# Patient Record
Sex: MALE | Race: WHITE | NOT HISPANIC OR LATINO | Employment: FULL TIME | URBAN - METROPOLITAN AREA
[De-identification: names, ages, dates, MRNs, and addresses within clinical notes are randomized per-mention and may not be internally consistent; named-entity substitution may affect disease eponyms.]

---

## 2019-07-25 ENCOUNTER — OFFICE VISIT (OUTPATIENT)
Dept: FAMILY MEDICINE CLINIC | Facility: CLINIC | Age: 49
End: 2019-07-25
Payer: COMMERCIAL

## 2019-07-25 VITALS
TEMPERATURE: 97.8 F | HEIGHT: 74 IN | OXYGEN SATURATION: 97 % | BODY MASS INDEX: 36.06 KG/M2 | SYSTOLIC BLOOD PRESSURE: 132 MMHG | DIASTOLIC BLOOD PRESSURE: 82 MMHG | WEIGHT: 281 LBS | HEART RATE: 68 BPM

## 2019-07-25 DIAGNOSIS — H61.23 BILATERAL IMPACTED CERUMEN: Primary | ICD-10-CM

## 2019-07-25 DIAGNOSIS — Z12.11 COLON CANCER SCREENING: ICD-10-CM

## 2019-07-25 DIAGNOSIS — Z00.00 WELL ADULT EXAM: ICD-10-CM

## 2019-07-25 PROCEDURE — 3008F BODY MASS INDEX DOCD: CPT | Performed by: FAMILY MEDICINE

## 2019-07-25 PROCEDURE — 99203 OFFICE O/P NEW LOW 30 MIN: CPT | Performed by: FAMILY MEDICINE

## 2019-07-25 PROCEDURE — 1036F TOBACCO NON-USER: CPT | Performed by: FAMILY MEDICINE

## 2019-07-25 PROCEDURE — 69210 REMOVE IMPACTED EAR WAX UNI: CPT | Performed by: FAMILY MEDICINE

## 2019-07-25 NOTE — PROGRESS NOTES
Ear cerumen removal  Date/Time: 7/25/2019 4:02 PM  Performed by: Kimo Low DO  Authorized by: Kimo Low DO     Patient location:  Clinic  Indications / Diagnosis:  Cerumen impaction, right ear  Other Assisting Provider: No    Consent:     Consent obtained:  Verbal    Consent given by:  Patient    Risks discussed:  Bleeding, dizziness, infection, incomplete removal, pain and TM perforation    Alternatives discussed:  Referral, observation, alternative treatment, delayed treatment and no treatment  Universal protocol:     Procedure explained and questions answered to patient or proxy's satisfaction: yes      Patient identity confirmed:  Verbally with patient  Procedure details:     Local anesthetic:  None    Location:  R ear    Procedure type: curette      Approach:  External  Post-procedure details:     Complication:  None    Hearing quality:  Improved    Patient tolerance of procedure:   Tolerated well, no immediate complications

## 2019-07-25 NOTE — PROGRESS NOTES
Ear cerumen removal  Date/Time: 7/25/2019 4:02 PM  Performed by: Tanya Osborne DO  Authorized by: Tanya Osborne DO     Patient location:  Clinic  Indications / Diagnosis:  Cerumen impaction, left ear  Other Assisting Provider: No    Consent:     Consent obtained:  Verbal    Consent given by:  Patient    Risks discussed:  Bleeding, dizziness, infection, incomplete removal, pain and TM perforation    Alternatives discussed:  No treatment, delayed treatment, alternative treatment, observation and referral  Universal protocol:     Procedure explained and questions answered to patient or proxy's satisfaction: yes      Patient identity confirmed:  Verbally with patient  Procedure details:     Local anesthetic:  None    Location:  L ear    Procedure type: curette      Approach:  External  Post-procedure details:     Complication:  None    Hearing quality:  Improved    Patient tolerance of procedure:   Tolerated well, no immediate complications

## 2019-07-25 NOTE — PROGRESS NOTES
Assessment/Plan:  Recommend follow-up with gastroenterologist for colon cancer screening  Bilateral ears disimpacted  Ears appear normal after disimpaction  Anticipatory guidance provided  Script given for blood testing including PSA  No problem-specific Assessment & Plan notes found for this encounter  Diagnoses and all orders for this visit:    Bilateral impacted cerumen  -     Ear cerumen removal  -     Ear cerumen removal    Well adult exam  -     PSA, Total Screen; Future  -     CBC and differential  -     Comprehensive metabolic panel  -     Lipid Panel with Direct LDL reflex    Colon cancer screening  -     Ambulatory referral to Gastroenterology; Future          Subjective:      Patient ID: Mercy Muir is a 46 y o  male  Patient has not been seen in several years  He has difficulty hearing out of both ears for the last 1 week  He states he has swimming and after swimming had difficulty hearing  He has history of previous bilateral cerumen impaction  Denies any ear pain  He otherwise is feeling well and tries to remain active  He is due for colonoscopy for colon cancer screening  The following portions of the patient's history were reviewed and updated as appropriate: allergies, current medications, past family history, past medical history, past social history, past surgical history and problem list     Review of Systems   Constitutional: Negative  HENT: Negative  Eyes: Negative  Respiratory: Negative  Cardiovascular: Negative  Gastrointestinal: Negative  Endocrine: Negative  Genitourinary: Negative  Musculoskeletal: Negative  Skin: Negative  Allergic/Immunologic: Negative  Neurological: Negative  Hematological: Negative  Psychiatric/Behavioral: Negative            Objective:      /82 (BP Location: Left arm, Patient Position: Sitting, Cuff Size: Standard)   Pulse 68   Temp 97 8 °F (36 6 °C) (Tympanic)   Ht 6' 2 02" (1 88 m)   Wt 127 kg (281 lb)   SpO2 97%   BMI 36 06 kg/m²          Physical Exam   Constitutional: He is oriented to person, place, and time  He appears well-developed and well-nourished  HENT:   Head: Normocephalic and atraumatic  Right Ear: External ear normal  Tympanic membrane is not erythematous and not bulging  Left Ear: External ear normal  Tympanic membrane is not erythematous and not bulging  Nose: Nose normal    Mouth/Throat: Oropharynx is clear and moist and mucous membranes are normal  No oral lesions  No oropharyngeal exudate  Bilateral ear canals initially impacted with cerumen  After disimpaction canals look clear tympanic membranes intact  Eyes: Conjunctivae and EOM are normal  Right eye exhibits no discharge  Left eye exhibits no discharge  No scleral icterus  Neck: Normal range of motion  Neck supple  No thyromegaly present  Cardiovascular: Normal rate, regular rhythm and normal heart sounds  Exam reveals no gallop and no friction rub  No murmur heard  Pulmonary/Chest: Effort normal  No respiratory distress  He has no wheezes  He has no rales  He exhibits no tenderness  Abdominal: Soft  Bowel sounds are normal  He exhibits no distension and no mass  There is no tenderness  There is no rebound and no guarding  Musculoskeletal: Normal range of motion  He exhibits no edema, tenderness or deformity  Lymphadenopathy:     He has no cervical adenopathy  Neurological: He is alert and oriented to person, place, and time  He has normal reflexes  No cranial nerve deficit  He exhibits normal muscle tone  Coordination normal    Skin: Skin is warm and dry  No rash noted  No erythema  No pallor  Psychiatric: He has a normal mood and affect  His behavior is normal    Vitals reviewed

## 2019-07-25 NOTE — PROGRESS NOTES
BMI Counseling: Body mass index is 36 06 kg/m²  Discussed the patient's BMI with him  The BMI is above average  BMI counseling and education was provided to the patient  Nutrition recommendations include reducing portion sizes

## 2019-09-26 ENCOUNTER — TELEPHONE (OUTPATIENT)
Dept: FAMILY MEDICINE CLINIC | Facility: CLINIC | Age: 49
End: 2019-09-26

## 2019-09-26 NOTE — TELEPHONE ENCOUNTER
LMOM advised patient to return call  Patient was due to  lab results  Does he still want to  results? If not please remove from done bin  Thanks!

## 2019-10-24 ENCOUNTER — ANESTHESIA EVENT (OUTPATIENT)
Dept: GASTROENTEROLOGY | Facility: HOSPITAL | Age: 49
End: 2019-10-24

## 2019-10-24 RX ORDER — SODIUM CHLORIDE, SODIUM LACTATE, POTASSIUM CHLORIDE, CALCIUM CHLORIDE 600; 310; 30; 20 MG/100ML; MG/100ML; MG/100ML; MG/100ML
125 INJECTION, SOLUTION INTRAVENOUS CONTINUOUS
Status: CANCELLED | OUTPATIENT
Start: 2019-10-24

## 2019-10-24 NOTE — ANESTHESIA PREPROCEDURE EVALUATION
Review of Systems/Medical History  Patient summary reviewed  Chart reviewed  No history of anesthetic complications     Cardiovascular  Exercise tolerance (METS): >4,     Pulmonary  No shortness of breath, No recent URI ,        GI/Hepatic    No GERD ,             Endo/Other    Obesity    GYN       Hematology   Musculoskeletal  Negative musculoskeletal ROS        Neurology  Negative neurology ROS      Psychology           Physical Exam    Airway    Mallampati score: II  TM Distance: >3 FB  Neck ROM: full     Dental   No notable dental hx     Cardiovascular  Rhythm: regular,     Pulmonary  Breath sounds clear to auscultation,     Other Findings        Anesthesia Plan  ASA Score- 1     Anesthesia Type- IV sedation with anesthesia with ASA Monitors  Additional Monitors:   Airway Plan:         Plan Factors-    Induction- intravenous  Postoperative Plan-     Informed Consent- Anesthetic plan and risks discussed with patient  I personally reviewed this patient with the CRNA  Discussed and agreed on the Anesthesia Plan with the CRNA  Yovanny Glynn

## 2019-10-25 ENCOUNTER — ANESTHESIA (OUTPATIENT)
Dept: GASTROENTEROLOGY | Facility: HOSPITAL | Age: 49
End: 2019-10-25

## 2019-10-25 ENCOUNTER — HOSPITAL ENCOUNTER (OUTPATIENT)
Dept: GASTROENTEROLOGY | Facility: HOSPITAL | Age: 49
Setting detail: OUTPATIENT SURGERY
Discharge: HOME | End: 2019-10-25
Attending: COLON & RECTAL SURGERY | Admitting: COLON & RECTAL SURGERY
Payer: COMMERCIAL

## 2019-10-25 VITALS
WEIGHT: 275 LBS | BODY MASS INDEX: 34.19 KG/M2 | SYSTOLIC BLOOD PRESSURE: 115 MMHG | RESPIRATION RATE: 18 BRPM | HEART RATE: 56 BPM | DIASTOLIC BLOOD PRESSURE: 81 MMHG | OXYGEN SATURATION: 98 % | TEMPERATURE: 97.8 F | HEIGHT: 75 IN

## 2019-10-25 DIAGNOSIS — Z12.11 SCREENING FOR COLON CANCER: ICD-10-CM

## 2019-10-25 PROCEDURE — 88305 TISSUE EXAM BY PATHOLOGIST: CPT | Performed by: PATHOLOGY

## 2019-10-25 PROCEDURE — 99243 OFF/OP CNSLTJ NEW/EST LOW 30: CPT | Performed by: COLON & RECTAL SURGERY

## 2019-10-25 PROCEDURE — 45385 COLONOSCOPY W/LESION REMOVAL: CPT | Performed by: COLON & RECTAL SURGERY

## 2019-10-25 PROCEDURE — 45382 COLONOSCOPY W/CONTROL BLEED: CPT | Performed by: COLON & RECTAL SURGERY

## 2019-10-25 PROCEDURE — 45380 COLONOSCOPY AND BIOPSY: CPT | Performed by: COLON & RECTAL SURGERY

## 2019-10-25 RX ORDER — SODIUM CHLORIDE 9 MG/ML
INJECTION, SOLUTION INTRAVENOUS CONTINUOUS PRN
Status: DISCONTINUED | OUTPATIENT
Start: 2019-10-25 | End: 2019-10-25 | Stop reason: SURG

## 2019-10-25 RX ORDER — PROPOFOL 10 MG/ML
INJECTION, EMULSION INTRAVENOUS AS NEEDED
Status: DISCONTINUED | OUTPATIENT
Start: 2019-10-25 | End: 2019-10-25 | Stop reason: SURG

## 2019-10-25 RX ADMIN — SODIUM CHLORIDE: 0.9 INJECTION, SOLUTION INTRAVENOUS at 09:57

## 2019-10-25 RX ADMIN — PROPOFOL 30 MG: 10 INJECTION, EMULSION INTRAVENOUS at 09:57

## 2019-10-25 RX ADMIN — SODIUM CHLORIDE: 0.9 INJECTION, SOLUTION INTRAVENOUS at 09:33

## 2019-10-25 RX ADMIN — PROPOFOL 40 MG: 10 INJECTION, EMULSION INTRAVENOUS at 09:43

## 2019-10-25 RX ADMIN — PROPOFOL 130 MG: 10 INJECTION, EMULSION INTRAVENOUS at 09:39

## 2019-10-25 RX ADMIN — PROPOFOL 30 MG: 10 INJECTION, EMULSION INTRAVENOUS at 09:49

## 2019-10-25 RX ADMIN — PROPOFOL 30 MG: 10 INJECTION, EMULSION INTRAVENOUS at 09:51

## 2019-10-25 RX ADMIN — PROPOFOL 30 MG: 10 INJECTION, EMULSION INTRAVENOUS at 09:54

## 2019-10-25 RX ADMIN — PROPOFOL 30 MG: 10 INJECTION, EMULSION INTRAVENOUS at 09:46

## 2019-10-25 NOTE — ANESTHESIA POSTPROCEDURE EVALUATION
Post-Op Assessment Note    CV Status:  Stable  Pain Score: 0    Pain management: adequate     Mental Status:  Alert and awake   Hydration Status:  Euvolemic   PONV Controlled:  Controlled   Airway Patency:  Patent   Post Op Vitals Reviewed: Yes      Staff: CRNA           /68 (10/25/19 1007)    Temp      Pulse 59 (10/25/19 1007)   Resp 16 (10/25/19 1007)    SpO2 96 % (10/25/19 1007)

## 2019-10-25 NOTE — H&P
History and Physical   Colon and Rectal Surgery   Bentley Valderrama 44 y o  male MRN: [id number]  Unit/Bed#:  Encounter: [id number]  10/25/19   8:41 AM      No chief complaint on file  History of Present Illness   HPI:  Bentley Valderrama is a 44 y o  male who presents for 1st screening colonoscopy, denies family history colon polyps or colon cancer  Denies nausea/vomiting/abdominal pain, change in bowel habits, rectal bleeding, or other constitutional symptoms  Historical Information   Past Medical History:   Diagnosis Date    Seasonal allergies      Past Surgical History:   Procedure Laterality Date    HEMORROIDECTOMY         Meds/Allergies       (Not in a hospital admission)    No current outpatient medications on file      No Known Allergies      Social History   Social History     Substance and Sexual Activity   Alcohol Use Yes    Comment: social     Social History     Substance and Sexual Activity   Drug Use Never     Social History     Tobacco Use   Smoking Status Never Smoker   Smokeless Tobacco Never Used         Family History:   Family History   Problem Relation Age of Onset    Cancer Mother     Cancer Father     Skin cancer Father     Breast cancer additional onset Sister     Cancer Sister     Skin cancer Brother          Objective     Current Vitals:   Blood Pressure: 133/82 (10/25/19 0823)  Pulse: (!) 54 (10/25/19 0823)  Temperature: 97 8 °F (36 6 °C) (10/25/19 0823)  Respirations: 16 (10/25/19 0823)  SpO2: 96 % (10/25/19 0823)  No intake or output data in the 24 hours ending 10/25/19 0841    Physical Exam:  General:no distress  Eyes:perrla/eomi  ENT:moist mucus membranes  Neck:supple  Pulm:no increased work of breathing, clear bilateral  CV:sinus  Abdomen:soft,nontender  Extremities:no edema        ASSESSMENT:    Average risk screening colonoscopy, none prior    Screening colonoscopy,discussed in a face-to-face, personal, informed consent process, the benefits, alternatives, risks including not limited to bleeding, missed lesion, perforation requiring emergent surgery discussed/understood        PLAN:  Colonoscopy

## 2019-10-25 NOTE — ANESTHESIA POSTPROCEDURE EVALUATION
Post-Op Assessment Note    CV Status:  Stable  Pain Score: 0    Pain management: adequate     Mental Status:  Alert and awake   Hydration Status:  Euvolemic   PONV Controlled:  Controlled   Airway Patency:  Patent   Post Op Vitals Reviewed: Yes      Staff: Anesthesiologist           /68 (10/25/19 1007)    Temp      Pulse 59 (10/25/19 1007)   Resp 16 (10/25/19 1007)    SpO2 96 % (10/25/19 1007)

## 2020-09-29 ENCOUNTER — CLINICAL SUPPORT (OUTPATIENT)
Dept: FAMILY MEDICINE CLINIC | Facility: CLINIC | Age: 50
End: 2020-09-29
Payer: COMMERCIAL

## 2020-09-29 DIAGNOSIS — Z23 NEED FOR INFLUENZA VACCINATION: Primary | ICD-10-CM

## 2020-09-29 PROCEDURE — 90682 RIV4 VACC RECOMBINANT DNA IM: CPT

## 2020-09-29 PROCEDURE — 90471 IMMUNIZATION ADMIN: CPT

## 2020-09-29 NOTE — PROGRESS NOTES
Patient in office for influenza vaccine  Patient tolerated well, no questions or concerns at this time

## 2021-02-05 ENCOUNTER — IMMUNIZATIONS (OUTPATIENT)
Dept: FAMILY MEDICINE CLINIC | Facility: HOSPITAL | Age: 51
End: 2021-02-05

## 2021-02-05 DIAGNOSIS — Z23 ENCOUNTER FOR IMMUNIZATION: Primary | ICD-10-CM

## 2021-02-05 PROCEDURE — 0011A SARS-COV-2 / COVID-19 MRNA VACCINE (MODERNA) 100 MCG: CPT

## 2021-02-05 PROCEDURE — 91301 SARS-COV-2 / COVID-19 MRNA VACCINE (MODERNA) 100 MCG: CPT

## 2021-03-04 ENCOUNTER — IMMUNIZATIONS (OUTPATIENT)
Dept: FAMILY MEDICINE CLINIC | Facility: HOSPITAL | Age: 51
End: 2021-03-04

## 2021-03-04 DIAGNOSIS — Z23 ENCOUNTER FOR IMMUNIZATION: Primary | ICD-10-CM

## 2021-03-04 PROCEDURE — 91301 SARS-COV-2 / COVID-19 MRNA VACCINE (MODERNA) 100 MCG: CPT

## 2021-03-04 PROCEDURE — 0012A SARS-COV-2 / COVID-19 MRNA VACCINE (MODERNA) 100 MCG: CPT

## 2021-04-30 ENCOUNTER — TELEPHONE (OUTPATIENT)
Dept: FAMILY MEDICINE CLINIC | Facility: CLINIC | Age: 51
End: 2021-04-30

## 2021-04-30 NOTE — TELEPHONE ENCOUNTER
We have not seen patient in 2 years  Recommend scheduling annual checkup and we can discuss further at that time

## 2021-05-07 ENCOUNTER — OFFICE VISIT (OUTPATIENT)
Dept: FAMILY MEDICINE CLINIC | Facility: CLINIC | Age: 51
End: 2021-05-07
Payer: COMMERCIAL

## 2021-05-07 VITALS
BODY MASS INDEX: 36.53 KG/M2 | HEART RATE: 70 BPM | OXYGEN SATURATION: 97 % | HEIGHT: 75 IN | SYSTOLIC BLOOD PRESSURE: 124 MMHG | DIASTOLIC BLOOD PRESSURE: 82 MMHG | WEIGHT: 293.8 LBS | TEMPERATURE: 97 F

## 2021-05-07 DIAGNOSIS — Z12.5 SCREENING FOR PROSTATE CANCER: ICD-10-CM

## 2021-05-07 DIAGNOSIS — G89.29 CHRONIC KNEE PAIN, UNSPECIFIED LATERALITY: ICD-10-CM

## 2021-05-07 DIAGNOSIS — M25.569 CHRONIC KNEE PAIN, UNSPECIFIED LATERALITY: ICD-10-CM

## 2021-05-07 DIAGNOSIS — E66.9 OBESITY (BMI 35.0-39.9 WITHOUT COMORBIDITY): ICD-10-CM

## 2021-05-07 DIAGNOSIS — Z00.00 WELL ADULT EXAM: Primary | ICD-10-CM

## 2021-05-07 PROCEDURE — 99396 PREV VISIT EST AGE 40-64: CPT | Performed by: FAMILY MEDICINE

## 2021-05-07 PROCEDURE — 3725F SCREEN DEPRESSION PERFORMED: CPT | Performed by: FAMILY MEDICINE

## 2021-05-07 PROCEDURE — 36415 COLL VENOUS BLD VENIPUNCTURE: CPT | Performed by: FAMILY MEDICINE

## 2021-05-07 NOTE — PROGRESS NOTES
BMI Counseling: Body mass index is 36 72 kg/m²  The BMI is above normal  Nutrition recommendations include reducing portion sizes

## 2021-05-07 NOTE — PROGRESS NOTES
Assessment/Plan: card given for Orthopedics  He may have just a mild tendinitis to the lateral aspect of the knee  We offered to drain sebaceous cyst but he prefers to see dermatologist for this  He will schedule  Recommend screening blood testing  He needs a sleep study done for work  He states that for his [employer] physical his BMI triggered a need to get sleep study done  He denies any daytime hypersomnolence  Order provided  1  Well adult exam  -     CBC and differential  -     Comprehensive metabolic panel  -     Lipid Panel with Direct LDL reflex    2  Chronic knee pain, unspecified laterality    3  Obesity (BMI 35 0-39 9 without comorbidity)  -     Diagnostic Sleep Study; Future; Expected date: 05/08/2021  -     CBC and differential  -     Comprehensive metabolic panel  -     Lipid Panel with Direct LDL reflex    4  Screening for prostate cancer  -     PSA, Total Screen          Subjective:      Patient ID: Za Pack is a 44 y o  male  HPI         The following portions of the patient's history were reviewed and updated as appropriate: allergies, current medications, past family history, past medical history, past social history, past surgical history, and problem list     Review of Systems   Constitutional: Negative  HENT: Negative  Eyes: Negative  Respiratory: Negative  Cardiovascular: Negative  Gastrointestinal: Negative  Endocrine: Negative  Genitourinary: Negative  Musculoskeletal: Positive for arthralgias  Skin: Negative  Allergic/Immunologic: Negative  Neurological: Negative  Hematological: Negative  Psychiatric/Behavioral: Negative  Objective:      /82 (BP Location: Left arm, Patient Position: Sitting, Cuff Size: Standard)   Pulse 70   Temp (!) 97 °F (36 1 °C) (Temporal)   Ht 6' 3" (1 905 m)   Wt 133 kg (293 lb 12 8 oz)   SpO2 97%   BMI 36 72 kg/m²          Physical Exam  Vitals signs reviewed     Constitutional:       Appearance: He is well-developed  HENT:      Head: Normocephalic and atraumatic  Right Ear: External ear normal  Tympanic membrane is not erythematous or bulging  Left Ear: External ear normal  Tympanic membrane is not erythematous or bulging  Nose: Nose normal       Mouth/Throat:      Mouth: No oral lesions  Pharynx: No oropharyngeal exudate  Eyes:      General: No scleral icterus  Right eye: No discharge  Left eye: No discharge  Conjunctiva/sclera: Conjunctivae normal    Neck:      Musculoskeletal: Normal range of motion and neck supple  Thyroid: No thyromegaly  Cardiovascular:      Rate and Rhythm: Normal rate and regular rhythm  Heart sounds: Normal heart sounds  No murmur  No friction rub  No gallop  Pulmonary:      Effort: Pulmonary effort is normal  No respiratory distress  Breath sounds: No wheezing or rales  Chest:      Chest wall: No tenderness  Abdominal:      General: Bowel sounds are normal  There is no distension  Palpations: Abdomen is soft  There is no mass  Tenderness: There is no abdominal tenderness  There is no guarding or rebound  Musculoskeletal: Normal range of motion  General: No tenderness or deformity  Comments: Full range of motion to the right knee  No patellar tracking  No edema  Negative anterior drawer sign  Lymphadenopathy:      Cervical: No cervical adenopathy  Skin:     General: Skin is warm and dry  Coloration: Skin is not pale  Findings: No erythema or rash  Comments: Sebaceous cyst present to the back  Neurological:      Mental Status: He is alert and oriented to person, place, and time  Cranial Nerves: No cranial nerve deficit  Motor: No abnormal muscle tone  Coordination: Coordination normal       Deep Tendon Reflexes: Reflexes are normal and symmetric     Psychiatric:         Behavior: Behavior normal

## 2021-05-08 LAB
ALBUMIN SERPL-MCNC: 4.3 G/DL (ref 3.6–5.1)
ALBUMIN/GLOB SERPL: 1.6 (CALC) (ref 1–2.5)
ALP SERPL-CCNC: 57 U/L (ref 35–144)
ALT SERPL-CCNC: 18 U/L (ref 9–46)
AST SERPL-CCNC: 19 U/L (ref 10–35)
BASOPHILS # BLD AUTO: 31 CELLS/UL (ref 0–200)
BASOPHILS NFR BLD AUTO: 0.6 %
BILIRUB SERPL-MCNC: 1.3 MG/DL (ref 0.2–1.2)
BUN SERPL-MCNC: 21 MG/DL (ref 7–25)
BUN/CREAT SERPL: ABNORMAL (CALC) (ref 6–22)
CALCIUM SERPL-MCNC: 9.1 MG/DL (ref 8.6–10.3)
CHLORIDE SERPL-SCNC: 106 MMOL/L (ref 98–110)
CHOLEST SERPL-MCNC: 173 MG/DL
CHOLEST/HDLC SERPL: 4.2 (CALC)
CO2 SERPL-SCNC: 26 MMOL/L (ref 20–32)
CREAT SERPL-MCNC: 0.89 MG/DL (ref 0.7–1.33)
EOSINOPHIL # BLD AUTO: 109 CELLS/UL (ref 15–500)
EOSINOPHIL NFR BLD AUTO: 2.1 %
ERYTHROCYTE [DISTWIDTH] IN BLOOD BY AUTOMATED COUNT: 12.5 % (ref 11–15)
GLOBULIN SER CALC-MCNC: 2.7 G/DL (CALC) (ref 1.9–3.7)
GLUCOSE SERPL-MCNC: 85 MG/DL (ref 65–99)
HCT VFR BLD AUTO: 47.3 % (ref 38.5–50)
HDLC SERPL-MCNC: 41 MG/DL
HGB BLD-MCNC: 16.5 G/DL (ref 13.2–17.1)
LDLC SERPL CALC-MCNC: 117 MG/DL (CALC)
LYMPHOCYTES # BLD AUTO: 1326 CELLS/UL (ref 850–3900)
LYMPHOCYTES NFR BLD AUTO: 25.5 %
MCH RBC QN AUTO: 32.8 PG (ref 27–33)
MCHC RBC AUTO-ENTMCNC: 34.9 G/DL (ref 32–36)
MCV RBC AUTO: 94 FL (ref 80–100)
MONOCYTES # BLD AUTO: 614 CELLS/UL (ref 200–950)
MONOCYTES NFR BLD AUTO: 11.8 %
NEUTROPHILS # BLD AUTO: 3120 CELLS/UL (ref 1500–7800)
NEUTROPHILS NFR BLD AUTO: 60 %
NONHDLC SERPL-MCNC: 132 MG/DL (CALC)
PLATELET # BLD AUTO: 257 THOUSAND/UL (ref 140–400)
PMV BLD REES-ECKER: 9.7 FL (ref 7.5–12.5)
POTASSIUM SERPL-SCNC: 3.9 MMOL/L (ref 3.5–5.3)
PROT SERPL-MCNC: 7 G/DL (ref 6.1–8.1)
PSA SERPL-MCNC: 0.6 NG/ML
RBC # BLD AUTO: 5.03 MILLION/UL (ref 4.2–5.8)
SL AMB EGFR AFRICAN AMERICAN: 114 ML/MIN/1.73M2
SL AMB EGFR NON AFRICAN AMERICAN: 98 ML/MIN/1.73M2
SODIUM SERPL-SCNC: 140 MMOL/L (ref 135–146)
TRIGL SERPL-MCNC: 56 MG/DL
WBC # BLD AUTO: 5.2 THOUSAND/UL (ref 3.8–10.8)

## 2021-06-08 ENCOUNTER — APPOINTMENT (OUTPATIENT)
Dept: RADIOLOGY | Facility: MEDICAL CENTER | Age: 51
End: 2021-06-08
Payer: COMMERCIAL

## 2021-06-08 ENCOUNTER — OFFICE VISIT (OUTPATIENT)
Dept: OBGYN CLINIC | Facility: MEDICAL CENTER | Age: 51
End: 2021-06-08
Payer: COMMERCIAL

## 2021-06-08 VITALS
SYSTOLIC BLOOD PRESSURE: 135 MMHG | HEIGHT: 75 IN | WEIGHT: 293 LBS | BODY MASS INDEX: 36.43 KG/M2 | HEART RATE: 65 BPM | DIASTOLIC BLOOD PRESSURE: 87 MMHG

## 2021-06-08 DIAGNOSIS — M25.561 RIGHT KNEE PAIN, UNSPECIFIED CHRONICITY: ICD-10-CM

## 2021-06-08 DIAGNOSIS — Z01.89 ENCOUNTER FOR LOWER EXTREMITY COMPARISON IMAGING STUDY: ICD-10-CM

## 2021-06-08 DIAGNOSIS — M25.561 RIGHT KNEE PAIN, UNSPECIFIED CHRONICITY: Primary | ICD-10-CM

## 2021-06-08 PROCEDURE — 99203 OFFICE O/P NEW LOW 30 MIN: CPT | Performed by: ORTHOPAEDIC SURGERY

## 2021-06-08 PROCEDURE — 73564 X-RAY EXAM KNEE 4 OR MORE: CPT

## 2021-06-08 NOTE — PROGRESS NOTES
Orthopaedic Surgery - Office Note  Anu Bernabe (54 y o  male)   : 1970   MRN: [id number]  Encounter Date: 2021    Chief Complaint   Patient presents with    Right Knee - Pain       Assessment / Plan  Right knee concern for lateral meniscus tear    · MRI of right knee   · Activity as tolerated  · Continue with Tylenol and Voltaren gel prn   Return for after MRI for results  History of Present Illness  Anu Bernabe is a 44 y o  male who presents for evaluation of right knee pain  He had ongoing knee pain for a few months followed by a twisting injury in 2021  Since then he has experience increase pain associated with locking and clicking  He gets locking with prolonged sitting and walking  He describes the pain as being lateral  This has also caused swelling around the knee  He has been using ice, voltaren gel and Tylenol for pain management  He is active and a , this pain and discomfort which requires activity modification  He denies any radiating symptoms  Review of Systems  Pertinent items are noted in HPI  All other systems were reviewed and are negative  Physical Exam  /87   Pulse 65   Ht 6' 3" (1 905 m)   Wt 133 kg (293 lb)   BMI 36 62 kg/m²   Cons: Appears well  No apparent distress  Psych: Alert  Oriented x3  Mood and affect normal   Eyes: PERRLA, EOMI  Resp: Normal effort  No audible wheezing or stridor  CV: Palpable pulse  No discernable arrhythmia  No LE edema  Lymph:  No palpable cervical, axillary, or inguinal lymphadenopathy  Skin: Warm  No palpable masses  No visible lesions  Neuro: Normal muscle tone  Normal and symmetric DTR's  Right Knee Exam  Alignment:  Normal knee alignment  Inspection:  No swelling  No erythema  No ecchymosis  Palpation:  moderate lateral joint line tenderness  moderate effusion  ROM:  Knee Extension 0  Knee Flexion 125  Strength:  Able to SLR without lag   Able to actively extend knee against gravity  Stability:  (-) Lachman  Tests:  (+) Que  (+) Bounce test   Patella:  Normal patellar mobility  Neurovascular:  Sensation intact in DP/SP/Guzman/Sa/T nerve distributions  2+ DP & PT pulses  Gait:  Normal     Studies Reviewed  I have personally reviewed pertinent films in PACS  XR of right knee - images from 6/08/2021 which demonstrate no osseous abnormalities or fractures     Procedures  No procedures today  Medical, Surgical, Family, and Social History  The patient's medical history, family history, and social history, were reviewed and updated as appropriate  Past Medical History:   Diagnosis Date    Seasonal allergies        Past Surgical History:   Procedure Laterality Date    HEMORROIDECTOMY         Family History   Problem Relation Age of Onset    Cancer Mother     Cancer Father     Skin cancer Father     Breast cancer additional onset Sister    [facility] Cancer Sister     Skin cancer Brother        Social History     Occupational History    Not on file   Tobacco Use    Smoking status: Never Smoker    Smokeless tobacco: Never Used   Substance and Sexual Activity    Alcohol use: Yes     Comment: socially     Drug use: Never    Sexual activity: Not on file       No Known Allergies    No current outpatient medications on file        Nury Urbano    Scribe Attestation    I,:  Nury Urbano am acting as a scribe while in the presence of the attending physician :       I,:  Sharlotte Castleman, MD personally performed the services described in this documentation    as scribed in my presence :

## 2021-06-12 ENCOUNTER — HOSPITAL ENCOUNTER (OUTPATIENT)
Dept: MRI IMAGING | Facility: HOSPITAL | Age: 51
Discharge: HOME | End: 2021-06-12
Attending: ORTHOPAEDIC SURGERY
Payer: COMMERCIAL

## 2021-06-12 DIAGNOSIS — M25.561 RIGHT KNEE PAIN, UNSPECIFIED CHRONICITY: ICD-10-CM

## 2021-06-12 PROCEDURE — G1004 CDSM NDSC: HCPCS

## 2021-06-12 PROCEDURE — 73721 MRI JNT OF LWR EXTRE W/O DYE: CPT

## 2021-06-18 VITALS
SYSTOLIC BLOOD PRESSURE: 154 MMHG | BODY MASS INDEX: 36.43 KG/M2 | HEIGHT: 75 IN | WEIGHT: 293 LBS | DIASTOLIC BLOOD PRESSURE: 95 MMHG | HEART RATE: 68 BPM

## 2021-06-18 DIAGNOSIS — S83.271S COMPLEX TEAR OF LATERAL MENISCUS OF RIGHT KNEE, UNSPECIFIED WHETHER OLD OR CURRENT TEAR, SEQUELA: Primary | ICD-10-CM

## 2021-06-18 DIAGNOSIS — M25.561 RIGHT KNEE PAIN, UNSPECIFIED CHRONICITY: ICD-10-CM

## 2021-06-18 PROCEDURE — 99213 OFFICE O/P EST LOW 20 MIN: CPT | Performed by: ORTHOPAEDIC SURGERY

## 2021-06-18 PROCEDURE — 1036F TOBACCO NON-USER: CPT | Performed by: ORTHOPAEDIC SURGERY

## 2021-06-18 PROCEDURE — 3008F BODY MASS INDEX DOCD: CPT | Performed by: ORTHOPAEDIC SURGERY

## 2021-06-18 RX ORDER — LORATADINE 10 MG/1
10 TABLET ORAL DAILY
COMMUNITY

## 2021-06-18 RX ORDER — CEFAZOLIN SODIUM 2 G/50ML
2000 SOLUTION INTRAVENOUS ONCE
Status: CANCELLED | OUTPATIENT
Start: 2021-07-19 | End: 2021-06-18

## 2021-06-18 RX ORDER — ACETAMINOPHEN 500 MG
500 TABLET ORAL EVERY 6 HOURS PRN
COMMUNITY

## 2021-06-18 NOTE — PROGRESS NOTES
Orthopaedic Surgery - Office Note  Benny Prakash (20 y o  male)   : 1970   MRN: [id number]  Encounter Date: 2021    Chief Complaint   Patient presents with    Right Knee - Follow-up       Assessment / Plan  Right knee lateral meniscus tear    · The diagnosis and treatment options were reviewed  · The patient wishes to proceed with right knee arthroscopy with lateral meniscus repair vs menisectomy  · The risks, benefits, and alternatives were discussed  · Written consent was obtained  · Continue with ice, anti-inflammatories and tylenol prn pain  Return for 5-7 days after surgery  History of Present Illness  Benny Prakash is a 44 y o  male who presents for follow up right knee pain and MRI results  He had ongoing knee pain for a few months followed by a twisting injury in 2021  Since then he has experience increase pain associated with locking and clicking  He gets locking with prolonged sitting and walking  He describes the pain as being lateral  This has also caused swelling around the knee  He has been using ice, voltaren gel and Tylenol for pain management  He is active and a , this pain and discomfort which requires activity modification  He denies any radiating symptoms  There were no changes since his prior visit  Review of Systems  Pertinent items are noted in HPI  All other systems were reviewed and are negative  Physical Exam  /95   Pulse 68   Ht 6' 3" (1 905 m)   Wt 133 kg (293 lb)   BMI 36 62 kg/m²   Cons: Appears well  No apparent distress  Psych: Alert  Oriented x3  Mood and affect normal   Eyes: PERRLA, EOMI  Resp: Normal effort  No audible wheezing or stridor  CV: Palpable pulse  No discernable arrhythmia  No LE edema  Lymph:  No palpable cervical, axillary, or inguinal lymphadenopathy  Skin: Warm  No palpable masses  No visible lesions  Neuro: Normal muscle tone  Normal and symmetric DTR's       Right Knee Exam  Alignment:  Normal knee alignment  Inspection:  No edema  No erythema  No ecchymosis  Palpation:  moderate lateral joint line tenderness  mild effusion  ROM:  Knee Extension 0  Knee Flexion 125  Strength:  Quadriceps 5/5  Hamstrings 5/5  Stability:  (-) Lachman  Tests:  (+) Qeu  (+) Bounce test   Patella:  Normal patellar mobility  Neurovascular:  Sensation intact in DP/SP/Guzman/Sa/T nerve distributions  2+ DP & PT pulses  Gait:  Normal     Studies Reviewed  I have personally reviewed pertinent films in PACS  MRI of right knee - images from 6/12/2021 which show longitudinal lateral meniscus tear  XR of right knee - images from 6/08/2021 which demonstrate no osseous abnormalities or fractures     Procedures  No procedures today  Medical, Surgical, Family, and Social History  The patient's medical history, family history, and social history, were reviewed and updated as appropriate      Past Medical History:   Diagnosis Date    Seasonal allergies        Past Surgical History:   Procedure Laterality Date    HEMORROIDECTOMY         Family History   Problem Relation Age of Onset    Cancer Mother     Cancer Father     Skin cancer Father     Breast cancer additional onset Sister    [facility] Courser Cancer Sister     Skin cancer Brother        Social History     Occupational History    Not on file   Tobacco Use    Smoking status: Never Smoker    Smokeless tobacco: Never Used   Vaping Use    Vaping Use: Never used   Substance and Sexual Activity    Alcohol use: Yes     Comment: socially     Drug use: Never    Sexual activity: Not on file       No Known Allergies      Current Outpatient Medications:     acetaminophen (TYLENOL) 500 mg tablet, Take 500 mg by mouth every 6 (six) hours as needed for mild pain, Disp: , Rfl:     loratadine (CLARITIN) 10 mg tablet, Take 10 mg by mouth daily, Disp: , Rfl:       [employer]    I,:  Jose Mao am acting as a scribe while in the presence of the attending physician : I,:  Jacob Teixeira MD personally performed the services described in this documentation    as scribed in my presence :

## 2021-06-21 ENCOUNTER — OFFICE VISIT (OUTPATIENT)
Dept: FAMILY MEDICINE CLINIC | Facility: CLINIC | Age: 51
End: 2021-06-21
Payer: COMMERCIAL

## 2021-06-21 VITALS
DIASTOLIC BLOOD PRESSURE: 88 MMHG | TEMPERATURE: 97.7 F | OXYGEN SATURATION: 97 % | HEART RATE: 78 BPM | SYSTOLIC BLOOD PRESSURE: 130 MMHG | HEIGHT: 75 IN | BODY MASS INDEX: 36.7 KG/M2 | WEIGHT: 295.2 LBS

## 2021-06-21 DIAGNOSIS — J01.00 ACUTE NON-RECURRENT MAXILLARY SINUSITIS: Primary | ICD-10-CM

## 2021-06-21 PROCEDURE — U0005 INFEC AGEN DETEC AMPLI PROBE: HCPCS | Performed by: FAMILY MEDICINE

## 2021-06-21 PROCEDURE — 3008F BODY MASS INDEX DOCD: CPT | Performed by: FAMILY MEDICINE

## 2021-06-21 PROCEDURE — U0003 INFECTIOUS AGENT DETECTION BY NUCLEIC ACID (DNA OR RNA); SEVERE ACUTE RESPIRATORY SYNDROME CORONAVIRUS 2 (SARS-COV-2) (CORONAVIRUS DISEASE [COVID-19]), AMPLIFIED PROBE TECHNIQUE, MAKING USE OF HIGH THROUGHPUT TECHNOLOGIES AS DESCRIBED BY CMS-2020-01-R: HCPCS | Performed by: FAMILY MEDICINE

## 2021-06-21 PROCEDURE — 1036F TOBACCO NON-USER: CPT | Performed by: FAMILY MEDICINE

## 2021-06-21 PROCEDURE — 99213 OFFICE O/P EST LOW 20 MIN: CPT | Performed by: FAMILY MEDICINE

## 2021-06-21 RX ORDER — AMOXICILLIN AND CLAVULANATE POTASSIUM 875; 125 MG/1; MG/1
1 TABLET, FILM COATED ORAL EVERY 12 HOURS SCHEDULED
Qty: 14 TABLET | Refills: 0 | Status: SHIPPED | OUTPATIENT
Start: 2021-06-21 | End: 2021-06-28

## 2021-06-21 RX ORDER — IPRATROPIUM BROMIDE 21 UG/1
2 SPRAY, METERED NASAL EVERY 12 HOURS
Qty: 30 ML | Refills: 0 | Status: SHIPPED | OUTPATIENT
Start: 2021-06-21

## 2021-06-21 NOTE — H&P (VIEW-ONLY)
Assessment/Plan:  Medication reviewed  Recommend return to office if no improvement or worsening symptoms  1  Acute non-recurrent maxillary sinusitis  -     Novel Coronavirus (Covid-19),PCR [facility] - Collected in Office  -     amoxicillin-clavulanate (AUGMENTIN) 875-125 mg per tablet; Take 1 tablet by mouth every 12 (twelve) hours for 7 days  -     ipratropium (ATROVENT) 0 03 % nasal spray; 2 sprays into each nostril every 12 (twelve) hours          Subjective:      Patient ID: Morena Harper is a 44 y o  male  Patient with cough and congestion over the last several days  He has been immunized against COVID  No fevers  The following portions of the patient's history were reviewed and updated as appropriate: allergies, current medications, past family history, past medical history, past social history, past surgical history, and problem list     Review of Systems   Constitutional: Negative  Negative for fever  HENT: Positive for congestion  Eyes: Negative  Respiratory: Negative for cough  Cardiovascular: Negative  Gastrointestinal: Negative  Endocrine: Negative  Genitourinary: Negative  Musculoskeletal: Negative  Skin: Negative  Allergic/Immunologic: Negative  Neurological: Negative  Hematological: Negative  Psychiatric/Behavioral: Negative  Objective:      /88 (BP Location: Left arm, Patient Position: Sitting, Cuff Size: Standard)   Pulse 78   Temp 97 7 °F (36 5 °C) (Temporal)   Ht 6' 3" (1 905 m)   Wt 134 kg (295 lb 3 2 oz)   SpO2 97%   BMI 36 90 kg/m²          Physical Exam  Vitals reviewed  Constitutional:       Appearance: He is well-developed  HENT:      Head: Normocephalic and atraumatic  Right Ear: External ear normal  Tympanic membrane is not erythematous or bulging  Left Ear: External ear normal  Tympanic membrane is not erythematous or bulging  Nose: Nose normal       Mouth/Throat:      Mouth: No oral lesions  Pharynx: No oropharyngeal exudate  Eyes:      General: No scleral icterus  Right eye: No discharge  Left eye: No discharge  Conjunctiva/sclera: Conjunctivae normal    Neck:      Thyroid: No thyromegaly  Cardiovascular:      Rate and Rhythm: Normal rate and regular rhythm  Heart sounds: Normal heart sounds  No murmur heard  No friction rub  No gallop  Pulmonary:      Effort: Pulmonary effort is normal  No respiratory distress  Breath sounds: No wheezing or rales  Chest:      Chest wall: No tenderness  Abdominal:      General: Bowel sounds are normal  There is no distension  Palpations: Abdomen is soft  There is no mass  Tenderness: There is no abdominal tenderness  There is no guarding or rebound  Musculoskeletal:         General: No tenderness or deformity  Normal range of motion  Cervical back: Normal range of motion and neck supple  Lymphadenopathy:      Cervical: No cervical adenopathy  Skin:     General: Skin is warm and dry  Coloration: Skin is not pale  Findings: No erythema or rash  Neurological:      Mental Status: He is alert and oriented to person, place, and time  Cranial Nerves: No cranial nerve deficit  Motor: No abnormal muscle tone  Coordination: Coordination normal       Deep Tendon Reflexes: Reflexes are normal and symmetric     Psychiatric:         Behavior: Behavior normal

## 2021-06-21 NOTE — PROGRESS NOTES
Assessment/Plan:  Medication reviewed  Recommend return to office if no improvement or worsening symptoms  1  Acute non-recurrent maxillary sinusitis  -     Novel Coronavirus (Covid-19),PCR [facility] - Collected in Office  -     amoxicillin-clavulanate (AUGMENTIN) 875-125 mg per tablet; Take 1 tablet by mouth every 12 (twelve) hours for 7 days  -     ipratropium (ATROVENT) 0 03 % nasal spray; 2 sprays into each nostril every 12 (twelve) hours          Subjective:      Patient ID: Anu Bernabe is a 44 y o  male  Patient with cough and congestion over the last several days  He has been immunized against COVID  No fevers  The following portions of the patient's history were reviewed and updated as appropriate: allergies, current medications, past family history, past medical history, past social history, past surgical history, and problem list     Review of Systems   Constitutional: Negative  Negative for fever  HENT: Positive for congestion  Eyes: Negative  Respiratory: Negative for cough  Cardiovascular: Negative  Gastrointestinal: Negative  Endocrine: Negative  Genitourinary: Negative  Musculoskeletal: Negative  Skin: Negative  Allergic/Immunologic: Negative  Neurological: Negative  Hematological: Negative  Psychiatric/Behavioral: Negative  Objective:      /88 (BP Location: Left arm, Patient Position: Sitting, Cuff Size: Standard)   Pulse 78   Temp 97 7 °F (36 5 °C) (Temporal)   Ht 6' 3" (1 905 m)   Wt 134 kg (295 lb 3 2 oz)   SpO2 97%   BMI 36 90 kg/m²          Physical Exam  Vitals reviewed  Constitutional:       Appearance: He is well-developed  HENT:      Head: Normocephalic and atraumatic  Right Ear: External ear normal  Tympanic membrane is not erythematous or bulging  Left Ear: External ear normal  Tympanic membrane is not erythematous or bulging  Nose: Nose normal       Mouth/Throat:      Mouth: No oral lesions  Pharynx: No oropharyngeal exudate  Eyes:      General: No scleral icterus  Right eye: No discharge  Left eye: No discharge  Conjunctiva/sclera: Conjunctivae normal    Neck:      Thyroid: No thyromegaly  Cardiovascular:      Rate and Rhythm: Normal rate and regular rhythm  Heart sounds: Normal heart sounds  No murmur heard  No friction rub  No gallop  Pulmonary:      Effort: Pulmonary effort is normal  No respiratory distress  Breath sounds: No wheezing or rales  Chest:      Chest wall: No tenderness  Abdominal:      General: Bowel sounds are normal  There is no distension  Palpations: Abdomen is soft  There is no mass  Tenderness: There is no abdominal tenderness  There is no guarding or rebound  Musculoskeletal:         General: No tenderness or deformity  Normal range of motion  Cervical back: Normal range of motion and neck supple  Lymphadenopathy:      Cervical: No cervical adenopathy  Skin:     General: Skin is warm and dry  Coloration: Skin is not pale  Findings: No erythema or rash  Neurological:      Mental Status: He is alert and oriented to person, place, and time  Cranial Nerves: No cranial nerve deficit  Motor: No abnormal muscle tone  Coordination: Coordination normal       Deep Tendon Reflexes: Reflexes are normal and symmetric     Psychiatric:         Behavior: Behavior normal

## 2021-06-22 ENCOUNTER — TELEPHONE (OUTPATIENT)
Dept: OBGYN CLINIC | Facility: MEDICAL CENTER | Age: 51
End: 2021-06-22

## 2021-06-22 LAB — SARS-COV-2 RNA RESP QL NAA+PROBE: NEGATIVE

## 2021-06-22 NOTE — TELEPHONE ENCOUNTER
Called patient to discuss his visit yesterday with family doctor for sinusitis  He was placed on an antibiotic and had Covid test  No other PAT's or medical clearance will be required from ortho  He will followup with fam doctor if he does not recover completely from his sinus issue

## 2021-07-08 NOTE — PRE-PROCEDURE INSTRUCTIONS
Pre-Surgery Instructions:   Medication Instructions    acetaminophen (TYLENOL) 500 mg tablet Instructed patient per Anesthesia Guidelines   ipratropium (ATROVENT) 0 03 % nasal spray Instructed patient per Anesthesia Guidelines   loratadine (CLARITIN) 10 mg tablet Instructed patient per Anesthesia Guidelines  Instructed may use nasal spray prn the morning of surgery  No aspirin, NSAIDs, vitamins, or supplements 1 week before surgery

## 2021-07-13 DIAGNOSIS — J01.00 ACUTE NON-RECURRENT MAXILLARY SINUSITIS: ICD-10-CM

## 2021-07-13 RX ORDER — IPRATROPIUM BROMIDE 21 UG/1
2 SPRAY, METERED NASAL EVERY 12 HOURS
OUTPATIENT
Start: 2021-07-13

## 2021-07-16 ENCOUNTER — ANESTHESIA EVENT (OUTPATIENT)
Dept: PERIOP | Facility: HOSPITAL | Age: 51
End: 2021-07-16
Payer: COMMERCIAL

## 2021-07-19 ENCOUNTER — HOSPITAL ENCOUNTER (OUTPATIENT)
Facility: HOSPITAL | Age: 51
Setting detail: OUTPATIENT SURGERY
Discharge: HOME | End: 2021-07-19
Attending: ORTHOPAEDIC SURGERY | Admitting: ORTHOPAEDIC SURGERY
Payer: COMMERCIAL

## 2021-07-19 ENCOUNTER — ANESTHESIA (OUTPATIENT)
Dept: PERIOP | Facility: HOSPITAL | Age: 51
End: 2021-07-19
Payer: COMMERCIAL

## 2021-07-19 VITALS
TEMPERATURE: 97.8 F | SYSTOLIC BLOOD PRESSURE: 127 MMHG | DIASTOLIC BLOOD PRESSURE: 72 MMHG | OXYGEN SATURATION: 96 % | HEIGHT: 75 IN | BODY MASS INDEX: 36.68 KG/M2 | HEART RATE: 59 BPM | WEIGHT: 295 LBS | RESPIRATION RATE: 14 BRPM

## 2021-07-19 DIAGNOSIS — S83.241A ACUTE MEDIAL MENISCUS TEAR OF RIGHT KNEE, INITIAL ENCOUNTER: Primary | ICD-10-CM

## 2021-07-19 PROCEDURE — 29881 ARTHRS KNE SRG MNISECTMY M/L: CPT | Performed by: PHYSICIAN ASSISTANT

## 2021-07-19 PROCEDURE — 29881 ARTHRS KNE SRG MNISECTMY M/L: CPT | Performed by: ORTHOPAEDIC SURGERY

## 2021-07-19 RX ORDER — FENTANYL CITRATE 50 UG/ML
INJECTION, SOLUTION INTRAMUSCULAR; INTRAVENOUS AS NEEDED
Status: DISCONTINUED | OUTPATIENT
Start: 2021-07-19 | End: 2021-07-19

## 2021-07-19 RX ORDER — OXYCODONE HYDROCHLORIDE 5 MG/1
5 TABLET ORAL EVERY 4 HOURS PRN
Status: DISCONTINUED | OUTPATIENT
Start: 2021-07-19 | End: 2021-07-19 | Stop reason: HOSPADM

## 2021-07-19 RX ORDER — FENTANYL CITRATE/PF 50 MCG/ML
25 SYRINGE (ML) INJECTION
Status: DISCONTINUED | OUTPATIENT
Start: 2021-07-19 | End: 2021-07-19 | Stop reason: HOSPADM

## 2021-07-19 RX ORDER — KETOROLAC TROMETHAMINE 30 MG/ML
INJECTION, SOLUTION INTRAMUSCULAR; INTRAVENOUS AS NEEDED
Status: DISCONTINUED | OUTPATIENT
Start: 2021-07-19 | End: 2021-07-19

## 2021-07-19 RX ORDER — SODIUM CHLORIDE 9 MG/ML
125 INJECTION, SOLUTION INTRAVENOUS CONTINUOUS
Status: DISCONTINUED | OUTPATIENT
Start: 2021-07-19 | End: 2021-07-19 | Stop reason: HOSPADM

## 2021-07-19 RX ORDER — ROPIVACAINE HYDROCHLORIDE 5 MG/ML
INJECTION, SOLUTION EPIDURAL; INFILTRATION; PERINEURAL
Status: COMPLETED | OUTPATIENT
Start: 2021-07-19 | End: 2021-07-19

## 2021-07-19 RX ORDER — ASPIRIN 325 MG
325 TABLET, DELAYED RELEASE (ENTERIC COATED) ORAL 2 TIMES DAILY
Qty: 28 TABLET | Refills: 0 | Status: SHIPPED | OUTPATIENT
Start: 2021-07-19 | End: 2021-08-02

## 2021-07-19 RX ORDER — OXYCODONE HYDROCHLORIDE 5 MG/1
5 TABLET ORAL EVERY 4 HOURS PRN
Qty: 30 TABLET | Refills: 0 | Status: SHIPPED | OUTPATIENT
Start: 2021-07-19 | End: 2021-07-29

## 2021-07-19 RX ORDER — MIDAZOLAM HYDROCHLORIDE 2 MG/2ML
INJECTION, SOLUTION INTRAMUSCULAR; INTRAVENOUS AS NEEDED
Status: DISCONTINUED | OUTPATIENT
Start: 2021-07-19 | End: 2021-07-19

## 2021-07-19 RX ORDER — PROPOFOL 10 MG/ML
INJECTION, EMULSION INTRAVENOUS AS NEEDED
Status: DISCONTINUED | OUTPATIENT
Start: 2021-07-19 | End: 2021-07-19

## 2021-07-19 RX ORDER — HYDROMORPHONE HCL/PF 1 MG/ML
SYRINGE (ML) INJECTION AS NEEDED
Status: DISCONTINUED | OUTPATIENT
Start: 2021-07-19 | End: 2021-07-19

## 2021-07-19 RX ORDER — ONDANSETRON 2 MG/ML
4 INJECTION INTRAMUSCULAR; INTRAVENOUS ONCE AS NEEDED
Status: DISCONTINUED | OUTPATIENT
Start: 2021-07-19 | End: 2021-07-19 | Stop reason: HOSPADM

## 2021-07-19 RX ORDER — LIDOCAINE HYDROCHLORIDE 10 MG/ML
INJECTION, SOLUTION EPIDURAL; INFILTRATION; INTRACAUDAL; PERINEURAL AS NEEDED
Status: DISCONTINUED | OUTPATIENT
Start: 2021-07-19 | End: 2021-07-19

## 2021-07-19 RX ORDER — ONDANSETRON 4 MG/1
4 TABLET, ORALLY DISINTEGRATING ORAL EVERY 8 HOURS PRN
Qty: 15 TABLET | Refills: 0 | Status: SHIPPED | OUTPATIENT
Start: 2021-07-19

## 2021-07-19 RX ORDER — CEFAZOLIN SODIUM 2 G/50ML
2000 SOLUTION INTRAVENOUS ONCE
Status: DISCONTINUED | OUTPATIENT
Start: 2021-07-19 | End: 2021-07-19

## 2021-07-19 RX ORDER — OXYCODONE HYDROCHLORIDE 5 MG/1
10 TABLET ORAL EVERY 4 HOURS PRN
Status: DISCONTINUED | OUTPATIENT
Start: 2021-07-19 | End: 2021-07-19 | Stop reason: HOSPADM

## 2021-07-19 RX ORDER — ONDANSETRON 2 MG/ML
INJECTION INTRAMUSCULAR; INTRAVENOUS AS NEEDED
Status: DISCONTINUED | OUTPATIENT
Start: 2021-07-19 | End: 2021-07-19

## 2021-07-19 RX ADMIN — MIDAZOLAM 4 MG: 1 INJECTION INTRAMUSCULAR; INTRAVENOUS at 11:49

## 2021-07-19 RX ADMIN — SODIUM CHLORIDE: 0.9 INJECTION, SOLUTION INTRAVENOUS at 12:35

## 2021-07-19 RX ADMIN — HYDROMORPHONE HYDROCHLORIDE 0.5 MG: 1 INJECTION, SOLUTION INTRAMUSCULAR; INTRAVENOUS; SUBCUTANEOUS at 12:31

## 2021-07-19 RX ADMIN — ONDANSETRON 4 MG: 2 INJECTION INTRAMUSCULAR; INTRAVENOUS at 12:39

## 2021-07-19 RX ADMIN — PROPOFOL 200 MG: 10 INJECTION, EMULSION INTRAVENOUS at 12:06

## 2021-07-19 RX ADMIN — ROPIVACAINE HYDROCHLORIDE 20 ML: 5 INJECTION, SOLUTION EPIDURAL; INFILTRATION; PERINEURAL at 11:59

## 2021-07-19 RX ADMIN — Medication 3000 MG: at 11:44

## 2021-07-19 RX ADMIN — FENTANYL CITRATE 100 MCG: 50 INJECTION INTRAMUSCULAR; INTRAVENOUS at 11:49

## 2021-07-19 RX ADMIN — HYDROMORPHONE HYDROCHLORIDE 0.5 MG: 1 INJECTION, SOLUTION INTRAMUSCULAR; INTRAVENOUS; SUBCUTANEOUS at 12:26

## 2021-07-19 RX ADMIN — SODIUM CHLORIDE 125 ML/HR: 0.9 INJECTION, SOLUTION INTRAVENOUS at 09:20

## 2021-07-19 RX ADMIN — Medication 3000 MG: at 11:53

## 2021-07-19 RX ADMIN — LIDOCAINE HYDROCHLORIDE 100 MG: 10 INJECTION, SOLUTION EPIDURAL; INFILTRATION; INTRACAUDAL; PERINEURAL at 12:06

## 2021-07-19 RX ADMIN — KETOROLAC TROMETHAMINE 30 MG: 30 INJECTION, SOLUTION INTRAMUSCULAR at 12:39

## 2021-07-19 RX ADMIN — PROPOFOL 200 MG: 10 INJECTION, EMULSION INTRAVENOUS at 12:13

## 2021-07-19 NOTE — OP NOTE
OPERATIVE REPORT    PATIENT NAME: Rickey Wood   :  1970  MRN: [id number]  Pt Location: [facility] OR ROOM 02    SURGERY DATE: 2021    SURGEON(S) and ROLE:  Primary: Orlando Child MD  Assisting: Ananth Romero PA-C    NOTE:  The presence of a physician assistant was necessary to help with patient positioning, surgical exposure, wound retraction, wound closure, and other key portions of the procedure  No qualified resident was available for this case  PREOPERATIVE DIAGNOSES:  Right Knee  Lateral Meniscus Tear    POSTOPERATIVE DIAGNOSES:  Right Knee  Lateral Meniscus Tear    PROCEDURES:  Right Knee Arthroscopy with:  Partial Lateral Meniscectomy      ANESTHESIA TYPE:  General LMA and Intra-articular block    ANESTHESIA STAFF:   Anesthesiologist: Mary Jo Martinez DO  CRNA: Ernestine Whitt CRNA    ESTIMATED BLOOD LOSS:  5 mL    TOURNIQUET TIME:  Not used    PERIOPERATIVE ANTIBIOTICS:  cefazolin, 2 grams    IMPLANTS:  none    * No implants in log *    SPECIMENS:  * No specimens in log *    DRAINS:  None      OPERATIVE INDICATIONS:  The patient is a 44 y o  male with right knee pain and a lateral meniscus tear  Surgical treatment was indicated due to persistent symptoms despite non-surgical treatment  After a thorough discussion of the potential risks, benefits, and alternative treatments, the patient agreed to proceed with surgery  The patient understands that the risks of surgery include, but are not limited to: infection, neurovascular injury, wound healing complications, venous thromboembolism, persistent pain, stiffness, instability, recurrence of symptoms, potential need for additional surgeries, and loss of limb or life  Oral and written consent for surgery was obtained from the patient preoperatively        EXAM UNDER ANESTHESIA:  Neutral alignment  ROM:  0-135 degrees  Ligaments stable to varus stress / valgus stress / Lachman / posterior drawer; negative pivot-shift  Patella tracking normal without crepitus  PROCEDURE AND TECHNIQUE:  On the day of surgery, the patient was identified in the preoperative holding area  The operative site was marked by the surgeon  The patient was taken into the operating room  A time-out was conducted to confirm the patient's identity, the operative site, and the proposed procedure  The patient was anesthetized, and perioperative antibiotics were administered  The patient was positioned supine on the OR table  All bony prominences were padded  A tourniquet was not used  The operative site was prepped and draped using standard sterile technique  An anterolateral portal was established, and the arthroscope was inserted into the knee joint  An anteromedial portal was established under direct visualization, and diagnostic arthroscopy was performed  The joint demonstrated mild synovitis  In the patellofemoral compartment, the trochlea demonstrated diffuse grade 2 chondral wear which was treated with chondroplasty to remove all loose flaps of tissue   The patella demonstrated diffuse grade 2 chondral wear which was treated with chondroplasty to remove all loose flaps of tissue   The patella tracking was normal        In the medial compartment, the medial femoral condyle demonstrated diffuse grade 2 chondral wear which was treated with chondroplasty to remove all loose flaps of tissue   The medial tibial plateau demonstrated diffuse grade 1 chondral wear which required no treatment   The medial meniscus was intact       In the lateral compartment, the lateral femoral condyle demonstrated diffuse grade 1 chondral wear which required no treatment   The lateral tibial plateau demonstrated diffuse grade 1 chondral wear which required no treatment   The lateral meniscus had a complex tear involving the posterior horn, body and anterior horn  The torn portion of the meniscus was removed and debrided to a stable base with a basket and motorized shaver   50% of the meniscus width remained intact  In the intercondylar notch, the PCL was intact  The ACL was intact  At the conclusion of the procedure, the instruments were withdrawn  The portals and incisions were closed with absorbable sutures and steri-strips  A sterile dressing was applied  The surgical drapes were removed  A soft bandage was applied to the operative knee  The patient was awakened from anesthesia and transported to the PACU in stable condition        COMPLICATIONS:  None    PATIENT DISPOSITION:  PACU       SIGNATURE:  Orlando Child MD  DATE:  July 19, 2021  TIME:  12:45 PM

## 2021-07-19 NOTE — ANESTHESIA POSTPROCEDURE EVALUATION
Post-Op Assessment Note    CV Status:  Stable    Pain management: adequate     Mental Status:  Alert and awake   Hydration Status:  Euvolemic   PONV Controlled:  Controlled   Airway Patency:  Patent      Post Op Vitals Reviewed: Yes      Staff: Anesthesiologist         No complications documented      /70 (07/19/21 1336)    Temp 97 8 °F (36 6 °C) (07/19/21 1336)    Pulse 67 (07/19/21 1336)   Resp 14 (07/19/21 1336)    SpO2 94 % (07/19/21 1336)

## 2021-07-19 NOTE — ANESTHESIA PREPROCEDURE EVALUATION
Procedure:  ARTHROSCOPY KNEE, partial lateral meniscectomy (Right Knee)    Relevant Problems   No relevant active problems        Physical Exam    Airway    Mallampati score: II  TM Distance: >3 FB  Neck ROM: full     Dental   No notable dental hx     Cardiovascular  Rhythm: regular, Rate: normal, Cardiovascular exam normal    Pulmonary  Pulmonary exam normal Breath sounds clear to auscultation,     Other Findings        Anesthesia Plan  ASA Score- 2     Anesthesia Type- general and regional with ASA Monitors  Additional Monitors:   Airway Plan:     Comment: IAB discussed          Plan Factors-    Chart reviewed  Patient summary reviewed  Patient is not a current smoker  Patient instructed to abstain from smoking on day of procedure  Patient did not smoke on day of surgery  Induction- intravenous  Postoperative Plan-     Informed Consent- Anesthetic plan and risks discussed with patient

## 2021-07-19 NOTE — DISCHARGE INSTRUCTIONS
POSTOPERATIVE INSTRUCTIONS following KNEE SURGERY    MEDICATIONS:  · Resume all home medications unless otherwise instructed by your surgeon  · Pain Medication:  Oxycodone 5 mg, 1-3 tablets every 3 hours as needed  · If you were given a regional anesthetic (nerve block), please begin taking the pain medication as soon as you get home, even if you have minimal or no pain  DO NOT WAIT FOR THE NERVE BLOCK TO WEAR OFF  · Possible side effects include nausea, constipation, and urinary retention  If you experience these side effects, please call our office for assistance  · Pain med refills are authorized only during office hours (8am-4pm, Mon-Fri)  · Anti-Inflammatory:  Resume your home anti-inflammatory medication  · Take with food  Stop if you experience nausea, reflux, or stomach pain  · Nausea Medication:  Zofran ODT 4 mg, 1 tablet every 6 hours as needed  · Fill prescription ONLY if you expericnce severe nausea  · Blood Clot Prevention:  Aspirin 81 mg, 1 tablet twice daily for 2 weeks  · Pump your foot up and down 20 times per hour while you are less mobile  WOUND CARE:  · Keep the dressing clean and dry  Light drainage may occur the first 2 days postop  · You may remove the dressings and get the incision wet in the shower 72 hours after surgery  DO NOT remove steri-strips or sutures  DO NOT immerse the incision under water  Carefully pat the incision dry  If there is wound drainage, re-apply a fresh dry gauze dressing  · Please call our office ([phone]) if you experience either of the following:  · Sudden increase in swelling, redness, or warmth at the surgical site  · Excessive incisional drainage that persists beyond the 3rd day after surgery  · Oral temperature greater than 101 degrees, not relieved with Tylenol  · Shortness of breath, chest pain, nausea, or any other concerning symptoms    SWELLING CONTROL:  · Cold Therapy:   The cold therapy device may be used either continuously or only as needed, according to your preference  Do not let the pad directly touch your skin  Alternatively, apply ice (20 min on, 20 min off) as often as you feel is necessary  · Elevation:  Elevate the entire leg above heart level  Place pillows under your ankle to keep your knee straight  · Compression:  Apply ACE wraps or a thigh-length compression stocking as needed  RANGE OF MOTION:  · You are allowed FULL RANGE OF MOTION as tolerated  IMMOBILIZATION:  · None  You are allowed full range of motion as tolerated  ACTIVITY:   · BEAR FULL WEIGHT AS TOLERATED on the operative leg  Use crutches to assist only as needed  · Using Crutches on Stairs:  Going up, lead with your "good" (nonoperative) leg  Going down, lead with your "bad" (operative) leg  Use a hand rail when available  · Knee Extension:  Place a rolled towel or pillow under your ankle for 20-30 minutes 3-5 times per day  This will help to maintain full knee extension  · Quad Sets:  Sit or lie with your knee straight  Tighten your quadriceps (front thigh) muscle  Hold for 3 seconds, then relax  Repeat 20 times per hour while awake  PHYSICAL THERAPY:  · You will be given a physical therapy prescription when you are seen in the office for your postoperative appointment  FOLLOW-UP APPOINTMENT:  · 4-5 days after surgery with:    Dr Gerson Carreon, [address] [facility]  [address] [place] [address], [address] [place] [address], sara, [address] [facility]  [phone] ([facility])  [phone] (After-Hours)

## 2021-07-19 NOTE — INTERVAL H&P NOTE
H&P reviewed  After examining the patient I find no changes in the patients condition since the H&P had been written      Vitals:    07/19/21 0858   BP: 136/82   Pulse: 64   Resp: 16   Temp: 98 8 °F (37 1 °C)   SpO2: 96%

## 2021-07-19 NOTE — ANESTHESIA PROCEDURE NOTES
Peripheral Block    Patient location during procedure: holding area  Start time: 7/19/2021 11:50 AM  Reason for block: at surgeon's request and post-op pain management  Staffing  Anesthesiologist: Alexy Alexander DO  Preanesthetic Checklist  Completed: patient identified, IV checked, site marked, risks and benefits discussed, surgical consent, monitors and equipment checked, pre-op evaluation and timeout performed  Peripheral Block  Patient position: supine  Prep: ChloraPrep  Patient monitoring: heart rate, continuous pulse ox and frequent blood pressure checks  Block type: Intra-articular  Laterality: right  Injection technique: single-shotropivacaine (NAROPIN) 0 5 % perineural infiltration, 20 mL (20 ml 2% lidocaine with 1:200K of epinephrine)  Needle  Needle type: short-bevel   Needle gauge: 18 G    Needle localization: anatomical landmarks  Assessment  Injection assessment: incremental injection and negative aspiration for heme  Paresthesia pain: none  Post-procedure:  site cleaned  patient tolerated the procedure well with no immediate complications

## 2021-07-27 ENCOUNTER — OFFICE VISIT (OUTPATIENT)
Dept: OBGYN CLINIC | Facility: MEDICAL CENTER | Age: 51
End: 2021-07-27

## 2021-07-27 VITALS — WEIGHT: 295 LBS | BODY MASS INDEX: 36.68 KG/M2 | HEIGHT: 75 IN

## 2021-07-27 DIAGNOSIS — S83.271S COMPLEX TEAR OF LATERAL MENISCUS OF RIGHT KNEE, UNSPECIFIED WHETHER OLD OR CURRENT TEAR, SEQUELA: Primary | ICD-10-CM

## 2021-07-27 PROCEDURE — 99024 POSTOP FOLLOW-UP VISIT: CPT | Performed by: ORTHOPAEDIC SURGERY

## 2021-07-27 PROCEDURE — 3008F BODY MASS INDEX DOCD: CPT | Performed by: FAMILY MEDICINE

## 2021-07-27 NOTE — PROGRESS NOTES
Orthopaedic Surgery - Office Note  Felisha Ruelas (52 y o  male)   : 1970   MRN: [id number]  Encounter Date: 2021    Chief Complaint   Patient presents with    Right Knee - Pain       Assessment / Plan  S/p right knee partial lateral meniscectomy on 2021     · Avoid heavy lifting or activity for 6 weeks post op  · No ROM restrictions   · Knee sleeve given today in office  · Continue with ice and anti-inflammatories prn  Return in about 5 weeks (around 2021)  History of Present Illness  Felisha Ruelas is a 44 y o  male who presents for follow up S/p right knee partial lateral meniscectomy on 2021  He states his pain has been manageable with OTC's  He has been ambulating unassisted for 3 days without any issues  He has been using ice, ace wrap and elevation for swelling management  He denies any fever, chills, numbness or tingling  He offers no complaints at this time  Review of Systems  Pertinent items are noted in HPI  All other systems were reviewed and are negative  Physical Exam  Ht 6' 3" (1 905 m)   Wt 134 kg (295 lb)   BMI 36 87 kg/m²   Cons: Appears well  No apparent distress  Psych: Alert  Oriented x3  Mood and affect normal   Eyes: PERRLA, EOMI  Resp: Normal effort  No audible wheezing or stridor  CV: Palpable pulse  No discernable arrhythmia  No LE edema  Lymph:  No palpable cervical, axillary, or inguinal lymphadenopathy  Skin: Warm  No palpable masses  No visible lesions  Neuro: Normal muscle tone  Normal and symmetric DTR's  Right Knee Exam  Alignment:  Normal knee alignment  Inspection:  mild swelling  No erythema  No ecchymosis  Incision clean and dry  Palpation:  mild diffuse tenderness  No effusion  ROM:  Not tested  Strength:  Able to actively extend knee against gravity  Stability:  Not tested  Tests:  Not tested  Patella:  Not tested  Neurovascular:  Sensation intact in DP/SP/Guzman/Sa/T nerve distributions  2+ DP & PT pulses    Gait: Smooth  Studies Reviewed  No studies to review    Procedures  No procedures today  Medical, Surgical, Family, and Social History  The patient's medical history, family history, and social history, were reviewed and updated as appropriate  Past Medical History:   Diagnosis Date    Seasonal allergies     Torn meniscus     right knee- surgical repair today 7/19/2021    Wears glasses        Past Surgical History:   Procedure Laterality Date    HEMORROIDECTOMY      [place] KNEE SCOPE,MED/LAT MENISECTOMY Right 7/19/2021    Procedure: ARTHROSCOPY KNEE, partial lateral meniscectomy;  Surgeon: Ema Roe MD;  [facility];  Service: Orthopedics       Family History   Problem Relation Age of Onset    Cancer Mother     Cancer Father     Skin cancer Father     Breast cancer additional onset Sister    Thomas Cancer Sister     Skin cancer Brother        Social History     Occupational History    Not on file   Tobacco Use    Smoking status: Never Smoker    Smokeless tobacco: Never Used   Vaping Use    Vaping Use: Never used   Substance and Sexual Activity    Alcohol use:  Yes     Alcohol/week: 3 0 standard drinks     Types: 1 Glasses of wine, 1 Cans of beer, 1 Shots of liquor per week     Comment: 1-2 weekly    Drug use: Never    Sexual activity: Not on file       Allergies   Allergen Reactions    Oyster Shell - Food Allergy Vomiting         Current Outpatient Medications:     acetaminophen (TYLENOL) 500 mg tablet, Take 500 mg by mouth every 6 (six) hours as needed for mild pain, Disp: , Rfl:     aspirin (ECOTRIN) 325 mg EC tablet, Take 1 tablet (325 mg total) by mouth 2 (two) times a day for 14 days, Disp: 28 tablet, Rfl: 0    ipratropium (ATROVENT) 0 03 % nasal spray, 2 sprays into each nostril every 12 (twelve) hours (Patient taking differently: 2 sprays into each nostril 2 (two) times a day as needed ), Disp: 30 mL, Rfl: 0    loratadine (CLARITIN) 10 mg tablet, Take 10 mg by mouth daily, Disp: , Rfl:     ondansetron (ZOFRAN-ODT) 4 mg disintegrating tablet, Take 1 tablet (4 mg total) by mouth every 8 (eight) hours as needed for nausea or vomiting, Disp: 15 tablet, Rfl: 0    oxyCODONE (ROXICODONE) 5 mg immediate release tablet, Take 1 tablet (5 mg total) by mouth every 4 (four) hours as needed for moderate pain for up to 10 daysMax Daily Amount: 30 mg, Disp: 30 tablet, Rfl: 0      [employer]    I,:  Yolanda Thomas am acting as a scribe while in the presence of the attending physician :       I,:  Fatimah Campos MD personally performed the services described in this documentation    as scribed in my presence :

## 2021-08-31 ENCOUNTER — OFFICE VISIT (OUTPATIENT)
Dept: OBGYN CLINIC | Facility: MEDICAL CENTER | Age: 51
End: 2021-08-31

## 2021-08-31 VITALS
HEART RATE: 56 BPM | HEIGHT: 75 IN | DIASTOLIC BLOOD PRESSURE: 87 MMHG | BODY MASS INDEX: 37.25 KG/M2 | WEIGHT: 299.6 LBS | SYSTOLIC BLOOD PRESSURE: 132 MMHG

## 2021-08-31 DIAGNOSIS — S83.271S COMPLEX TEAR OF LATERAL MENISCUS OF RIGHT KNEE, UNSPECIFIED WHETHER OLD OR CURRENT TEAR, SEQUELA: Primary | ICD-10-CM

## 2021-08-31 PROCEDURE — 99024 POSTOP FOLLOW-UP VISIT: CPT | Performed by: ORTHOPAEDIC SURGERY

## 2021-08-31 NOTE — PROGRESS NOTES
Orthopaedic Surgery - Office Note  Berlin Mcmahan (87 y o  male)   : 1970   MRN: [id number]  Encounter Date: 2021    No chief complaint on file  Assessment / Plan  S/p right knee partial lateral meniscectomy, with appropriate progression    · Avoid strenuous activity  · Neoprene knee sleeve  · Home exercise program reviewed  · Anti-inflammatories or Tylenol prn pain  · compression, ice and elevation for swelling control  · at next appointment if swelling persists we will consider aspiration  Return in about 6 weeks (around 10/12/2021)  History of Present Illness  Berlin Mcmahan is a 44 y o  male who presents for follow up evaluation, he is S/p right knee partial lateral meniscectomy on 21  He has been compliant with a home exercise program,  Focusing on stretching  He is experiencing right knee soreness however he does not need to take anything for pain  The soreness he is experiencing is different than the pain he had prior to surgery  He states did have some lateral incisional  Drainage up until 1 week ago, however he has not been having drainage for the last week  He denies any fevers or chills  Denies any injury or trauma to his right knee  He denies any distal paresthesias  Review of Systems  Pertinent items are noted in HPI  All other systems were reviewed and are negative  Physical Exam  /87   Pulse 56   Ht 6' 3" (1 905 m)   Wt 136 kg (299 lb 9 6 oz)   BMI 37 45 kg/m²   Cons: Appears well  No apparent distress  Psych: Alert  Oriented x3  Mood and affect normal   Eyes: PERRLA, EOMI  Resp: Normal effort  No audible wheezing or stridor  CV: Palpable pulse  No discernable arrhythmia  No LE edema  Lymph:  No palpable cervical, axillary, or inguinal lymphadenopathy  Skin: Warm  No palpable masses  No visible lesions  Neuro: Normal muscle tone  Normal and symmetric DTR's  Right Knee Exam  Alignment:  Normal knee alignment  Inspection:  No edema   No erythema  No ecchymosis  Incisions clean and dry  Palpation:  no tenderness  moderate effusion  No warmth  No crepitus  ROM:  Knee Extension 0  Knee Flexion 115  Strength:  Able to actively extend knee against gravity  Stability:  No objective knee instability  Stable Varus / Valgus stress, Lachman, and Posterior drawer  Tests:  (-) Que  Patella:  Normal patellar mobility  Neurovascular:  Sensation intact in DP/SP/Guzman/Sa/T nerve distributions  2+ DP & PT pulses  Gait:  Normal     Studies Reviewed  No studies to review    Procedures  No procedures today  Medical, Surgical, Family, and Social History  The patient's medical history, family history, and social history, were reviewed and updated as appropriate  Past Medical History:   Diagnosis Date    Seasonal allergies     Torn meniscus     right knee- surgical repair today 7/19/2021    Wears glasses        Past Surgical History:   Procedure Laterality Date    HEMORROIDECTOMY      NH KNEE SCOPE,MED/LAT MENISECTOMY Right 7/19/2021    Procedure: ARTHROSCOPY KNEE, partial lateral meniscectomy;  Surgeon: Kenia Gibbons MD;  Location: [facility];  Service: Orthopedics       Family History   Problem Relation Age of Onset    Cancer Mother     Cancer Father     Skin cancer Father     Breast cancer additional onset Sister    Tarik Albrecht Cancer Sister     Skin cancer Brother        Social History     Occupational History    Not on file   Tobacco Use    Smoking status: Never Smoker    Smokeless tobacco: Never Used   Vaping Use    Vaping Use: Never used   Substance and Sexual Activity    Alcohol use:  Yes     Alcohol/week: 3 0 standard drinks     Types: 1 Glasses of wine, 1 Cans of beer, 1 Shots of liquor per week     Comment: 1-2 weekly    Drug use: Never    Sexual activity: Not on file       Allergies   Allergen Reactions    Oyster Shell - Food Allergy Vomiting         Current Outpatient Medications:     ipratropium (ATROVENT) 0 03 % nasal spray, 2 sprays into each nostril every 12 (twelve) hours (Patient taking differently: 2 sprays into each nostril 2 (two) times a day as needed ), Disp: 30 mL, Rfl: 0    loratadine (CLARITIN) 10 mg tablet, Take 10 mg by mouth daily, Disp: , Rfl:     acetaminophen (TYLENOL) 500 mg tablet, Take 500 mg by mouth every 6 (six) hours as needed for mild pain (Patient not taking: Reported on 8/31/2021), Disp: , Rfl:     aspirin (ECOTRIN) 325 mg EC tablet, Take 1 tablet (325 mg total) by mouth 2 (two) times a day for 14 days, Disp: 28 tablet, Rfl: 0    ondansetron (ZOFRAN-ODT) 4 mg disintegrating tablet, Take 1 tablet (4 mg total) by mouth every 8 (eight) hours as needed for nausea or vomiting (Patient not taking: Reported on 8/31/2021), Disp: 15 tablet, Rfl: 0      Amy Lomeli    Scribe Attestation    I,:  Manish Plant am acting as a scribe while in the presence of the attending physician :       I,:  Khadar Weiss MD personally performed the services described in this documentation    as scribed in my presence :

## 2021-10-07 ENCOUNTER — IMMUNIZATIONS (OUTPATIENT)
Dept: FAMILY MEDICINE CLINIC | Facility: CLINIC | Age: 51
End: 2021-10-07
Payer: COMMERCIAL

## 2021-10-07 DIAGNOSIS — Z23 NEED FOR INFLUENZA VACCINATION: Primary | ICD-10-CM

## 2021-10-07 PROCEDURE — 90682 RIV4 VACC RECOMBINANT DNA IM: CPT

## 2021-10-07 PROCEDURE — 90471 IMMUNIZATION ADMIN: CPT

## 2021-10-12 ENCOUNTER — OFFICE VISIT (OUTPATIENT)
Dept: OBGYN CLINIC | Facility: MEDICAL CENTER | Age: 51
End: 2021-10-12

## 2021-10-12 VITALS
WEIGHT: 300.8 LBS | HEIGHT: 75 IN | DIASTOLIC BLOOD PRESSURE: 88 MMHG | HEART RATE: 57 BPM | BODY MASS INDEX: 37.4 KG/M2 | SYSTOLIC BLOOD PRESSURE: 142 MMHG

## 2021-10-12 DIAGNOSIS — S83.271S COMPLEX TEAR OF LATERAL MENISCUS OF RIGHT KNEE, UNSPECIFIED WHETHER OLD OR CURRENT TEAR, SEQUELA: Primary | ICD-10-CM

## 2021-10-12 PROCEDURE — 99024 POSTOP FOLLOW-UP VISIT: CPT | Performed by: ORTHOPAEDIC SURGERY

## 2022-05-22 ENCOUNTER — DOCUMENTATION (OUTPATIENT)
Dept: FAMILY MEDICINE CLINIC | Facility: CLINIC | Age: 52
End: 2022-05-22

## 2022-05-22 DIAGNOSIS — U07.1 COVID-19: Primary | ICD-10-CM

## 2022-05-23 ENCOUNTER — TELEMEDICINE (OUTPATIENT)
Dept: FAMILY MEDICINE CLINIC | Facility: CLINIC | Age: 52
End: 2022-05-23
Payer: COMMERCIAL

## 2022-05-23 DIAGNOSIS — U07.1 COVID-19: Primary | ICD-10-CM

## 2022-05-23 PROCEDURE — 99213 OFFICE O/P EST LOW 20 MIN: CPT | Performed by: FAMILY MEDICINE

## 2022-05-23 NOTE — PROGRESS NOTES
Virtual Regular Visit    Verification of patient location:    Patient is located in the following state in which I hold an active license [place]      Assessment/Plan:  Patient will continue on Paxil of it  He will call with any new persisting or worsening symptoms  He continues to isolated home    Problem List Items Addressed This Visit    None     Visit Diagnoses     COVID-19    -  Primary               Reason for visit is   Chief Complaint   Patient presents with    Virtual Regular Visit        Encounter provider Wilber Wiseman DO    Provider located at [address] [place] [address] [id number] [phone]      Recent Visits  No visits were found meeting these conditions  Showing recent visits within past 7 days and meeting all other requirements  Today's Visits  Date Type Provider Dept   05/23/22 Telemedicine Wilber Wiseman DO [facility]   Showing today's visits and meeting all other requirements  Future Appointments  No visits were found meeting these conditions  Showing future appointments within next 150 days and meeting all other requirements       The patient was identified by name and date of birth  Kelsie Mtz was informed that this is a telemedicine visit and that the visit is being conducted through [address] [place] [address] Now and patient was informed that this is a secure, HIPAA-compliant platform  He agrees to proceed     My office door was closed  No one else was in the room  He acknowledged consent and understanding of privacy and security of the video platform  The patient has agreed to participate and understands they can discontinue the visit at any time  Patient is aware this is a billable service  Subjective  Kelsie Mtz is a 46 y o  male for COVID-19   Patient seen for Covid  He has mild-to-moderate symptoms  He was started on Paxil would yesterday  He is tolerating medication well so far  No severe shortness of breath    No loss of taste or smell  Past Medical History:   Diagnosis Date    Seasonal allergies     Torn meniscus     right knee- surgical repair today 7/19/2021    Wears glasses        Past Surgical History:   Procedure Laterality Date    HEMORROIDECTOMY      OR KNEE SCOPE,MED/LAT MENISECTOMY Right 7/19/2021    Procedure: ARTHROSCOPY KNEE, partial lateral meniscectomy;  Surgeon: Vikas Hammond MD;  Location: [facility] [place];  Service: Orthopedics       Current Outpatient Medications   Medication Sig Dispense Refill    acetaminophen (TYLENOL) 500 mg tablet Take 500 mg by mouth every 6 (six) hours as needed for mild pain (Patient not taking: Reported on 8/31/2021)      aspirin (ECOTRIN) 325 mg EC tablet Take 1 tablet (325 mg total) by mouth 2 (two) times a day for 14 days 28 tablet 0    ipratropium (ATROVENT) 0 03 % nasal spray 2 sprays into each nostril every 12 (twelve) hours (Patient taking differently: 2 sprays into each nostril 2 (two) times a day as needed ) 30 mL 0    loratadine (CLARITIN) 10 mg tablet Take 10 mg by mouth daily      nirmatrelvir & ritonavir (Paxlovid) tablet therapy pack Take 3 tablets by mouth in the morning and 3 tablets in the evening  Do all this for 5 days  Take 2 nirmatrelvir tablets + 1 ritonavir tablet together per dose  30 tablet 0    ondansetron (ZOFRAN-ODT) 4 mg disintegrating tablet Take 1 tablet (4 mg total) by mouth every 8 (eight) hours as needed for nausea or vomiting (Patient not taking: Reported on 8/31/2021) 15 tablet 0     No current facility-administered medications for this visit  Allergies   Allergen Reactions    Oyster Shell - Food Allergy Vomiting       Review of Systems   Constitutional: Negative  Negative for fever  HENT: Positive for congestion  Eyes: Negative  Respiratory: Positive for cough  Cardiovascular: Negative  Gastrointestinal: Negative  Endocrine: Negative  Genitourinary: Negative  Musculoskeletal: Negative  Skin: Negative  Allergic/Immunologic: Negative  Neurological: Negative  Hematological: Negative  Psychiatric/Behavioral: Negative  Video Exam    There were no vitals filed for this visit  Physical Exam  Constitutional:       General: He is not in acute distress  Appearance: He is well-developed  He is not diaphoretic  Neurological:      Mental Status: He is alert and oriented to person, place, and time  Psychiatric:         Behavior: Behavior normal          Thought Content: Thought content normal          Judgment: Judgment normal           I spent 15 minutes directly with the patient during this visit    VIRTUAL VISIT DISCLAIMER      Juan Eduardo verbally agrees to participate in [employer]  Pt is aware that [employer] could be limited without vital signs or the ability to perform a full hands-on physical exam  Mario Mckeon understands he or the provider may request at any time to terminate the video visit and request the patient to seek care or treatment in person

## 2022-12-28 ENCOUNTER — OFFICE VISIT (OUTPATIENT)
Dept: FAMILY MEDICINE CLINIC | Facility: CLINIC | Age: 52
End: 2022-12-28

## 2022-12-28 ENCOUNTER — APPOINTMENT (OUTPATIENT)
Dept: LAB | Facility: MEDICAL CENTER | Age: 52
End: 2022-12-28

## 2022-12-28 VITALS
OXYGEN SATURATION: 98 % | HEART RATE: 78 BPM | BODY MASS INDEX: 38.22 KG/M2 | DIASTOLIC BLOOD PRESSURE: 92 MMHG | TEMPERATURE: 97.1 F | SYSTOLIC BLOOD PRESSURE: 144 MMHG | HEIGHT: 75 IN | WEIGHT: 307.4 LBS

## 2022-12-28 DIAGNOSIS — Z12.5 SCREENING FOR PROSTATE CANCER: ICD-10-CM

## 2022-12-28 DIAGNOSIS — R59.1 LYMPHADENOPATHY: Primary | ICD-10-CM

## 2022-12-28 DIAGNOSIS — R59.1 LYMPHADENOPATHY: ICD-10-CM

## 2022-12-28 DIAGNOSIS — Z12.12 SCREENING FOR COLORECTAL CANCER: ICD-10-CM

## 2022-12-28 DIAGNOSIS — Z12.11 SCREENING FOR COLORECTAL CANCER: ICD-10-CM

## 2022-12-28 LAB
ALBUMIN SERPL BCP-MCNC: 3.7 G/DL (ref 3.5–5)
ALP SERPL-CCNC: 114 U/L (ref 46–116)
ALT SERPL W P-5'-P-CCNC: 35 U/L (ref 12–78)
ANION GAP SERPL CALCULATED.3IONS-SCNC: 3 MMOL/L (ref 4–13)
AST SERPL W P-5'-P-CCNC: 29 U/L (ref 5–45)
BASOPHILS # BLD AUTO: 0.05 THOUSANDS/ÂΜL (ref 0–0.1)
BASOPHILS NFR BLD AUTO: 1 % (ref 0–1)
BILIRUB SERPL-MCNC: 0.63 MG/DL (ref 0.2–1)
BUN SERPL-MCNC: 25 MG/DL (ref 5–25)
CALCIUM SERPL-MCNC: 9.2 MG/DL (ref 8.3–10.1)
CHLORIDE SERPL-SCNC: 108 MMOL/L (ref 96–108)
CHOLEST SERPL-MCNC: 209 MG/DL
CO2 SERPL-SCNC: 27 MMOL/L (ref 21–32)
CREAT SERPL-MCNC: 0.96 MG/DL (ref 0.6–1.3)
EOSINOPHIL # BLD AUTO: 0.3 THOUSAND/ÂΜL (ref 0–0.61)
EOSINOPHIL NFR BLD AUTO: 5 % (ref 0–6)
ERYTHROCYTE [DISTWIDTH] IN BLOOD BY AUTOMATED COUNT: 12.8 % (ref 11.6–15.1)
GFR SERPL CREATININE-BSD FRML MDRD: 89 ML/MIN/1.73SQ M
GLUCOSE SERPL-MCNC: 98 MG/DL (ref 65–140)
HCT VFR BLD AUTO: 46.8 % (ref 36.5–49.3)
HDLC SERPL-MCNC: 50 MG/DL
HGB BLD-MCNC: 15.9 G/DL (ref 12–17)
IMM GRANULOCYTES # BLD AUTO: 0.06 THOUSAND/UL (ref 0–0.2)
IMM GRANULOCYTES NFR BLD AUTO: 1 % (ref 0–2)
LDLC SERPL CALC-MCNC: 143 MG/DL (ref 0–100)
LYMPHOCYTES # BLD AUTO: 1.01 THOUSANDS/ÂΜL (ref 0.6–4.47)
LYMPHOCYTES NFR BLD AUTO: 16 % (ref 14–44)
MCH RBC QN AUTO: 31.8 PG (ref 26.8–34.3)
MCHC RBC AUTO-ENTMCNC: 34 G/DL (ref 31.4–37.4)
MCV RBC AUTO: 94 FL (ref 82–98)
MONOCYTES # BLD AUTO: 0.92 THOUSAND/ÂΜL (ref 0.17–1.22)
MONOCYTES NFR BLD AUTO: 14 % (ref 4–12)
NEUTROPHILS # BLD AUTO: 4.15 THOUSANDS/ÂΜL (ref 1.85–7.62)
NEUTS SEG NFR BLD AUTO: 63 % (ref 43–75)
NRBC BLD AUTO-RTO: 0 /100 WBCS
PLATELET # BLD AUTO: 244 THOUSANDS/UL (ref 149–390)
PMV BLD AUTO: 9.6 FL (ref 8.9–12.7)
POTASSIUM SERPL-SCNC: 4.2 MMOL/L (ref 3.5–5.3)
PROT SERPL-MCNC: 8.4 G/DL (ref 6.4–8.4)
PSA SERPL-MCNC: 0.6 NG/ML (ref 0–4)
RBC # BLD AUTO: 5 MILLION/UL (ref 3.88–5.62)
SODIUM SERPL-SCNC: 138 MMOL/L (ref 135–147)
TRIGL SERPL-MCNC: 81 MG/DL
WBC # BLD AUTO: 6.49 THOUSAND/UL (ref 4.31–10.16)

## 2022-12-28 RX ORDER — TRIAMCINOLONE ACETONIDE 1 MG/G
CREAM TOPICAL
COMMUNITY
Start: 2022-12-11

## 2022-12-28 NOTE — PROGRESS NOTES
BMI Counseling: Body mass index is 38 42 kg/m²  The BMI is above normal  Nutrition recommendations include reducing portion sizes

## 2022-12-28 NOTE — PROGRESS NOTES
Assessment/Plan: Recommend follow-up with ENT specialist if no improvement and resolution within the next week  They will call with any new persisting or worsening symptoms  Recommend follow-up with colonoscopy for colon cancer screening  Time spent counseling and reviewing treatment plan and coordinating care as well as documentation was 30 minutes  1  Lymphadenopathy  -     CBC and differential  -     Comprehensive metabolic panel  -     Lipid Panel with Direct LDL reflex  -     Lyme Antibody Profile with reflex to WB; Future  -     Ambulatory Referral to Otolaryngology; Future  -     CT neck and chest w contrast; Future; Expected date: 12/28/2022    2  Screening for colorectal cancer  -     Ambulatory referral for colonoscopy; Future    3  Screening for prostate cancer  -     PSA, Total Screen; Future          Subjective:      Patient ID: Dorina Hernandez is a 45 y o  male  Patient discovered a lump to the left side of the neck about 2 days ago  He believes it is gotten somewhat smaller over the last 1 to 2 days  Denies any night sweats fevers or chills  No recent upper respiratory symptoms  Denies any new rashes or recent travel  The following portions of the patient's history were reviewed and updated as appropriate: allergies, current medications, past family history, past medical history, past social history, past surgical history, and problem list     Review of Systems   Constitutional: Negative  HENT: Negative  Eyes: Negative  Respiratory: Negative  Cardiovascular: Negative  Gastrointestinal: Negative  Endocrine: Negative  Genitourinary: Negative  Musculoskeletal: Negative  Skin: Negative  Allergic/Immunologic: Negative  Neurological: Negative  Hematological: Positive for adenopathy  Psychiatric/Behavioral: Negative            Objective:      /92 (BP Location: Left arm, Patient Position: Sitting, Cuff Size: Standard)   Pulse 78   Temp (!) 97 1 °F (36 2 °C) (Tympanic)   Ht 6' 3" (1 905 m)   Wt (!) 139 kg (307 lb 6 4 oz)   SpO2 98%   BMI 38 42 kg/m²          Physical Exam  Vitals reviewed  Constitutional:       Appearance: He is well-developed  HENT:      Head: Normocephalic and atraumatic  Right Ear: External ear normal  Tympanic membrane is not erythematous or bulging  Left Ear: External ear normal  Tympanic membrane is not erythematous or bulging  Nose: Nose normal       Mouth/Throat:      Mouth: No oral lesions  Pharynx: No oropharyngeal exudate  Eyes:      General: No scleral icterus  Right eye: No discharge  Left eye: No discharge  Conjunctiva/sclera: Conjunctivae normal    Neck:      Thyroid: No thyromegaly  Cardiovascular:      Rate and Rhythm: Normal rate and regular rhythm  Heart sounds: Normal heart sounds  No murmur heard  No friction rub  No gallop  Pulmonary:      Effort: Pulmonary effort is normal  No respiratory distress  Breath sounds: No wheezing or rales  Chest:      Chest wall: No tenderness  Abdominal:      General: Bowel sounds are normal  There is no distension  Palpations: Abdomen is soft  There is no mass  Tenderness: There is no abdominal tenderness  There is no guarding or rebound  Musculoskeletal:         General: No tenderness or deformity  Normal range of motion  Cervical back: Normal range of motion and neck supple  Lymphadenopathy:      Cervical: Cervical adenopathy (Left-sided nontender cervical lymphadenopathy palpable  No other lymph nodes palpable) present  Skin:     General: Skin is warm and dry  Coloration: Skin is not pale  Findings: No erythema or rash  Neurological:      Mental Status: He is alert and oriented to person, place, and time  Cranial Nerves: No cranial nerve deficit  Motor: No abnormal muscle tone        Coordination: Coordination normal       Deep Tendon Reflexes: Reflexes are normal and symmetric     Psychiatric:         Behavior: Behavior normal

## 2022-12-29 LAB — B BURGDOR IGG+IGM SER-ACNC: 0.2 AI

## 2023-01-03 ENCOUNTER — TELEPHONE (OUTPATIENT)
Dept: HEMATOLOGY ONCOLOGY | Facility: CLINIC | Age: 53
End: 2023-01-03

## 2023-01-03 ENCOUNTER — TELEPHONE (OUTPATIENT)
Dept: FAMILY MEDICINE CLINIC | Facility: CLINIC | Age: 53
End: 2023-01-03

## 2023-01-03 ENCOUNTER — HOSPITAL ENCOUNTER (OUTPATIENT)
Dept: CT IMAGING | Facility: HOSPITAL | Age: 53
Discharge: HOME | End: 2023-01-03

## 2023-01-03 DIAGNOSIS — R59.1 LYMPHADENOPATHY: ICD-10-CM

## 2023-01-03 DIAGNOSIS — R59.1 LYMPHADENOPATHY: Primary | ICD-10-CM

## 2023-01-03 RX ADMIN — IOHEXOL 85 ML: 350 INJECTION, SOLUTION INTRAVENOUS at 12:37

## 2023-01-03 NOTE — TELEPHONE ENCOUNTER
S/w Dr Aye Mendez - since this is urgent CT Scan order to be changed to STAT  Ok to Gayle to see if appt is available today or tomorrow

## 2023-01-03 NOTE — TELEPHONE ENCOUNTER
Call placed to Central Scheduling  Appt for today at 12:30pm is available at [facility]  Pt scheduled

## 2023-01-03 NOTE — TELEPHONE ENCOUNTER
Rcvd call from pt's wife, Ilia, re: [facility]  He is scheduled to have a CT Scan done on 01/10/22  However, since he saw Dr Kat Shay on 12/28/22 the lump has grown significantly  Explained to her that Dr Kat Shay is out of the office   I will check with Dr Khris Man and get back to her

## 2023-01-03 NOTE — TELEPHONE ENCOUNTER
Please advise  Patient's wife called and is concerned about recent CT results, please call patient's wife once resulted   [phone]

## 2023-01-03 NOTE — TELEPHONE ENCOUNTER
Patient's wife, Parag Parisi, is calling in requesting a sooner appointment, I tried assisting her but she was unsure of which appointment they would like  She will talk to her  and call me back  When she calls please transfer her over to me

## 2023-01-04 ENCOUNTER — HOSPITAL ENCOUNTER (OUTPATIENT)
Dept: CT IMAGING | Facility: HOSPITAL | Age: 53
Discharge: HOME | End: 2023-01-04

## 2023-01-04 ENCOUNTER — TELEPHONE (OUTPATIENT)
Dept: FAMILY MEDICINE CLINIC | Facility: CLINIC | Age: 53
End: 2023-01-04

## 2023-01-04 DIAGNOSIS — R93.89 ABNORMAL CT OF THE CHEST: ICD-10-CM

## 2023-01-04 DIAGNOSIS — R59.1 LYMPHADENOPATHY: ICD-10-CM

## 2023-01-04 DIAGNOSIS — R59.1 LYMPHADENOPATHY: Primary | ICD-10-CM

## 2023-01-04 RX ADMIN — IOHEXOL 100 ML: 350 INJECTION, SOLUTION INTRAVENOUS at 15:04

## 2023-01-04 NOTE — TELEPHONE ENCOUNTER
Wife nadia called back and stated that oncology team called her and they do not need the bloodwork done before hand just the ct scan  Informed her that   Did put the order in for the ct scan of abd and pelvis with contrast  She said great thank you and she will schedule it

## 2023-01-04 NOTE — TELEPHONE ENCOUNTER
We could try getting him in with Dr Angela King ENT specialist if he would like to be seen sooner  They may be able to see him this week or next

## 2023-01-04 NOTE — TELEPHONE ENCOUNTER
Pt wife nadia called needs script for STAT CT of the abd and pelvis with contrast also needs lukemia and slow cytometry bloodwork ordered so mark can have everything done before he goes to the oncology surgeon so they don't have to  Go back and forth and the surgeon can just do the biopsy right away    Please advise thank you

## 2023-01-04 NOTE — TELEPHONE ENCOUNTER
Stat CT of the abdomen pelvis with contrast ordered  Please schedule    I am not familiar with labs requested therefore needs to get order from oncology team

## 2023-01-05 PROBLEM — R59.1 LYMPHADENOPATHY: Status: ACTIVE | Noted: 2023-01-05

## 2023-01-06 ENCOUNTER — APPOINTMENT (OUTPATIENT)
Dept: LAB | Facility: CLINIC | Age: 53
End: 2023-01-06

## 2023-01-06 ENCOUNTER — CONSULT (OUTPATIENT)
Dept: SURGICAL ONCOLOGY | Facility: CLINIC | Age: 53
End: 2023-01-06

## 2023-01-06 VITALS
DIASTOLIC BLOOD PRESSURE: 90 MMHG | BODY MASS INDEX: 38.12 KG/M2 | HEIGHT: 75 IN | TEMPERATURE: 97.7 F | RESPIRATION RATE: 17 BRPM | OXYGEN SATURATION: 96 % | SYSTOLIC BLOOD PRESSURE: 138 MMHG | WEIGHT: 306.6 LBS | HEART RATE: 83 BPM

## 2023-01-06 DIAGNOSIS — R59.1 LYMPHADENOPATHY: Primary | ICD-10-CM

## 2023-01-06 DIAGNOSIS — R59.1 LYMPHADENOPATHY: ICD-10-CM

## 2023-01-06 LAB
ATRIAL RATE: 69 BPM
P AXIS: 4 DEGREES
PR INTERVAL: 168 MS
QRS AXIS: 50 DEGREES
QRSD INTERVAL: 100 MS
QT INTERVAL: 388 MS
QTC INTERVAL: 415 MS
T WAVE AXIS: 65 DEGREES
VENTRICULAR RATE: 69 BPM

## 2023-01-06 RX ORDER — HYDROCODONE BITARTRATE AND ACETAMINOPHEN 5; 325 MG/1; MG/1
1 TABLET ORAL EVERY 6 HOURS PRN
Qty: 10 TABLET | Refills: 0 | Status: SHIPPED | OUTPATIENT
Start: 2023-01-06

## 2023-01-06 NOTE — PROGRESS NOTES
Surgical Oncology Consult       [address] [facility] [address] [id number] [phone]    Emerson Hubbard  1970  [id number]  [address] [facility] [address] [id number] [phone]    Diagnoses and all orders for this visit:    Lymphadenopathy  -     Ambulatory Referral to Surgical Oncology  -     Case request operating room: EXCISION OF LEFT NECK LYMPH NODE WITH LYMPHOMA PROTOCOL; Standing  -     EKG 12 lead; Future  -     HYDROcodone-acetaminophen (Norco) 5-325 mg per tablet; Take 1 tablet by mouth every 6 (six) hours as needed for pain Max Daily Amount: 4 tablets  -     Case request operating room: EXCISION OF LEFT NECK LYMPH NODE WITH LYMPHOMA PROTOCOL    Other orders  -     Incentive spirometry; Standing  -     Insert and maintain IV line; Standing  -     Void On-Call to O R ; Standing  -     Place sequential compression device; Standing  -     ceFAZolin (ANCEF) 3,000 mg in dextrose 5 % 100 mL IVPB        Chief Complaint   Patient presents with   • New Patient Visit       No follow-ups on file  Oncology History    No history exists  History of Present Illness: 42-year-old male who recently palpated a mass on his left neck on December 24  He brought this to the attention of his primary care physician  CT of the neck from January 3, 2023 revealed moderate left-sided neck adenopathy along the jugular chain  There was also adenopathy seen in the mediastinum  Follow-up chest CT from the same date revealed left supraclavicular, mediastinal and posterior mediastinal adenopathy  There was suspicion of adenopathy in the abdomen  CT the next day revealed multiple para-aortic masses consistent with bulky lymphadenopathy  I personally reviewed the films  He denies any abdominal pain, nausea or vomiting    No change in appetite or unintentional weight loss  No fever, chills or night sweats  No fatigue  He does have some bloating which is more frequent as well as a cough that his wife describes as more frequent than just clearing of his throat  Review of Systems  Complete ROS Surg Onc:   Constitutional: The patient denies new or recent history of general fatigue, no recent weight loss, no change in appetite  Eyes: No complaints of visual problems, no scleral icterus  ENT: no complaints of ear pain, no hoarseness, no difficulty swallowing,  no tinnitus and no new masses in head, oral cavity, or neck  Cardiovascular: No complaints of chest pain, no palpitations, no ankle edema  Respiratory: No complaints of shortness of breath, no cough  Gastrointestinal: No complaints of jaundice, no bloody stools, no pale stools  Genitourinary: No complaints of dysuria, no hematuria, no nocturia, no frequent urination, no urethral discharge  Musculoskeletal: No complaints of weakness, paralysis, joint stiffness or arthralgias  Integumentary: No complaints of rash, no new lesions  Neurological: No complaints of convulsions, no seizures, no dizziness  Hematologic/Lymphatic: No complaints of easy bruising  Endocrine:  No hot or cold intolerance  No polydipsia, polyphagia, or polyuria  Allergy/immunology:  No environmental allergies  No food allergies  Not immunocompromised  Skin:  No pallor or rash  No wound            Patient Active Problem List   Diagnosis   • Impacted cerumen   • Lymphadenopathy     Past Medical History:   Diagnosis Date   • Allergic 1/1/2000    seasonal allergies   • Seasonal allergies    • Torn meniscus     right knee- surgical repair today 7/19/2021   • Wears glasses      Past Surgical History:   Procedure Laterality Date   • HEMORROIDECTOMY     • KNEE SURGERY  7/19/2021   • [place] ARTHRS KNE SURG W/MENISCECTOMY MED/LAT W/SHVG Right 07/19/2021    Procedure: ARTHROSCOPY KNEE, partial lateral meniscectomy; Surgeon: Krystal Jones MD;  Location: [facility] OR;  Service: Orthopedics     Family History   Problem Relation Age of Onset   • Cancer Mother         Breast Cancer   • Breast cancer Mother    • Breast cancer additional onset Mother    • Cancer Father         Skin carcenoma   • Skin cancer Father    • Breast cancer additional onset Sister    • Cancer Sister         Breast Cancer and Skin carcenoma   • Skin cancer Brother      Social History     Socioeconomic History   • Marital status: /Civil Union     Spouse name: Not on file   • Number of children: Not on file   • Years of education: Not on file   • Highest education level: Not on file   Occupational History   • Not on file   Tobacco Use   • Smoking status: Never   • Smokeless tobacco: Never   Vaping Use   • Vaping Use: Never used   Substance and Sexual Activity   • Alcohol use:  Yes     Alcohol/week: 3 0 standard drinks     Types: 1 Glasses of wine, 1 Cans of beer, 1 Shots of liquor per week     Comment: 1-2 weekly   • Drug use: Never   • Sexual activity: Not on file   Other Topics Concern   • Not on file   Social History Narrative   • Not on file     Social Determinants of Health     Financial Resource Strain: Not on file   Food Insecurity: Not on file   Transportation Needs: Not on file   Physical Activity: Not on file   Stress: Not on file   Social Connections: Not on file   Intimate Partner Violence: Not on file   Housing Stability: Not on file       Current Outpatient Medications:   •  HYDROcodone-acetaminophen (Norco) 5-325 mg per tablet, Take 1 tablet by mouth every 6 (six) hours as needed for pain Max Daily Amount: 4 tablets, Disp: 10 tablet, Rfl: 0  •  loratadine (CLARITIN) 10 mg tablet, Take 10 mg by mouth daily, Disp: , Rfl:   •  triamcinolone (KENALOG) 0 1 % cream, APPLY TO AREA ON LOWER BACK TWICE A DAY FOR UP TO 2 WEEKS IF NEEDED, Disp: , Rfl:   •  ondansetron (ZOFRAN-ODT) 4 mg disintegrating tablet, Take 1 tablet (4 mg total) by mouth every 8 (eight) hours as needed for nausea or vomiting (Patient not taking: Reported on 8/31/2021), Disp: 15 tablet, Rfl: 0  No current facility-administered medications for this visit  Facility-Administered Medications Ordered in Other Visits:   •  iohexol (OMNIPAQUE) 240 MG/ML solution 50 mL, 50 mL, Oral, Once in imaging, [facility], DO  Allergies   Allergen Reactions   • Meliton Baldwin - Food Allergy Vomiting     Vitals:    01/06/23 1252   BP: 138/90   Pulse: 83   Resp: 17   Temp: 97 7 °F (36 5 °C)   SpO2: 96%       Physical Exam   Constitutional: General appearance: The Patient is well-developed and well-nourished who appears the stated age in no acute distress  Patient is pleasant and talkative  HEENT:  Normocephalic  Sclerae are anicteric  Mucous membranes are moist  Neck is supple with left crevical adenopathy  No JVD  Chest: The lungs are clear to auscultation  Cardiac: Heart is regular rate  Abdomen: Abdomen is soft, non-tender, non-distended and without masses  Extremities: There is no clubbing or cyanosis  There is no edema  Symmetric  Neuro: Grossly nonfocal  Gait is normal      Lymphatic: Left cervical adenopathy  No evidence of axillary adenopathy bilaterally  No evidence of inguinal adenopathy bilaterally  Skin: Warm, anicteric  Psych:  Patient is pleasant and talkative  Breasts:      Pathology:  [unfilled]    Labs:      Imaging  CT soft tissue neck w contrast    Result Date: 1/3/2023  Narrative: CT NECK WITH CONTRAST INDICATION:   R59 1: Generalized enlarged lymph nodes  COMPARISON:  None  TECHNIQUE:  Axial, sagittal, and coronal 2D reformatted images were created from the axial source data and submitted for interpretation  Radiation dose length product (DLP) for this visit:  859 mGy-cm     This examination, like all CT scans performed in the [facility], was performed utilizing techniques to minimize radiation dose exposure, including the use of iterative reconstruction and automated exposure control  IV Contrast:  85 mL of iohexol (OMNIPAQUE) IMAGE QUALITY:  Diagnostic  FINDINGS: VISUALIZED BRAIN PARENCHYMA:  No acute intracranial pathology of the visualized brain parenchyma  VISUALIZED ORBITS AND PARANASAL SINUSES:  Normal visualized orbits  Small retention cysts are seen within the inferior maxillary sinuses  NASAL CAVITY AND NASOPHARYNX:  Normal  SUPRAHYOID NECK:  Normal oral cavity, tongue base, tonsillar fossa and epiglottis  Punctate calcifications are seen within the palatine tonsils bilaterally, presumably sequela of remote infection  No signs of acute tonsillopharyngitis  Normal parapharyngeal space and prevertebral space  INFRAHYOID NECK:  Aryepiglottic folds and piriform sinuses are normal   Normal glottis and subglottic airway  THYROID GLAND:  Unremarkable  PAROTID AND SUBMANDIBULAR GLANDS:  Normal  LYMPH NODES:  Moderate cervical adenopathy identified within the left neck primarily posterior jugular chain nodes, level 3 through 5 extending inferiorly into the supraclavicular region  These nodes measure up to 2 1 cm in short axis and are homogeneous with relatively sharply defined borders  Multiple additional smaller nodes are scattered within the neck  No enlarged adenopathy on the right  There is a prominent upper mediastinal node projecting between the left common carotid artery and subclavian artery measuring 1 5 cm in short axis  VASCULAR STRUCTURES:  Normal enhancement of the cervical vasculature  THORACIC INLET:  Lung apices and upper mediastinum are unremarkable  BONY STRUCTURES: No acute fracture or destructive osseous lesion  Impression: Moderate left-sided adenopathy primarily within the left neck, posterior jugular chain levels 3 through 5 with a prominent node present in the upper mediastinum  Findings are nonspecific    As there are no signs of infection, findings are suspicious for neoplastic adenopathy, possibly lymphoma/leukemia  This examination was marked "immediate notification" in Epic in order to begin the standard process by which the radiology reading room liaison alerts the referring practitioner  Workstation performed: [id number]     CT chest w contrast    Result Date: 1/3/2023  Narrative: CT CHEST WITH IV CONTRAST INDICATION:   R59 1: Generalized enlarged lymph nodes  COMPARISON:  None  TECHNIQUE: CT examination of the chest was performed  Axial, sagittal, and coronal 2D reformatted images were created from the source data and submitted for interpretation  Radiation dose length product (DLP) for this visit:  989 mGy-cm   This examination, like all CT scans performed in the [facility], was performed utilizing techniques to minimize radiation dose exposure, including the use of iterative reconstruction and automated exposure control  IV Contrast:  85 mL of iohexol (OMNIPAQUE) FINDINGS: LUNGS:  Lungs are clear  There is no tracheal or endobronchial lesion  PLEURA:  Unremarkable  HEART/GREAT VESSELS: Heart is unremarkable for patient's age  No thoracic aortic aneurysm  MEDIASTINUM AND JESSICA:  Posterior mediastinal/retrocrural adenopathy surrounding the descending thoracic aorta; dominant posterior mediastinal/retrocrural node measures 2 2 cm in short axis #4/40  Enlarged left superior mediastinal node measuring 15 mm in short axis #4/11  CHEST WALL AND LOWER NECK:  Partially imaged left supraclavicular adenopathy; partially imaged dominant 17 mm left supraclavicular node #4/1  See the separate CT soft tissue neck report  Bilateral subcentimeter axillary lymph nodes  VISUALIZED STRUCTURES IN THE UPPER ABDOMEN:  Partially imaged abdominal structure could represent adenopathy  Follow-up with dedicated CT abdomen pelvis imaging  OSSEOUS STRUCTURES:  No acute fracture or destructive osseous lesion       Impression: Left supraclavicular, superior mediastinal, posterior mediastinal/retrocrural and suspected partially imaged left abdominal adenopathy  Follow-up with CT abdomen pelvis  See the separate CT soft tissue neck report  The study was marked in [facility] for immediate notification  Workstation performed: [id number]     CT abdomen pelvis w contrast    Result Date: 1/4/2023  Narrative: CT ABDOMEN AND PELVIS WITH IV CONTRAST INDICATION:   R59 1: Generalized enlarged lymph nodes R93 89: Abnormal findings on diagnostic imaging of other specified body structures  COMPARISON:  9/30/2010, 1/3/2023 TECHNIQUE:  CT examination of the abdomen and pelvis was performed  Axial, sagittal, and coronal 2D reformatted images were created from the source data and submitted for interpretation  Radiation dose length product (DLP) for this visit:  5978 mGy-cm   This examination, like all CT scans performed in the [facility], was performed utilizing techniques to minimize radiation dose exposure, including the use of iterative reconstruction and automated exposure control  IV Contrast:  100 mL of iohexol (OMNIPAQUE) Enteric Contrast:  Enteric contrast was administered  FINDINGS: ABDOMEN LOWER CHEST:  No clinically significant abnormality identified in the visualized lower chest  LIVER/BILIARY TREE:  Unremarkable  GALLBLADDER:  No calcified gallstones  No pericholecystic inflammatory change  SPLEEN:  Unremarkable  PANCREAS:  Unremarkable  ADRENAL GLANDS:  Unremarkable  KIDNEYS/URETERS:  There is a punctate nonobstructing right renal calculus  No hydronephrosis  STOMACH AND BOWEL:  Unremarkable  APPENDIX:  No findings to suggest appendicitis  ABDOMINOPELVIC CAVITY:  No ascites  No pneumoperitoneum  There is extensive retroperitoneal lymphadenopathy  A bulky mass in the left para-aortic region extending into the left upper quadrant measures 8 2 x 10 9 cm  Aortocaval lymphadenopathy measures 3 7 x 5 2 cm  More inferior left para-aortic lymphadenopathy at the bifurcation measures 5 0 x 5 9 cm   VESSELS: Unremarkable for patient's age  PELVIS REPRODUCTIVE ORGANS:  Unremarkable for patient's age  URINARY BLADDER:  Unremarkable  ABDOMINAL WALL/INGUINAL REGIONS:  There are bilateral fat-containing inguinal hernias  OSSEOUS STRUCTURES:  No acute fracture or destructive osseous lesion  Impression: Bulky retroperitoneal lymphadenopathy consistent with lymphoma  Workstation performed: [id number]     I reviewed the above laboratory and imaging data  Discussion/Summary: 49-year-old male with lymphadenopathy  This involves the neck, chest as well as abdomen  I suspect this is contributing to his increased cough as well as his abdominal bloating  His left neck lymph node would be accessible for biopsy  I discussed that I suspect this is lymphoma  I have recommended that he undergo left neck lymph node biopsy with lymphoma protocol  The risks were explained including bleeding, infection, recurrence, need for further surgery, wound complications and neurovascular injury  Informed consent was obtained  We discussed treatment options would be based on the type of lymphoma  His wife sees Dr Mikaela Rosales for breast cancer, so we will make sure that he is set up with him once we have a definitive diagnosis  I will see him again at the time of surgery  He is agreeable to this  All his questions were answered

## 2023-01-06 NOTE — H&P (VIEW-ONLY)
Surgical Oncology Consult       [id number] [facility] SURGICAL ONCOLOGY [address] [id number] [phone]    Clementina Shown  1970  [id number]  [id number] [facility] SURGICAL ONCOLOGY [address] [address] [phone]    Diagnoses and all orders for this visit:    Lymphadenopathy  -     Ambulatory Referral to Surgical Oncology  -     Case request operating room: EXCISION OF LEFT NECK LYMPH NODE WITH LYMPHOMA PROTOCOL; Standing  -     EKG 12 lead; Future  -     HYDROcodone-acetaminophen (Norco) 5-325 mg per tablet; Take 1 tablet by mouth every 6 (six) hours as needed for pain Max Daily Amount: 4 tablets  -     Case request operating room: EXCISION OF LEFT NECK LYMPH NODE WITH LYMPHOMA PROTOCOL    Other orders  -     Incentive spirometry; Standing  -     Insert and maintain IV line; Standing  -     Void On-Call to O R ; Standing  -     Place sequential compression device; Standing  -     ceFAZolin (ANCEF) 3,000 mg in dextrose 5 % 100 mL IVPB        Chief Complaint   Patient presents with   • New Patient Visit       No follow-ups on file  Oncology History    No history exists  History of Present Illness: 75-year-old male who recently palpated a mass on his left neck on December 24  He brought this to the attention of his primary care physician  CT of the neck from January 3, 2023 revealed moderate left-sided neck adenopathy along the jugular chain  There was also adenopathy seen in the mediastinum  Follow-up chest CT from the same date revealed left supraclavicular, mediastinal and posterior mediastinal adenopathy  There was suspicion of adenopathy in the abdomen  CT the next day revealed multiple para-aortic masses consistent with bulky lymphadenopathy  I personally reviewed the films  He denies any abdominal pain, nausea or vomiting    No change in appetite or unintentional weight loss  No fever, chills or night sweats  No fatigue  He does have some bloating which is more frequent as well as a cough that his wife describes as more frequent than just clearing of his throat  Review of Systems  Complete ROS Surg Onc:   Constitutional: The patient denies new or recent history of general fatigue, no recent weight loss, no change in appetite  Eyes: No complaints of visual problems, no scleral icterus  ENT: no complaints of ear pain, no hoarseness, no difficulty swallowing,  no tinnitus and no new masses in head, oral cavity, or neck  Cardiovascular: No complaints of chest pain, no palpitations, no ankle edema  Respiratory: No complaints of shortness of breath, no cough  Gastrointestinal: No complaints of jaundice, no bloody stools, no pale stools  Genitourinary: No complaints of dysuria, no hematuria, no nocturia, no frequent urination, no urethral discharge  Musculoskeletal: No complaints of weakness, paralysis, joint stiffness or arthralgias  Integumentary: No complaints of rash, no new lesions  Neurological: No complaints of convulsions, no seizures, no dizziness  Hematologic/Lymphatic: No complaints of easy bruising  Endocrine:  No hot or cold intolerance  No polydipsia, polyphagia, or polyuria  Allergy/immunology:  No environmental allergies  No food allergies  Not immunocompromised  Skin:  No pallor or rash  No wound            Patient Active Problem List   Diagnosis   • Impacted cerumen   • Lymphadenopathy     Past Medical History:   Diagnosis Date   • Allergic 1/1/2000    seasonal allergies   • Seasonal allergies    • Torn meniscus     right knee- surgical repair today 7/19/2021   • Wears glasses      Past Surgical History:   Procedure Laterality Date   • HEMORROIDECTOMY     • KNEE SURGERY  7/19/2021   • PA ARTHRS KNE SURG W/MENISCECTOMY MED/LAT W/SHVG Right 07/19/2021    Procedure: ARTHROSCOPY KNEE, partial lateral meniscectomy; Surgeon: Ulysses Pickering MD;  Location: [facility] OR;  Service: Orthopedics     Family History   Problem Relation Age of Onset   • Cancer Mother         Breast Cancer   • Breast cancer Mother    • Breast cancer additional onset Mother    • Cancer Father         Skin carcenoma   • Skin cancer Father    • Breast cancer additional onset Sister    • Cancer Sister         Breast Cancer and Skin carcenoma   • Skin cancer Brother      Social History     Socioeconomic History   • Marital status: /Civil Union     Spouse name: Not on file   • Number of children: Not on file   • Years of education: Not on file   • Highest education level: Not on file   Occupational History   • Not on file   Tobacco Use   • Smoking status: Never   • Smokeless tobacco: Never   Vaping Use   • Vaping Use: Never used   Substance and Sexual Activity   • Alcohol use:  Yes     Alcohol/week: 3 0 standard drinks     Types: 1 Glasses of wine, 1 Cans of beer, 1 Shots of liquor per week     Comment: 1-2 weekly   • Drug use: Never   • Sexual activity: Not on file   Other Topics Concern   • Not on file   Social History Narrative   • Not on file     Social Determinants of Health     Financial Resource Strain: Not on file   Food Insecurity: Not on file   Transportation Needs: Not on file   Physical Activity: Not on file   Stress: Not on file   Social Connections: Not on file   Intimate Partner Violence: Not on file   Housing Stability: Not on file       Current Outpatient Medications:   •  HYDROcodone-acetaminophen (Norco) 5-325 mg per tablet, Take 1 tablet by mouth every 6 (six) hours as needed for pain Max Daily Amount: 4 tablets, Disp: 10 tablet, Rfl: 0  •  loratadine (CLARITIN) 10 mg tablet, Take 10 mg by mouth daily, Disp: , Rfl:   •  triamcinolone (KENALOG) 0 1 % cream, APPLY TO AREA ON LOWER BACK TWICE A DAY FOR UP TO 2 WEEKS IF NEEDED, Disp: , Rfl:   •  ondansetron (ZOFRAN-ODT) 4 mg disintegrating tablet, Take 1 tablet (4 mg total) by mouth every 8 (eight) hours as needed for nausea or vomiting (Patient not taking: Reported on 8/31/2021), Disp: 15 tablet, Rfl: 0  No current facility-administered medications for this visit  Facility-Administered Medications Ordered in Other Visits:   •  iohexol (OMNIPAQUE) 240 MG/ML solution 50 mL, 50 mL, Oral, Once in imaging, Mauri Bazzi, DO  Allergies   Allergen Reactions   • Pamalee Cool - Food Allergy Vomiting     Vitals:    01/06/23 1252   BP: 138/90   Pulse: 83   Resp: 17   Temp: 97 7 °F (36 5 °C)   SpO2: 96%       Physical Exam   Constitutional: General appearance: The Patient is well-developed and well-nourished who appears the stated age in no acute distress  Patient is pleasant and talkative  HEENT:  Normocephalic  Sclerae are anicteric  Mucous membranes are moist  Neck is supple with left crevical adenopathy  No JVD  Chest: The lungs are clear to auscultation  Cardiac: Heart is regular rate  Abdomen: Abdomen is soft, non-tender, non-distended and without masses  Extremities: There is no clubbing or cyanosis  There is no edema  Symmetric  Neuro: Grossly nonfocal  Gait is normal      Lymphatic: Left cervical adenopathy  No evidence of axillary adenopathy bilaterally  No evidence of inguinal adenopathy bilaterally  Skin: Warm, anicteric  Psych:  Patient is pleasant and talkative  Breasts:      Pathology:  [unfilled]    Labs:      Imaging  CT soft tissue neck w contrast    Result Date: 1/3/2023  Narrative: CT NECK WITH CONTRAST INDICATION:   R59 1: Generalized enlarged lymph nodes  COMPARISON:  None  TECHNIQUE:  Axial, sagittal, and coronal 2D reformatted images were created from the axial source data and submitted for interpretation  Radiation dose length product (DLP) for this visit:  859 mGy-cm     This examination, like all CT scans performed in the [facility], was performed utilizing techniques to minimize radiation dose exposure, including the use of iterative reconstruction and automated exposure control  IV Contrast:  85 mL of iohexol (OMNIPAQUE) IMAGE QUALITY:  Diagnostic  FINDINGS: VISUALIZED BRAIN PARENCHYMA:  No acute intracranial pathology of the visualized brain parenchyma  VISUALIZED ORBITS AND PARANASAL SINUSES:  Normal visualized orbits  Small retention cysts are seen within the inferior maxillary sinuses  NASAL CAVITY AND NASOPHARYNX:  Normal  SUPRAHYOID NECK:  Normal oral cavity, tongue base, tonsillar fossa and epiglottis  Punctate calcifications are seen within the palatine tonsils bilaterally, presumably sequela of remote infection  No signs of acute tonsillopharyngitis  Normal parapharyngeal space and prevertebral space  INFRAHYOID NECK:  Aryepiglottic folds and piriform sinuses are normal   Normal glottis and subglottic airway  THYROID GLAND:  Unremarkable  PAROTID AND SUBMANDIBULAR GLANDS:  Normal  LYMPH NODES:  Moderate cervical adenopathy identified within the left neck primarily posterior jugular chain nodes, level 3 through 5 extending inferiorly into the supraclavicular region  These nodes measure up to 2 1 cm in short axis and are homogeneous with relatively sharply defined borders  Multiple additional smaller nodes are scattered within the neck  No enlarged adenopathy on the right  There is a prominent upper mediastinal node projecting between the left common carotid artery and subclavian artery measuring 1 5 cm in short axis  VASCULAR STRUCTURES:  Normal enhancement of the cervical vasculature  THORACIC INLET:  Lung apices and upper mediastinum are unremarkable  BONY STRUCTURES: No acute fracture or destructive osseous lesion  Impression: Moderate left-sided adenopathy primarily within the left neck, posterior jugular chain levels 3 through 5 with a prominent node present in the upper mediastinum  Findings are nonspecific    As there are no signs of infection, findings are suspicious for neoplastic adenopathy, possibly lymphoma/leukemia  This examination was marked "immediate notification" in Epic in order to begin the standard process by which the radiology reading room liaison alerts the referring practitioner  Workstation performed: [id number]     CT chest w contrast    Result Date: 1/3/2023  Narrative: CT CHEST WITH IV CONTRAST INDICATION:   R59 1: Generalized enlarged lymph nodes  COMPARISON:  None  TECHNIQUE: CT examination of the chest was performed  Axial, sagittal, and coronal 2D reformatted images were created from the source data and submitted for interpretation  Radiation dose length product (DLP) for this visit:  989 mGy-cm   This examination, like all CT scans performed in the [facility], was performed utilizing techniques to minimize radiation dose exposure, including the use of iterative reconstruction and automated exposure control  IV Contrast:  85 mL of iohexol (OMNIPAQUE) FINDINGS: LUNGS:  Lungs are clear  There is no tracheal or endobronchial lesion  PLEURA:  Unremarkable  HEART/GREAT VESSELS: Heart is unremarkable for patient's age  No thoracic aortic aneurysm  MEDIASTINUM AND JESSICA:  Posterior mediastinal/retrocrural adenopathy surrounding the descending thoracic aorta; dominant posterior mediastinal/retrocrural node measures 2 2 cm in short axis #4/40  Enlarged left superior mediastinal node measuring 15 mm in short axis #4/11  CHEST WALL AND LOWER NECK:  Partially imaged left supraclavicular adenopathy; partially imaged dominant 17 mm left supraclavicular node #4/1  See the separate CT soft tissue neck report  Bilateral subcentimeter axillary lymph nodes  VISUALIZED STRUCTURES IN THE UPPER ABDOMEN:  Partially imaged abdominal structure could represent adenopathy  Follow-up with dedicated CT abdomen pelvis imaging  OSSEOUS STRUCTURES:  No acute fracture or destructive osseous lesion       Impression: Left supraclavicular, superior mediastinal, posterior mediastinal/retrocrural and suspected partially imaged left abdominal adenopathy  Follow-up with CT abdomen pelvis  See the separate CT soft tissue neck report  The study was marked in [facility] for immediate notification  Workstation performed: [id number]     CT abdomen pelvis w contrast    Result Date: 1/4/2023  Narrative: CT ABDOMEN AND PELVIS WITH IV CONTRAST INDICATION:   R59 1: Generalized enlarged lymph nodes R93 89: Abnormal findings on diagnostic imaging of other specified body structures  COMPARISON:  9/30/2010, 1/3/2023 TECHNIQUE:  CT examination of the abdomen and pelvis was performed  Axial, sagittal, and coronal 2D reformatted images were created from the source data and submitted for interpretation  Radiation dose length product (DLP) for this visit:  1199 mGy-cm   This examination, like all CT scans performed in the [facility], was performed utilizing techniques to minimize radiation dose exposure, including the use of iterative reconstruction and automated exposure control  IV Contrast:  100 mL of iohexol (OMNIPAQUE) Enteric Contrast:  Enteric contrast was administered  FINDINGS: ABDOMEN LOWER CHEST:  No clinically significant abnormality identified in the visualized lower chest  LIVER/BILIARY TREE:  Unremarkable  GALLBLADDER:  No calcified gallstones  No pericholecystic inflammatory change  SPLEEN:  Unremarkable  PANCREAS:  Unremarkable  ADRENAL GLANDS:  Unremarkable  KIDNEYS/URETERS:  There is a punctate nonobstructing right renal calculus  No hydronephrosis  STOMACH AND BOWEL:  Unremarkable  APPENDIX:  No findings to suggest appendicitis  ABDOMINOPELVIC CAVITY:  No ascites  No pneumoperitoneum  There is extensive retroperitoneal lymphadenopathy  A bulky mass in the left para-aortic region extending into the left upper quadrant measures 8 2 x 10 9 cm  Aortocaval lymphadenopathy measures 3 7 x 5 2 cm  More inferior left para-aortic lymphadenopathy at the bifurcation measures 5 0 x 5 9 cm   VESSELS: Unremarkable for patient's age  PELVIS REPRODUCTIVE ORGANS:  Unremarkable for patient's age  URINARY BLADDER:  Unremarkable  ABDOMINAL WALL/INGUINAL REGIONS:  There are bilateral fat-containing inguinal hernias  OSSEOUS STRUCTURES:  No acute fracture or destructive osseous lesion  Impression: Bulky retroperitoneal lymphadenopathy consistent with lymphoma  Workstation performed: [id number]     I reviewed the above laboratory and imaging data  Discussion/Summary: 18-year-old male with lymphadenopathy  This involves the neck, chest as well as abdomen  I suspect this is contributing to his increased cough as well as his abdominal bloating  His left neck lymph node would be accessible for biopsy  I discussed that I suspect this is lymphoma  I have recommended that he undergo left neck lymph node biopsy with lymphoma protocol  The risks were explained including bleeding, infection, recurrence, need for further surgery, wound complications and neurovascular injury  Informed consent was obtained  We discussed treatment options would be based on the type of lymphoma  His wife sees Dr Mike Marie for breast cancer, so we will make sure that he is set up with him once we have a definitive diagnosis  I will see him again at the time of surgery  He is agreeable to this  All his questions were answered

## 2023-01-06 NOTE — LETTER
January 6, 2023     Rah Mishra, [address] [facility],[address]  [address] [place] [address]    Patient: Dayna Darden   YOB: 1970   Date of Visit: 1/6/2023       Dear Dr Fabio Rockwell: Thank you for referring Dayna Darden to me for evaluation  Below are my notes for this consultation  If you have questions, please do not hesitate to call me  I look forward to following your patient along with you  Sincerely,        Sue Terry MD        CC: MD Sue Guevara MD  1/6/2023  1:32 PM  Sign when Signing Visit               Surgical Oncology Consult       [address] [facility] [id number] [phone]    Dayna Darden  1970  [id number]  [address] [facility] [id number] [phone]    Diagnoses and all orders for this visit:    Lymphadenopathy  -     Ambulatory Referral to Surgical Oncology  -     Case request operating room: EXCISION OF LEFT NECK LYMPH NODE WITH LYMPHOMA PROTOCOL; Standing  -     EKG 12 lead; Future  -     HYDROcodone-acetaminophen (Norco) 5-325 mg per tablet; Take 1 tablet by mouth every 6 (six) hours as needed for pain Max Daily Amount: 4 tablets  -     Case request operating room: EXCISION OF LEFT NECK LYMPH NODE WITH LYMPHOMA PROTOCOL    Other orders  -     Incentive spirometry; Standing  -     Insert and maintain IV line; Standing  -     Void On-Call to O R ; Standing  -     Place sequential compression device; Standing  -     ceFAZolin (ANCEF) 3,000 mg in dextrose 5 % 100 mL IVPB        Chief Complaint   Patient presents with   • New Patient Visit       No follow-ups on file  Oncology History    No history exists  History of Present Illness: 43-year-old male who recently palpated a mass on his left neck on December 24  He brought this to the attention of his primary care physician  CT of the neck from January 3, 2023 revealed moderate left-sided neck adenopathy along the jugular chain  There was also adenopathy seen in the mediastinum  Follow-up chest CT from the same date revealed left supraclavicular, mediastinal and posterior mediastinal adenopathy  There was suspicion of adenopathy in the abdomen  CT the next day revealed multiple para-aortic masses consistent with bulky lymphadenopathy  I personally reviewed the films  He denies any abdominal pain, nausea or vomiting  No change in appetite or unintentional weight loss  No fever, chills or night sweats  No fatigue  He does have some bloating which is more frequent as well as a cough that his wife describes as more frequent than just clearing of his throat  Review of Systems  Complete ROS Surg Onc:   Constitutional: The patient denies new or recent history of general fatigue, no recent weight loss, no change in appetite  Eyes: No complaints of visual problems, no scleral icterus  ENT: no complaints of ear pain, no hoarseness, no difficulty swallowing,  no tinnitus and no new masses in head, oral cavity, or neck  Cardiovascular: No complaints of chest pain, no palpitations, no ankle edema  Respiratory: No complaints of shortness of breath, no cough  Gastrointestinal: No complaints of jaundice, no bloody stools, no pale stools  Genitourinary: No complaints of dysuria, no hematuria, no nocturia, no frequent urination, no urethral discharge  Musculoskeletal: No complaints of weakness, paralysis, joint stiffness or arthralgias  Integumentary: No complaints of rash, no new lesions  Neurological: No complaints of convulsions, no seizures, no dizziness  Hematologic/Lymphatic: No complaints of easy bruising  Endocrine:  No hot or cold intolerance  No polydipsia, polyphagia, or polyuria  Allergy/immunology:  No environmental allergies  No food allergies  Not immunocompromised  Skin:  No pallor or rash    No wound  Patient Active Problem List   Diagnosis   • Impacted cerumen   • Lymphadenopathy     Past Medical History:   Diagnosis Date   • Allergic 1/1/2000    seasonal allergies   • Seasonal allergies    • Torn meniscus     right knee- surgical repair today 7/19/2021   • Wears glasses      Past Surgical History:   Procedure Laterality Date   • HEMORROIDECTOMY     • KNEE SURGERY  7/19/2021   • NC ARTHRS KNE SURG W/MENISCECTOMY MED/LAT W/SHVG Right 07/19/2021    Procedure: ARTHROSCOPY KNEE, partial lateral meniscectomy;  Surgeon: Krystal Jones MD;  Location: [facility];  Service: Orthopedics     Family History   Problem Relation Age of Onset   • Cancer Mother         Breast Cancer   • Breast cancer Mother    • Breast cancer additional onset Mother    • Cancer Father         Skin carcenoma   • Skin cancer Father    • Breast cancer additional onset Sister    • Cancer Sister         Breast Cancer and Skin carcenoma   • Skin cancer Brother      Social History     Socioeconomic History   • Marital status: /Civil Union     Spouse name: Not on file   • Number of children: Not on file   • Years of education: Not on file   • Highest education level: Not on file   Occupational History   • Not on file   Tobacco Use   • Smoking status: Never   • Smokeless tobacco: Never   Vaping Use   • Vaping Use: Never used   Substance and Sexual Activity   • Alcohol use:  Yes     Alcohol/week: 3 0 standard drinks     Types: 1 Glasses of wine, 1 Cans of beer, 1 Shots of liquor per week     Comment: 1-2 weekly   • Drug use: Never   • Sexual activity: Not on file   Other Topics Concern   • Not on file   Social History Narrative   • Not on file     Social Determinants of Health     Financial Resource Strain: Not on file   Food Insecurity: Not on file   Transportation Needs: Not on file   Physical Activity: Not on file   Stress: Not on file   Social Connections: Not on file   Intimate Partner Violence: Not on file   Housing Stability: Not on file       Current Outpatient Medications:   •  HYDROcodone-acetaminophen (Norco) 5-325 mg per tablet, Take 1 tablet by mouth every 6 (six) hours as needed for pain Max Daily Amount: 4 tablets, Disp: 10 tablet, Rfl: 0  •  loratadine (CLARITIN) 10 mg tablet, Take 10 mg by mouth daily, Disp: , Rfl:   •  triamcinolone (KENALOG) 0 1 % cream, APPLY TO AREA ON LOWER BACK TWICE A DAY FOR UP TO 2 WEEKS IF NEEDED, Disp: , Rfl:   •  ondansetron (ZOFRAN-ODT) 4 mg disintegrating tablet, Take 1 tablet (4 mg total) by mouth every 8 (eight) hours as needed for nausea or vomiting (Patient not taking: Reported on 8/31/2021), Disp: 15 tablet, Rfl: 0  No current facility-administered medications for this visit  Facility-Administered Medications Ordered in Other Visits:   •  iohexol (OMNIPAQUE) 240 MG/ML solution 50 mL, 50 mL, Oral, Once in imaging, Vara Stage, DO  Allergies   Allergen Reactions   • Cierra Boone - Food Allergy Vomiting     Vitals:    01/06/23 1252   BP: 138/90   Pulse: 83   Resp: 17   Temp: 97 7 °F (36 5 °C)   SpO2: 96%       Physical Exam   Constitutional: General appearance: The Patient is well-developed and well-nourished who appears the stated age in no acute distress  Patient is pleasant and talkative  HEENT:  Normocephalic  Sclerae are anicteric  Mucous membranes are moist  Neck is supple with left crevical adenopathy  No JVD  Chest: The lungs are clear to auscultation  Cardiac: Heart is regular rate  Abdomen: Abdomen is soft, non-tender, non-distended and without masses  Extremities: There is no clubbing or cyanosis  There is no edema  Symmetric  Neuro: Grossly nonfocal  Gait is normal      Lymphatic: Left cervical adenopathy  No evidence of axillary adenopathy bilaterally  No evidence of inguinal adenopathy bilaterally  Skin: Warm, anicteric  Psych:  Patient is pleasant and talkative    Breasts:      Pathology:  [unfilled]    Labs:      Imaging  CT soft tissue neck w contrast    Result Date: 1/3/2023  Narrative: CT NECK WITH CONTRAST INDICATION:   R59 1: Generalized enlarged lymph nodes  COMPARISON:  None  TECHNIQUE:  Axial, sagittal, and coronal 2D reformatted images were created from the axial source data and submitted for interpretation  Radiation dose length product (DLP) for this visit:  859 mGy-cm   This examination, like all CT scans performed in the [facility], was performed utilizing techniques to minimize radiation dose exposure, including the use of iterative reconstruction and automated exposure control  IV Contrast:  85 mL of iohexol (OMNIPAQUE) IMAGE QUALITY:  Diagnostic  FINDINGS: VISUALIZED BRAIN PARENCHYMA:  No acute intracranial pathology of the visualized brain parenchyma  VISUALIZED ORBITS AND PARANASAL SINUSES:  Normal visualized orbits  Small retention cysts are seen within the inferior maxillary sinuses  NASAL CAVITY AND NASOPHARYNX:  Normal  SUPRAHYOID NECK:  Normal oral cavity, tongue base, tonsillar fossa and epiglottis  Punctate calcifications are seen within the palatine tonsils bilaterally, presumably sequela of remote infection  No signs of acute tonsillopharyngitis  Normal parapharyngeal space and prevertebral space  INFRAHYOID NECK:  Aryepiglottic folds and piriform sinuses are normal   Normal glottis and subglottic airway  THYROID GLAND:  Unremarkable  PAROTID AND SUBMANDIBULAR GLANDS:  Normal  LYMPH NODES:  Moderate cervical adenopathy identified within the left neck primarily posterior jugular chain nodes, level 3 through 5 extending inferiorly into the supraclavicular region  These nodes measure up to 2 1 cm in short axis and are homogeneous with relatively sharply defined borders  Multiple additional smaller nodes are scattered within the neck  No enlarged adenopathy on the right    There is a prominent upper mediastinal node projecting between the left common carotid artery and subclavian artery measuring 1 5 cm in short axis  VASCULAR STRUCTURES:  Normal enhancement of the cervical vasculature  THORACIC INLET:  Lung apices and upper mediastinum are unremarkable  BONY STRUCTURES: No acute fracture or destructive osseous lesion  Impression: Moderate left-sided adenopathy primarily within the left neck, posterior jugular chain levels 3 through 5 with a prominent node present in the upper mediastinum  Findings are nonspecific  As there are no signs of infection, findings are suspicious for neoplastic adenopathy, possibly lymphoma/leukemia  This examination was marked "immediate notification" in Epic in order to begin the standard process by which the radiology reading room liaison alerts the referring practitioner  Workstation performed: [id number]     CT chest w contrast    Result Date: 1/3/2023  Narrative: CT CHEST WITH IV CONTRAST INDICATION:   R59 1: Generalized enlarged lymph nodes  COMPARISON:  None  TECHNIQUE: CT examination of the chest was performed  Axial, sagittal, and coronal 2D reformatted images were created from the source data and submitted for interpretation  Radiation dose length product (DLP) for this visit:  989 mGy-cm   This examination, like all CT scans performed in the [facility], was performed utilizing techniques to minimize radiation dose exposure, including the use of iterative reconstruction and automated exposure control  IV Contrast:  85 mL of iohexol (OMNIPAQUE) FINDINGS: LUNGS:  Lungs are clear  There is no tracheal or endobronchial lesion  PLEURA:  Unremarkable  HEART/GREAT VESSELS: Heart is unremarkable for patient's age  No thoracic aortic aneurysm  MEDIASTINUM AND JESSICA:  Posterior mediastinal/retrocrural adenopathy surrounding the descending thoracic aorta; dominant posterior mediastinal/retrocrural node measures 2 2 cm in short axis #4/40  Enlarged left superior mediastinal node measuring 15 mm in short axis #4/11   CHEST WALL AND LOWER NECK:  Partially imaged left supraclavicular adenopathy; partially imaged dominant 17 mm left supraclavicular node #4/1  See the separate CT soft tissue neck report  Bilateral subcentimeter axillary lymph nodes  VISUALIZED STRUCTURES IN THE UPPER ABDOMEN:  Partially imaged abdominal structure could represent adenopathy  Follow-up with dedicated CT abdomen pelvis imaging  OSSEOUS STRUCTURES:  No acute fracture or destructive osseous lesion  Impression: Left supraclavicular, superior mediastinal, posterior mediastinal/retrocrural and suspected partially imaged left abdominal adenopathy  Follow-up with CT abdomen pelvis  See the separate CT soft tissue neck report  The study was marked in [facility] for immediate notification  Workstation performed: [id number]     CT abdomen pelvis w contrast    Result Date: 1/4/2023  Narrative: CT ABDOMEN AND PELVIS WITH IV CONTRAST INDICATION:   R59 1: Generalized enlarged lymph nodes R93 89: Abnormal findings on diagnostic imaging of other specified body structures  COMPARISON:  9/30/2010, 1/3/2023 TECHNIQUE:  CT examination of the abdomen and pelvis was performed  Axial, sagittal, and coronal 2D reformatted images were created from the source data and submitted for interpretation  Radiation dose length product (DLP) for this visit:  4440 mGy-cm   This examination, like all CT scans performed in the [facility], was performed utilizing techniques to minimize radiation dose exposure, including the use of iterative reconstruction and automated exposure control  IV Contrast:  100 mL of iohexol (OMNIPAQUE) Enteric Contrast:  Enteric contrast was administered  FINDINGS: ABDOMEN LOWER CHEST:  No clinically significant abnormality identified in the visualized lower chest  LIVER/BILIARY TREE:  Unremarkable  GALLBLADDER:  No calcified gallstones  No pericholecystic inflammatory change  SPLEEN:  Unremarkable  PANCREAS:  Unremarkable  ADRENAL GLANDS:  Unremarkable   KIDNEYS/URETERS:  There is a punctate nonobstructing right renal calculus  No hydronephrosis  STOMACH AND BOWEL:  Unremarkable  APPENDIX:  No findings to suggest appendicitis  ABDOMINOPELVIC CAVITY:  No ascites  No pneumoperitoneum  There is extensive retroperitoneal lymphadenopathy  A bulky mass in the left para-aortic region extending into the left upper quadrant measures 8 2 x 10 9 cm  Aortocaval lymphadenopathy measures 3 7 x 5 2 cm  More inferior left para-aortic lymphadenopathy at the bifurcation measures 5 0 x 5 9 cm  VESSELS:  Unremarkable for patient's age  PELVIS REPRODUCTIVE ORGANS:  Unremarkable for patient's age  URINARY BLADDER:  Unremarkable  ABDOMINAL WALL/INGUINAL REGIONS:  There are bilateral fat-containing inguinal hernias  OSSEOUS STRUCTURES:  No acute fracture or destructive osseous lesion  Impression: Bulky retroperitoneal lymphadenopathy consistent with lymphoma  Workstation performed: [id number]     I reviewed the above laboratory and imaging data  Discussion/Summary: 38-year-old male with lymphadenopathy  This involves the neck, chest as well as abdomen  I suspect this is contributing to his increased cough as well as his abdominal bloating  His left neck lymph node would be accessible for biopsy  I discussed that I suspect this is lymphoma  I have recommended that he undergo left neck lymph node biopsy with lymphoma protocol  The risks were explained including bleeding, infection, recurrence, need for further surgery, wound complications and neurovascular injury  Informed consent was obtained  We discussed treatment options would be based on the type of lymphoma  His wife sees Dr Cassandria Lombard for breast cancer, so we will make sure that he is set up with him once we have a definitive diagnosis  I will see him again at the time of surgery  He is agreeable to this  All his questions were answered

## 2023-01-09 NOTE — PRE-PROCEDURE INSTRUCTIONS
Pre-Surgery Instructions:   Medication Instructions   • loratadine (CLARITIN) 10 mg tablet Instructed to take as needed including DOS with sips water    Pre op,medications and showering instructions reviewed-Patient has hibiclens  Instructed to avoid all ASA/NSAIDs and OTC Vit/Supp from now until after surgery per anesthesia guidelines  Tylenol ok prn  Pt  Verbalized an understanding of all instructions reviewed and offers no concerns at this time

## 2023-01-11 ENCOUNTER — HOSPITAL ENCOUNTER (OUTPATIENT)
Facility: HOSPITAL | Age: 53
Setting detail: OUTPATIENT SURGERY
Discharge: HOME | End: 2023-01-11
Attending: SURGERY | Admitting: SURGERY

## 2023-01-11 ENCOUNTER — ANESTHESIA (OUTPATIENT)
Dept: PERIOP | Facility: HOSPITAL | Age: 53
End: 2023-01-11

## 2023-01-11 ENCOUNTER — ANESTHESIA EVENT (OUTPATIENT)
Dept: PERIOP | Facility: HOSPITAL | Age: 53
End: 2023-01-11

## 2023-01-11 VITALS
DIASTOLIC BLOOD PRESSURE: 82 MMHG | RESPIRATION RATE: 18 BRPM | OXYGEN SATURATION: 94 % | HEART RATE: 71 BPM | TEMPERATURE: 97.2 F | WEIGHT: 306 LBS | HEIGHT: 75 IN | SYSTOLIC BLOOD PRESSURE: 136 MMHG | BODY MASS INDEX: 38.05 KG/M2

## 2023-01-11 DIAGNOSIS — R59.1 LYMPHADENOPATHY: ICD-10-CM

## 2023-01-11 PROBLEM — E66.812 OBESITY, CLASS II, BMI 35-39.9: Status: ACTIVE | Noted: 2023-01-11

## 2023-01-11 PROBLEM — E66.9 OBESITY, CLASS II, BMI 35-39.9: Status: ACTIVE | Noted: 2023-01-11

## 2023-01-11 RX ORDER — GLYCOPYRROLATE 0.2 MG/ML
INJECTION INTRAMUSCULAR; INTRAVENOUS AS NEEDED
Status: DISCONTINUED | OUTPATIENT
Start: 2023-01-11 | End: 2023-01-11

## 2023-01-11 RX ORDER — HYDROMORPHONE HCL/PF 1 MG/ML
0.5 SYRINGE (ML) INJECTION
Status: DISCONTINUED | OUTPATIENT
Start: 2023-01-11 | End: 2023-01-11 | Stop reason: HOSPADM

## 2023-01-11 RX ORDER — DEXAMETHASONE SODIUM PHOSPHATE 10 MG/ML
INJECTION, SOLUTION INTRAMUSCULAR; INTRAVENOUS AS NEEDED
Status: DISCONTINUED | OUTPATIENT
Start: 2023-01-11 | End: 2023-01-11

## 2023-01-11 RX ORDER — MIDAZOLAM HYDROCHLORIDE 2 MG/2ML
INJECTION, SOLUTION INTRAMUSCULAR; INTRAVENOUS AS NEEDED
Status: DISCONTINUED | OUTPATIENT
Start: 2023-01-11 | End: 2023-01-11

## 2023-01-11 RX ORDER — NEOSTIGMINE METHYLSULFATE 1 MG/ML
INJECTION INTRAVENOUS AS NEEDED
Status: DISCONTINUED | OUTPATIENT
Start: 2023-01-11 | End: 2023-01-11

## 2023-01-11 RX ORDER — CEFAZOLIN SODIUM 1 G/50ML
1000 SOLUTION INTRAVENOUS ONCE
Status: COMPLETED | OUTPATIENT
Start: 2023-01-11 | End: 2023-01-11

## 2023-01-11 RX ORDER — SODIUM CHLORIDE, SODIUM LACTATE, POTASSIUM CHLORIDE, CALCIUM CHLORIDE 600; 310; 30; 20 MG/100ML; MG/100ML; MG/100ML; MG/100ML
INJECTION, SOLUTION INTRAVENOUS CONTINUOUS PRN
Status: DISCONTINUED | OUTPATIENT
Start: 2023-01-11 | End: 2023-01-11

## 2023-01-11 RX ORDER — FENTANYL CITRATE/PF 50 MCG/ML
25 SYRINGE (ML) INJECTION
Status: DISCONTINUED | OUTPATIENT
Start: 2023-01-11 | End: 2023-01-11 | Stop reason: HOSPADM

## 2023-01-11 RX ORDER — DIPHENHYDRAMINE HYDROCHLORIDE 50 MG/ML
12.5 INJECTION INTRAMUSCULAR; INTRAVENOUS ONCE AS NEEDED
Status: DISCONTINUED | OUTPATIENT
Start: 2023-01-11 | End: 2023-01-11 | Stop reason: HOSPADM

## 2023-01-11 RX ORDER — OXYCODONE HYDROCHLORIDE 5 MG/1
5 TABLET ORAL EVERY 4 HOURS PRN
Status: DISCONTINUED | OUTPATIENT
Start: 2023-01-11 | End: 2023-01-11 | Stop reason: HOSPADM

## 2023-01-11 RX ORDER — LIDOCAINE HYDROCHLORIDE 20 MG/ML
INJECTION, SOLUTION EPIDURAL; INFILTRATION; INTRACAUDAL; PERINEURAL AS NEEDED
Status: DISCONTINUED | OUTPATIENT
Start: 2023-01-11 | End: 2023-01-11

## 2023-01-11 RX ORDER — ONDANSETRON 2 MG/ML
INJECTION INTRAMUSCULAR; INTRAVENOUS AS NEEDED
Status: DISCONTINUED | OUTPATIENT
Start: 2023-01-11 | End: 2023-01-11

## 2023-01-11 RX ORDER — BUPIVACAINE HYDROCHLORIDE 2.5 MG/ML
INJECTION, SOLUTION EPIDURAL; INFILTRATION; INTRACAUDAL AS NEEDED
Status: DISCONTINUED | OUTPATIENT
Start: 2023-01-11 | End: 2023-01-11 | Stop reason: HOSPADM

## 2023-01-11 RX ORDER — CEFAZOLIN SODIUM 2 G/50ML
2000 SOLUTION INTRAVENOUS ONCE
Status: COMPLETED | OUTPATIENT
Start: 2023-01-11 | End: 2023-01-11

## 2023-01-11 RX ORDER — ONDANSETRON 2 MG/ML
4 INJECTION INTRAMUSCULAR; INTRAVENOUS ONCE AS NEEDED
Status: DISCONTINUED | OUTPATIENT
Start: 2023-01-11 | End: 2023-01-11 | Stop reason: HOSPADM

## 2023-01-11 RX ORDER — FENTANYL CITRATE 50 UG/ML
INJECTION, SOLUTION INTRAMUSCULAR; INTRAVENOUS AS NEEDED
Status: DISCONTINUED | OUTPATIENT
Start: 2023-01-11 | End: 2023-01-11

## 2023-01-11 RX ORDER — ROCURONIUM BROMIDE 10 MG/ML
INJECTION, SOLUTION INTRAVENOUS AS NEEDED
Status: DISCONTINUED | OUTPATIENT
Start: 2023-01-11 | End: 2023-01-11

## 2023-01-11 RX ORDER — PROPOFOL 10 MG/ML
INJECTION, EMULSION INTRAVENOUS AS NEEDED
Status: DISCONTINUED | OUTPATIENT
Start: 2023-01-11 | End: 2023-01-11

## 2023-01-11 RX ORDER — MAGNESIUM HYDROXIDE 1200 MG/15ML
LIQUID ORAL AS NEEDED
Status: DISCONTINUED | OUTPATIENT
Start: 2023-01-11 | End: 2023-01-11 | Stop reason: HOSPADM

## 2023-01-11 RX ORDER — SODIUM CHLORIDE, SODIUM LACTATE, POTASSIUM CHLORIDE, CALCIUM CHLORIDE 600; 310; 30; 20 MG/100ML; MG/100ML; MG/100ML; MG/100ML
50 INJECTION, SOLUTION INTRAVENOUS CONTINUOUS
Status: DISCONTINUED | OUTPATIENT
Start: 2023-01-11 | End: 2023-01-11 | Stop reason: HOSPADM

## 2023-01-11 RX ADMIN — NEOSTIGMINE METHYLSULFATE 3 MG: 1 INJECTION INTRAVENOUS at 08:08

## 2023-01-11 RX ADMIN — PROPOFOL 200 MG: 10 INJECTION, EMULSION INTRAVENOUS at 07:31

## 2023-01-11 RX ADMIN — CEFAZOLIN SODIUM 2000 MG: 2 SOLUTION INTRAVENOUS at 07:35

## 2023-01-11 RX ADMIN — ONDANSETRON 4 MG: 2 INJECTION INTRAMUSCULAR; INTRAVENOUS at 07:51

## 2023-01-11 RX ADMIN — MIDAZOLAM 2 MG: 1 INJECTION INTRAMUSCULAR; INTRAVENOUS at 07:26

## 2023-01-11 RX ADMIN — CEFAZOLIN SODIUM 1000 MG: 1 SOLUTION INTRAVENOUS at 07:35

## 2023-01-11 RX ADMIN — OXYCODONE HYDROCHLORIDE 5 MG: 5 TABLET ORAL at 09:28

## 2023-01-11 RX ADMIN — PROPOFOL 150 MG: 10 INJECTION, EMULSION INTRAVENOUS at 07:33

## 2023-01-11 RX ADMIN — GLYCOPYRROLATE 0.6 MCG: 0.2 INJECTION INTRAMUSCULAR; INTRAVENOUS at 08:08

## 2023-01-11 RX ADMIN — ROCURONIUM BROMIDE 50 MG: 10 INJECTION, SOLUTION INTRAVENOUS at 07:32

## 2023-01-11 RX ADMIN — SODIUM CHLORIDE, SODIUM LACTATE, POTASSIUM CHLORIDE, AND CALCIUM CHLORIDE: .6; .31; .03; .02 INJECTION, SOLUTION INTRAVENOUS at 07:26

## 2023-01-11 RX ADMIN — FENTANYL CITRATE 100 MCG: 50 INJECTION, SOLUTION INTRAMUSCULAR; INTRAVENOUS at 07:31

## 2023-01-11 RX ADMIN — DEXAMETHASONE SODIUM PHOSPHATE 10 MG: 10 INJECTION, SOLUTION INTRAMUSCULAR; INTRAVENOUS at 07:38

## 2023-01-11 RX ADMIN — LIDOCAINE HYDROCHLORIDE 100 MG: 20 INJECTION, SOLUTION EPIDURAL; INFILTRATION; INTRACAUDAL; PERINEURAL at 07:31

## 2023-01-11 NOTE — OP NOTE
OPERATIVE REPORT  PATIENT NAME: David Martinez    :  1970  MRN: [id number]  Pt Location: AN OR ROOM 04    SURGERY DATE: 2023    Surgeon(s) and Role:     * Milton Thibodeaux MD - Primary     * Nury Cote PA-C - Assisting    Preop Diagnosis:  Lymphadenopathy [R59 1]    Post-Op Diagnosis Codes:     * Lymphadenopathy [R59 1]    Procedure(s) (LRB):  EXCISION OF LEFT NECK LYMPH NODE WITH LYMPHOMA PROTOCOL (Left)    Specimen(s):  ID Type Source Tests Collected by Time Destination   1 : LEFT NECK LYMPH NODE Tissue Lymph Node - Lymphoma Prtocol TISSUE EXAM, LEUKEMIA/LYMPHOMA FLOW CYTOMETRY Milton Thibodeaux MD 2023  7:57 AM        Estimated Blood Loss:   Minimal    Drains:  * No LDAs found *    Anesthesia Type:   General    Operative Indications:  Lymphadenopathy [R611]  18-year-old male with significant lymphadenopathy  It was recommended that he undergo excision of a left neck lymph node  Risks and benefits were explained  Informed consent was obtained  Patient was brought to the operating room  Operative Findings:  Enlarged left cervical lymph nodes    Complications:   None    Procedure and Technique:  After identifying the patient, general anesthesia was achieved  Patient was prepped and draped in the usual sterile fashion  A timeout was performed  Local anesthesia was infiltrated over the palpable lymph node in the left neck  Skin incision was made over the node  This was taken through the skin, subcutaneous tissue and the platysma  We then dissected along the lateral border of the sternocleidomastoid  An enlarged lymph node was identified and then sharply dissected  The lymph node was then encircled with 2-0 Vicryl suture  The spinal accessory nerve was identified and dissected away from the lymph node and preserved along its course  The lymphatics were now clamped, divided and ligated with 2-0 Vicryl suture  Specimen was sent fresh to pathology to rule out lymphoma    Wound was now copiously irrigated  There was excellent hemostasis  Local anesthesia was infiltrated into the wound and there continued to be excellent hemostasis  Platysma was approximated with a running 3-0 Vicryl suture  Skin was approximated with a running 4-0 Monocryl suture in a subcuticular fashion  Skin glue was used on the skin  Patient was extubated and taken to the recovery room in stable condition having tolerated the procedure well  I was present and participated in all aspects of this procedure  No qualified resident was available to assist   The PA assisted with exposure, retraction, and to minimize blood loss         I was present for the entire procedure, A qualified resident physician was not available and A physician assistant was required during the procedure for retraction tissue handling,dissection and suturing    Patient Disposition:  PACU     This procedure was not performed to treat primary cutaneous melanoma through wide local excision      SIGNATURE: Roni Garcia MD  DATE: January 11, 2023  TIME: 8:07 AM

## 2023-01-11 NOTE — DISCHARGE INSTR - AVS FIRST PAGE
POST-OPERATIVE WOUND CARE INSTRUCTIONS    Your wound is closed with:   Dissolvable sutures/glue       Wound care: You may remove your dressing after 24 hours   You may shower using soap and water to clean your wound  Gently pat it dry  You may redress your wound for comfort as needed  Activity:   Did not perform any heavy lifting or strenuous physical activity for 7 days  Your activity restrictions will be reevaluated at your postoperative visit  Medications: You may resume all your preoperative medications and diet  Pain medication as directed on the prescription given in the office  Other:   May use ice on the wound for 24-48 hours as needed for comfort  May place warm compresses to the neck after 48 hours for comfort  Call the office at [phone] if you have any of the followin  Redness, swelling, heat, unusual drainage or heavy bleeding from the wound     2  Fever greater than 101°F    3  Pain not relieved by the prescribed pain medication

## 2023-01-11 NOTE — ANESTHESIA PREPROCEDURE EVALUATION
Procedure:  EXCISION OF LEFT NECK LYMPH NODE WITH LYMPHOMA PROTOCOL (Left: Neck)    Relevant Problems   Immune and Lymphatic   (+) Lymphadenopathy      Other   (+) Obesity, Class II, BMI 35-39 9        Physical Exam    Airway    Mallampati score: II  TM Distance: >3 FB  Neck ROM: full     Dental   No notable dental hx     Cardiovascular      Pulmonary      Other Findings        Anesthesia Plan  ASA Score- 3     Anesthesia Type- general with ASA Monitors  Additional Monitors:   Airway Plan: ETT  Plan Factors-Exercise tolerance (METS): >4 METS  Chart reviewed  Existing labs reviewed  Patient summary reviewed  Patient is not a current smoker  Obstructive sleep apnea risk education given perioperatively  Induction- intravenous  Postoperative Plan- Plan for postoperative opioid use  Planned trial extubation    Informed Consent- Anesthetic plan and risks discussed with patient  I personally reviewed this patient with the CRNA  Discussed and agreed on the Anesthesia Plan with the CRNA  Sukhi Pablo

## 2023-01-11 NOTE — INTERVAL H&P NOTE
H&P reviewed  After examining the patient I find no changes in the patients condition since the H&P had been written      Vitals:    01/11/23 0646   BP: 145/87   Pulse: 74   Resp: 18   Temp: 97 5 °F (36 4 °C)   SpO2: 96%

## 2023-01-11 NOTE — ANESTHESIA POSTPROCEDURE EVALUATION
Post-Op Assessment Note    CV Status:  Stable  Pain Score: 0    Pain management: adequate     Mental Status:  Alert and awake   Hydration Status:  Euvolemic   PONV Controlled:  Controlled   Airway Patency:  Patent      Post Op Vitals Reviewed: Yes            No notable events documented      BP   131/74   Temp   97 4   Pulse  88   Resp   12   SpO2   92%

## 2023-01-15 LAB — SCAN RESULT: NORMAL

## 2023-01-17 ENCOUNTER — TELEPHONE (OUTPATIENT)
Dept: SURGICAL ONCOLOGY | Facility: CLINIC | Age: 53
End: 2023-01-17

## 2023-01-17 NOTE — TELEPHONE ENCOUNTER
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

## 2023-01-18 ENCOUNTER — HOSPITAL ENCOUNTER (OUTPATIENT)
Dept: ULTRASOUND IMAGING | Facility: HOSPITAL | Age: 53
Discharge: HOME | End: 2023-01-18
Attending: UROLOGY

## 2023-01-18 ENCOUNTER — OFFICE VISIT (OUTPATIENT)
Dept: UROLOGY | Facility: AMBULATORY SURGERY CENTER | Age: 53
End: 2023-01-18

## 2023-01-18 ENCOUNTER — PATIENT OUTREACH (OUTPATIENT)
Dept: HEMATOLOGY ONCOLOGY | Facility: CLINIC | Age: 53
End: 2023-01-18

## 2023-01-18 ENCOUNTER — APPOINTMENT (OUTPATIENT)
Dept: LAB | Facility: MEDICAL CENTER | Age: 53
End: 2023-01-18

## 2023-01-18 VITALS
OXYGEN SATURATION: 95 % | BODY MASS INDEX: 37.12 KG/M2 | SYSTOLIC BLOOD PRESSURE: 110 MMHG | WEIGHT: 297 LBS | DIASTOLIC BLOOD PRESSURE: 82 MMHG | HEART RATE: 70 BPM

## 2023-01-18 DIAGNOSIS — C62.90 SEMINOMA (HCC): Primary | ICD-10-CM

## 2023-01-18 DIAGNOSIS — C62.90 SEMINOMA (HCC): ICD-10-CM

## 2023-01-18 DIAGNOSIS — R59.0 RETROPERITONEAL LYMPHADENOPATHY: ICD-10-CM

## 2023-01-18 LAB
AFP-TM SERPL-MCNC: 5.2 NG/ML (ref 0.5–8)
HCG-TM SERPL-SCNC: 13 MLU/ML
LDH SERPL-CCNC: 699 U/L (ref 81–234)

## 2023-01-18 RX ORDER — ZINC GLUCONATE 50 MG
50 TABLET ORAL DAILY
COMMUNITY

## 2023-01-18 RX ORDER — RIBOFLAVIN (VITAMIN B2) 100 MG
100 TABLET ORAL DAILY
COMMUNITY

## 2023-01-18 NOTE — PROGRESS NOTES
1/18/2023    [facility]  1970  [id number]        Assessment  Extensive retroperitoneal adenopathy and supraclavicular adenopathy status postbiopsy consistent with seminoma, no evidence of testicular mass on examination      Discussion  Discussion with patient and his wife in the office today regarding the recent neck biopsy showing seminoma  The CT scan also shows retroperitoneal adenopathy which appears most consistent with lymphoma, however, with a diagnosis of seminoma this could very well be testicular cancer as well  The distribution shows more tumor towards the patient's left side, but testicular examination is normal   I recommend obtaining tumor markers including LDH, AFP, and hCG  If this is truly pure seminoma with extensive retroperitoneal adenopathy I would expect the hCG level to be elevated  If the AFP level is elevated this would be indicative of a mixed germ cell tumor  I have also ordered a scrotal ultrasound to see if there is a small lesion or scar within the testicles possibly from a burned-out testicular tumor  I am working with our nurse navigator to arrange for a medical oncology appointment ASAP  I will hold on orchiectomy at this time as there is no palpable testicular mass on examination  History of Present Illness  45 y o  male with a history of enlarging left supraclavicular and neck adenopathy  This prompted a CT scan which showed enlarged retroperitoneal adenopathy as well as some mediastinal adenopathy  The patient was seen by Dr Saud Phillips and recently underwent a biopsy of a left neck lymph node  Dr Saud Phillips informing that the pathology is most consistent with seminoma  He denies ever feeling a testicular mass  He has no history of undescended testes  CT scan reviewed in the office today shows a pattern of retroperitoneal adenopathy consistent with either lymphoma or possibly a testicular cancer spreading along the iliac, caval, and aortic lymph node pathways    He denies any weight loss or constitutional signs  He denies ever feeling a testicular mass or lesion  AUA Symptom Score  AUA SYMPTOM SCORE    Flowsheet Row Most Recent Value   AUA SYMPTOM SCORE    How often have you had a sensation of not emptying your bladder completely after you finished urinating? 1 (P)     How often have you had to urinate again less than two hours after you finished urinating? 1 (P)     How often have you found you stopped and started again several times when you urinate? 0 (P)     How often have you found it difficult to postpone urination? 0 (P)     How often have you had a weak urinary stream? 1 (P)     How often have you had to push or strain to begin urination? 0 (P)     How many times did you most typically get up to urinate from the time you went to bed at night until the time you got up in the morning? 1 (P)     Quality of Life: If you were to spend the rest of your life with your urinary condition just the way it is now, how would you feel about that? 2 (P)     AUA SYMPTOM SCORE 4 (P)           Review of Systems  Review of Systems   Constitutional: Negative  HENT: Negative  Eyes: Negative  Respiratory: Negative  Cardiovascular: Negative  Gastrointestinal: Negative  Endocrine: Negative  Genitourinary:        HPI   Musculoskeletal: Negative  Skin: Negative  Allergic/Immunologic: Negative  Neurological: Negative  Hematological: Negative  Psychiatric/Behavioral: Negative            Past Medical History  Past Medical History:   Diagnosis Date   • Allergic 1/1/2000    seasonal allergies   • Seasonal allergies    • Torn meniscus     right knee- surgical repair today 7/19/2021   • Wears glasses        Past Social History  Past Surgical History:   Procedure Laterality Date   • FACIAL/NECK BIOPSY Left 1/11/2023    Procedure: EXCISION OF LEFT NECK LYMPH NODE WITH LYMPHOMA PROTOCOL;  Surgeon: Viki Morris MD;  [facility];  Service: Surgical Oncology   • HEMORROIDECTOMY     • KNEE SURGERY  7/19/2021   • MN ARTHRS KNE SURG W/MENISCECTOMY MED/LAT W/SHVG Right 07/19/2021    Procedure: ARTHROSCOPY KNEE, partial lateral meniscectomy;  Surgeon: Magalis Munoz MD;  Location: [facility] OR;  Service: Orthopedics       Past Family History  Family History   Problem Relation Age of Onset   • Cancer Mother         Breast Cancer   • Breast cancer Mother    • Breast cancer additional onset Mother    • Cancer Father         Skin carcenoma   • Skin cancer Father    • Breast cancer additional onset Sister    • Cancer Sister         Breast Cancer and Skin carcenoma   • Skin cancer Brother        Past Social history  Social History     Socioeconomic History   • Marital status: /Civil Union     Spouse name: Not on file   • Number of children: Not on file   • Years of education: Not on file   • Highest education level: Not on file   Occupational History   • Not on file   Tobacco Use   • Smoking status: Never   • Smokeless tobacco: Never   Vaping Use   • Vaping Use: Never used   Substance and Sexual Activity   • Alcohol use:  Yes     Alcohol/week: 3 0 standard drinks     Types: 1 Glasses of wine, 1 Cans of beer, 1 Shots of liquor per week     Comment: 1-2 weekly   • Drug use: Never   • Sexual activity: Yes     Partners: Female   Other Topics Concern   • Not on file   Social History Narrative   • Not on file     Social Determinants of Health     Financial Resource Strain: Not on file   Food Insecurity: Not on file   Transportation Needs: Not on file   Physical Activity: Not on file   Stress: Not on file   Social Connections: Not on file   Intimate Partner Violence: Not on file   Housing Stability: Not on file       Current Medications  Current Outpatient Medications   Medication Sig Dispense Refill   • Ascorbic Acid (vitamin C) 100 MG tablet Take 100 mg by mouth daily     • Elderberry 500 MG CAPS Take by mouth in the morning     • zinc gluconate 50 mg tablet Take 50 mg by mouth daily     • HYDROcodone-acetaminophen (Norco) 5-325 mg per tablet Take 1 tablet by mouth every 6 (six) hours as needed for pain Max Daily Amount: 4 tablets (Patient not taking: Reported on 1/18/2023) 10 tablet 0   • loratadine (CLARITIN) 10 mg tablet Take 10 mg by mouth daily (Patient not taking: Reported on 1/18/2023)     • ondansetron (ZOFRAN-ODT) 4 mg disintegrating tablet Take 1 tablet (4 mg total) by mouth every 8 (eight) hours as needed for nausea or vomiting (Patient not taking: Reported on 8/31/2021) 15 tablet 0   • triamcinolone (KENALOG) 0 1 % cream APPLY TO AREA ON LOWER BACK TWICE A DAY FOR UP TO 2 WEEKS IF NEEDED (Patient not taking: Reported on 1/18/2023)       No current facility-administered medications for this visit  Allergies  Allergies   Allergen Reactions   • [employer] [place] - Food Allergy Vomiting       Past Medical History, Social History, Family History, medications and allergies were reviewed  Vitals  Vitals:    01/18/23 0912   BP: 110/82   BP Location: Left arm   Patient Position: Sitting   Cuff Size: Large   Pulse: 70   SpO2: 95%   Weight: 135 kg (297 lb)       Physical Exam  Physical Exam  On examination he is in no acute distress  His abdomen is soft obese nontender nondistended  There is no palpable inguinal adenopathy  A left neck incision is healing well at this time  Phallus is normal   Both testes are normal and properly descended in the scrotum without mass or lesion  Skin is warm  Extremities without edema    Neurologic is grossly intact and nonfocal   Gait normal   Affect normal      Results  Lab Results   Component Value Date    PSA 0 6 12/28/2022    PSA 0 6 05/07/2021     Lab Results   Component Value Date    CALCIUM 9 2 12/28/2022    K 4 2 12/28/2022    CO2 27 12/28/2022     12/28/2022    BUN 25 12/28/2022    CREATININE 0 96 12/28/2022     Lab Results   Component Value Date    WBC 6 49 12/28/2022    HGB 15 9 12/28/2022    HCT 46 8 12/28/2022    MCV 94 12/28/2022     12/28/2022         Office Urine Dip  No results found for this or any previous visit (from the past 1 hour(s)) ]

## 2023-01-18 NOTE — PROGRESS NOTES
Hematology/Oncology Outpatient Office Note    Date of Service: 2023    [facility] [facility]  [phone]    Reason for Consultation:   Chief Complaint   Patient presents with   • Consult       Cancer Stage at diagnosis: IIIC    Referral Physician: Scarlet Hua MD    Primary Care Physician:  Fadi Allen DO     Nickname: Dalila Cazares    Spouse: Fidel Morrow    Original ECO    Today's ECO     Goals and Barriers:  Current Goal: Minimize effects of disease burden, extend life  Barriers to accomplishing this: None    Patient's Capacity to Self Care:  Patient is able to self care    Code Status: not addressed today    Advanced directives: not addressed today    ASSESSMENT & PLAN      Diagnosis ICD-10-CM Associated Orders   1  Seminoma ([facility] [address] )  C62 90       2  Chemotherapy-induced nausea  R11 0     T45  1X5A             This is a 45 y o  c PMHx notable for seasonal allergies, 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     5 year Survival rate 73% (Testicular Cancer Survival Rates  Testicular Cancer Prognosis) per the ACS  Discussion of decision making  • Oncology history updated, accordingly, during this visit  • Goals of care/patient communication  o I discussed with the patient the clinical course leading up to their cancer diagnosis  I reviewed relevant office notes, imaging reports and pathology result as well   o I told the patient that this is a case of potentially curable disease and what this means  We discussed that the goal of anti-cancer therapy is to provide best quality of life, extend overall survival, and progression free survival as shown in clinical trials   We also discussed that there might be a point when the cancer will no longer respond to this anti-neoplastic therapy    o I explained the risks/benefits of the proposed cancer therapy: Bleomycin 30 units IV, Etoposide 100mg/m2, cisplatin 20mg/m2, and after discussion including understanding risks of possible life-threatening complications and therapy-related malignancy development, informed consent for blood products and treatment has been signed and obtained  • TNM/Staging At Diagnosis  o [id number] S2  Cancer Staging   IIIC, poor risk (non pulmonary visceral mets)  • Disease Features/Tumor Markers/Genetics  o Tumor Marker: 1/18/2023:  (3x ULN), AFP (5 2, WNL), HCG (13, slightly high)  o Notable Path Features:   1/11/2023: Lymph node, left neck, excision: Germ cell tumor, compatible with metastatic seminoma  Background nonnecrotizing granulomatous inflammation  • Treatment: BEP  • Other Supportive care:  • Treatment Team Members  o Surgeon: Dr Krysten Valentine  o Rad Onc  o Palliative  • Labs: 12/28/2022: creat 0 96, T bili 0 63, WBC 6 49k, Hgb 15 9, plt 244k  • Diagnostics   1/3/2023 CT soft tissue neck: Moderate left-sided adenopathy primarily within the left neck, posterior jugular chain levels 3 through 5 with a prominent node present in the upper mediastinum  1/3/2023 CT Chest w/c: Left supraclavicular, superior mediastinal, posterior mediastinal (15mm)/retrocrural (2 2 cm)and suspected partially imaged left abdominal adenopathy  1/4/2023: CT Abd/pelv w/c: Bulky retroperitoneal lymphadenopathy consistent with lymphoma (left para-aortic region extending into the left upper quadrant measures 8 2 x 10 9 cm  Aortocaval lymphadenopathy measures 3 7 x 5 2 cm  More inferior left para-aortic lymphadenopathy at the bifurcation measures 5 0 x 5 9 cm )  1/18/2023: Testicular U/S shows a left testicular lesion (1 5 x 1 7 x 0 9 cm) and the RP adenopathy is more to the Left of the aorta    Discussion of decision making    I personally reviewed the following lab results, the image studies, pathology, other specialty/physicians consult notes and recommendations, and outside medical records from [facility]   I had a lengthy discussion with the patient and shared the work-up findings  We discussed the diagnosis and management plan as below  I spent 62 minutes reviewing the records (labs, clinician notes, outside records, medical history, ordering medicine/tests/procedures, interpreting the imaging/labs previously done) and coordination of care as well as direct time with the patient today, of which greater than 50% of the time was spent in counseling and coordination of care with the patient/family  · Plan/Labs  · Zofran 8mg q8 prn nausea/vomiting to be sent home  · F/u Urology for goal of orchiectomy  Port planned for Monday   · PFTs baseline  · Infusion chairs to be ordered for BEP q21 days x4 cycles with Neulasta support  · CT CAP w/c ordered for 4/5/2023 after BEP for post chemo restaging along with tumor markers at that time        Follow Up: q3 weeks for 4 cycles while on systemic therapy    All questions were answered to the patient's satisfaction during this encounter  The patient knows the contact information for our office and knows to reach out for any relevant concerns related to this encounter  They are to call for any temperature 100 4 or higher, new symptoms including but not restricted to shaking chills, decreased appetite, nausea, vomiting, diarrhea, increased fatigue, shortness of breath or chest pain, confusion, and not feeling the strength to come to the clinic  For all other listed problems and medical diagnosis in their chart - they are managed by PCP and/or other specialists, which the patient acknowledges  Thank you very much for your consultation and making us a part of this patient's care  We are continuing to follow closely with you  Please do not hesitate to reach out to me with any additional questions or concerns  Nancy Xavier MD  Hematology & Medical Oncology Staff Physician             Disclaimer: This document was prepared using [employer] Flu[employer] Direct technology   If a word or phrase is confusing, or does not make sense, this is likely due to recognition error which was not discovered during this clinician's review  If you believe an error has occurred, please contact me through [address] [place] line for rashida? cation  ONCOLOGY HISTORY OF PRESENT ILLNESS        Oncology History   Seminoma ([facility] [address] )   1/11/2023 -  Cancer Staged    Staging form: Testis, AJCC 8th Edition  - Clinical stage from 1/11/2023: cT4, cN3, pM1a, S2 - Signed by Agata Whitley MD on 1/20/2023  Stage prefix: Initial diagnosis  Lactate dehydrogenase (LDH) (U/L): 699  Human chorionic gonadotropin (hCG) (mIU/mL): 13  Alpha fetoprotein (AFP) (ng/mL): 5 2  Laterality: Left  Sites of metastasis: Distant lymph nodes, NOS, Retroperitoneal lymph node, NOS  Source of metastatic specimen: Supraclavicular Lymph Nodes  Staged by: Managing physician       1/19/2023 Initial Diagnosis    Seminoma (HCC)           SUBJECTIVE  (INTERVAL HISTORY)      Clotting History None   Bleeding History None   Cancer History Seminoma   Family Cancer History Mom/sister (breast), same sister (melanoma), father (basal cell skin cancer)   H/O Blood/Plt Transfusion None   Tobacco/etoh/drug abuse No nicotine use, etoh abuse, rec drug use       Cancer Screening history C-scope (2019)   Occupation  at [employer]     Slight pain in the L groin and L neck since 12/19/2022  These discomfort has been stable  I have reviewed the relevant past medical, surgical, social and family history  I have also reviewed allergies and medications for this patient  Review of Systems    Baseline weight: 300 lbs    Has had intermittent lower back, thigh rash since summer of 2022  Denies F/C, N/V, SOB, CP, LH, HA, itching, gen weakness, melena, hematuria, hematochezia, falls, diarrhea, or constipation       A 10-point review of system was performed, pertinent positive and negative were detailed as above  Otherwise, the 10-point review of system was negative        Past Medical History: Diagnosis Date   • Allergic 1/1/2000    seasonal allergies   • Seasonal allergies    • Torn meniscus     right knee- surgical repair today 7/19/2021   • Wears glasses        Past Surgical History:   Procedure Laterality Date   • FACIAL/NECK BIOPSY Left 1/11/2023    Procedure: EXCISION OF LEFT NECK LYMPH NODE WITH LYMPHOMA PROTOCOL;  Surgeon: Vy Castro MD;  [facility];  Service: Surgical Oncology   • HEMORROIDECTOMY     • KNEE SURGERY  7/19/2021   • RI ARTHRS KNE SURG W/MENISCECTOMY MED/LAT W/SHVG Right 07/19/2021    Procedure: ARTHROSCOPY KNEE, partial lateral meniscectomy;  Surgeon: Tiff Mcgee MD;  [facility];  Service: Orthopedics       Family History   Problem Relation Age of Onset   • Cancer Mother         Breast Cancer   • Breast cancer Mother    • Breast cancer additional onset Mother    • Cancer Father         Skin carcenoma   • Skin cancer Father    • Breast cancer additional onset Sister    • Cancer Sister         Breast Cancer and Skin carcenoma   • Skin cancer Brother        Social History     Socioeconomic History   • Marital status: /Civil Union     Spouse name: Not on file   • Number of children: Not on file   • Years of education: Not on file   • Highest education level: Not on file   Occupational History   • Not on file   Tobacco Use   • Smoking status: Never   • Smokeless tobacco: Never   Vaping Use   • Vaping Use: Never used   Substance and Sexual Activity   • Alcohol use:  Yes     Alcohol/week: 3 0 standard drinks     Types: 1 Glasses of wine, 1 Cans of beer, 1 Shots of liquor per week     Comment: 1-2 weekly   • Drug use: Never   • Sexual activity: Yes     Partners: Female   Other Topics Concern   • Not on file   Social History Narrative   • Not on file     Social Determinants of Health     Financial Resource Strain: Not on file   Food Insecurity: Not on file   Transportation Needs: Not on file   Physical Activity: Not on file   Stress: Not on file Social Connections: Not on file   Intimate Partner Violence: Not on file   Housing Stability: Not on file       Allergies   Allergen Reactions   • [place] - Food Allergy Vomiting       Current Outpatient Medications   Medication Sig Dispense Refill   • Ascorbic Acid (vitamin C) 100 MG tablet Take 100 mg by mouth daily     • Elderberry 500 MG CAPS Take by mouth in the morning     • triamcinolone (KENALOG) 0 1 % cream      • zinc gluconate 50 mg tablet Take 50 mg by mouth daily     • HYDROcodone-acetaminophen (Norco) 5-325 mg per tablet Take 1 tablet by mouth every 6 (six) hours as needed for pain Max Daily Amount: 4 tablets (Patient not taking: Reported on 1/18/2023) 10 tablet 0   • loratadine (CLARITIN) 10 mg tablet Take 10 mg by mouth daily (Patient not taking: Reported on 1/18/2023)       No current facility-administered medications for this visit  (Not in a hospital admission)      Objective:     24 Hour Vitals Assessment:     Vitals:    01/20/23 1114   BP: 130/74   Pulse: 70   Resp: 16   Temp: 98 3 °F (36 8 °C)   SpO2: 96%       PHYSICIAN EXAM    General: Appearance: alert, cooperative, no distress  HEENT: Normocephalic, atraumatic  No scleral icterus  conjunctivae clear  EOMI  Chest: No tenderness to palpation  No open wound noted  Lungs: Clear to auscultation bilaterally, Respirations unlabored  Cardiac: Regular rate and rhythm, +S1and S2  Abdomen: Soft, non-tender, non-distended  Bowel sounds are normal    Extremities:  No edema, cyanosis, clubbing  Skin: Skin color, turgor are normal  No rashes  Lymphatics: no palpable axillary, or inguinal adenopathy  L supra-clavicular LN palpated with incision c/d/i    Neurologic: Awake, Alert, and oriented, no gross focal deficits noted b/l  DATA REVIEW:    Pathology Result:    Final Diagnosis   Date Value Ref Range Status   01/11/2023   Final    A    Lymph node, left neck, excision:  -Germ cell tumor, compatible with metastatic seminoma (see note)   -Background nonnecrotizing granulomatous inflammation (see note)  Comment: This is an appended report  These results have been appended to a previously preliminary verified report  10/25/2019   Final    A  Cecum, cold biopsy:  - Portion of benign colonic mucosa with prominent reactive lymphoid aggregate  B  Ascending colon, cold snare:  - Tubular adenoma  - Negative for high grade dysplasia/ carcinoma  C  Colon, splenic flexure, cold snare:  - Tubular adenoma  - Negative for high grade dysplasia/ carcinoma  Image Results:   Image result are reviewed and documented in Hematology/Oncology history    US scrotum and testicles  Narrative: SCROTAL ULTRASOUND    INDICATION:    C62 90: Malignant neoplasm of unspecified testis, unspecified whether descended or undescended  "Discussion with patient and his wife in the office today regarding the recent neck biopsy showing seminoma  The CT scan also shows   retroperitoneal adenopathy which appears most consistent with lymphoma, however, with a diagnosis of seminoma this could very well be testicular cancer as well  The distribution shows more tumor towards the patient's left side, but testicular   examination is normal   I recommend obtaining tumor markers including LDH, AFP, and hCG  If this is truly pure seminoma with extensive retroperitoneal adenopathy I would expect the hCG level to be elevated  If the AFP level is elevated this would be   indicative of a mixed germ cell tumor  I have also ordered a scrotal ultrasound to see if there is a small lesion or scar within the testicles possibly from a burned-out testicular tumor "     COMPARISON: CT abdomen pelvis 1/4/2020  TECHNIQUE:   Ultrasound the scrotal contents was performed with a high frequency linear transducer utilizing volumetric sweep imaging as well as standard still image techniques  Imaging performed in longitudinal and transverse orientation   Color and   spectral Doppler evaluation also performed bilaterally  FINDINGS:    TESTES:   Testes are symmetric and normal in size  RIGHT testis = 4 4 x 1 9 x 2 9 cm  Volume 12 7 mL  Normal contour with homogeneous smooth echotexture  Testicular microlithiasis  LEFT testis = 3 9 x 1 8 x 2 8 cm  Volume 10 2 mL  Normal contour with homogeneous smooth echotexture  Testicular microlithiasis  Hypoechoic area measuring 1 5 x 1 7 x 0 9 cm  Smaller hypoechoic area measuring 0 9 x 0 9 x 0 4 cm  Doppler flow within both testes is present and symmetric  EPIDIDYMIDES:   Normal Size  Doppler ultrasound demonstrates normal blood flow  No epididymal lesions  HYDROCELE:  No significant fluid present  VARICOCELE:  None present  SCROTUM:  Scrotal thickness and appearance within normal limits  No evidence for extratesticular mass or hernia demonstrated  Impression:    Hypoechoic areas in the left testicle suspicious for testicular malignancy in this patient with left-sided predominant retroperitoneal adenopathy  Bilateral testicular microlithiasis  The study was marked in [facility] for immediate notification  Workstation performed: [id number]      LABS:  Lab data are reviewed and documented in HemOnc history  Lab Results   Component Value Date    HGB 15 9 12/28/2022    HCT 46 8 12/28/2022    MCV 94 12/28/2022     12/28/2022    WBC 6 49 12/28/2022    NRBC 0 12/28/2022     Lab Results   Component Value Date    K 4 2 12/28/2022     12/28/2022    CO2 27 12/28/2022    BUN 25 12/28/2022    CREATININE 0 96 12/28/2022    CALCIUM 9 2 12/28/2022    AST 29 12/28/2022    ALT 35 12/28/2022    ALKPHOS 114 12/28/2022    EGFR 89 12/28/2022       No results found for: IRON, TIBC, FERRITIN    No results found for: [id number]    No results for input(s): WBC, CREAT in the last 72 hours      Invalid input(s):  PLT    By:  Mariya Atkins MD, 1/20/2023, 11:26 AM

## 2023-01-18 NOTE — LETTER
January 18, 2023     Pily Sprague, [facility],[address]  [id number] [place] [address]    Patient: Eugenia Garcia   YOB: 1970   Date of Visit: 1/18/2023       Dear Dr Aj Poe: Thank you for referring Eugenia Garcia to me for evaluation  Below are my notes for this consultation  If you have questions, please do not hesitate to call me  I look forward to following your patient along with you  Sincerely,        Que Bill MD        CC: MD Ronny Odonnell MD Frosty Brightly, MD Leverette Red, MD Harlow Bean, KALEY Bill MD  1/18/2023 10:15 AM  Sign when Signing Visit  1/18/2023    Eugenia Garcia  1970  [id number]        Assessment  Extensive retroperitoneal adenopathy and supraclavicular adenopathy status postbiopsy consistent with seminoma, no evidence of testicular mass on examination      Discussion  Discussion with patient and his wife in the office today regarding the recent neck biopsy showing seminoma  The CT scan also shows retroperitoneal adenopathy which appears most consistent with lymphoma, however, with a diagnosis of seminoma this could very well be testicular cancer as well  The distribution shows more tumor towards the patient's left side, but testicular examination is normal   I recommend obtaining tumor markers including LDH, AFP, and hCG  If this is truly pure seminoma with extensive retroperitoneal adenopathy I would expect the hCG level to be elevated  If the AFP level is elevated this would be indicative of a mixed germ cell tumor  I have also ordered a scrotal ultrasound to see if there is a small lesion or scar within the testicles possibly from a burned-out testicular tumor  I am working with our nurse navigator to arrange for a medical oncology appointment ASAP  I will hold on orchiectomy at this time as there is no palpable testicular mass on examination          History of Present Illness  45 y o  male with a history of enlarging left supraclavicular and neck adenopathy  This prompted a CT scan which showed enlarged retroperitoneal adenopathy as well as some mediastinal adenopathy  The patient was seen by Dr Margoth Cazares and recently underwent a biopsy of a left neck lymph node  Dr Margoth Cazares informing that the pathology is most consistent with seminoma  He denies ever feeling a testicular mass  He has no history of undescended testes  CT scan reviewed in the office today shows a pattern of retroperitoneal adenopathy consistent with either lymphoma or possibly a testicular cancer spreading along the iliac, caval, and aortic lymph node pathways  He denies any weight loss or constitutional signs  He denies ever feeling a testicular mass or lesion  AUA Symptom Score  AUA SYMPTOM SCORE    Flowsheet Row Most Recent Value   AUA SYMPTOM SCORE    How often have you had a sensation of not emptying your bladder completely after you finished urinating? 1 (P)     How often have you had to urinate again less than two hours after you finished urinating? 1 (P)     How often have you found you stopped and started again several times when you urinate? 0 (P)     How often have you found it difficult to postpone urination? 0 (P)     How often have you had a weak urinary stream? 1 (P)     How often have you had to push or strain to begin urination? 0 (P)     How many times did you most typically get up to urinate from the time you went to bed at night until the time you got up in the morning? 1 (P)     Quality of Life: If you were to spend the rest of your life with your urinary condition just the way it is now, how would you feel about that? 2 (P)     AUA SYMPTOM SCORE 4 (P)           Review of Systems  Review of Systems   Constitutional: Negative  HENT: Negative  Eyes: Negative  Respiratory: Negative  Cardiovascular: Negative  Gastrointestinal: Negative  Endocrine: Negative      Genitourinary:        HPI   Musculoskeletal: Negative  Skin: Negative  Allergic/Immunologic: Negative  Neurological: Negative  Hematological: Negative  Psychiatric/Behavioral: Negative  Past Medical History  Past Medical History:   Diagnosis Date   • Allergic 1/1/2000    seasonal allergies   • Seasonal allergies    • Torn meniscus     right knee- surgical repair today 7/19/2021   • Wears glasses        Past Social History  Past Surgical History:   Procedure Laterality Date   • FACIAL/NECK BIOPSY Left 1/11/2023    Procedure: EXCISION OF LEFT NECK LYMPH NODE WITH LYMPHOMA PROTOCOL;  Surgeon: Juma Zavala MD;  Location: [facility];  Service: Surgical Oncology   • HEMORROIDECTOMY     • KNEE SURGERY  7/19/2021   • FL ARTHRS KNE SURG W/MENISCECTOMY MED/LAT W/SHVG Right 07/19/2021    Procedure: ARTHROSCOPY KNEE, partial lateral meniscectomy;  Surgeon: Immanuel Akers MD;  Location: [facility];  Service: Orthopedics       Past Family History  Family History   Problem Relation Age of Onset   • Cancer Mother         Breast Cancer   • Breast cancer Mother    • Breast cancer additional onset Mother    • Cancer Father         Skin carcenoma   • Skin cancer Father    • Breast cancer additional onset Sister    • Cancer Sister         Breast Cancer and Skin carcenoma   • Skin cancer Brother        Past Social history  Social History     Socioeconomic History   • Marital status: /Civil Union     Spouse name: Not on file   • Number of children: Not on file   • Years of education: Not on file   • Highest education level: Not on file   Occupational History   • Not on file   Tobacco Use   • Smoking status: Never   • Smokeless tobacco: Never   Vaping Use   • Vaping Use: Never used   Substance and Sexual Activity   • Alcohol use:  Yes     Alcohol/week: 3 0 standard drinks     Types: 1 Glasses of wine, 1 Cans of beer, 1 Shots of liquor per week     Comment: 1-2 weekly   • Drug use: Never   • Sexual activity: Yes     Partners: Female   Other Topics Concern   • Not on file   Social History Narrative   • Not on file     Social Determinants of Health     Financial Resource Strain: Not on file   Food Insecurity: Not on file   Transportation Needs: Not on file   Physical Activity: Not on file   Stress: Not on file   Social Connections: Not on file   Intimate Partner Violence: Not on file   Housing Stability: Not on file       Current Medications  Current Outpatient Medications   Medication Sig Dispense Refill   • Ascorbic Acid (vitamin C) 100 MG tablet Take 100 mg by mouth daily     • Elderberry 500 MG CAPS Take by mouth in the morning     • zinc gluconate 50 mg tablet Take 50 mg by mouth daily     • HYDROcodone-acetaminophen (Norco) 5-325 mg per tablet Take 1 tablet by mouth every 6 (six) hours as needed for pain Max Daily Amount: 4 tablets (Patient not taking: Reported on 1/18/2023) 10 tablet 0   • loratadine (CLARITIN) 10 mg tablet Take 10 mg by mouth daily (Patient not taking: Reported on 1/18/2023)     • ondansetron (ZOFRAN-ODT) 4 mg disintegrating tablet Take 1 tablet (4 mg total) by mouth every 8 (eight) hours as needed for nausea or vomiting (Patient not taking: Reported on 8/31/2021) 15 tablet 0   • triamcinolone (KENALOG) 0 1 % cream APPLY TO AREA ON LOWER BACK TWICE A DAY FOR UP TO 2 WEEKS IF NEEDED (Patient not taking: Reported on 1/18/2023)       No current facility-administered medications for this visit  Allergies  Allergies   Allergen Reactions   • Sofi Poon - Food Allergy Vomiting       Past Medical History, Social History, Family History, medications and allergies were reviewed  Vitals  Vitals:    01/18/23 0912   BP: 110/82   BP Location: Left arm   Patient Position: Sitting   Cuff Size: Large   Pulse: 70   SpO2: 95%   Weight: 135 kg (297 lb)       Physical Exam  Physical Exam  On examination he is in no acute distress  His abdomen is soft obese nontender nondistended  There is no palpable inguinal adenopathy    A left neck incision is healing well at this time  Phallus is normal   Both testes are normal and properly descended in the scrotum without mass or lesion  Skin is warm  Extremities without edema    Neurologic is grossly intact and nonfocal   Gait normal   Affect normal      Results  Lab Results   Component Value Date    PSA 0 6 12/28/2022    PSA 0 6 05/07/2021     Lab Results   Component Value Date    CALCIUM 9 2 12/28/2022    K 4 2 12/28/2022    CO2 27 12/28/2022     12/28/2022    BUN 25 12/28/2022    CREATININE 0 96 12/28/2022     Lab Results   Component Value Date    WBC 6 49 12/28/2022    HGB 15 9 12/28/2022    HCT 46 8 12/28/2022    MCV 94 12/28/2022     12/28/2022         Office Urine Dip  No results found for this or any previous visit (from the past 1 hour(s)) ]

## 2023-01-19 ENCOUNTER — PATIENT OUTREACH (OUTPATIENT)
Dept: CASE MANAGEMENT | Facility: OTHER | Age: 53
End: 2023-01-19

## 2023-01-19 PROBLEM — C62.90 SEMINOMA (HCC): Status: ACTIVE | Noted: 2023-01-19

## 2023-01-19 NOTE — PROGRESS NOTES
Call placed to pt to introduce myself as Nurse Navigator  Spoke with his wife Ilia  Explained my role in providing support, coordinating care and connecting to resources     Reviewed upcoming appts and offered appt for this Friday with Dr Julia Hernadez  1/20/23 at 11:20am  General assessment completed  Pt completed stat labs and scheduled for Ultrasound today  Provided support and encouraged to call with any questions or concerns      Future Appointments   Date Time Provider Mario Townsend   1/20/2023 11:20 AM Demetri Quinonez MD HEM [facility]   1/24/2023  8:30 AM Amira Whittaker MD SURG [facility]   2/1/2023  2:15 PM Ian Sanchez MD [facility]

## 2023-01-20 ENCOUNTER — TELEPHONE (OUTPATIENT)
Dept: HEMATOLOGY ONCOLOGY | Facility: CLINIC | Age: 53
End: 2023-01-20

## 2023-01-20 ENCOUNTER — PATIENT OUTREACH (OUTPATIENT)
Dept: HEMATOLOGY ONCOLOGY | Facility: CLINIC | Age: 53
End: 2023-01-20

## 2023-01-20 ENCOUNTER — CONSULT (OUTPATIENT)
Dept: HEMATOLOGY ONCOLOGY | Facility: CLINIC | Age: 53
End: 2023-01-20

## 2023-01-20 ENCOUNTER — OFFICE VISIT (OUTPATIENT)
Dept: SURGICAL ONCOLOGY | Facility: CLINIC | Age: 53
End: 2023-01-20

## 2023-01-20 VITALS
WEIGHT: 300 LBS | DIASTOLIC BLOOD PRESSURE: 74 MMHG | HEIGHT: 75 IN | BODY MASS INDEX: 37.3 KG/M2 | OXYGEN SATURATION: 96 % | RESPIRATION RATE: 16 BRPM | HEART RATE: 70 BPM | TEMPERATURE: 98.3 F | SYSTOLIC BLOOD PRESSURE: 130 MMHG

## 2023-01-20 VITALS
RESPIRATION RATE: 14 BRPM | HEIGHT: 75 IN | WEIGHT: 295 LBS | TEMPERATURE: 96.7 F | BODY MASS INDEX: 36.68 KG/M2 | HEART RATE: 78 BPM | OXYGEN SATURATION: 99 % | DIASTOLIC BLOOD PRESSURE: 84 MMHG | SYSTOLIC BLOOD PRESSURE: 128 MMHG

## 2023-01-20 DIAGNOSIS — R11.0 CHEMOTHERAPY-INDUCED NAUSEA: ICD-10-CM

## 2023-01-20 DIAGNOSIS — C62.90 SEMINOMA (HCC): Primary | ICD-10-CM

## 2023-01-20 DIAGNOSIS — R59.1 LYMPHADENOPATHY: ICD-10-CM

## 2023-01-20 DIAGNOSIS — T45.1X5A CHEMOTHERAPY-INDUCED NAUSEA: ICD-10-CM

## 2023-01-20 RX ORDER — ONDANSETRON 8 MG/1
8 TABLET, ORALLY DISINTEGRATING ORAL EVERY 8 HOURS PRN
Qty: 20 TABLET | Refills: 0 | Status: SHIPPED | OUTPATIENT
Start: 2023-01-20

## 2023-01-20 NOTE — TELEPHONE ENCOUNTER
While we try to accommodate patient requests, our priority is to schedule treatment according to Doctor's orders and site availability  Does the Provider use the intake sheet or checkout note? What would be a preferred day of the week that would work best for your infusion appointment? Monday-Fri   Do you prefer mornings or afternoons for your appointments? Mornings   Are there any days or dates that do not work for your schedule, including any upcoming vacations? N/A  We are going to try our best to schedule you at the infusion center closest to your home  In the event that we are unable to what would be your next preferred infusion site or sites? 1  [facility]      Do you have transportation to take you to all of your appointments?  Yes  Would you like the infusion center to draw labs from your port? (disregard if patient doesn't have a port or need labs for infusion appointment) outpatient

## 2023-01-20 NOTE — LETTER
January 20, 2023     Debera Prim, [address] [facility],2[address]  [address] [place] [address]    Patient: Marty Hong   YOB: 1970   Date of Visit: 1/20/2023       Dear Dr Kiley Cintron: Thank you for referring Marty Hong to me for evaluation  Below are my notes for this consultation  If you have questions, please do not hesitate to call me  I look forward to following your patient along with you  Sincerely,        Meagan Reynolds MD        CC: Seth Lundborg, MD Alroy Maxwell, MD Huston Joiner, MD  1/20/2023  1:13 PM  Incomplete               Surgical Oncology Follow Up       [facility] [address] [address]  [phone]    Marty Hong  1970  [id number]  [facility] [address] [address] [phone]    Diagnoses and all orders for this visit:    Seminoma [facility])  -     Case request operating room: INSERTION VENOUS PORT (PORT-A-CATH); Standing  -     Case request operating room: INSERTION VENOUS PORT (PORT-A-CATH)    Lymphadenopathy    Other orders  -     Incentive spirometry; Standing  -     Insert and maintain IV line; Standing  -     Void On-Call to O R ; Standing  -     Place sequential compression device; Standing  -     ceFAZolin (ANCEF) 3,000 mg in dextrose 5 % 100 mL IVPB        Chief Complaint   Patient presents with   • Post-op       No follow-ups on file        Oncology History   Seminoma ([facility] [address] )   1/11/2023 -  Cancer Staged    Staging form: Testis, AJCC 8th Edition  - Clinical stage from 1/11/2023: cT4, cN3, pM1a, S2 - Signed by Seth Lundborg, MD on 1/20/2023  Stage prefix: Initial diagnosis  Lactate dehydrogenase (LDH) (U/L): 699  Human chorionic gonadotropin (hCG) (mIU/mL): 13  Alpha fetoprotein (AFP) (ng/mL): 5 2  Laterality: Left  Sites of metastasis: Distant lymph nodes, NOS, Retroperitoneal lymph node, NOS  Source of metastatic specimen: Supraclavicular Lymph Nodes  Staged by: Managing physician       1/19/2023 Initial Diagnosis    Seminoma ([address] )     1/30/2023 -  Chemotherapy    pegfilgrastim (Durwin Cotton), 6 mg, Subcutaneous, Once, 0 of 4 cycles  bleomycin (BLENOXANE) IVPB, 30 Units, Intravenous, Once, 0 of 4 cycles  CISplatin (PLATINOL) infusion, 20 mg/m2, Intravenous, Once, 0 of 4 cycles  fosaprepitant (EMEND) IVPB, 150 mg, Intravenous, Once, 0 of 4 cycles             History of Present Illness: Patient returns in follow-up of his left neck lymph node biopsy  This did reveal seminoma  We had evaluated by urology and a testicular ultrasound was performed  He has already seen medical oncology and chemotherapy is being initiated  He comes in now to discuss port placement  Review of Systems  Complete ROS Surg Onc:   Complete ROS Surg Onc:   Constitutional: The patient denies new or recent history of general fatigue, no recent weight loss, no change in appetite  Eyes: No complaints of visual problems, no scleral icterus  ENT: no complaints of ear pain, no hoarseness, no difficulty swallowing,  no tinnitus and no new masses in head, oral cavity, or neck  Cardiovascular: No complaints of chest pain, no palpitations, no ankle edema  Respiratory: No complaints of shortness of breath, no cough  Gastrointestinal: No complaints of jaundice, no bloody stools, no pale stools  Genitourinary: No complaints of dysuria, no hematuria, no nocturia, no frequent urination, no urethral discharge  Musculoskeletal: No complaints of weakness, paralysis, joint stiffness or arthralgias  Integumentary: No complaints of rash, no new lesions  Neurological: No complaints of convulsions, no seizures, no dizziness  Hematologic/Lymphatic: No complaints of easy bruising  Endocrine:  No hot or cold intolerance  No polydipsia, polyphagia, or polyuria  Allergy/immunology:  No environmental allergies    No food allergies  Not immunocompromised  Skin:  No pallor or rash  No wound  Patient Active Problem List   Diagnosis   • Impacted cerumen   • Lymphadenopathy   • Obesity, Class II, BMI 35-39 9   • Seminoma (HCC)     Past Medical History:   Diagnosis Date   • Allergic 1/1/2000    seasonal allergies   • Seasonal allergies    • Torn meniscus     right knee- surgical repair today 7/19/2021   • Wears glasses      Past Surgical History:   Procedure Laterality Date   • FACIAL/NECK BIOPSY Left 1/11/2023    Procedure: EXCISION OF LEFT NECK LYMPH NODE WITH LYMPHOMA PROTOCOL;  Surgeon: Anne-Marie Zavaleta MD;  Location: [facility];  Service: Surgical Oncology   • HEMORROIDECTOMY     • KNEE SURGERY  7/19/2021   • RI ARTHRS KNE SURG W/MENISCECTOMY MED/LAT W/SHVG Right 07/19/2021    Procedure: ARTHROSCOPY KNEE, partial lateral meniscectomy;  Surgeon: Colletta Pitt, MD;  Location: [facility];  Service: Orthopedics     Family History   Problem Relation Age of Onset   • Cancer Mother         Breast Cancer   • Breast cancer Mother    • Breast cancer additional onset Mother    • Cancer Father         Skin carcenoma   • Skin cancer Father    • Breast cancer additional onset Sister    • Cancer Sister         Breast Cancer and Skin carcenoma   • Skin cancer Brother      Social History     Socioeconomic History   • Marital status: /Civil Union     Spouse name: Not on file   • Number of children: Not on file   • Years of education: Not on file   • Highest education level: Not on file   Occupational History   • Not on file   Tobacco Use   • Smoking status: Never   • Smokeless tobacco: Never   Vaping Use   • Vaping Use: Never used   Substance and Sexual Activity   • Alcohol use:  Yes     Alcohol/week: 3 0 standard drinks     Types: 1 Glasses of wine, 1 Cans of beer, 1 Shots of liquor per week     Comment: 1-2 weekly   • Drug use: Never   • Sexual activity: Yes     Partners: Female   Other Topics Concern   • Not on file   Social History Narrative   • Not on file     Social Determinants of Health     Financial Resource Strain: Not on file   Food Insecurity: Not on file   Transportation Needs: Not on file   Physical Activity: Not on file   Stress: Not on file   Social Connections: Not on file   Intimate Partner Violence: Not on file   Housing Stability: Not on file       Current Outpatient Medications:   •  Ascorbic Acid (vitamin C) 100 MG tablet, Take 100 mg by mouth daily, Disp: , Rfl:   •  Elderberry 500 MG CAPS, Take by mouth in the morning, Disp: , Rfl:   •  loratadine (CLARITIN) 10 mg tablet, Take 10 mg by mouth daily, Disp: , Rfl:   •  ondansetron (ZOFRAN-ODT) 8 mg disintegrating tablet, Take 1 tablet (8 mg total) by mouth every 8 (eight) hours as needed for nausea or vomiting, Disp: 20 tablet, Rfl: 0  •  triamcinolone (KENALOG) 0 1 % cream, , Disp: , Rfl:   •  zinc gluconate 50 mg tablet, Take 50 mg by mouth daily, Disp: , Rfl:   •  HYDROcodone-acetaminophen (Norco) 5-325 mg per tablet, Take 1 tablet by mouth every 6 (six) hours as needed for pain Max Daily Amount: 4 tablets (Patient not taking: Reported on 1/20/2023), Disp: 10 tablet, Rfl: 0  Allergies   Allergen Reactions   • Oyster Shell - Food Allergy Vomiting     Vitals:    01/20/23 1255   BP: 128/84   Pulse: 78   Resp: 14   Temp: (!) 96 7 °F (35 9 °C)   SpO2: 99%       Physical Exam  Constitutional: General appearance: The Patient is well-developed and well-nourished who appears the stated age in no acute distress  Patient is pleasant and talkative  HEENT:  Normocephalic  Sclerae are anicteric  Mucous membranes are moist  Neck is supple without adenopathy  No JVD  Chest: The lungs are clear to auscultation  Cardiac: Heart is regular rate  Abdomen: Abdomen is soft, non-tender, non-distended and without masses  Extremities: There is no clubbing or cyanosis  There is no edema  Symmetric  Neuro: Grossly nonfocal  Gait is normal      Skin: Warm, anicteric  Psych:  Patient is pleasant and talkative  Breasts:        Pathology:  Addendum   Additional IHC for ALK1 is negative, compatible with the diagnosis  Addendum electronically signed by Roman Daniel MD on 1/17/2023 at 2:16 PM Final Diagnosis   A  Lymph node, left neck, excision:   -Germ cell tumor, compatible with metastatic seminoma (see note)  -Background nonnecrotizing granulomatous inflammation (see note)  Electronically signed by Roman Daniel MD on 1/17/2023 at 9:48 AM      Labs:      Imaging  CT soft tissue neck w contrast    Result Date: 1/3/2023  Narrative: CT NECK WITH CONTRAST INDICATION:   R59 1: Generalized enlarged lymph nodes  COMPARISON:  None  TECHNIQUE:  Axial, sagittal, and coronal 2D reformatted images were created from the axial source data and submitted for interpretation  Radiation dose length product (DLP) for this visit:  859 mGy-cm   This examination, like all CT scans performed in the [facility], was performed utilizing techniques to minimize radiation dose exposure, including the use of iterative reconstruction and automated exposure control  IV Contrast:  85 mL of iohexol (OMNIPAQUE) IMAGE QUALITY:  Diagnostic  FINDINGS: VISUALIZED BRAIN PARENCHYMA:  No acute intracranial pathology of the visualized brain parenchyma  VISUALIZED ORBITS AND PARANASAL SINUSES:  Normal visualized orbits  Small retention cysts are seen within the inferior maxillary sinuses  NASAL CAVITY AND NASOPHARYNX:  Normal  SUPRAHYOID NECK:  Normal oral cavity, tongue base, tonsillar fossa and epiglottis  Punctate calcifications are seen within the palatine tonsils bilaterally, presumably sequela of remote infection  No signs of acute tonsillopharyngitis  Normal parapharyngeal space and prevertebral space  INFRAHYOID NECK:  Aryepiglottic folds and piriform sinuses are normal   Normal glottis and subglottic airway  THYROID GLAND:  Unremarkable   PAROTID AND SUBMANDIBULAR GLANDS:  Normal  LYMPH NODES:  Moderate cervical adenopathy identified within the left neck primarily posterior jugular chain nodes, level 3 through 5 extending inferiorly into the supraclavicular region  These nodes measure up to 2 1 cm in short axis and are homogeneous with relatively sharply defined borders  Multiple additional smaller nodes are scattered within the neck  No enlarged adenopathy on the right  There is a prominent upper mediastinal node projecting between the left common carotid artery and subclavian artery measuring 1 5 cm in short axis  VASCULAR STRUCTURES:  Normal enhancement of the cervical vasculature  THORACIC INLET:  Lung apices and upper mediastinum are unremarkable  BONY STRUCTURES: No acute fracture or destructive osseous lesion  Impression: Moderate left-sided adenopathy primarily within the left neck, posterior jugular chain levels 3 through 5 with a prominent node present in the upper mediastinum  Findings are nonspecific  As there are no signs of infection, findings are suspicious for neoplastic adenopathy, possibly lymphoma/leukemia  This examination was marked "immediate notification" in Epic in order to begin the standard process by which the radiology reading room liaison alerts the referring practitioner  Workstation performed: [id number]     CT chest w contrast    Result Date: 1/3/2023  Narrative: CT CHEST WITH IV CONTRAST INDICATION:   R59 1: Generalized enlarged lymph nodes  COMPARISON:  None  TECHNIQUE: CT examination of the chest was performed  Axial, sagittal, and coronal 2D reformatted images were created from the source data and submitted for interpretation  Radiation dose length product (DLP) for this visit:  989 mGy-cm   This examination, like all CT scans performed in the [facility], was performed utilizing techniques to minimize radiation dose exposure, including the use of iterative reconstruction and automated exposure control   IV Contrast:  85 mL of iohexol (OMNIPAQUE) FINDINGS: LUNGS:  Lungs are clear  There is no tracheal or endobronchial lesion  PLEURA:  Unremarkable  HEART/GREAT VESSELS: Heart is unremarkable for patient's age  No thoracic aortic aneurysm  MEDIASTINUM AND JESSICA:  Posterior mediastinal/retrocrural adenopathy surrounding the descending thoracic aorta; dominant posterior mediastinal/retrocrural node measures 2 2 cm in short axis #4/40  Enlarged left superior mediastinal node measuring 15 mm in short axis #4/11  CHEST WALL AND LOWER NECK:  Partially imaged left supraclavicular adenopathy; partially imaged dominant 17 mm left supraclavicular node #4/1  See the separate CT soft tissue neck report  Bilateral subcentimeter axillary lymph nodes  VISUALIZED STRUCTURES IN THE UPPER ABDOMEN:  Partially imaged abdominal structure could represent adenopathy  Follow-up with dedicated CT abdomen pelvis imaging  OSSEOUS STRUCTURES:  No acute fracture or destructive osseous lesion  Impression: Left supraclavicular, superior mediastinal, posterior mediastinal/retrocrural and suspected partially imaged left abdominal adenopathy  Follow-up with CT abdomen pelvis  See the separate CT soft tissue neck report  The study was marked in [facility] for immediate notification  Workstation performed: [id number]     US scrotum and testicles    Result Date: 1/18/2023  Narrative: SCROTAL ULTRASOUND INDICATION:    C62 90: Malignant neoplasm of unspecified testis, unspecified whether descended or undescended  "Discussion with patient and his wife in the office today regarding the recent neck biopsy showing seminoma  The CT scan also shows retroperitoneal adenopathy which appears most consistent with lymphoma, however, with a diagnosis of seminoma this could very well be testicular cancer as well  The distribution shows more tumor towards the patient's left side, but testicular examination is normal   I recommend obtaining tumor markers including LDH, AFP, and hCG    If this is truly pure seminoma with extensive retroperitoneal adenopathy I would expect the hCG level to be elevated  If the AFP level is elevated this would be indicative of a mixed germ cell tumor  I have also ordered a scrotal ultrasound to see if there is a small lesion or scar within the testicles possibly from a burned-out testicular tumor " COMPARISON: CT abdomen pelvis 1/4/2020  TECHNIQUE:   Ultrasound the scrotal contents was performed with a high frequency linear transducer utilizing volumetric sweep imaging as well as standard still image techniques  Imaging performed in longitudinal and transverse orientation  Color and spectral Doppler evaluation also performed bilaterally  FINDINGS: TESTES: Testes are symmetric and normal in size  RIGHT testis = 4 4 x 1 9 x 2 9 cm  Volume 12 7 mL Normal contour with homogeneous smooth echotexture  Testicular microlithiasis  LEFT testis = 3 9 x 1 8 x 2 8 cm  Volume 10 2 mL Normal contour with homogeneous smooth echotexture  Testicular microlithiasis  Hypoechoic area measuring 1 5 x 1 7 x 0 9 cm  Smaller hypoechoic area measuring 0 9 x 0 9 x 0 4 cm  Doppler flow within both testes is present and symmetric  EPIDIDYMIDES: Normal Size  Doppler ultrasound demonstrates normal blood flow  No epididymal lesions  HYDROCELE:  No significant fluid present  VARICOCELE:  None present  SCROTUM:  Scrotal thickness and appearance within normal limits  No evidence for extratesticular mass or hernia demonstrated  Impression:  Hypoechoic areas in the left testicle suspicious for testicular malignancy in this patient with left-sided predominant retroperitoneal adenopathy  Bilateral testicular microlithiasis  The study was marked in [facility] for immediate notification   Workstation performed: [id number]     CT abdomen pelvis w contrast    Result Date: 1/4/2023  Narrative: CT ABDOMEN AND PELVIS WITH IV CONTRAST INDICATION:   R59 1: Generalized enlarged lymph nodes R93 89: Abnormal findings on diagnostic imaging of other specified body structures  COMPARISON:  9/30/2010, 1/3/2023 TECHNIQUE:  CT examination of the abdomen and pelvis was performed  Axial, sagittal, and coronal 2D reformatted images were created from the source data and submitted for interpretation  Radiation dose length product (DLP) for this visit:  8683 mGy-cm   This examination, like all CT scans performed in the [facility], was performed utilizing techniques to minimize radiation dose exposure, including the use of iterative reconstruction and automated exposure control  IV Contrast:  100 mL of iohexol (OMNIPAQUE) Enteric Contrast:  Enteric contrast was administered  FINDINGS: ABDOMEN LOWER CHEST:  No clinically significant abnormality identified in the visualized lower chest  LIVER/BILIARY TREE:  Unremarkable  GALLBLADDER:  No calcified gallstones  No pericholecystic inflammatory change  SPLEEN:  Unremarkable  PANCREAS:  Unremarkable  ADRENAL GLANDS:  Unremarkable  KIDNEYS/URETERS:  There is a punctate nonobstructing right renal calculus  No hydronephrosis  STOMACH AND BOWEL:  Unremarkable  APPENDIX:  No findings to suggest appendicitis  ABDOMINOPELVIC CAVITY:  No ascites  No pneumoperitoneum  There is extensive retroperitoneal lymphadenopathy  A bulky mass in the left para-aortic region extending into the left upper quadrant measures 8 2 x 10 9 cm  Aortocaval lymphadenopathy measures 3 7 x 5 2 cm  More inferior left para-aortic lymphadenopathy at the bifurcation measures 5 0 x 5 9 cm  VESSELS:  Unremarkable for patient's age  PELVIS REPRODUCTIVE ORGANS:  Unremarkable for patient's age  URINARY BLADDER:  Unremarkable  ABDOMINAL WALL/INGUINAL REGIONS:  There are bilateral fat-containing inguinal hernias  OSSEOUS STRUCTURES:  No acute fracture or destructive osseous lesion  Impression: Bulky retroperitoneal lymphadenopathy consistent with lymphoma   Workstation performed: [id number]     I reviewed the above laboratory and imaging data  Discussion/Summary: 49-year-old male status post left neck lymph node biopsy that revealed seminoma  He is going to be starting chemotherapy  He will need a port for chemotherapy  Risks were explained including bleeding, infection, need for further surgery, wound complications, port malfunction, DVT and pneumothorax  Informed consent was obtained  This is being scheduled for January 23  I did discuss that he will likely require orchiectomy as there was a small abnormality seen on the ultrasound  I have recommended that he have this performed after he completes chemotherapy  He will continue to follow-up with Dr Katya Keller as well  All of his questions were answered

## 2023-01-20 NOTE — PROGRESS NOTES
Attended pt visit with Dr Kyle Parsons  He was accompanied by his wife  Reinforced chemotherapy teaching, plan of care, follow-up and who to call with side effects, after hours and for any questions/concners  Provided my business card and encouraged call with any questions/concerns

## 2023-01-20 NOTE — H&P (VIEW-ONLY)
Surgical Oncology Follow Up       [address] [facility]  [address]  [place] [facility] [id number] [phone]    Dorina Hernandez  1970  [id number]  [address] [facility]  [address]  [place] [id number] [phone]    Diagnoses and all orders for this visit:    Seminoma [facility])  -     Case request operating room: INSERTION VENOUS PORT (PORT-A-CATH); Standing  -     Case request operating room: INSERTION VENOUS PORT (PORT-A-CATH)    Lymphadenopathy    Other orders  -     Incentive spirometry; Standing  -     Insert and maintain IV line; Standing  -     Void On-Call to O R ; Standing  -     Place sequential compression device; Standing  -     ceFAZolin (ANCEF) 3,000 mg in dextrose 5 % 100 mL IVPB        Chief Complaint   Patient presents with   • Post-op       No follow-ups on file        Oncology History   Seminoma ([address] )   1/11/2023 -  Cancer Staged    Staging form: Testis, AJCC 8th Edition  - Clinical stage from 1/11/2023: cT4, cN3, pM1a, S2 - Signed by Kristyn Soto MD on 1/20/2023  Stage prefix: Initial diagnosis  Lactate dehydrogenase (LDH) (U/L): 699  Human chorionic gonadotropin (hCG) (mIU/mL): 13  Alpha fetoprotein (AFP) (ng/mL): 5 2  Laterality: Left  Sites of metastasis: Distant lymph nodes, NOS, Retroperitoneal lymph node, NOS  Source of metastatic specimen: Supraclavicular Lymph Nodes  Staged by: Managing physician       1/19/2023 Initial Diagnosis    Seminoma ([address] )     1/30/2023 -  Chemotherapy    pegfilgrastim (Lilli Osvaldo), 6 mg, Subcutaneous, Once, 0 of 4 cycles  bleomycin (BLENOXANE) IVPB, 30 Units, Intravenous, Once, 0 of 4 cycles  CISplatin (PLATINOL) infusion, 20 mg/m2, Intravenous, Once, 0 of 4 cycles  fosaprepitant (EMEND) IVPB, 150 mg, Intravenous, Once, 0 of 4 cycles             History of Present Illness: Patient returns in follow-up of his left neck lymph node biopsy  This did reveal seminoma  We had evaluated by urology and a testicular ultrasound was performed  He has already seen medical oncology and chemotherapy is being initiated  He comes in now to discuss port placement  Review of Systems  Complete ROS Surg Onc:   Complete ROS Surg Onc:   Constitutional: The patient denies new or recent history of general fatigue, no recent weight loss, no change in appetite  Eyes: No complaints of visual problems, no scleral icterus  ENT: no complaints of ear pain, no hoarseness, no difficulty swallowing,  no tinnitus and no new masses in head, oral cavity, or neck  Cardiovascular: No complaints of chest pain, no palpitations, no ankle edema  Respiratory: No complaints of shortness of breath, no cough  Gastrointestinal: No complaints of jaundice, no bloody stools, no pale stools  Genitourinary: No complaints of dysuria, no hematuria, no nocturia, no frequent urination, no urethral discharge  Musculoskeletal: No complaints of weakness, paralysis, joint stiffness or arthralgias  Integumentary: No complaints of rash, no new lesions  Neurological: No complaints of convulsions, no seizures, no dizziness  Hematologic/Lymphatic: No complaints of easy bruising  Endocrine:  No hot or cold intolerance  No polydipsia, polyphagia, or polyuria  Allergy/immunology:  No environmental allergies  No food allergies  Not immunocompromised  Skin:  No pallor or rash  No wound          Patient Active Problem List   Diagnosis   • Impacted cerumen   • Lymphadenopathy   • Obesity, Class II, BMI 35-39 9   • Seminoma (HCC)     Past Medical History:   Diagnosis Date   • Allergic 1/1/2000    seasonal allergies   • Seasonal allergies    • Torn meniscus     right knee- surgical repair today 7/19/2021   • Wears glasses      Past Surgical History:   Procedure Laterality Date   • FACIAL/NECK BIOPSY Left 1/11/2023    Procedure: EXCISION OF LEFT NECK LYMPH NODE WITH LYMPHOMA PROTOCOL;  Surgeon: Ragini Snell MD;  Location: [facility];  Service: Surgical Oncology   • HEMORROIDECTOMY     • KNEE SURGERY  7/19/2021   • MT ARTHRS KNE SURG W/MENISCECTOMY MED/LAT W/SHVG Right 07/19/2021    Procedure: ARTHROSCOPY KNEE, partial lateral meniscectomy;  Surgeon: Alexa Huerta MD;  Location: [facility];  Service: Orthopedics     Family History   Problem Relation Age of Onset   • Cancer Mother         Breast Cancer   • Breast cancer Mother    • Breast cancer additional onset Mother    • Cancer Father         Skin carcenoma   • Skin cancer Father    • Breast cancer additional onset Sister    • Cancer Sister         Breast Cancer and Skin carcenoma   • Skin cancer Brother      Social History     Socioeconomic History   • Marital status: /Civil Union     Spouse name: Not on file   • Number of children: Not on file   • Years of education: Not on file   • Highest education level: Not on file   Occupational History   • Not on file   Tobacco Use   • Smoking status: Never   • Smokeless tobacco: Never   Vaping Use   • Vaping Use: Never used   Substance and Sexual Activity   • Alcohol use:  Yes     Alcohol/week: 3 0 standard drinks     Types: 1 Glasses of wine, 1 Cans of beer, 1 Shots of liquor per week     Comment: 1-2 weekly   • Drug use: Never   • Sexual activity: Yes     Partners: Female   Other Topics Concern   • Not on file   Social History Narrative   • Not on file     Social Determinants of Health     Financial Resource Strain: Not on file   Food Insecurity: Not on file   Transportation Needs: Not on file   Physical Activity: Not on file   Stress: Not on file   Social Connections: Not on file   Intimate Partner Violence: Not on file   Housing Stability: Not on file       Current Outpatient Medications:   •  Ascorbic Acid (vitamin C) 100 MG tablet, Take 100 mg by mouth daily, Disp: , Rfl:   •  Elderberry 500 MG CAPS, Take by mouth in the morning, Disp: , Rfl:   •  loratadine (CLARITIN) 10 mg tablet, Take 10 mg by mouth daily, Disp: , Rfl:   •  ondansetron (ZOFRAN-ODT) 8 mg disintegrating tablet, Take 1 tablet (8 mg total) by mouth every 8 (eight) hours as needed for nausea or vomiting, Disp: 20 tablet, Rfl: 0  •  triamcinolone (KENALOG) 0 1 % cream, , Disp: , Rfl:   •  zinc gluconate 50 mg tablet, Take 50 mg by mouth daily, Disp: , Rfl:   •  HYDROcodone-acetaminophen (Norco) 5-325 mg per tablet, Take 1 tablet by mouth every 6 (six) hours as needed for pain Max Daily Amount: 4 tablets (Patient not taking: Reported on 1/20/2023), Disp: 10 tablet, Rfl: 0  Allergies   Allergen Reactions   • Oyster Shell - Food Allergy Vomiting     Vitals:    01/20/23 1255   BP: 128/84   Pulse: 78   Resp: 14   Temp: (!) 96 7 °F (35 9 °C)   SpO2: 99%       Physical Exam  Constitutional: General appearance: The Patient is well-developed and well-nourished who appears the stated age in no acute distress  Patient is pleasant and talkative  HEENT:  Normocephalic  Sclerae are anicteric  Mucous membranes are moist  Neck is supple without adenopathy  No JVD  Chest: The lungs are clear to auscultation  Cardiac: Heart is regular rate  Abdomen: Abdomen is soft, non-tender, non-distended and without masses  Extremities: There is no clubbing or cyanosis  There is no edema  Symmetric  Neuro: Grossly nonfocal  Gait is normal      Skin: Warm, anicteric  Psych:  Patient is pleasant and talkative  Breasts:        Pathology:  Addendum   Additional IHC for ALK1 is negative, compatible with the diagnosis  Addendum electronically signed by Roman Daniel MD on 1/17/2023 at 2:16 PM Final Diagnosis   A  Lymph node, left neck, excision:   -Germ cell tumor, compatible with metastatic seminoma (see note)  -Background nonnecrotizing granulomatous inflammation (see note)   Electronically signed by Roman Daniel MD on 1/17/2023 at 9:48 AM      Labs:      Imaging  CT soft tissue neck w contrast    Result Date: 1/3/2023  Narrative: CT NECK WITH CONTRAST INDICATION:   R59 1: Generalized enlarged lymph nodes  COMPARISON:  None  TECHNIQUE:  Axial, sagittal, and coronal 2D reformatted images were created from the axial source data and submitted for interpretation  Radiation dose length product (DLP) for this visit:  859 mGy-cm   This examination, like all CT scans performed in the [facility], was performed utilizing techniques to minimize radiation dose exposure, including the use of iterative reconstruction and automated exposure control  IV Contrast:  85 mL of iohexol (OMNIPAQUE) IMAGE QUALITY:  Diagnostic  FINDINGS: VISUALIZED BRAIN PARENCHYMA:  No acute intracranial pathology of the visualized brain parenchyma  VISUALIZED ORBITS AND PARANASAL SINUSES:  Normal visualized orbits  Small retention cysts are seen within the inferior maxillary sinuses  NASAL CAVITY AND NASOPHARYNX:  Normal  SUPRAHYOID NECK:  Normal oral cavity, tongue base, tonsillar fossa and epiglottis  Punctate calcifications are seen within the palatine tonsils bilaterally, presumably sequela of remote infection  No signs of acute tonsillopharyngitis  Normal parapharyngeal space and prevertebral space  INFRAHYOID NECK:  Aryepiglottic folds and piriform sinuses are normal   Normal glottis and subglottic airway  THYROID GLAND:  Unremarkable  PAROTID AND SUBMANDIBULAR GLANDS:  Normal  LYMPH NODES:  Moderate cervical adenopathy identified within the left neck primarily posterior jugular chain nodes, level 3 through 5 extending inferiorly into the supraclavicular region  These nodes measure up to 2 1 cm in short axis and are homogeneous with relatively sharply defined borders  Multiple additional smaller nodes are scattered within the neck  No enlarged adenopathy on the right  There is a prominent upper mediastinal node projecting between the left common carotid artery and subclavian artery measuring 1 5 cm in short axis   VASCULAR STRUCTURES:  Normal enhancement of the cervical vasculature  THORACIC INLET:  Lung apices and upper mediastinum are unremarkable  BONY STRUCTURES: No acute fracture or destructive osseous lesion  Impression: Moderate left-sided adenopathy primarily within the left neck, posterior jugular chain levels 3 through 5 with a prominent node present in the upper mediastinum  Findings are nonspecific  As there are no signs of infection, findings are suspicious for neoplastic adenopathy, possibly lymphoma/leukemia  This examination was marked "immediate notification" in Epic in order to begin the standard process by which the radiology reading room liaison alerts the referring practitioner  Workstation performed: [id number]     CT chest w contrast    Result Date: 1/3/2023  Narrative: CT CHEST WITH IV CONTRAST INDICATION:   R59 1: Generalized enlarged lymph nodes  COMPARISON:  None  TECHNIQUE: CT examination of the chest was performed  Axial, sagittal, and coronal 2D reformatted images were created from the source data and submitted for interpretation  Radiation dose length product (DLP) for this visit:  989 mGy-cm   This examination, like all CT scans performed in the [facility], was performed utilizing techniques to minimize radiation dose exposure, including the use of iterative reconstruction and automated exposure control  IV Contrast:  85 mL of iohexol (OMNIPAQUE) FINDINGS: LUNGS:  Lungs are clear  There is no tracheal or endobronchial lesion  PLEURA:  Unremarkable  HEART/GREAT VESSELS: Heart is unremarkable for patient's age  No thoracic aortic aneurysm  MEDIASTINUM AND JESSICA:  Posterior mediastinal/retrocrural adenopathy surrounding the descending thoracic aorta; dominant posterior mediastinal/retrocrural node measures 2 2 cm in short axis #4/40  Enlarged left superior mediastinal node measuring 15 mm in short axis #4/11   CHEST WALL AND LOWER NECK:  Partially imaged left supraclavicular adenopathy; partially imaged dominant 17 mm left supraclavicular node #4/1  See the separate CT soft tissue neck report  Bilateral subcentimeter axillary lymph nodes  VISUALIZED STRUCTURES IN THE UPPER ABDOMEN:  Partially imaged abdominal structure could represent adenopathy  Follow-up with dedicated CT abdomen pelvis imaging  OSSEOUS STRUCTURES:  No acute fracture or destructive osseous lesion  Impression: Left supraclavicular, superior mediastinal, posterior mediastinal/retrocrural and suspected partially imaged left abdominal adenopathy  Follow-up with CT abdomen pelvis  See the separate CT soft tissue neck report  The study was marked in [facility] for immediate notification  Workstation performed: [id number]     US scrotum and testicles    Result Date: 1/18/2023  Narrative: SCROTAL ULTRASOUND INDICATION:    C62 90: Malignant neoplasm of unspecified testis, unspecified whether descended or undescended  "Discussion with patient and his wife in the office today regarding the recent neck biopsy showing seminoma  The CT scan also shows retroperitoneal adenopathy which appears most consistent with lymphoma, however, with a diagnosis of seminoma this could very well be testicular cancer as well  The distribution shows more tumor towards the patient's left side, but testicular examination is normal   I recommend obtaining tumor markers including LDH, AFP, and hCG  If this is truly pure seminoma with extensive retroperitoneal adenopathy I would expect the hCG level to be elevated  If the AFP level is elevated this would be indicative of a mixed germ cell tumor  I have also ordered a scrotal ultrasound to see if there is a small lesion or scar within the testicles possibly from a burned-out testicular tumor " COMPARISON: CT abdomen pelvis 1/4/2020   TECHNIQUE:   Ultrasound the scrotal contents was performed with a high frequency linear transducer utilizing volumetric sweep imaging as well as standard still image techniques  Imaging performed in longitudinal and transverse orientation  Color and spectral Doppler evaluation also performed bilaterally  FINDINGS: TESTES: Testes are symmetric and normal in size  RIGHT testis = 4 4 x 1 9 x 2 9 cm  Volume 12 7 mL Normal contour with homogeneous smooth echotexture  Testicular microlithiasis  LEFT testis = 3 9 x 1 8 x 2 8 cm  Volume 10 2 mL Normal contour with homogeneous smooth echotexture  Testicular microlithiasis  Hypoechoic area measuring 1 5 x 1 7 x 0 9 cm  Smaller hypoechoic area measuring 0 9 x 0 9 x 0 4 cm  Doppler flow within both testes is present and symmetric  EPIDIDYMIDES: Normal Size  Doppler ultrasound demonstrates normal blood flow  No epididymal lesions  HYDROCELE:  No significant fluid present  VARICOCELE:  None present  SCROTUM:  Scrotal thickness and appearance within normal limits  No evidence for extratesticular mass or hernia demonstrated  Impression:  Hypoechoic areas in the left testicle suspicious for testicular malignancy in this patient with left-sided predominant retroperitoneal adenopathy  Bilateral testicular microlithiasis  The study was marked in [facility] for immediate notification  Workstation performed: [id number]     CT abdomen pelvis w contrast    Result Date: 1/4/2023  Narrative: CT ABDOMEN AND PELVIS WITH IV CONTRAST INDICATION:   R59 1: Generalized enlarged lymph nodes R93 89: Abnormal findings on diagnostic imaging of other specified body structures  COMPARISON:  9/30/2010, 1/3/2023 TECHNIQUE:  CT examination of the abdomen and pelvis was performed  Axial, sagittal, and coronal 2D reformatted images were created from the source data and submitted for interpretation  Radiation dose length product (DLP) for this visit:  1334 mGy-cm     This examination, like all CT scans performed in the [facility], was performed utilizing techniques to minimize radiation dose exposure, including the use of iterative reconstruction and automated exposure control  IV Contrast:  100 mL of iohexol (OMNIPAQUE) Enteric Contrast:  Enteric contrast was administered  FINDINGS: ABDOMEN LOWER CHEST:  No clinically significant abnormality identified in the visualized lower chest  LIVER/BILIARY TREE:  Unremarkable  GALLBLADDER:  No calcified gallstones  No pericholecystic inflammatory change  SPLEEN:  Unremarkable  PANCREAS:  Unremarkable  ADRENAL GLANDS:  Unremarkable  KIDNEYS/URETERS:  There is a punctate nonobstructing right renal calculus  No hydronephrosis  STOMACH AND BOWEL:  Unremarkable  APPENDIX:  No findings to suggest appendicitis  ABDOMINOPELVIC CAVITY:  No ascites  No pneumoperitoneum  There is extensive retroperitoneal lymphadenopathy  A bulky mass in the left para-aortic region extending into the left upper quadrant measures 8 2 x 10 9 cm  Aortocaval lymphadenopathy measures 3 7 x 5 2 cm  More inferior left para-aortic lymphadenopathy at the bifurcation measures 5 0 x 5 9 cm  VESSELS:  Unremarkable for patient's age  PELVIS REPRODUCTIVE ORGANS:  Unremarkable for patient's age  URINARY BLADDER:  Unremarkable  ABDOMINAL WALL/INGUINAL REGIONS:  There are bilateral fat-containing inguinal hernias  OSSEOUS STRUCTURES:  No acute fracture or destructive osseous lesion  Impression: Bulky retroperitoneal lymphadenopathy consistent with lymphoma  Workstation performed: [id number]     I reviewed the above laboratory and imaging data  Discussion/Summary: 58-year-old male status post left neck lymph node biopsy that revealed seminoma  He is going to be starting chemotherapy  He will need a port for chemotherapy  Risks were explained including bleeding, infection, need for further surgery, wound complications, port malfunction, DVT and pneumothorax  Informed consent was obtained  This is being scheduled for January 23  I did discuss that he will likely require orchiectomy as there was a small abnormality seen on the ultrasound    I have recommended that he have this performed after he completes chemotherapy  He will continue to follow-up with Dr Jimi Alas as well  All of his questions were answered

## 2023-01-20 NOTE — PROGRESS NOTES
oncolink printouts given and reviewed with patient and patient spouse by Dr Jessie Turner  Nurse Navigator Mendoza Perez was present during visit on 1/20/23  Reviewed office handouts with Evonne Bell and RN teams number reviewed to call for further questions and concerns  Consent obtained on 1/20/23  Copy given to patient and uploaded into patient chart on 1/20/23

## 2023-01-20 NOTE — PROGRESS NOTES
Surgical Oncology Follow Up       [address] [facility] [place]  [address] [facility] [place] [address] [phone]    Dirk Duran  1970  [id number]  [address] [facility] [place]  [id number]   [place] [address] [phone]    Diagnoses and all orders for this visit:    Seminoma [facility])  -     Case request operating room: INSERTION VENOUS PORT (PORT-A-CATH); Standing  -     Case request operating room: INSERTION VENOUS PORT (PORT-A-CATH)    Lymphadenopathy    Other orders  -     Incentive spirometry; Standing  -     Insert and maintain IV line; Standing  -     Void On-Call to O R ; Standing  -     Place sequential compression device; Standing  -     ceFAZolin (ANCEF) 3,000 mg in dextrose 5 % 100 mL IVPB        Chief Complaint   Patient presents with   • Post-op       No follow-ups on file        Oncology History   Seminoma ([facility] [address] )   1/11/2023 -  Cancer Staged    Staging form: Testis, AJCC 8th Edition  - Clinical stage from 1/11/2023: cT4, cN3, pM1a, S2 - Signed by Leona Gonsales MD on 1/20/2023  Stage prefix: Initial diagnosis  Lactate dehydrogenase (LDH) (U/L): 699  Human chorionic gonadotropin (hCG) (mIU/mL): 13  Alpha fetoprotein (AFP) (ng/mL): 5 2  Laterality: Left  Sites of metastasis: Distant lymph nodes, NOS, Retroperitoneal lymph node, NOS  Source of metastatic specimen: Supraclavicular Lymph Nodes  Staged by: Managing physician       1/19/2023 Initial Diagnosis    Seminoma ([facility] [address] )     1/30/2023 -  Chemotherapy    pegfilgrastim (Jock Adair), 6 mg, Subcutaneous, Once, 0 of 4 cycles  bleomycin (BLENOXANE) IVPB, 30 Units, Intravenous, Once, 0 of 4 cycles  CISplatin (PLATINOL) infusion, 20 mg/m2, Intravenous, Once, 0 of 4 cycles  fosaprepitant (EMEND) IVPB, 150 mg, Intravenous, Once, 0 of 4 cycles             History of Present Illness: Patient returns in follow-up of his left neck lymph node biopsy  This did reveal seminoma  We had evaluated by urology and a testicular ultrasound was performed  He has already seen medical oncology and chemotherapy is being initiated  He comes in now to discuss port placement  Review of Systems  Complete ROS Surg Onc:   Complete ROS Surg Onc:   Constitutional: The patient denies new or recent history of general fatigue, no recent weight loss, no change in appetite  Eyes: No complaints of visual problems, no scleral icterus  ENT: no complaints of ear pain, no hoarseness, no difficulty swallowing,  no tinnitus and no new masses in head, oral cavity, or neck  Cardiovascular: No complaints of chest pain, no palpitations, no ankle edema  Respiratory: No complaints of shortness of breath, no cough  Gastrointestinal: No complaints of jaundice, no bloody stools, no pale stools  Genitourinary: No complaints of dysuria, no hematuria, no nocturia, no frequent urination, no urethral discharge  Musculoskeletal: No complaints of weakness, paralysis, joint stiffness or arthralgias  Integumentary: No complaints of rash, no new lesions  Neurological: No complaints of convulsions, no seizures, no dizziness  Hematologic/Lymphatic: No complaints of easy bruising  Endocrine:  No hot or cold intolerance  No polydipsia, polyphagia, or polyuria  Allergy/immunology:  No environmental allergies  No food allergies  Not immunocompromised  Skin:  No pallor or rash  No wound          Patient Active Problem List   Diagnosis   • Impacted cerumen   • Lymphadenopathy   • Obesity, Class II, BMI 35-39 9   • Seminoma (HCC)     Past Medical History:   Diagnosis Date   • Allergic 1/1/2000    seasonal allergies   • Seasonal allergies    • Torn meniscus     right knee- surgical repair today 7/19/2021   • Wears glasses      Past Surgical History:   Procedure Laterality Date   • FACIAL/NECK BIOPSY Left 1/11/2023    Procedure: EXCISION OF LEFT NECK LYMPH NODE WITH LYMPHOMA PROTOCOL;  Surgeon: Andrez Bond MD;  Location: [facility];  Service: Surgical Oncology   • HEMORROIDECTOMY     • KNEE SURGERY  7/19/2021   • NJ ARTHRS KNE SURG W/MENISCECTOMY MED/LAT W/SHVG Right 07/19/2021    Procedure: ARTHROSCOPY KNEE, partial lateral meniscectomy;  Surgeon: Vasiliy Garcia MD;  Location: [facility];  Service: Orthopedics     Family History   Problem Relation Age of Onset   • Cancer Mother         Breast Cancer   • Breast cancer Mother    • Breast cancer additional onset Mother    • Cancer Father         Skin carcenoma   • Skin cancer Father    • Breast cancer additional onset Sister    • Cancer Sister         Breast Cancer and Skin carcenoma   • Skin cancer Brother      Social History     Socioeconomic History   • Marital status: /Civil Union     Spouse name: Not on file   • Number of children: Not on file   • Years of education: Not on file   • Highest education level: Not on file   Occupational History   • Not on file   Tobacco Use   • Smoking status: Never   • Smokeless tobacco: Never   Vaping Use   • Vaping Use: Never used   Substance and Sexual Activity   • Alcohol use:  Yes     Alcohol/week: 3 0 standard drinks     Types: 1 Glasses of wine, 1 Cans of beer, 1 Shots of liquor per week     Comment: 1-2 weekly   • Drug use: Never   • Sexual activity: Yes     Partners: Female   Other Topics Concern   • Not on file   Social History Narrative   • Not on file     Social Determinants of Health     Financial Resource Strain: Not on file   Food Insecurity: Not on file   Transportation Needs: Not on file   Physical Activity: Not on file   Stress: Not on file   Social Connections: Not on file   Intimate Partner Violence: Not on file   Housing Stability: Not on file       Current Outpatient Medications:   •  Ascorbic Acid (vitamin C) 100 MG tablet, Take 100 mg by mouth daily, Disp: , Rfl:   •  Elderberry 500 MG CAPS, Take by mouth in the morning, Disp: , Rfl:   •  loratadine (CLARITIN) 10 mg tablet, Take 10 mg by mouth daily, Disp: , Rfl:   •  ondansetron (ZOFRAN-ODT) 8 mg disintegrating tablet, Take 1 tablet (8 mg total) by mouth every 8 (eight) hours as needed for nausea or vomiting, Disp: 20 tablet, Rfl: 0  •  triamcinolone (KENALOG) 0 1 % cream, , Disp: , Rfl:   •  zinc gluconate 50 mg tablet, Take 50 mg by mouth daily, Disp: , Rfl:   •  HYDROcodone-acetaminophen (Norco) 5-325 mg per tablet, Take 1 tablet by mouth every 6 (six) hours as needed for pain Max Daily Amount: 4 tablets (Patient not taking: Reported on 1/20/2023), Disp: 10 tablet, Rfl: 0  Allergies   Allergen Reactions   • Oyster Shell - Food Allergy Vomiting     Vitals:    01/20/23 1255   BP: 128/84   Pulse: 78   Resp: 14   Temp: (!) 96 7 °F (35 9 °C)   SpO2: 99%       Physical Exam  Constitutional: General appearance: The Patient is well-developed and well-nourished who appears the stated age in no acute distress  Patient is pleasant and talkative  HEENT:  Normocephalic  Sclerae are anicteric  Mucous membranes are moist  Neck is supple without adenopathy  No JVD  Chest: The lungs are clear to auscultation  Cardiac: Heart is regular rate  Abdomen: Abdomen is soft, non-tender, non-distended and without masses  Extremities: There is no clubbing or cyanosis  There is no edema  Symmetric  Neuro: Grossly nonfocal  Gait is normal      Skin: Warm, anicteric  Psych:  Patient is pleasant and talkative  Breasts:        Pathology:  Addendum   Additional IHC for ALK1 is negative, compatible with the diagnosis  Addendum electronically signed by Allie Ward MD on 1/17/2023 at 2:16 PM Final Diagnosis   A  Lymph node, left neck, excision:   -Germ cell tumor, compatible with metastatic seminoma (see note)  -Background nonnecrotizing granulomatous inflammation (see note)   Electronically signed by Allie Ward MD on 1/17/2023 at 9:48 AM      Labs:      Imaging  CT soft tissue neck w contrast    Result Date: 1/3/2023  Narrative: CT NECK WITH CONTRAST INDICATION:   R59 1: Generalized enlarged lymph nodes  COMPARISON:  None  TECHNIQUE:  Axial, sagittal, and coronal 2D reformatted images were created from the axial source data and submitted for interpretation  Radiation dose length product (DLP) for this visit:  859 mGy-cm   This examination, like all CT scans performed in the [facility], was performed utilizing techniques to minimize radiation dose exposure, including the use of iterative reconstruction and automated exposure control  IV Contrast:  85 mL of iohexol (OMNIPAQUE) IMAGE QUALITY:  Diagnostic  FINDINGS: VISUALIZED BRAIN PARENCHYMA:  No acute intracranial pathology of the visualized brain parenchyma  VISUALIZED ORBITS AND PARANASAL SINUSES:  Normal visualized orbits  Small retention cysts are seen within the inferior maxillary sinuses  NASAL CAVITY AND NASOPHARYNX:  Normal  SUPRAHYOID NECK:  Normal oral cavity, tongue base, tonsillar fossa and epiglottis  Punctate calcifications are seen within the palatine tonsils bilaterally, presumably sequela of remote infection  No signs of acute tonsillopharyngitis  Normal parapharyngeal space and prevertebral space  INFRAHYOID NECK:  Aryepiglottic folds and piriform sinuses are normal   Normal glottis and subglottic airway  THYROID GLAND:  Unremarkable  PAROTID AND SUBMANDIBULAR GLANDS:  Normal  LYMPH NODES:  Moderate cervical adenopathy identified within the left neck primarily posterior jugular chain nodes, level 3 through 5 extending inferiorly into the supraclavicular region  These nodes measure up to 2 1 cm in short axis and are homogeneous with relatively sharply defined borders  Multiple additional smaller nodes are scattered within the neck  No enlarged adenopathy on the right  There is a prominent upper mediastinal node projecting between the left common carotid artery and subclavian artery measuring 1 5 cm in short axis   VASCULAR STRUCTURES:  Normal enhancement of the cervical vasculature  THORACIC INLET:  Lung apices and upper mediastinum are unremarkable  BONY STRUCTURES: No acute fracture or destructive osseous lesion  Impression: Moderate left-sided adenopathy primarily within the left neck, posterior jugular chain levels 3 through 5 with a prominent node present in the upper mediastinum  Findings are nonspecific  As there are no signs of infection, findings are suspicious for neoplastic adenopathy, possibly lymphoma/leukemia  This examination was marked "immediate notification" in Epic in order to begin the standard process by which the radiology reading room liaison alerts the referring practitioner  Workstation performed: [id number]     CT chest w contrast    Result Date: 1/3/2023  Narrative: CT CHEST WITH IV CONTRAST INDICATION:   R59 1: Generalized enlarged lymph nodes  COMPARISON:  None  TECHNIQUE: CT examination of the chest was performed  Axial, sagittal, and coronal 2D reformatted images were created from the source data and submitted for interpretation  Radiation dose length product (DLP) for this visit:  989 mGy-cm   This examination, like all CT scans performed in the [facility], was performed utilizing techniques to minimize radiation dose exposure, including the use of iterative reconstruction and automated exposure control  IV Contrast:  85 mL of iohexol (OMNIPAQUE) FINDINGS: LUNGS:  Lungs are clear  There is no tracheal or endobronchial lesion  PLEURA:  Unremarkable  HEART/GREAT VESSELS: Heart is unremarkable for patient's age  No thoracic aortic aneurysm  MEDIASTINUM AND JESSICA:  Posterior mediastinal/retrocrural adenopathy surrounding the descending thoracic aorta; dominant posterior mediastinal/retrocrural node measures 2 2 cm in short axis #4/40  Enlarged left superior mediastinal node measuring 15 mm in short axis #4/11   CHEST WALL AND LOWER NECK:  Partially imaged left supraclavicular adenopathy; partially imaged dominant 17 mm left supraclavicular node #4/1  See the separate CT soft tissue neck report  Bilateral subcentimeter axillary lymph nodes  VISUALIZED STRUCTURES IN THE UPPER ABDOMEN:  Partially imaged abdominal structure could represent adenopathy  Follow-up with dedicated CT abdomen pelvis imaging  OSSEOUS STRUCTURES:  No acute fracture or destructive osseous lesion  Impression: Left supraclavicular, superior mediastinal, posterior mediastinal/retrocrural and suspected partially imaged left abdominal adenopathy  Follow-up with CT abdomen pelvis  See the separate CT soft tissue neck report  The study was marked in [facility] for immediate notification  Workstation performed: [id number]     US scrotum and testicles    Result Date: 1/18/2023  Narrative: SCROTAL ULTRASOUND INDICATION:    C62 90: Malignant neoplasm of unspecified testis, unspecified whether descended or undescended  "Discussion with patient and his wife in the office today regarding the recent neck biopsy showing seminoma  The CT scan also shows retroperitoneal adenopathy which appears most consistent with lymphoma, however, with a diagnosis of seminoma this could very well be testicular cancer as well  The distribution shows more tumor towards the patient's left side, but testicular examination is normal   I recommend obtaining tumor markers including LDH, AFP, and hCG  If this is truly pure seminoma with extensive retroperitoneal adenopathy I would expect the hCG level to be elevated  If the AFP level is elevated this would be indicative of a mixed germ cell tumor  I have also ordered a scrotal ultrasound to see if there is a small lesion or scar within the testicles possibly from a burned-out testicular tumor " COMPARISON: CT abdomen pelvis 1/4/2020   TECHNIQUE:   Ultrasound the scrotal contents was performed with a high frequency linear transducer utilizing volumetric sweep imaging as well as standard still image techniques  Imaging performed in longitudinal and transverse orientation  Color and spectral Doppler evaluation also performed bilaterally  FINDINGS: TESTES: Testes are symmetric and normal in size  RIGHT testis = 4 4 x 1 9 x 2 9 cm  Volume 12 7 mL Normal contour with homogeneous smooth echotexture  Testicular microlithiasis  LEFT testis = 3 9 x 1 8 x 2 8 cm  Volume 10 2 mL Normal contour with homogeneous smooth echotexture  Testicular microlithiasis  Hypoechoic area measuring 1 5 x 1 7 x 0 9 cm  Smaller hypoechoic area measuring 0 9 x 0 9 x 0 4 cm  Doppler flow within both testes is present and symmetric  EPIDIDYMIDES: Normal Size  Doppler ultrasound demonstrates normal blood flow  No epididymal lesions  HYDROCELE:  No significant fluid present  VARICOCELE:  None present  SCROTUM:  Scrotal thickness and appearance within normal limits  No evidence for extratesticular mass or hernia demonstrated  Impression:  Hypoechoic areas in the left testicle suspicious for testicular malignancy in this patient with left-sided predominant retroperitoneal adenopathy  Bilateral testicular microlithiasis  The study was marked in [facility] for immediate notification  Workstation performed: [id number]     CT abdomen pelvis w contrast    Result Date: 1/4/2023  Narrative: CT ABDOMEN AND PELVIS WITH IV CONTRAST INDICATION:   R59 1: Generalized enlarged lymph nodes R93 89: Abnormal findings on diagnostic imaging of other specified body structures  COMPARISON:  9/30/2010, 1/3/2023 TECHNIQUE:  CT examination of the abdomen and pelvis was performed  Axial, sagittal, and coronal 2D reformatted images were created from the source data and submitted for interpretation  Radiation dose length product (DLP) for this visit:  1679 mGy-cm     This examination, like all CT scans performed in the [facility], was performed utilizing techniques to minimize radiation dose exposure, including the use of iterative reconstruction and automated exposure control  IV Contrast:  100 mL of iohexol (OMNIPAQUE) Enteric Contrast:  Enteric contrast was administered  FINDINGS: ABDOMEN LOWER CHEST:  No clinically significant abnormality identified in the visualized lower chest  LIVER/BILIARY TREE:  Unremarkable  GALLBLADDER:  No calcified gallstones  No pericholecystic inflammatory change  SPLEEN:  Unremarkable  PANCREAS:  Unremarkable  ADRENAL GLANDS:  Unremarkable  KIDNEYS/URETERS:  There is a punctate nonobstructing right renal calculus  No hydronephrosis  STOMACH AND BOWEL:  Unremarkable  APPENDIX:  No findings to suggest appendicitis  ABDOMINOPELVIC CAVITY:  No ascites  No pneumoperitoneum  There is extensive retroperitoneal lymphadenopathy  A bulky mass in the left para-aortic region extending into the left upper quadrant measures 8 2 x 10 9 cm  Aortocaval lymphadenopathy measures 3 7 x 5 2 cm  More inferior left para-aortic lymphadenopathy at the bifurcation measures 5 0 x 5 9 cm  VESSELS:  Unremarkable for patient's age  PELVIS REPRODUCTIVE ORGANS:  Unremarkable for patient's age  URINARY BLADDER:  Unremarkable  ABDOMINAL WALL/INGUINAL REGIONS:  There are bilateral fat-containing inguinal hernias  OSSEOUS STRUCTURES:  No acute fracture or destructive osseous lesion  Impression: Bulky retroperitoneal lymphadenopathy consistent with lymphoma  Workstation performed: [id number]     I reviewed the above laboratory and imaging data  Discussion/Summary: 78-year-old male status post left neck lymph node biopsy that revealed seminoma  He is going to be starting chemotherapy  He will need a port for chemotherapy  Risks were explained including bleeding, infection, need for further surgery, wound complications, port malfunction, DVT and pneumothorax  Informed consent was obtained  This is being scheduled for January 23  I did discuss that he will likely require orchiectomy as there was a small abnormality seen on the ultrasound    I have recommended that he have this performed after he completes chemotherapy  He will continue to follow-up with Dr Zeina Glass as well  All of his questions were answered

## 2023-01-21 ENCOUNTER — APPOINTMENT (OUTPATIENT)
Dept: LAB | Facility: MEDICAL CENTER | Age: 53
End: 2023-01-21

## 2023-01-21 DIAGNOSIS — C62.90 SEMINOMA (HCC): ICD-10-CM

## 2023-01-21 LAB
ALBUMIN SERPL BCP-MCNC: 3.6 G/DL (ref 3.5–5)
ALP SERPL-CCNC: 138 U/L (ref 46–116)
ALT SERPL W P-5'-P-CCNC: 37 U/L (ref 12–78)
ANION GAP SERPL CALCULATED.3IONS-SCNC: 5 MMOL/L (ref 4–13)
AST SERPL W P-5'-P-CCNC: 30 U/L (ref 5–45)
BASOPHILS # BLD AUTO: 0.05 THOUSANDS/ÂΜL (ref 0–0.1)
BASOPHILS NFR BLD AUTO: 1 % (ref 0–1)
BILIRUB SERPL-MCNC: 0.47 MG/DL (ref 0.2–1)
BUN SERPL-MCNC: 27 MG/DL (ref 5–25)
CALCIUM SERPL-MCNC: 8.8 MG/DL (ref 8.3–10.1)
CHLORIDE SERPL-SCNC: 110 MMOL/L (ref 96–108)
CO2 SERPL-SCNC: 25 MMOL/L (ref 21–32)
CREAT SERPL-MCNC: 1.1 MG/DL (ref 0.6–1.3)
EOSINOPHIL # BLD AUTO: 0.16 THOUSAND/ÂΜL (ref 0–0.61)
EOSINOPHIL NFR BLD AUTO: 3 % (ref 0–6)
ERYTHROCYTE [DISTWIDTH] IN BLOOD BY AUTOMATED COUNT: 12.2 % (ref 11.6–15.1)
GFR SERPL CREATININE-BSD FRML MDRD: 75 ML/MIN/1.73SQ M
GLUCOSE P FAST SERPL-MCNC: 96 MG/DL (ref 65–99)
HCT VFR BLD AUTO: 44.5 % (ref 36.5–49.3)
HGB BLD-MCNC: 14.8 G/DL (ref 12–17)
IMM GRANULOCYTES # BLD AUTO: 0.04 THOUSAND/UL (ref 0–0.2)
IMM GRANULOCYTES NFR BLD AUTO: 1 % (ref 0–2)
LYMPHOCYTES # BLD AUTO: 1.19 THOUSANDS/ÂΜL (ref 0.6–4.47)
LYMPHOCYTES NFR BLD AUTO: 20 % (ref 14–44)
MCH RBC QN AUTO: 31.3 PG (ref 26.8–34.3)
MCHC RBC AUTO-ENTMCNC: 33.3 G/DL (ref 31.4–37.4)
MCV RBC AUTO: 94 FL (ref 82–98)
MONOCYTES # BLD AUTO: 0.93 THOUSAND/ÂΜL (ref 0.17–1.22)
MONOCYTES NFR BLD AUTO: 16 % (ref 4–12)
NEUTROPHILS # BLD AUTO: 3.48 THOUSANDS/ÂΜL (ref 1.85–7.62)
NEUTS SEG NFR BLD AUTO: 59 % (ref 43–75)
NRBC BLD AUTO-RTO: 0 /100 WBCS
PLATELET # BLD AUTO: 238 THOUSANDS/UL (ref 149–390)
PMV BLD AUTO: 9.6 FL (ref 8.9–12.7)
POTASSIUM SERPL-SCNC: 3.8 MMOL/L (ref 3.5–5.3)
PROT SERPL-MCNC: 7.6 G/DL (ref 6.4–8.4)
RBC # BLD AUTO: 4.73 MILLION/UL (ref 3.88–5.62)
SODIUM SERPL-SCNC: 140 MMOL/L (ref 135–147)
WBC # BLD AUTO: 5.85 THOUSAND/UL (ref 4.31–10.16)

## 2023-01-22 ENCOUNTER — ANESTHESIA EVENT (OUTPATIENT)
Dept: PERIOP | Facility: HOSPITAL | Age: 53
End: 2023-01-22

## 2023-01-23 ENCOUNTER — TELEPHONE (OUTPATIENT)
Dept: HEMATOLOGY ONCOLOGY | Facility: CLINIC | Age: 53
End: 2023-01-23

## 2023-01-23 ENCOUNTER — HOSPITAL ENCOUNTER (OUTPATIENT)
Facility: HOSPITAL | Age: 53
Setting detail: OUTPATIENT SURGERY
Discharge: HOME | End: 2023-01-23
Attending: SURGERY | Admitting: SURGERY

## 2023-01-23 ENCOUNTER — ANESTHESIA (OUTPATIENT)
Dept: PERIOP | Facility: HOSPITAL | Age: 53
End: 2023-01-23

## 2023-01-23 ENCOUNTER — TELEPHONE (OUTPATIENT)
Dept: GENETICS | Facility: CLINIC | Age: 53
End: 2023-01-23

## 2023-01-23 ENCOUNTER — APPOINTMENT (OUTPATIENT)
Dept: RADIOLOGY | Facility: HOSPITAL | Age: 53
End: 2023-01-23

## 2023-01-23 ENCOUNTER — DOCUMENTATION (OUTPATIENT)
Dept: HEMATOLOGY ONCOLOGY | Facility: CLINIC | Age: 53
End: 2023-01-23

## 2023-01-23 ENCOUNTER — PATIENT OUTREACH (OUTPATIENT)
Dept: HEMATOLOGY ONCOLOGY | Facility: CLINIC | Age: 53
End: 2023-01-23

## 2023-01-23 VITALS
SYSTOLIC BLOOD PRESSURE: 150 MMHG | BODY MASS INDEX: 36.68 KG/M2 | RESPIRATION RATE: 16 BRPM | DIASTOLIC BLOOD PRESSURE: 92 MMHG | HEIGHT: 75 IN | HEART RATE: 65 BPM | OXYGEN SATURATION: 95 % | TEMPERATURE: 97.1 F | WEIGHT: 295 LBS

## 2023-01-23 DEVICE — 8F PLASTIC PRO-FUSE® CT PORT W/ATTACHABLE CHRONOFLEX® POLYURETHANE CATHETER
Type: IMPLANTABLE DEVICE | Site: CHEST | Status: FUNCTIONAL
Brand: PRO-FUSE® LOW PROFILE CT PORT

## 2023-01-23 RX ORDER — PROPOFOL 10 MG/ML
INJECTION, EMULSION INTRAVENOUS AS NEEDED
Status: DISCONTINUED | OUTPATIENT
Start: 2023-01-23 | End: 2023-01-23

## 2023-01-23 RX ORDER — SODIUM CHLORIDE 9 MG/ML
20 INJECTION, SOLUTION INTRAVENOUS ONCE
Status: CANCELLED | OUTPATIENT
Start: 2023-02-13

## 2023-01-23 RX ORDER — SODIUM CHLORIDE 9 MG/ML
20 INJECTION, SOLUTION INTRAVENOUS ONCE
Status: CANCELLED | OUTPATIENT
Start: 2023-02-01

## 2023-01-23 RX ORDER — ONDANSETRON 2 MG/ML
4 INJECTION INTRAMUSCULAR; INTRAVENOUS ONCE AS NEEDED
Status: DISCONTINUED | OUTPATIENT
Start: 2023-01-23 | End: 2023-01-23 | Stop reason: HOSPADM

## 2023-01-23 RX ORDER — ACETAMINOPHEN 325 MG/1
650 TABLET ORAL ONCE
Status: CANCELLED | OUTPATIENT
Start: 2023-01-30

## 2023-01-23 RX ORDER — SODIUM CHLORIDE 9 MG/ML
20 INJECTION, SOLUTION INTRAVENOUS ONCE
Status: CANCELLED | OUTPATIENT
Start: 2023-01-30

## 2023-01-23 RX ORDER — ACETAMINOPHEN 325 MG/1
650 TABLET ORAL ONCE
Status: CANCELLED | OUTPATIENT
Start: 2023-02-13

## 2023-01-23 RX ORDER — SODIUM CHLORIDE, SODIUM LACTATE, POTASSIUM CHLORIDE, CALCIUM CHLORIDE 600; 310; 30; 20 MG/100ML; MG/100ML; MG/100ML; MG/100ML
INJECTION, SOLUTION INTRAVENOUS CONTINUOUS PRN
Status: DISCONTINUED | OUTPATIENT
Start: 2023-01-23 | End: 2023-01-23

## 2023-01-23 RX ORDER — SODIUM CHLORIDE 9 MG/ML
20 INJECTION, SOLUTION INTRAVENOUS ONCE
Status: CANCELLED | OUTPATIENT
Start: 2023-01-31

## 2023-01-23 RX ORDER — SODIUM CHLORIDE 9 MG/ML
20 INJECTION, SOLUTION INTRAVENOUS ONCE
Status: CANCELLED | OUTPATIENT
Start: 2023-02-06

## 2023-01-23 RX ORDER — FENTANYL CITRATE 50 UG/ML
INJECTION, SOLUTION INTRAMUSCULAR; INTRAVENOUS AS NEEDED
Status: DISCONTINUED | OUTPATIENT
Start: 2023-01-23 | End: 2023-01-23

## 2023-01-23 RX ORDER — ACETAMINOPHEN 325 MG/1
650 TABLET ORAL ONCE
Status: CANCELLED | OUTPATIENT
Start: 2023-02-06

## 2023-01-23 RX ORDER — BUPIVACAINE HYDROCHLORIDE 2.5 MG/ML
INJECTION, SOLUTION EPIDURAL; INFILTRATION; INTRACAUDAL AS NEEDED
Status: DISCONTINUED | OUTPATIENT
Start: 2023-01-23 | End: 2023-01-23 | Stop reason: HOSPADM

## 2023-01-23 RX ORDER — OXYCODONE HYDROCHLORIDE 5 MG/1
10 TABLET ORAL EVERY 4 HOURS PRN
Status: DISCONTINUED | OUTPATIENT
Start: 2023-01-23 | End: 2023-01-23 | Stop reason: HOSPADM

## 2023-01-23 RX ORDER — ONDANSETRON 2 MG/ML
INJECTION INTRAMUSCULAR; INTRAVENOUS AS NEEDED
Status: DISCONTINUED | OUTPATIENT
Start: 2023-01-23 | End: 2023-01-23

## 2023-01-23 RX ORDER — DEXAMETHASONE SODIUM PHOSPHATE 10 MG/ML
INJECTION, SOLUTION INTRAMUSCULAR; INTRAVENOUS AS NEEDED
Status: DISCONTINUED | OUTPATIENT
Start: 2023-01-23 | End: 2023-01-23

## 2023-01-23 RX ORDER — OXYCODONE HYDROCHLORIDE 5 MG/1
5 TABLET ORAL EVERY 4 HOURS PRN
Status: DISCONTINUED | OUTPATIENT
Start: 2023-01-23 | End: 2023-01-23 | Stop reason: HOSPADM

## 2023-01-23 RX ORDER — SODIUM CHLORIDE 9 MG/ML
INJECTION INTRAVENOUS AS NEEDED
Status: DISCONTINUED | OUTPATIENT
Start: 2023-01-23 | End: 2023-01-23 | Stop reason: HOSPADM

## 2023-01-23 RX ORDER — LIDOCAINE HYDROCHLORIDE 10 MG/ML
INJECTION, SOLUTION EPIDURAL; INFILTRATION; INTRACAUDAL; PERINEURAL
Status: DISCONTINUED
Start: 2023-01-23 | End: 2023-01-23 | Stop reason: HOSPADM

## 2023-01-23 RX ORDER — SODIUM CHLORIDE 9 MG/ML
20 INJECTION, SOLUTION INTRAVENOUS ONCE
Status: CANCELLED | OUTPATIENT
Start: 2023-02-03

## 2023-01-23 RX ORDER — ACETAMINOPHEN 325 MG/1
650 TABLET ORAL EVERY 6 HOURS PRN
Status: DISCONTINUED | OUTPATIENT
Start: 2023-01-23 | End: 2023-01-23 | Stop reason: HOSPADM

## 2023-01-23 RX ORDER — SODIUM CHLORIDE 9 MG/ML
20 INJECTION, SOLUTION INTRAVENOUS ONCE
Status: CANCELLED | OUTPATIENT
Start: 2023-02-02

## 2023-01-23 RX ADMIN — PROPOFOL 50 MG: 10 INJECTION, EMULSION INTRAVENOUS at 08:03

## 2023-01-23 RX ADMIN — DEXAMETHASONE SODIUM PHOSPHATE 10 MG: 10 INJECTION, SOLUTION INTRAMUSCULAR; INTRAVENOUS at 08:50

## 2023-01-23 RX ADMIN — Medication 3000 MG: at 08:16

## 2023-01-23 RX ADMIN — PROPOFOL 50 MG: 10 INJECTION, EMULSION INTRAVENOUS at 08:58

## 2023-01-23 RX ADMIN — OXYCODONE HYDROCHLORIDE 5 MG: 5 TABLET ORAL at 11:03

## 2023-01-23 RX ADMIN — PROPOFOL 200 MG: 10 INJECTION, EMULSION INTRAVENOUS at 07:59

## 2023-01-23 RX ADMIN — ONDANSETRON 4 MG: 2 INJECTION INTRAMUSCULAR; INTRAVENOUS at 08:50

## 2023-01-23 RX ADMIN — SODIUM CHLORIDE, SODIUM LACTATE, POTASSIUM CHLORIDE, AND CALCIUM CHLORIDE: .6; .31; .03; .02 INJECTION, SOLUTION INTRAVENOUS at 07:50

## 2023-01-23 RX ADMIN — PROPOFOL 50 MG: 10 INJECTION, EMULSION INTRAVENOUS at 08:20

## 2023-01-23 RX ADMIN — FENTANYL CITRATE 50 MCG: 50 INJECTION INTRAMUSCULAR; INTRAVENOUS at 08:23

## 2023-01-23 NOTE — PROGRESS NOTES
Pt scheduled for chemo on 2/1/2023 and also scheduled to see Dr Trena Lyon  Can this appt be rescheduled for closer to the end of chemo or do you want to see him before that? Thank you!

## 2023-01-23 NOTE — ANESTHESIA POSTPROCEDURE EVALUATION
Post-Op Assessment Note    CV Status:  Stable    Pain management: adequate     Mental Status:  Sleepy   Hydration Status:  Euvolemic   PONV Controlled:  Controlled   Airway Patency:  Patent      Post Op Vitals Reviewed: Yes      Staff: Anesthesiologist         No notable events documented      /91 (01/23/23 0930)    Temp     Pulse 72 (01/23/23 0930)   Resp 21 (01/23/23 0930)    SpO2 95 % (01/23/23 0930)

## 2023-01-23 NOTE — ANESTHESIA PREPROCEDURE EVALUATION
Procedure:  INSERTION VENOUS PORT (PORT-A-CATH) (Chest)    Relevant Problems   Other   (+) Obesity, Class II, BMI 35-39 9   (+) Seminoma (HCC)      Lab Results   Component Value Date    SODIUM 140 01/21/2023    K 3 8 01/21/2023     (H) 01/21/2023    CO2 25 01/21/2023    AGAP 5 01/21/2023    BUN 27 (H) 01/21/2023    CREATININE 1 10 01/21/2023    GLUC 98 12/28/2022    GLUF 96 01/21/2023    CALCIUM 8 8 01/21/2023    AST 30 01/21/2023    ALT 37 01/21/2023    ALKPHOS 138 (H) 01/21/2023    TP 7 6 01/21/2023    TBILI 0 47 01/21/2023    EGFR 75 01/21/2023         Lab Results   Component Value Date    WBC 5 85 01/21/2023    HGB 14 8 01/21/2023    HCT 44 5 01/21/2023    MCV 94 01/21/2023     01/21/2023       Physical Exam    Airway    Mallampati score: II  TM Distance: >3 FB  Neck ROM: full     Dental       Cardiovascular      Pulmonary      Other Findings        Anesthesia Plan  ASA Score- 2     Anesthesia Type- general with ASA Monitors  Additional Monitors:   Airway Plan: LMA  Plan Factors-Exercise tolerance (METS): >4 METS  Chart reviewed  EKG reviewed  Imaging results reviewed  Existing labs reviewed  Patient summary reviewed  Induction- intravenous  Postoperative Plan- Plan for postoperative opioid use  Planned trial extubation    Informed Consent- Anesthetic plan and risks discussed with patient and spouse  I personally reviewed this patient with the CRNA  Discussed and agreed on the Anesthesia Plan with the CRNA  Zeynep Iraheta

## 2023-01-23 NOTE — INTERVAL H&P NOTE
H&P reviewed  After examining the patient I find no changes in the patients condition since the H&P had been written      Vitals:    01/23/23 0653   BP: 146/96   Pulse: 69   Resp: 18   Temp: 97 8 °F (36 6 °C)   SpO2: 96%

## 2023-01-23 NOTE — DISCHARGE INSTR - AVS FIRST PAGE
POST-OPERATIVE WOUND CARE INSTRUCTIONS    Your wound is closed with:   Sterile strips-white pieces of tape that hold your incision together      Wound care: You may remove your dressing after 24 hours   You may shower using soap and water to clean your wound  Gently pat it dry  You may redress your wound for comfort as needed  Activity:   Did not perform any heavy lifting or strenuous physical activity for 7 days  Your activity restrictions will be reevaluated at your postoperative visit  Medications: You may resume all your preoperative medications and diet  Pain medication as directed on the prescription given in the office  Other:   May use ice on the wound for 24-48 hours as needed for comfort  Call the office at [phone] if you have any of the followin  Redness, swelling, heat, unusual drainage or heavy bleeding from the wound     2  Fever greater than 101°F    3  Pain not relieved by the prescribed pain medication

## 2023-01-23 NOTE — PROGRESS NOTES
Received email from pt's wife inquiring about the process of genetics referral and if it needed to be completed prior to starting chemotherapy  Pt scheduled for chemotherapy on 2/1/23 and also scheduled to see Dr Piper Torres  Inquiring about rescheduling  Advised I would send message to Dr Piper Torres and inquire about when pt needed to follow-up  Pt had port placement today by Dr Gaby Marquez

## 2023-01-23 NOTE — OP NOTE
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]    Post-Op Diagnosis Codes:     * Seminoma ([address] ) [C62 90]    Procedure(s) (LRB):  INSERTION VENOUS PORT (PORT-A-CATH) (N/A)    Specimen(s):  * No specimens in log *    Estimated Blood Loss:   Minimal    Drains:  * No LDAs found *    Anesthesia Type:   General    Operative Indications:  Seminoma ([address] ) []  49-year-old male who needs a port for long-term intravenous access  Risks and benefits were explained  Informed consent was obtained  Patient was brought to the operating room  Operative Findings:  Catheter tip at the cavoatrial junction  No ectopy  Complications:   None    Procedure and Technique:  After identifying the patient, general anesthesia was induced  Patient was prepped and draped in the usual sterile fashion  A time-out was performed  An incision was made in the left deltopectoral groove  This was taken down to the cephalic vein  The cephalic vein was encircled and tied off distally with 2-0 silk suture  A 2-0 silk was placed proximally around the vein to prevent any backbleeding  The catheter was introduced through the vein and confirmed to be in the superior vena cava using fluoroscopy  A subcutaneous port pocket was created using sharp dissection  Excess fat was removed using sharp dissection  There was excellent hemostasis  Three 2-0 Prolene sutures were placed in the port pocket and attached to the port  Using fluoroscopy, the catheter was manipulated into the cavoatrial junction  Catheter was cut and attached to the port and the port was secured using the previously placed Prolene sutures  The port withdrew blood easily and flushed very easily    The proximal tie was placed around the catheter and vein to prevent any backbleeding  Fluoroscopy confirmed the catheter tip to be just beyond the cavoatrial junction, because otherwise more proximally the catheter would not withdraw blood  There was no ectopy  Wound was now copiously irrigated there was excellent hemostasis  The incision was approximated with interrupted 3-0 Vicryl suture subcutaneously and a running 4 Monocryl suture in a subcuticular fashion  Steri-Strips were applied  Port was once again accessed and withdrew blood easily and flushed very easily and was ultimately flushed with the final Hep-Lock solution  Sterile dressings were applied  The patient was extubated  Patient tolerated the procedure well and was taken to the recovery room in stable condition  I was present and participated in all aspects of this procedure  A chest x-ray was pending at the time of this dictation         I was present for the entire procedure    Patient Disposition:  PACU         SIGNATURE: Juma Zavala MD  DATE: January 23, 2023  TIME: 8:52 AM

## 2023-01-23 NOTE — TELEPHONE ENCOUNTER
I called Duncan Turner to schedule a new patient appointment with the Cancer Risk and Genetics Program       Outcome:   I left a voice message encouraging the patient to call the genetics team at [phone] to schedule this appointment  Follow-up: We will make one more attempt to reach the patient

## 2023-01-24 ENCOUNTER — TELEPHONE (OUTPATIENT)
Dept: GENETICS | Facility: CLINIC | Age: 53
End: 2023-01-24

## 2023-01-24 ENCOUNTER — HOSPITAL ENCOUNTER (OUTPATIENT)
Dept: PULMONOLOGY | Facility: HOSPITAL | Age: 53
Discharge: HOME | End: 2023-01-24
Attending: INTERNAL MEDICINE

## 2023-01-24 DIAGNOSIS — C62.90 SEMINOMA (HCC): ICD-10-CM

## 2023-01-24 RX ORDER — ALBUTEROL SULFATE 2.5 MG/3ML
2.5 SOLUTION RESPIRATORY (INHALATION) ONCE
Status: COMPLETED | OUTPATIENT
Start: 2023-01-24 | End: 2023-01-24

## 2023-01-24 RX ADMIN — ALBUTEROL SULFATE 2.5 MG: 2.5 SOLUTION RESPIRATORY (INHALATION) at 16:59

## 2023-01-24 NOTE — TELEPHONE ENCOUNTER
Santiago Jimenez returned call to schedule a new patient appointment with the Cancer Risk and Genetics Program       Outcome:  Genetics appointment scheduled for Friday January 27 at 1:00 pm      Personal/Family History Related to Appointment:  Patient recently dx with Seminoma  fhx of breast cancer (MGM, mom, sister), skin cancer (sister)  Patient is non-[employer]  History of Genetic Testing:  Patient reports family history of genetic testing  sister possibly had genetic testing done - results are unknown      Genetics Family History Questionnaire:  I confirmed the patient's e-mail on file as the best e-mail to send an invite link for our genetics family history intake

## 2023-01-25 ENCOUNTER — DOCUMENTATION (OUTPATIENT)
Dept: HEMATOLOGY ONCOLOGY | Facility: CLINIC | Age: 53
End: 2023-01-25

## 2023-01-25 ENCOUNTER — PATIENT OUTREACH (OUTPATIENT)
Dept: HEMATOLOGY ONCOLOGY | Facility: CLINIC | Age: 53
End: 2023-01-25

## 2023-01-25 NOTE — PROGRESS NOTES
Received email from pt's wife inquiring about directions for Claritin and when he would get Neulasta  Reviewed treatment plan and noted pt will have Neulasta on 2/7/23 after Day 8  Responded via email with that information and advised he should start taking Claritin the day before on 2/6/23 and continue for 4-5 days  Also provided name of Dr Max Potts nurse as requested, Yisel Dumont RN

## 2023-01-25 NOTE — PROGRESS NOTES
Oncology Finance Advocacy Intake and Intervention  Oncology Finance Counselor/Advocate placed call to patient  This writer informed patient that this writer is here to assist patient with billing questions, financial assistance, payment/payment plans, quotes, copayment assistance, insurance optimization, and insurance navigation  This writer conducted a thorough benefit review of copayment, deductible, and out of pocket cost  This information is documented below and has been reviewed with patient  Copayment:specialist $35 ER $200  Deductible:$400 met -0- $400 remaining  Out of Pocket Cost: $1400 met $248 72  $1151 28 remaining  Insurance optimization (Limited benefit vs self-pay):  Patient assistance status:n/a  Interventions:  Called pt & went over the benefits & he has no concerns at this time          Information above was review thoroughly with patient and patient was advise of possible assistance programs/interventions  If any question arise patient can contact this writer at below information  This information was given to patient at time of contact  Damon Reed  Phone:[phone]  Email: [email or web address]

## 2023-01-26 ENCOUNTER — PATIENT OUTREACH (OUTPATIENT)
Dept: CASE MANAGEMENT | Facility: OTHER | Age: 53
End: 2023-01-26

## 2023-01-26 NOTE — PROGRESS NOTES
Biopsychosocial and Barriers Assessment    Cancer Diagnosis: Seminoma  Home/Cell Phone: [phone]  Emergency Contact: Ronni Lim- [phone]  Marital Status:   Interpretation concerns, speaks another language, preferred language: English  Cultural concerns: none  Ability to read or write: yes    Caregiver/Support: Strong support from family/friends  Children: 2 sons and 1 daughter  Child/Elder care: n/a    Housin story  Home Setup: 2 steps to enter  Lives With: spouse 1 son and 1 daughter  Daily Living Activities: independent  Durable Medical Equipment: none  Ambulation: independent    Preferred Pharmacy: [facility]  [place] co-pays with insurance: [employer] co-pays with medication coverage: none  No medication coverage: n/a    Primary Care Provider: Dr Mi Manuel  Hx of  [facility]: none  Hx of Short term rehab: none  Mental Health Hx: none  Substance Abuse Hx: none  Employment: [employer] Status/Location: none  Ability to pay bills: yes  POA/LW/AD: not addressed  Transportation Plan/Concerns: self      What do you know about your Cancer Diagnosis    What has your doctor told you about your cancer diagnosis: I have seminoma cancer  What has your doctor told you about your cancer treatment: I will be starting chemotherapy on Monday  What specific concerns do you have about your diagnosis and treatment: None at this time-he feels confident with his doctors and the plan  Have you been made aware of any hair loss associated with treatment: n/a    Additional Comments:    OSW placed outreach TC to pt this afternoon  OSW introduced self and role  Pt is a very pleasant man with a dx of seminoma cancer  Pt completed a Dt, where he scored a 3/10  He does not have any concerns at this time  He states that he is well supported by his spouse, who is a nurse for [employer], as well as family members  He states that he feels confident in his team and treatment recommendations   He works for [employer] and states that they are aware and supportive  Pt states that he is familiar with cancer, as his wife, mother and sister all had breast cancer  OSW educated him on the [facility] and cancer hope network  He was agreeable to OSW sending out in the mail  They were placed in the mail today  Pt does not identify any SW needs at this time, therefore the referral will be closed  OSW encouraged him to call with any questions/concerns  He was appreciative of the outreach

## 2023-01-27 ENCOUNTER — CLINICAL SUPPORT (OUTPATIENT)
Dept: GENETICS | Facility: CLINIC | Age: 53
End: 2023-01-27

## 2023-01-27 DIAGNOSIS — R59.0 RETROPERITONEAL LYMPHADENOPATHY: ICD-10-CM

## 2023-01-27 DIAGNOSIS — Z80.0 FAMILY HISTORY OF PANCREATIC CANCER: ICD-10-CM

## 2023-01-27 DIAGNOSIS — Z80.8 FAMILY HISTORY OF THYROID CANCER: ICD-10-CM

## 2023-01-27 DIAGNOSIS — C62.90 SEMINOMA (HCC): Primary | ICD-10-CM

## 2023-01-27 DIAGNOSIS — C62.90 SEMINOMA (HCC): ICD-10-CM

## 2023-01-27 DIAGNOSIS — Z80.3 FAMILY HISTORY OF BREAST CANCER: Primary | ICD-10-CM

## 2023-01-27 NOTE — PROGRESS NOTES
Pre-Test Genetic Counseling Consult Note    Patient Name: Linda Lynne   /Age: 1970/52 y o  Referring Provider: Jony Rincon MD     Date of Service: 2023  Genetic Counselor: Katrina Cotto MS, [facility]  Interpretation Services: None  [facility]      Julia Amezcua was referred to the [address] [place] [address] and Genetic Assessment Program due to his family history of breast, pancreatic, and thyroid cancer  he presents today to discuss the possibility of a hereditary cancer syndrome, options for genetic testing, and implications for him and his family  His wife, Pedro Aguirre, accompanied him to the appointment  Cancer History and Treatment:   Personal History: recently diagnosed with seminoma  Seminoma ([facility] [address] )   2023 -  Cancer Staged     Staging form: Testis, AJCC 8th Edition  - Clinical stage from 2023: cT4, cN3, pM1a, S2 - Signed by Elena Sharma MD on 2023  Stage prefix: Initial diagnosis  Lactate dehydrogenase (LDH) (U/L): 699  Human chorionic gonadotropin (hCG) (mIU/mL): 13  Alpha fetoprotein (AFP) (ng/mL): 5 2  Laterality: Left  Sites of metastasis: Distant lymph nodes, NOS, Retroperitoneal lymph node, NOS  Source of metastatic specimen: Supraclavicular Lymph Nodes  Staged by: Managing physician   2023 Initial Diagnosis     Seminoma ([address] [facility] [address] )   2023 -  Chemotherapy     pegfilgrastim (Almeta Koyanagi), 6 mg, Subcutaneous, Once, 0 of 4 cycles  bleomycin (BLENOXANE) IVPB, 30 Units, Intravenous, Once, 0 of 4 cycles  CISplatin (PLATINOL) infusion, 20 mg/m2, Intravenous, Once, 0 of 4 cycles  fosaprepitant (EMEND) IVPB, 150 mg, Intravenous, Once, 0 of 4 cycles        Screening Hx:   Colon:  Colonoscopy: 10/25/19  Final Diagnosis   A  Cecum, cold biopsy:  - Portion of benign colonic mucosa with prominent reactive lymphoid aggregate       B  Ascending colon, cold snare:  - Tubular adenoma    - Negative for high grade dysplasia/ carcinoma      C  Colon, splenic flexure, cold snare:  - Tubular adenoma  - Negative for high grade dysplasia/ carcinoma  Skin:  No current screening    Prostate:  Prostate screening: annually normal    Other screening: none    Medical and Surgical History  Pertinent surgical history:   Past Surgical History:   Procedure Laterality Date   • FACIAL/NECK BIOPSY Left 1/11/2023    Procedure: EXCISION OF LEFT NECK LYMPH NODE WITH LYMPHOMA PROTOCOL;  Surgeon: Delmar Monroe MD;  Location: [facility] OR;  Service: Surgical Oncology   • FL GUIDED CENTRAL VENOUS ACCESS DEVICE INSERTION  1/23/2023   • HEMORROIDECTOMY     • KNEE SURGERY  7/19/2021   • IA ARTHRS KNE SURG W/MENISCECTOMY MED/LAT W/SHVG Right 07/19/2021    Procedure: ARTHROSCOPY KNEE, partial lateral meniscectomy;  Surgeon: Verito Sheth MD;  Location: [facility];  Service: Orthopedics   • TUNNELED VENOUS PORT PLACEMENT N/A 1/23/2023    Procedure: INSERTION VENOUS PORT (PORT-A-CATH); Surgeon: Delmar Monroe MD;  Location: [facility] OR;  Service: Surgical Oncology      Pertinent medical history:  Past Medical History:   Diagnosis Date   • Allergic 1/1/2000    seasonal allergies   • Seasonal allergies    • Torn meniscus     right knee- surgical repair today 7/19/2021   • Wears glasses          Other History:  Height:   Ht Readings from Last 1 Encounters:   01/23/23 6' 3" (1 905 m)     Weight:   Wt Readings from Last 1 Encounters:   01/23/23 134 kg (295 lb)       Relevant Family History   Patient reports no Ashkenazi Muslim ancestry  Sister (61) history of unilateral breast cancer (dx 45) and contralateral breast cancer (dx 52)  Niece through unaffected aunt (22) history of thyroid cancer (dx 25)    Maternal Family History:   Mother (d 67) history of bilateral breast cancer (dx 61)  Uncle (d 78) history of lymphoma  Aunt (d 69) history of pancreatic cancer   Daughter (d 63) history of pancreatic cancer  Aunt (d 83) history of lymphoma   Son (61) history of cancer NOS (dx 10)  Grandmother (d 38) history of breast cancer    Paternal Family History:   Father (80) history of BCC (dx 64)   No other reported history of cancer    Please refer to the scanned pedigree in the Media Tab for a complete family history     *All history is reported as provided by the patient; records are not available for review, except where indicated  Assessment:  We discussed sporadic, familial and hereditary cancer  We also discussed the many factors that influence our risk for cancer such as age, environmental exposures, lifestyle choices and family history  We reviewed the indications suggestive of a hereditary predisposition to cancer  Genetic testing is indicated for Hakeem Lloyd based on the following criteria: Meets NCCN V2 2023 Testing Criteria for High-Penetrance Breast Cancer Susceptibility Genes: family history of a first-degree relative with a history of breast cancer diagnosed under 48    The risks, benefits, and limitations of genetic testing were reviewed with the patient, as well as genetic discrimination laws, and possible test results (positive, negative, variants of uncertain significance) and their clinical implications  If positive for a mutation, options for managing cancer risk including increased surveillance, chemoprevention, and in some cases prophylactic surgery were discussed  Hakeem Lloyd was informed that if a hereditary cancer syndrome was identified in him, first degree relatives (parents, siblings, and children) have a chance of also inheriting the condition  Genetic testing would allow for predictive genetic testing in other relatives, who may also be at risk depending on their degree of relation  Billing:  Most individuals pay <$100 for hereditary cancer genetic testing  If insurance covers the cost of the testing, individuals may still pay out of pocket secondary to co-pays, co-insurance, or deductibles   If the cost of the testing exceeds $100, the lab will reach out to the patient via phone or e-mail  The patient will then have the option to proceed with the testing, cancel the testing, or elect the self-pay option of $250  Donnette Primrose verbalized understanding  Plan: Patient decided to proceed with testing and provided consent  Summary:     Sample Collection:  The patient was given a blood kit and instructed to [facility] to a [facility]    Genetic Testing Preformed: [facility] + RNA(47 genes): APC, MARIO, AXIN2, BARD1, BMPR1A, BRCA1, BRCA2, BRIP1, CDH1, CDK4, CDKN2A, CHEK2, CTNNA1, DICER1, EPCAM, GREM1, HOXB13, KIT, MEN1, MLH1, MSH2, MSH3, MSH6, MUTYH, NBN, NF1, NTHL1, PALB2, PDGFRA, PMS2, POLD1, POLE, PTEN, RAD50, RAD51C, RAD51D, SDHA, SDHB, SDHC, SDHD, SMAD4, SMARCA4, STK11, TP53, TSC1, TSC2, VHL    Results take approximately 2-3 weeks to complete once test is started  We will contact Jimmy Rothman once results are available  Additional recommendations for surveillance/medical management will be made pending genetic test results

## 2023-01-27 NOTE — PROGRESS NOTES
Reviewed with Dr Dain Grissom patient did not have labs done less than 1 week pre treatment  Per Dr Dain Grissom patient okay to have treatment with labs from 1/21/23  Magnesium level to be obtained prior to treatment on 1/30/23 and proceed without results

## 2023-01-27 NOTE — LETTER
2023     Shan Hernandez [address]  [address] [address]    Patient: Dang Carias  YOB: 1970  Date of Visit: 2023      Dear Dr Mcghee Span:    Thank you for referring Dang Carias to me for evaluation  Below are my notes for this consultation  If you have questions, please do not hesitate to call me  I look forward to following your patient along with you  Sincerely,        Sonia Peralta        CC: Cruzito Reed MD  [facility], DO        Pre-Test Genetic Counseling Consult Note    Patient Name: Dang Carias   /Age: 1970/52 y o  Referring Provider: Lore Gross MD     Date of Service: 2023  Genetic Counselor: Sonia Peralta MS, [facility]  Interpretation Services: None  [facility]      Alfredo Welsh was referred to the [address] [place] [address] and Genetic Assessment Program due to his family history of breast, pancreatic, and thyroid cancer  he presents today to discuss the possibility of a hereditary cancer syndrome, options for genetic testing, and implications for him and his family  His wife, Caroline Pringle, accompanied him to the appointment        Cancer History and Treatment:   Personal History: recently diagnosed with seminoma  Seminoma ([facility] [address] )   2023 -  Cancer Staged     Staging form: Testis, AJCC 8th Edition  - Clinical stage from 2023: cT4, cN3, pM1a, S2 - Signed by Cruzito Reed MD on 2023  Stage prefix: Initial diagnosis  Lactate dehydrogenase (LDH) (U/L): 699  Human chorionic gonadotropin (hCG) (mIU/mL): 13  Alpha fetoprotein (AFP) (ng/mL): 5 2  Laterality: Left  Sites of metastasis: Distant lymph nodes, NOS, Retroperitoneal lymph node, NOS  Source of metastatic specimen: Supraclavicular Lymph Nodes  Staged by: Managing physician   2023 Initial Diagnosis     Seminoma ([facility] [address] )   2023 -  Chemotherapy     pegfilgrastim (NEULASTA ONPRO), 6 mg, Subcutaneous, Once, 0 of 4 cycles  bleomycin (BLENOXANE) IVPB, 30 Units, Intravenous, Once, 0 of 4 cycles  CISplatin (PLATINOL) infusion, 20 mg/m2, Intravenous, Once, 0 of 4 cycles  fosaprepitant (EMEND) IVPB, 150 mg, Intravenous, Once, 0 of 4 cycles        Screening Hx:   Colon:  Colonoscopy: 10/25/19  Final Diagnosis   A  Cecum, cold biopsy:  - Portion of benign colonic mucosa with prominent reactive lymphoid aggregate       B  Ascending colon, cold snare:  - Tubular adenoma  - Negative for high grade dysplasia/ carcinoma      C  Colon, splenic flexure, cold snare:  - Tubular adenoma  - Negative for high grade dysplasia/ carcinoma  Skin:  No current screening    Prostate:  Prostate screening: annually normal    Other screening: none    Medical and Surgical History  Pertinent surgical history:   Past Surgical History:   Procedure Laterality Date   • FACIAL/NECK BIOPSY Left 1/11/2023    Procedure: EXCISION OF LEFT NECK LYMPH NODE WITH LYMPHOMA PROTOCOL;  Surgeon: Brian Brand MD;  Location[facility];  Service: Surgical Oncology   • FL GUIDED CENTRAL VENOUS ACCESS DEVICE INSERTION  1/23/2023   • HEMORROIDECTOMY     • KNEE SURGERY  7/19/2021   • ID ARTHRS KNE SURG W/MENISCECTOMY MED/LAT W/SHVG Right 07/19/2021    Procedure: ARTHROSCOPY KNEE, partial lateral meniscectomy;  Surgeon: Danika Aguilera MD;  [facility];  Service: Orthopedics   • TUNNELED VENOUS PORT PLACEMENT N/A 1/23/2023    Procedure: INSERTION VENOUS PORT (PORT-A-CATH);   Surgeon: Brian Brand MD;  Location[facility];  Service: Surgical Oncology      Pertinent medical history:  Past Medical History:   Diagnosis Date   • Allergic 1/1/2000    seasonal allergies   • Seasonal allergies    • Torn meniscus     right knee- surgical repair today 7/19/2021   • Wears glasses          Other History:  Height:   Ht Readings from Last 1 Encounters:   01/23/23 6' 3" (1 905 m)     Weight:   Wt Readings from Last 1 Encounters:   01/23/23 134 kg (295 lb)       Relevant Family History   Patient reports no Ashkenazi Jehovah's witness ancestry  Sister (61) history of unilateral breast cancer (dx 45) and contralateral breast cancer (dx 52)  Niece through unaffected aunt (22) history of thyroid cancer (dx 25)    Maternal Family History: Mother (d 67) history of bilateral breast cancer (dx 61)  Uncle (d 78) history of lymphoma  Aunt (d 69) history of pancreatic cancer   Daughter (d 63) history of pancreatic cancer  Aunt (d 83) history of lymphoma   Son (61) history of cancer NOS (dx 10)  Grandmother (d 38) history of breast cancer    Paternal Family History:   Father (80) history of BCC (dx 64)   No other reported history of cancer    Please refer to the scanned pedigree in the Media Tab for a complete family history     *All history is reported as provided by the patient; records are not available for review, except where indicated  Assessment:  We discussed sporadic, familial and hereditary cancer  We also discussed the many factors that influence our risk for cancer such as age, environmental exposures, lifestyle choices and family history  We reviewed the indications suggestive of a hereditary predisposition to cancer  Genetic testing is indicated for Kevin Freire based on the following criteria: Meets NCCN V2 2023 Testing Criteria for High-Penetrance Breast Cancer Susceptibility Genes: family history of a first-degree relative with a history of breast cancer diagnosed under 48    The risks, benefits, and limitations of genetic testing were reviewed with the patient, as well as genetic discrimination laws, and possible test results (positive, negative, variants of uncertain significance) and their clinical implications  If positive for a mutation, options for managing cancer risk including increased surveillance, chemoprevention, and in some cases prophylactic surgery were discussed   Kevin Freire was informed that if a hereditary cancer syndrome was identified in him, first degree relatives (parents, siblings, and children) have a chance of also inheriting the condition  Genetic testing would allow for predictive genetic testing in other relatives, who may also be at risk depending on their degree of relation  Billing:  Most individuals pay <$100 for hereditary cancer genetic testing  If insurance covers the cost of the testing, individuals may still pay out of pocket secondary to co-pays, co-insurance, or deductibles  If the cost of the testing exceeds $100, the lab will reach out to the patient via phone or e-mail  The patient will then have the option to proceed with the testing, cancel the testing, or elect the self-pay option of $250  True Luciano verbalized understanding  Plan: Patient decided to proceed with testing and provided consent  Summary:     Sample Collection:  The patient was given a blood kit and instructed to go to a [facility] lab    Genetic Testing Preformed: [facility] Cancer Panel + RNA(47 genes): APC, MARIO, AXIN2, BARD1, BMPR1A, BRCA1, BRCA2, BRIP1, CDH1, CDK4, CDKN2A, CHEK2, CTNNA1, DICER1, EPCAM, GREM1, HOXB13, KIT, MEN1, MLH1, MSH2, MSH3, MSH6, MUTYH, NBN, NF1, NTHL1, PALB2, PDGFRA, PMS2, POLD1, POLE, PTEN, RAD50, RAD51C, RAD51D, SDHA, SDHB, SDHC, SDHD, SMAD4, SMARCA4, STK11, TP53, TSC1, TSC2, VHL    Results take approximately 2-3 weeks to complete once test is started  We will contact Janell Norman once results are available  Additional recommendations for surveillance/medical management will be made pending genetic test results

## 2023-01-28 ENCOUNTER — APPOINTMENT (OUTPATIENT)
Dept: LAB | Facility: MEDICAL CENTER | Age: 53
End: 2023-01-28

## 2023-01-28 DIAGNOSIS — Z80.0 FAMILY HISTORY OF PANCREATIC CANCER: ICD-10-CM

## 2023-01-28 DIAGNOSIS — C62.90 SEMINOMA (HCC): ICD-10-CM

## 2023-01-30 ENCOUNTER — HOSPITAL ENCOUNTER (OUTPATIENT)
Dept: INFUSION CENTER | Facility: CLINIC | Age: 53
Discharge: HOME | End: 2023-01-30

## 2023-01-30 VITALS
HEART RATE: 79 BPM | HEIGHT: 74 IN | RESPIRATION RATE: 18 BRPM | SYSTOLIC BLOOD PRESSURE: 151 MMHG | BODY MASS INDEX: 38.45 KG/M2 | DIASTOLIC BLOOD PRESSURE: 90 MMHG | TEMPERATURE: 96.6 F | WEIGHT: 299.61 LBS

## 2023-01-30 DIAGNOSIS — C62.90 SEMINOMA (HCC): Primary | ICD-10-CM

## 2023-01-30 LAB — MAGNESIUM SERPL-MCNC: 2 MG/DL (ref 1.9–2.7)

## 2023-01-30 RX ORDER — SODIUM CHLORIDE 9 MG/ML
20 INJECTION, SOLUTION INTRAVENOUS ONCE
Status: COMPLETED | OUTPATIENT
Start: 2023-01-30 | End: 2023-01-30

## 2023-01-30 RX ORDER — ACETAMINOPHEN 325 MG/1
650 TABLET ORAL ONCE
Status: COMPLETED | OUTPATIENT
Start: 2023-01-30 | End: 2023-01-30

## 2023-01-30 RX ADMIN — BLEOMYCIN 30 UNITS: 15 POWDER, FOR SOLUTION INTRAMUSCULAR; INTRAPLEURAL; INTRAVENOUS; SUBCUTANEOUS at 13:59

## 2023-01-30 RX ADMIN — SODIUM CHLORIDE 500 ML: 0.9 INJECTION, SOLUTION INTRAVENOUS at 08:47

## 2023-01-30 RX ADMIN — CISPLATIN 52.2 MG: 1 INJECTION, SOLUTION INTRAVENOUS at 10:09

## 2023-01-30 RX ADMIN — SODIUM CHLORIDE 500 ML: 0.9 INJECTION, SOLUTION INTRAVENOUS at 12:37

## 2023-01-30 RX ADMIN — DIPHENHYDRAMINE HYDROCHLORIDE 25 MG: 50 INJECTION, SOLUTION INTRAMUSCULAR; INTRAVENOUS at 12:37

## 2023-01-30 RX ADMIN — DEXAMETHASONE SODIUM PHOSPHATE: 10 INJECTION, SOLUTION INTRAMUSCULAR; INTRAVENOUS at 08:50

## 2023-01-30 RX ADMIN — ACETAMINOPHEN 650 MG: 325 TABLET ORAL at 12:37

## 2023-01-30 RX ADMIN — ETOPOSIDE 261 MG: 20 INJECTION INTRAVENOUS at 11:18

## 2023-01-30 RX ADMIN — FOSAPREPITANT 150 MG: 150 INJECTION, POWDER, LYOPHILIZED, FOR SOLUTION INTRAVENOUS at 09:25

## 2023-01-30 RX ADMIN — SODIUM CHLORIDE 20 ML/HR: 0.9 INJECTION, SOLUTION INTRAVENOUS at 08:46

## 2023-01-30 NOTE — PROGRESS NOTES
Patient arrived to unit without complaint  Patient tolerated chemotherapy without incident  AVS provided and patient aware of appointment tomorrow  Patient left in stable condition

## 2023-01-31 ENCOUNTER — HOSPITAL ENCOUNTER (OUTPATIENT)
Dept: INFUSION CENTER | Facility: CLINIC | Age: 53
Discharge: HOME | End: 2023-01-31

## 2023-01-31 VITALS
BODY MASS INDEX: 38.76 KG/M2 | DIASTOLIC BLOOD PRESSURE: 77 MMHG | SYSTOLIC BLOOD PRESSURE: 123 MMHG | RESPIRATION RATE: 18 BRPM | HEART RATE: 67 BPM | HEIGHT: 74 IN | TEMPERATURE: 97.9 F | WEIGHT: 302.03 LBS

## 2023-01-31 DIAGNOSIS — C62.90 SEMINOMA (HCC): Primary | ICD-10-CM

## 2023-01-31 RX ORDER — SODIUM CHLORIDE 9 MG/ML
20 INJECTION, SOLUTION INTRAVENOUS ONCE
Status: COMPLETED | OUTPATIENT
Start: 2023-01-31 | End: 2023-01-31

## 2023-01-31 RX ADMIN — CISPLATIN 52.2 MG: 1 INJECTION, SOLUTION INTRAVENOUS at 09:23

## 2023-01-31 RX ADMIN — DEXAMETHASONE SODIUM PHOSPHATE: 10 INJECTION, SOLUTION INTRAMUSCULAR; INTRAVENOUS at 08:24

## 2023-01-31 RX ADMIN — SODIUM CHLORIDE 500 ML: 0.9 INJECTION, SOLUTION INTRAVENOUS at 11:49

## 2023-01-31 RX ADMIN — SODIUM CHLORIDE 500 ML: 0.9 INJECTION, SOLUTION INTRAVENOUS at 08:24

## 2023-01-31 RX ADMIN — SODIUM CHLORIDE 20 ML/HR: 0.9 INJECTION, SOLUTION INTRAVENOUS at 08:24

## 2023-01-31 RX ADMIN — ETOPOSIDE 261 MG: 20 INJECTION INTRAVENOUS at 10:29

## 2023-02-01 ENCOUNTER — HOSPITAL ENCOUNTER (OUTPATIENT)
Dept: INFUSION CENTER | Facility: CLINIC | Age: 53
Discharge: HOME | End: 2023-02-01

## 2023-02-01 VITALS
HEIGHT: 74 IN | BODY MASS INDEX: 39.04 KG/M2 | HEART RATE: 62 BPM | DIASTOLIC BLOOD PRESSURE: 76 MMHG | SYSTOLIC BLOOD PRESSURE: 126 MMHG | WEIGHT: 304.24 LBS | TEMPERATURE: 97.5 F | RESPIRATION RATE: 18 BRPM

## 2023-02-01 DIAGNOSIS — C62.90 SEMINOMA (HCC): Primary | ICD-10-CM

## 2023-02-01 RX ORDER — SODIUM CHLORIDE 9 MG/ML
20 INJECTION, SOLUTION INTRAVENOUS ONCE
Status: COMPLETED | OUTPATIENT
Start: 2023-02-01 | End: 2023-02-01

## 2023-02-01 RX ADMIN — ETOPOSIDE 261 MG: 20 INJECTION INTRAVENOUS at 10:24

## 2023-02-01 RX ADMIN — DEXAMETHASONE SODIUM PHOSPHATE: 10 INJECTION, SOLUTION INTRAMUSCULAR; INTRAVENOUS at 08:22

## 2023-02-01 RX ADMIN — SODIUM CHLORIDE 20 ML/HR: 0.9 INJECTION, SOLUTION INTRAVENOUS at 08:21

## 2023-02-01 RX ADMIN — SODIUM CHLORIDE 500 ML: 0.9 INJECTION, SOLUTION INTRAVENOUS at 11:42

## 2023-02-01 RX ADMIN — CISPLATIN 52.2 MG: 1 INJECTION, SOLUTION INTRAVENOUS at 09:22

## 2023-02-01 RX ADMIN — SODIUM CHLORIDE 500 ML: 0.9 INJECTION, SOLUTION INTRAVENOUS at 08:21

## 2023-02-01 NOTE — PROGRESS NOTES
Patient arrived to unit without complain other than mild nausea  PO Zofran has been effective for nausea control  Patient tolerating chemotherapy without incident

## 2023-02-02 ENCOUNTER — HOSPITAL ENCOUNTER (OUTPATIENT)
Dept: INFUSION CENTER | Facility: CLINIC | Age: 53
Discharge: HOME | End: 2023-02-02

## 2023-02-02 VITALS
WEIGHT: 308.64 LBS | RESPIRATION RATE: 18 BRPM | SYSTOLIC BLOOD PRESSURE: 134 MMHG | TEMPERATURE: 97.1 F | HEIGHT: 74 IN | HEART RATE: 51 BPM | DIASTOLIC BLOOD PRESSURE: 81 MMHG | BODY MASS INDEX: 39.61 KG/M2

## 2023-02-02 DIAGNOSIS — C62.90 SEMINOMA (HCC): Primary | ICD-10-CM

## 2023-02-02 RX ORDER — SODIUM CHLORIDE 9 MG/ML
20 INJECTION, SOLUTION INTRAVENOUS ONCE
Status: COMPLETED | OUTPATIENT
Start: 2023-02-02 | End: 2023-02-02

## 2023-02-02 RX ADMIN — CISPLATIN 52.2 MG: 1 INJECTION, SOLUTION INTRAVENOUS at 10:05

## 2023-02-02 RX ADMIN — ETOPOSIDE 261 MG: 20 INJECTION INTRAVENOUS at 11:08

## 2023-02-02 RX ADMIN — SODIUM CHLORIDE 500 ML: 0.9 INJECTION, SOLUTION INTRAVENOUS at 08:57

## 2023-02-02 RX ADMIN — DEXAMETHASONE SODIUM PHOSPHATE: 10 INJECTION, SOLUTION INTRAMUSCULAR; INTRAVENOUS at 08:58

## 2023-02-02 RX ADMIN — SODIUM CHLORIDE 20 ML/HR: 0.9 INJECTION, SOLUTION INTRAVENOUS at 09:00

## 2023-02-02 RX ADMIN — SODIUM CHLORIDE 500 ML: 0.9 INJECTION, SOLUTION INTRAVENOUS at 12:24

## 2023-02-02 NOTE — PROGRESS NOTES
Pt tolerated treatment without incident, left unit in stable condition, aware of appt tomorrow for day d5 treatment

## 2023-02-02 NOTE — PROGRESS NOTES
Pt arrived to unit in stable condition  Pt admits to some mild nausea, he took his oral Zofran at 0620 today with some relief obtained, but pt still mildly nauseous  Also it is noted that pt has had an approx 9lb weight gain since day 1 on treatment on 1/30/23  Pt admits to mild abdominal bloating, denies SOB, chest pain, or any peripheral swelling  All symptoms/assessment reported to Dr Hilary Mustafa via TARAS Campbell RN  OK given to proceed with treatment today, no change in treatment plan ordered  We are to continue to monitor pt weight, and pt is to notify us/Dr Westbrook if he should become symptomatic with fluid retention  Pt verbalized understanding

## 2023-02-02 NOTE — PROGRESS NOTES
Received notification from infusion RN that patient presented to infusion center with 9lb weight gain since day 1 of cycle 1  Patient is getting cycle 1 day 4  Patient did also take PO zofran 8mg at 620 am, reviewed Up to date with Maximino Trejo PA-C patient is okay to receive premedication zofran as prescribed  Infusion staff notified

## 2023-02-02 NOTE — PLAN OF CARE
Problem: Potential for Falls  Goal: Patient will remain free of falls  Description: INTERVENTIONS:  - Educate patient/family on patient safety including physical limitations  - Instruct patient to call for assistance with activity   - Consult OT/PT to assist with strengthening/mobility   - Keep Call bell within reach  - Keep bed low and locked with side rails adjusted as appropriate  - Keep care items and personal belongings within reach  - Initiate and maintain comfort rounds  - Make Fall Risk Sign visible to staff  - Offe- Apply yellow socks and bracelet for high fall risk patients  - Consider moving patient to room near nurses station  Outcome: Progressing

## 2023-02-03 ENCOUNTER — HOSPITAL ENCOUNTER (OUTPATIENT)
Dept: INFUSION CENTER | Facility: CLINIC | Age: 53
End: 2023-02-03

## 2023-02-03 VITALS
RESPIRATION RATE: 18 BRPM | HEART RATE: 55 BPM | WEIGHT: 306.88 LBS | DIASTOLIC BLOOD PRESSURE: 88 MMHG | HEIGHT: 74 IN | BODY MASS INDEX: 39.38 KG/M2 | TEMPERATURE: 97.8 F | SYSTOLIC BLOOD PRESSURE: 146 MMHG

## 2023-02-03 DIAGNOSIS — C62.90 SEMINOMA (HCC): Primary | ICD-10-CM

## 2023-02-03 RX ORDER — SODIUM CHLORIDE 9 MG/ML
20 INJECTION, SOLUTION INTRAVENOUS ONCE
Status: COMPLETED | OUTPATIENT
Start: 2023-02-03 | End: 2023-02-03

## 2023-02-03 RX ADMIN — SODIUM CHLORIDE 20 ML/HR: 0.9 INJECTION, SOLUTION INTRAVENOUS at 09:56

## 2023-02-03 RX ADMIN — ETOPOSIDE 261 MG: 20 INJECTION INTRAVENOUS at 11:05

## 2023-02-03 RX ADMIN — SODIUM CHLORIDE 500 ML: 0.9 INJECTION, SOLUTION INTRAVENOUS at 12:16

## 2023-02-03 RX ADMIN — SODIUM CHLORIDE 52.2 MG: 0.9 INJECTION, SOLUTION INTRAVENOUS at 09:58

## 2023-02-03 RX ADMIN — SODIUM CHLORIDE 500 ML: 0.9 INJECTION, SOLUTION INTRAVENOUS at 08:40

## 2023-02-03 RX ADMIN — DEXAMETHASONE SODIUM PHOSPHATE: 10 INJECTION, SOLUTION INTRAMUSCULAR; INTRAVENOUS at 09:25

## 2023-02-03 NOTE — PROGRESS NOTES
Patient arrived on unit for Day #5 of cycle  Denies any present issues/concerns  Tolerated infusion without incident  Patient stated he will be getting his labs drawn tomorrow for 2/6/23 treatment  Aware of next appointment   Declined AVS

## 2023-02-04 ENCOUNTER — APPOINTMENT (OUTPATIENT)
Dept: LAB | Facility: MEDICAL CENTER | Age: 53
End: 2023-02-04

## 2023-02-04 DIAGNOSIS — C62.90 SEMINOMA (HCC): ICD-10-CM

## 2023-02-04 LAB
BASOPHILS # BLD AUTO: 0 THOUSANDS/ÂΜL (ref 0–0.1)
BASOPHILS NFR BLD AUTO: 0 % (ref 0–1)
EOSINOPHIL # BLD AUTO: 0.07 THOUSAND/ÂΜL (ref 0–0.61)
EOSINOPHIL NFR BLD AUTO: 1 % (ref 0–6)
ERYTHROCYTE [DISTWIDTH] IN BLOOD BY AUTOMATED COUNT: 11.8 % (ref 11.6–15.1)
HCT VFR BLD AUTO: 41.1 % (ref 36.5–49.3)
HGB BLD-MCNC: 14.5 G/DL (ref 12–17)
IMM GRANULOCYTES # BLD AUTO: 0.03 THOUSAND/UL (ref 0–0.2)
IMM GRANULOCYTES NFR BLD AUTO: 0 % (ref 0–2)
LYMPHOCYTES # BLD AUTO: 1.27 THOUSANDS/ÂΜL (ref 0.6–4.47)
LYMPHOCYTES NFR BLD AUTO: 19 % (ref 14–44)
MCH RBC QN AUTO: 32.2 PG (ref 26.8–34.3)
MCHC RBC AUTO-ENTMCNC: 35.3 G/DL (ref 31.4–37.4)
MCV RBC AUTO: 91 FL (ref 82–98)
MONOCYTES # BLD AUTO: 0.05 THOUSAND/ÂΜL (ref 0.17–1.22)
MONOCYTES NFR BLD AUTO: 1 % (ref 4–12)
NEUTROPHILS # BLD AUTO: 5.39 THOUSANDS/ÂΜL (ref 1.85–7.62)
NEUTS SEG NFR BLD AUTO: 79 % (ref 43–75)
NRBC BLD AUTO-RTO: 0 /100 WBCS
PLATELET # BLD AUTO: 244 THOUSANDS/UL (ref 149–390)
PMV BLD AUTO: 9.7 FL (ref 8.9–12.7)
RBC # BLD AUTO: 4.5 MILLION/UL (ref 3.88–5.62)
WBC # BLD AUTO: 6.81 THOUSAND/UL (ref 4.31–10.16)

## 2023-02-06 ENCOUNTER — TELEPHONE (OUTPATIENT)
Dept: HEMATOLOGY ONCOLOGY | Facility: CLINIC | Age: 53
End: 2023-02-06

## 2023-02-06 ENCOUNTER — HOSPITAL ENCOUNTER (OUTPATIENT)
Dept: INFUSION CENTER | Facility: CLINIC | Age: 53
Discharge: HOME | End: 2023-02-06

## 2023-02-06 ENCOUNTER — HOSPITAL ENCOUNTER (EMERGENCY)
Facility: HOSPITAL | Age: 53
Discharge: HOME | End: 2023-02-07
Attending: EMERGENCY MEDICINE

## 2023-02-06 ENCOUNTER — APPOINTMENT (EMERGENCY)
Dept: RADIOLOGY | Facility: HOSPITAL | Age: 53
End: 2023-02-06

## 2023-02-06 ENCOUNTER — DOCUMENTATION (OUTPATIENT)
Dept: HEMATOLOGY ONCOLOGY | Facility: CLINIC | Age: 53
End: 2023-02-06

## 2023-02-06 ENCOUNTER — PATIENT OUTREACH (OUTPATIENT)
Dept: HEMATOLOGY ONCOLOGY | Facility: CLINIC | Age: 53
End: 2023-02-06

## 2023-02-06 VITALS — HEIGHT: 74 IN | WEIGHT: 295.42 LBS | BODY MASS INDEX: 37.91 KG/M2

## 2023-02-06 DIAGNOSIS — C62.90 SEMINOMA (HCC): Primary | ICD-10-CM

## 2023-02-06 DIAGNOSIS — D84.9 IMMUNOCOMPROMISED (HCC): ICD-10-CM

## 2023-02-06 DIAGNOSIS — E83.42 HYPOMAGNESEMIA: ICD-10-CM

## 2023-02-06 DIAGNOSIS — R50.9 FEVER: Primary | ICD-10-CM

## 2023-02-06 LAB
ALBUMIN SERPL BCP-MCNC: 4.1 G/DL (ref 3.5–5)
ALP SERPL-CCNC: 59 U/L (ref 34–104)
ALT SERPL W P-5'-P-CCNC: 36 U/L (ref 7–52)
ANION GAP SERPL CALCULATED.3IONS-SCNC: 9 MMOL/L (ref 4–13)
APTT PPP: 27 SECONDS (ref 23–37)
AST SERPL W P-5'-P-CCNC: 21 U/L (ref 13–39)
BACTERIA UR QL AUTO: ABNORMAL /HPF
BASOPHILS # BLD AUTO: 0.06 THOUSANDS/ÂΜL (ref 0–0.1)
BASOPHILS NFR BLD AUTO: 1 % (ref 0–1)
BILIRUB SERPL-MCNC: 1.77 MG/DL (ref 0.2–1)
BILIRUB UR QL STRIP: NEGATIVE
BUN SERPL-MCNC: 26 MG/DL (ref 5–25)
CALCIUM SERPL-MCNC: 9.1 MG/DL (ref 8.4–10.2)
CHLORIDE SERPL-SCNC: 97 MMOL/L (ref 96–108)
CLARITY UR: CLEAR
CO2 SERPL-SCNC: 26 MMOL/L (ref 21–32)
COLOR UR: ABNORMAL
CREAT SERPL-MCNC: 1.08 MG/DL (ref 0.6–1.3)
EOSINOPHIL # BLD AUTO: 0.05 THOUSAND/ÂΜL (ref 0–0.61)
EOSINOPHIL NFR BLD AUTO: 1 % (ref 0–6)
ERYTHROCYTE [DISTWIDTH] IN BLOOD BY AUTOMATED COUNT: 11.6 % (ref 11.6–15.1)
FINE GRAN CASTS URNS QL MICRO: ABNORMAL /LPF
FLUAV RNA RESP QL NAA+PROBE: NEGATIVE
FLUBV RNA RESP QL NAA+PROBE: NEGATIVE
GFR SERPL CREATININE-BSD FRML MDRD: 77 ML/MIN/1.73SQ M
GLUCOSE SERPL-MCNC: 105 MG/DL (ref 65–140)
GLUCOSE UR STRIP-MCNC: NEGATIVE MG/DL
HCT VFR BLD AUTO: 42.5 % (ref 36.5–49.3)
HGB BLD-MCNC: 15.2 G/DL (ref 12–17)
HGB UR QL STRIP.AUTO: NEGATIVE
HYALINE CASTS #/AREA URNS LPF: ABNORMAL /LPF
IMM GRANULOCYTES # BLD AUTO: 0.15 THOUSAND/UL (ref 0–0.2)
IMM GRANULOCYTES NFR BLD AUTO: 2 % (ref 0–2)
INR PPP: 1.01 (ref 0.84–1.19)
KETONES UR STRIP-MCNC: NEGATIVE MG/DL
LACTATE SERPL-SCNC: 1 MMOL/L (ref 0.5–2)
LEUKOCYTE ESTERASE UR QL STRIP: NEGATIVE
LYMPHOCYTES # BLD AUTO: 0.36 THOUSANDS/ÂΜL (ref 0.6–4.47)
LYMPHOCYTES NFR BLD AUTO: 5 % (ref 14–44)
MAGNESIUM SERPL-MCNC: 1.7 MG/DL (ref 1.9–2.7)
MCH RBC QN AUTO: 31.8 PG (ref 26.8–34.3)
MCHC RBC AUTO-ENTMCNC: 35.8 G/DL (ref 31.4–37.4)
MCV RBC AUTO: 89 FL (ref 82–98)
MONOCYTES # BLD AUTO: 0.02 THOUSAND/ÂΜL (ref 0.17–1.22)
MONOCYTES NFR BLD AUTO: 0 % (ref 4–12)
NEUTROPHILS # BLD AUTO: 7.42 THOUSANDS/ÂΜL (ref 1.85–7.62)
NEUTS SEG NFR BLD AUTO: 91 % (ref 43–75)
NITRITE UR QL STRIP: NEGATIVE
NON-SQ EPI CELLS URNS QL MICRO: ABNORMAL /HPF
NRBC BLD AUTO-RTO: 0 /100 WBCS
PH UR STRIP.AUTO: 6 [PH]
PLATELET # BLD AUTO: 181 THOUSANDS/UL (ref 149–390)
PMV BLD AUTO: 9.2 FL (ref 8.9–12.7)
POTASSIUM SERPL-SCNC: 3.9 MMOL/L (ref 3.5–5.3)
PROCALCITONIN SERPL-MCNC: 0.96 NG/ML
PROT SERPL-MCNC: 7.5 G/DL (ref 6.4–8.4)
PROT UR STRIP-MCNC: ABNORMAL MG/DL
PROTHROMBIN TIME: 13.3 SECONDS (ref 11.6–14.5)
RBC # BLD AUTO: 4.78 MILLION/UL (ref 3.88–5.62)
RBC #/AREA URNS AUTO: ABNORMAL /HPF
RSV RNA RESP QL NAA+PROBE: NEGATIVE
SARS-COV-2 RNA RESP QL NAA+PROBE: NEGATIVE
SODIUM SERPL-SCNC: 132 MMOL/L (ref 135–147)
SP GR UR STRIP.AUTO: 1.02 (ref 1–1.03)
UROBILINOGEN UR QL STRIP.AUTO: 1 E.U./DL
WBC # BLD AUTO: 8.06 THOUSAND/UL (ref 4.31–10.16)
WBC #/AREA URNS AUTO: ABNORMAL /HPF

## 2023-02-06 RX ORDER — ACETAMINOPHEN 325 MG/1
650 TABLET ORAL ONCE
Status: COMPLETED | OUTPATIENT
Start: 2023-02-06 | End: 2023-02-06

## 2023-02-06 RX ORDER — SODIUM CHLORIDE 9 MG/ML
20 INJECTION, SOLUTION INTRAVENOUS ONCE
Status: COMPLETED | OUTPATIENT
Start: 2023-02-06 | End: 2023-02-06

## 2023-02-06 RX ORDER — ONDANSETRON 2 MG/ML
4 INJECTION INTRAMUSCULAR; INTRAVENOUS ONCE
Status: COMPLETED | OUTPATIENT
Start: 2023-02-06 | End: 2023-02-06

## 2023-02-06 RX ORDER — UREA 10 %
500 LOTION (ML) TOPICAL ONCE
Status: COMPLETED | OUTPATIENT
Start: 2023-02-06 | End: 2023-02-06

## 2023-02-06 RX ADMIN — PEGFILGRASTIM 6 MG: KIT SUBCUTANEOUS at 10:13

## 2023-02-06 RX ADMIN — ONDANSETRON HYDROCHLORIDE 4 MG: 2 SOLUTION INTRAMUSCULAR; INTRAVENOUS at 19:15

## 2023-02-06 RX ADMIN — SODIUM CHLORIDE 20 ML/HR: 0.9 INJECTION, SOLUTION INTRAVENOUS at 09:06

## 2023-02-06 RX ADMIN — SODIUM CHLORIDE 1000 ML: 0.9 INJECTION, SOLUTION INTRAVENOUS at 19:14

## 2023-02-06 RX ADMIN — ACETAMINOPHEN 650 MG: 325 TABLET ORAL at 08:57

## 2023-02-06 RX ADMIN — DIPHENHYDRAMINE HYDROCHLORIDE 25 MG: 50 INJECTION, SOLUTION INTRAMUSCULAR; INTRAVENOUS at 09:06

## 2023-02-06 RX ADMIN — BLEOMYCIN SULFATE 30 UNITS: 30 POWDER, FOR SOLUTION INTRAMUSCULAR; INTRAPLEURAL; INTRAVENOUS; SUBCUTANEOUS at 10:04

## 2023-02-06 RX ADMIN — SODIUM CHLORIDE 1000 ML: 0.9 INJECTION, SOLUTION INTRAVENOUS at 21:30

## 2023-02-06 RX ADMIN — Medication 500 MG: at 20:29

## 2023-02-06 NOTE — PROGRESS NOTES
Incoming call from pt's wife  States pt had a Great week last week with some mild nausea in the mornings relieved with zofran, but otherwise was able to work and do normal activities  Pt felt fine this morning and Had treatment at 9am with Day 8 Bleomycin only  Pre-meds of tylenol and Benadryl given  Now with chills and can't get warm  Temp 98 9  No difficulty breathing, SOB or chest tightness  Denies flushing or itching  Mild nausea relieved with zofran and c/o headache  Neftaly Brown also states pt has been only able to get limited quantities of the zofran at a time  High Priority task sent to Nikole Andrade RN for Dr Hillis Fleischer, with above information    Nikole Andrade RN contacted Neftaly Brown with recommendations

## 2023-02-06 NOTE — PROGRESS NOTES
Call out to patient spouse and reviewed recommendations  Compazine prescription to be sent to preferred pharmacy  Rest and hydration are encouraged  Patient stated has been have nausea in the mornings  Reviewed recommendation on monitoring chills/temperature as well  Patient stated will try to increase hydration, rest and to take tylenol to assist with HA  Reported will call on 2/7/23 to follow up with s/s  [facility] number provided incase s/s continue or worsen  Patient and patient spouse appreciative of call

## 2023-02-06 NOTE — TELEPHONE ENCOUNTER
Pt wife called in stating that pt has a fever of 101 9 and has not been feeling well all day  Paged the on call provider Dr Saba Douglas via TC

## 2023-02-06 NOTE — PROGRESS NOTES
Pt arrived to unit in good spirits  Pt admits to mild nausea, so far this is controlled with prescribed Zofran  Pt also admits to some insomnia  He does not want anything prescribed for this just yet  He will notify MD if this problem persists, he is hopeful this will improve now that he is not receiving steroid premed  Pt tolerated treatment today without incident  Neulasta onpro placed on pt's left arm  Pt left unit with device functioning properly  Pt and wife educated re: monitoring and removal of device  Written materials provided as well, both verbalized understanding  Pt left unit without question or concern  AVS provided

## 2023-02-06 NOTE — ED PROVIDER NOTES
History  Chief Complaint   Patient presents with   • Fever Immunocompromised     Patient reports chemo treatment (2nd dose) today and fever post treatment  Patient took tylenol around 1430 and then temp  Was 101 9 at 1630  Patient is unable to take motrin  99 4 temp in triage  Patient is a 77-year-old male here with spouse  Patient currently receiving chemotherapy for seminoma  In chart review patient was initially seen by his PCP and had CTs and consulted with surgery oncology after he had noted adenopathy throughout his neck extending down into the mediastinum, left supraclavicular and posterior mediastinal adenopathy  There was a lymph node biopsy obtained  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  He then had an ultrasound completed which did show a left testicular anechoic area concerning for malignancy and he was seen by urology on January 18  They were holding orchectomy due to no palpable testicular mass on exam   Patient has been following closely and does have start of chemotherapy  According to wife he receives chemotherapy for 1 week, then a chemotherapy day on the Monday after  They have 4 cycles of this  Completed his first week of chemotherapy last week and his chemo today which is bleomycin  After coming home he was feeling well but with some nausea with Zofran  He then was complaining of not feeling well  He was complaining that he felt cold  They took his temperature and it was 99 4  Patient then progressed with continued feeling cold and chills  He had no vomiting, diarrhea, abdominal pain, chest pain, cough, palpitations, syncope  He reports that then they took his temperature was greater than 101  He did have a headache at that time as well but relieved after Tylenol  Called to come in for further work-up  Patient has his COVID and flu vaccines  No sick contacts  He has no melena, bright red blood per rectum         History provided by:  Patient and spouse  Language  used: Yes    Fever Immunocompromised  Max temp prior to arrival:  101 4  Severity:  Moderate  Onset quality:  Gradual  Timing:  Intermittent  Progression:  Resolved  Chronicity:  New  Relieved by:  Nothing  Worsened by:  Nothing  Ineffective treatments:  Acetaminophen  Associated symptoms: chills and nausea    Associated symptoms: no chest pain, no confusion, no congestion, no cough, no diarrhea, no dysuria, no ear pain, no headaches, no myalgias, no rash, no rhinorrhea, no somnolence, no sore throat and no vomiting    Risk factors: hx of cancer and immunosuppression    Risk factors: no contaminated food, no contaminated water, no occupational exposure, no recent sickness and no recent travel          Prior to Admission Medications   Prescriptions Last Dose Informant Patient Reported? Taking?    Ascorbic Acid (vitamin C) 100 MG tablet   Yes No   Sig: Take 100 mg by mouth daily   Elderberry 500 MG CAPS   Yes No   Sig: Take by mouth in the morning   HYDROcodone-acetaminophen (Norco) 5-325 mg per tablet   No No   Sig: Take 1 tablet by mouth every 6 (six) hours as needed for pain Max Daily Amount: 4 tablets   Patient not taking: Reported on 1/20/2023   loratadine (CLARITIN) 10 mg tablet   Yes No   Sig: Take 10 mg by mouth daily   ondansetron (ZOFRAN-ODT) 8 mg disintegrating tablet   No No   Sig: Take 1 tablet (8 mg total) by mouth every 8 (eight) hours as needed for nausea or vomiting   triamcinolone (KENALOG) 0 1 % cream   Yes No   zinc gluconate 50 mg tablet   Yes No   Sig: Take 50 mg by mouth daily      Facility-Administered Medications: None       Past Medical History:   Diagnosis Date   • Allergic 1/1/2000    seasonal allergies   • Impacted cerumen    • Lymphadenopathy    • Obesity, unspecified    • Seasonal allergies    • Seminoma of testis ([facility] [address] )    • Torn meniscus     right knee- surgical repair today 7/19/2021   • Wears glasses        Past Surgical History:   Procedure Laterality Date   • FACIAL/NECK BIOPSY Left 1/11/2023    Procedure: EXCISION OF LEFT NECK LYMPH NODE WITH LYMPHOMA PROTOCOL;  Surgeon: Stefano Simmons MD;  Location: [facility];  Service: Surgical Oncology   • FL GUIDED CENTRAL VENOUS ACCESS DEVICE INSERTION  1/23/2023   • HEMORROIDECTOMY     • KNEE SURGERY  7/19/2021   • CA ARTHRS KNE SURG W/MENISCECTOMY MED/LAT W/SHVG Right 07/19/2021    Procedure: ARTHROSCOPY KNEE, partial lateral meniscectomy;  Surgeon: Lashay Ayon MD;  Location: [facility];  Service: Orthopedics   • TUNNELED VENOUS PORT PLACEMENT N/A 1/23/2023    Procedure: INSERTION VENOUS PORT (PORT-A-CATH); Surgeon: Stefano Simmons MD;  Location: [facility];  Service: Surgical Oncology       Family History   Problem Relation Age of Onset   • Cancer Mother         Breast Cancer   • Breast cancer Mother    • Breast cancer additional onset Mother    • Cancer Father         Skin carcenoma   • Skin cancer Father    • Breast cancer additional onset Sister    • Cancer Sister         Breast Cancer and Skin carcenoma   • Skin cancer Brother      I have reviewed and agree with the history as documented  E-Cigarette/Vaping   • E-Cigarette Use Never User      E-Cigarette/Vaping Substances   • Nicotine No    • THC No    • CBD No    • Flavoring No    • Other No    • Unknown No      Social History     Tobacco Use   • Smoking status: Never   • Smokeless tobacco: Never   Vaping Use   • Vaping Use: Never used   Substance Use Topics   • Alcohol use: Not Currently     Alcohol/week: 3 0 standard drinks     Types: 1 Glasses of wine, 1 Cans of beer, 1 Shots of liquor per week     Comment: 1-2 weekly   • Drug use: Never       Review of Systems   Constitutional: Positive for chills  Negative for fever  HENT: Negative  Negative for congestion, ear pain, rhinorrhea and sore throat  Eyes: Negative for pain and visual disturbance  Respiratory: Negative  Negative for cough and shortness of breath  Cardiovascular: Negative    Negative for chest pain and palpitations  Gastrointestinal: Positive for nausea  Negative for abdominal pain, diarrhea and vomiting  Genitourinary: Negative  Negative for dysuria and hematuria  Musculoskeletal: Negative  Negative for arthralgias, back pain and myalgias  Skin: Negative  Negative for color change and rash  Allergic/Immunologic: Positive for immunocompromised state  Neurological: Negative for seizures, syncope and headaches  Hematological: Negative  Psychiatric/Behavioral: Negative  Negative for confusion  All other systems reviewed and are negative  Physical Exam  Physical Exam  Vitals and nursing note reviewed  Constitutional:       General: He is not in acute distress  Appearance: He is well-developed  HENT:      Head: Normocephalic and atraumatic  Comments: Patient maintaining airway and secretions  No stridor   No brawniness under tongue  Eyes:      Conjunctiva/sclera: Conjunctivae normal    Cardiovascular:      Rate and Rhythm: Normal rate and regular rhythm  Pulses:           Radial pulses are 2+ on the right side and 2+ on the left side  Dorsalis pedis pulses are 2+ on the right side and 2+ on the left side  Heart sounds: Normal heart sounds, S1 normal and S2 normal  No murmur heard  Pulmonary:      Effort: Pulmonary effort is normal  No respiratory distress  Breath sounds: Normal breath sounds  Abdominal:      Palpations: Abdomen is soft  Tenderness: There is no abdominal tenderness  Musculoskeletal:         General: No swelling  Normal range of motion  Cervical back: Neck supple  Right lower leg: No edema  Left lower leg: No edema  Skin:     General: Skin is warm and dry  Capillary Refill: Capillary refill takes less than 2 seconds  Neurological:      General: No focal deficit present  Mental Status: He is alert and oriented to person, place, and time  GCS: GCS eye subscore is 4  GCS verbal subscore is 5  GCS motor subscore is 6  Cranial Nerves: Cranial nerves 2-12 are intact  Sensory: Sensation is intact  Motor: Motor function is intact  Coordination: Coordination is intact  Gait: Gait is intact  Comments: Cranial nerves 2-12 grossly intact  EOMI  PERRLA  No slurred speech  No facial asymmetry  No tongue deviation  Patient can move bilateral upper extremities and lower extremities independently of each other pain-free with full active range of motion throughout the bilateral shoulders, elbows, wrists, hips, knees and ankles  Manual muscle grade 5/5 throughout the bilateral upper extremities and lower extremities  Psychiatric:         Mood and Affect: Mood normal          Behavior: Behavior normal          Thought Content:  Thought content normal          Judgment: Judgment normal          Vital Signs  ED Triage Vitals [02/06/23 1757]   Temperature Pulse Respirations Blood Pressure SpO2   99 4 °F (37 4 °C) 104 20 137/57 96 %      Temp Source Heart Rate Source Patient Position - Orthostatic VS BP Location FiO2 (%)   Oral Monitor Sitting Right arm --      Pain Score       No Pain           Vitals:    02/06/23 1757 02/06/23 2030 02/06/23 2242 02/07/23 0021   BP: 137/57 97/54 104/60 120/65   Pulse: 104 88 72 64   Patient Position - Orthostatic VS: Sitting Sitting Sitting Lying         Visual Acuity      ED Medications  Medications   sodium chloride 0 9 % bolus 1,000 mL (1,000 mL Intravenous New Bag 2/6/23 1914)     Followed by   sodium chloride 0 9 % bolus 1,000 mL (has no administration in time range)   ondansetron (ZOFRAN) injection 4 mg (4 mg Intravenous Given 2/6/23 1915)   ondansetron (ZOFRAN) injection 4 mg (4 mg Intravenous Given 2/6/23 1915)   magnesium gluconate (MAGONATE) tablet 500 mg (500 mg Oral Given 2/6/23 2029)   sodium chloride 0 9 % bolus 1,000 mL (1,000 mL Intravenous New Bag 2/6/23 2130)       Diagnostic Studies  Results Reviewed     Procedure Component Value Units Date/Time    Blood culture #1 [[id number]] Collected: 02/06/23 1905    Lab Status: Preliminary result Specimen: Blood from Arm, Right Updated: 02/06/23 2301     Blood Culture Received in Microbiology Lab  Culture in Progress  Blood culture #2 [[id number]] Collected: 02/06/23 1905    Lab Status: Preliminary result Specimen: Blood from Arm, Left Updated: 02/06/23 2301     Blood Culture Received in Microbiology Lab  Culture in Progress  Urine Microscopic [[id number]]  (Abnormal) Collected: 02/06/23 2039    Lab Status: Final result Specimen: Urine, Clean Catch Updated: 02/06/23 2150     RBC, UA None Seen /hpf      WBC, UA 2-4 /hpf      Epithelial Cells Occasional /hpf      Bacteria, UA None Seen /hpf      Hyaline Casts, UA 0-1 /lpf      Fine granular casts 2-3 /lpf     UA w Reflex to Microscopic w Reflex to Culture [[id number]]  (Abnormal) Collected: 02/06/23 2039    Lab Status: Final result Specimen: Urine, Clean Catch Updated: 02/06/23 2127     Color, UA Orange     Clarity, UA Clear     Specific Mc[place], UA 1 020     pH, UA 6 0     Leukocytes, UA Negative     Nitrite, UA Negative     Protein,  (2+) mg/dl      Glucose, UA Negative mg/dl      Ketones, UA Negative mg/dl      Urobilinogen, UA 1 0 E U /dl      Bilirubin, UA Negative     Occult Blood, UA Negative    FLU/RSV/COVID - if FLU/RSV clinically relevant [[id number]]  (Normal) Collected: 02/06/23 1905    Lab Status: Final result Specimen: Nares from Nose Updated: 02/06/23 2055     SARS-CoV-2 Negative     INFLUENZA A PCR Negative     INFLUENZA B PCR Negative     RSV PCR Negative    Narrative:      FOR PEDIATRIC PATIENTS - copy/paste COVID Guidelines URL to browser: [email or web address]    SARS-CoV-2 assay is a Nucleic Acid Amplification assay intended for the  qualitative detection of nucleic acid from SARS-CoV-2 in nasopharyngeal  swabs   Results are for the presumptive identification of SARS-CoV-2 RNA  Positive results are indicative of infection with SARS-CoV-2, the virus  causing COVID-19, but do not rule out bacterial infection or co-infection  with other viruses  Laboratories within the [place] and its  territories are required to report all positive results to the appropriate  public health authorities  Negative results do not preclude SARS-CoV-2  infection and should not be used as the sole basis for treatment or other  patient management decisions  Negative results must be combined with  clinical observations, patient history, and epidemiological information  This test has not been FDA cleared or approved  This test has been authorized by FDA under an Emergency Use Authorization  (EUA)  This test is only authorized for the duration of time the  declaration that circumstances exist justifying the authorization of the  emergency use of an in vitro diagnostic tests for detection of SARS-CoV-2  virus and/or diagnosis of COVID-19 infection under section 564(b)(1) of  the Act, 21 U  S C  741NFU-3(A)(7), unless the authorization is terminated  or revoked sooner  The test has been validated but independent review by FDA  and CLIA is pending  Test performed using [employer] GeneXpert: This RT-PCR assay targets N2,  a region unique to SARS-CoV-2  A conserved region in the E-gene was chosen  for pan-Sarbecovirus detection which includes SARS-CoV-2  According to CMS-2020-01-R, this platform meets the definition of high-throughput technology      Procalcitonin [[id number]]  (Abnormal) Collected: 02/06/23 1905    Lab Status: Final result Specimen: Blood from Arm, Right Updated: 02/06/23 2004     Procalcitonin 0 96 ng/ml     Comprehensive metabolic panel [[id number]]  (Abnormal) Collected: 02/06/23 1905    Lab Status: Final result Specimen: Blood from Arm, Right Updated: 02/06/23 2000     Sodium 132 mmol/L      Potassium 3 9 mmol/L      Chloride 97 mmol/L      CO2 26 mmol/L      ANION GAP 9 mmol/L      BUN 26 mg/dL      Creatinine 1 08 mg/dL      Glucose 105 mg/dL      Calcium 9 1 mg/dL      AST 21 U/L      ALT 36 U/L      Alkaline Phosphatase 59 U/L      Total Protein 7 5 g/dL      Albumin 4 1 g/dL      Total Bilirubin 1 77 mg/dL      eGFR 77 ml/min/1 73sq m     Narrative:      [place] guidelines for Chronic Kidney Disease (CKD):   •  Stage 1 with normal or high GFR (GFR > 90 mL/min/1 73 square meters)  •  Stage 2 Mild CKD (GFR = 60-89 mL/min/1 73 square meters)  •  Stage 3A Moderate CKD (GFR = 45-59 mL/min/1 73 square meters)  •  Stage 3B Moderate CKD (GFR = 30-44 mL/min/1 73 square meters)  •  Stage 4 Severe CKD (GFR = 15-29 mL/min/1 73 square meters)  •  Stage 5 End Stage CKD (GFR <15 mL/min/1 73 square meters)  Note: GFR calculation is accurate only with a steady state creatinine    Magnesium [[id number]]  (Abnormal) Collected: 02/06/23 1905    Lab Status: Final result Specimen: Blood from Arm, Right Updated: 02/06/23 2000     Magnesium 1 7 mg/dL     Lactic acid [[id number]]  (Normal) Collected: 02/06/23 1905    Lab Status: Final result Specimen: Blood from Arm, Right Updated: 02/06/23 1956     LACTIC ACID 1 0 mmol/L     Narrative:      Result may be elevated if tourniquet was used during collection      Protime-INR [[id number]]  (Normal) Collected: 02/06/23 1905    Lab Status: Final result Specimen: Blood from Arm, Right Updated: 02/06/23 1942     Protime 13 3 seconds      INR 1 01    APTT [[id number]]  (Normal) Collected: 02/06/23 1905    Lab Status: Final result Specimen: Blood from Arm, Right Updated: 02/06/23 1942     PTT 27 seconds     CBC and differential [[id number]]  (Abnormal) Collected: 02/06/23 1905    Lab Status: Final result Specimen: Blood from Arm, Right Updated: 02/06/23 1936     WBC 8 06 Thousand/uL      RBC 4 78 Million/uL      Hemoglobin 15 2 g/dL      Hematocrit 42 5 %      MCV 89 fL      MCH 31 8 pg      MCHC 35 8 g/dL      RDW 11 6 %      MPV 9 2 fL Platelets 598 Thousands/uL      nRBC 0 /100 WBCs      Neutrophils Relative 91 %      Immat GRANS % 2 %      Lymphocytes Relative 5 %      Monocytes Relative 0 %      Eosinophils Relative 1 %      Basophils Relative 1 %      Neutrophils Absolute 7 42 Thousands/µL      Immature Grans Absolute 0 15 Thousand/uL      Lymphocytes Absolute 0 36 Thousands/µL      Monocytes Absolute 0 02 Thousand/µL      Eosinophils Absolute 0 05 Thousand/µL      Basophils Absolute 0 06 Thousands/µL     Narrative: This is an appended report  These results have been appended to a previously verified report  XR chest pa & lateral   ED Interpretation by Talbert Claude, DO (02/06 1952)   No free air  No pneumothoraces  No focal consolidation  Cardiac silhouette within normal limits  Port in place                 Procedures  Procedures         ED Course  ED Course as of 02/07/23 0026   Mon Feb 06, 2023   1843 Patient is a 47-year-old male here with wife coming in today with fever  Patient finished a round of chemotherapy today  On exam he is well-appearing in no distress  Hemodynamically breanna  Given age and comorbidities will start septic      Disclosure: Voice to text software was used in the preparation of this document and could have resulted in translational errors       Occasional wrong word or "sound a like" substitutions may have occurred due to the inherent limitations of voice recognition software   Read the chart carefully and recognize, using context, where substitutions have occurred         2001 Patient's EKG with new T wave inversions however no ischemia  Magnesium low 1 will replace orally  Sodium mildly low at 132 and was giving IV fluids  No evidence of septicemia based on labs  Lactic acid within normal limits  2011 We will reach out to Rosario Terry - Dr Yoni Le, fellow  2025 Discussed with Dr Yoni Le from [facility] fellow - labs WNL   Wanted official cxr read to rule out bleomycin pneumonitis as well as urine  These are normal without evidence of infection he is able to go home  *2 for him to have patient's chest x-ray read officially by radiologist   2040 Discussed with patient and wife  They are updated on plan and agreeable  Reached out to radiology for official cxr read  2158 Urine was clear  There are some protein otherwise no acute pathology   2159 Chest x-ray did not read by radiology yet however on my read there is no acute pathology  2207 Patient with drop in BP however only received approximately 500 cc  Will finish 2 liters total and reassess  Patient and family agreaable  2321 Patient's  blood pressure is slowly increasing  Patient states that he was able to get up and walk to the bathroom  He had no dizziness, chest pain, palpitations, shortness of breath or lightheaded  Will finish IV fluids and DC home  Has not spiked a fever since he has been here  Discussed with him this can be reaction from the bleomycin or also other pathology not identified  He understands return to ER instructions given  Tue Feb 07, 2023   0025 With return of blood pressure to within normal limits for patient  He has no fevers  Long discussion with patient and wife again regarding return to ER instructions  He has been tolerating p o  well here without any vomiting  His nausea is improved  Magdi with patient and family that the chest x-ray was on my read not an official radiology read  If any abnormalities were identified they will receive a phone call  Blood cultures are pending as well                               SBIRT 20yo+    Flowsheet Row Most Recent Value   SBIRT (25 yo +)    In order to provide better care to our patients, we are screening all of our patients for alcohol and drug use  Would it be okay to ask you these screening questions?  No Filed at: 02/06/2023 1902                    Medical Decision Making      DDx including but not limited to: febrile seizure, OM, pharyngitis, URI, viral syndrome, pneumonia, UTI, cellulitis, influenza, meningitis, other intracranial process, medication reaction      EKG INTERPRETATION @ 1955  RHYTHM: Normal sinus rhythm at 80 bpm  AXIS: Normal axis  INTERVALS: MN interval measured at 162 ms  QRS COMPLEX: QRS measured at 92 ms  ST SEGMENT: Nonspecific ST segment changes  Diffuse artifact  T wave inversion in 3, aVF  QT INTERVAL: QTc measured at 401 ms  COMPARED WITH PRIOR compared to EKG on January 6, 2023 no T wave inversions are new however no ST segment depression  Interpretation by Nghia Snider DO      Amount and/or Complexity of Data Reviewed  Independent Historian: caregiver  Labs: ordered  Decision-making details documented in ED Course  Radiology: ordered and independent interpretation performed  Decision-making details documented in ED Course  ECG/medicine tests: ordered and independent interpretation performed  Decision-making details documented in ED Course  Risk  Prescription drug management  Disposition  Final diagnoses:   Fever   Immunocompromised ([facility] )   Hypomagnesemia     Time reflects when diagnosis was documented in both MDM as applicable and the Disposition within this note     Time User Action Codes Description Comment    2/6/2023  8:02 PM Juan Miguel Bennett Add [R50 9] Fever     2/6/2023  8:02 PM Clemencia Bennett Add [D84 9] Immunocompromised ([facility] [address] )     2/6/2023 11:22 PM Joann Flow Add [E83 42] Hypomagnesemia       ED Disposition     ED Disposition   Discharge    Condition   Stable    Date/Time   Mon Feb 6, 2023 11:22 PM    Comment   Jessie Frost discharge to home/self care                 Follow-up Information     Follow up With Specialties Details Why Contact Info    Yoanna Gifford DO Family Medicine Schedule an appointment as soon as possible for a visit in 1 week  [facility]  [phone]            Patient's Medications   Discharge Prescriptions    No medications on file       No discharge procedures on file      PDMP Review       Value Time User    PDMP Reviewed  Yes 1/11/2023  8:29 AM Kenrick Raímrez PA-C          ED Provider  Electronically Signed by           Orly Barker DO  02/07/23 7753

## 2023-02-06 NOTE — PROGRESS NOTES
Received call from pt's wife  Pt feeling great up until this early afternoon  Pt had treatment at 9am with Day 8 Bleomycin only  Tylenol and IV benadryl pre-meds  Now with chills and can't get warm  Temp 98 9  No difficulty breathing or SOB  Denies redness, itching or chest tightness  C/O  Mild nausea relieved with zofran and c/o headache  2/4/23 CBC   Lab Results   Component Value Date    WBC 6 81 02/04/2023    HGB 14 5 02/04/2023    HCT 41 1 02/04/2023    MCV 91 02/04/2023     02/04/2023     ANC 5 39    Please advise  Thank you! Also-Pharmacy limiting amount of zofran they are dispensing also  Prior Starlene Tess may be needed?

## 2023-02-06 NOTE — TELEPHONE ENCOUNTER
Patient sent to ER for temp = 101 9  This happened 2 hours after taking tylenol and was associated with chills  He is on BEP for seminoma and had a dose of bleo today  It could be due to the bleo, but infection must be ruled out as well

## 2023-02-06 NOTE — PROGRESS NOTES
Received vm from patient spouse, "Osito Khurram, my name is Mary Ramírez  I'm calling on behalf of my  Francisca Molina  His date of birth is [id number]  I was wondering if you could please give us a call as soon as possible at [phone]  Mario's having some symptoms and if you can give us a call that would be great  Thank you  Bye  It is 214 "    Per Dr Gould Dafbryn can offer prescription of compazine to alternate with Zofran   Monitor chills

## 2023-02-07 ENCOUNTER — TELEPHONE (OUTPATIENT)
Dept: HEMATOLOGY ONCOLOGY | Facility: CLINIC | Age: 53
End: 2023-02-07

## 2023-02-07 VITALS
BODY MASS INDEX: 37.26 KG/M2 | SYSTOLIC BLOOD PRESSURE: 120 MMHG | DIASTOLIC BLOOD PRESSURE: 65 MMHG | WEIGHT: 293.43 LBS | TEMPERATURE: 98.3 F | RESPIRATION RATE: 17 BRPM | OXYGEN SATURATION: 98 % | HEART RATE: 64 BPM

## 2023-02-07 DIAGNOSIS — T45.1X5A CHEMOTHERAPY-INDUCED NAUSEA: Primary | ICD-10-CM

## 2023-02-07 DIAGNOSIS — R11.0 CHEMOTHERAPY-INDUCED NAUSEA: Primary | ICD-10-CM

## 2023-02-07 LAB
ATRIAL RATE: 80 BPM
P AXIS: 42 DEGREES
PR INTERVAL: 162 MS
QRS AXIS: 51 DEGREES
QRSD INTERVAL: 92 MS
QT INTERVAL: 348 MS
QTC INTERVAL: 401 MS
T WAVE AXIS: -20 DEGREES
VENTRICULAR RATE: 80 BPM

## 2023-02-07 RX ORDER — PROCHLORPERAZINE MALEATE 10 MG
10 TABLET ORAL EVERY 6 HOURS PRN
Qty: 30 TABLET | Refills: 0 | Status: SHIPPED | OUTPATIENT
Start: 2023-02-07

## 2023-02-07 NOTE — TELEPHONE ENCOUNTER
Returned call to patient spouse, per patient spouse Sandro Crabtree, Patient went to the ED 2/6/23  Complete work up completed  Patient spouse concerned about patient EKG results patient spouse is concerned about the T wave  Recommended to reach out to PCP  Patient urine was fine, patient WBC and ANC was WNL per patient spouse, Plts were WNL at this time  Patient ate breakfast and is working from home  Patient stated he is okay  No fevers today    Patient had chills, temperature over 101 and some vomiting, On call provider reviewed ED for further evaluation  Patient received on day 8 of cycle 1  Benadryl 25mg IVPB  Tylenol 650mg po    Then received Bleomycin 30 units     Neulasta on pro    Patient spouse asking if this is to be expected or if any premedications to be added for the further cycles  zofran- 9 tablets each time per prescription fill, only 12 tablets left, reviewed patient has compazine prescription to alternate with zofran

## 2023-02-07 NOTE — PROGRESS NOTES
Hematology/Oncology Outpatient Office Note    Date of Service: 2023    [facility] HEMATOLOGY ONCOLOGY SPECIALISTS DEMETRIO [address] [facility]  [phone]    Reason for Consultation:   Chief Complaint   Patient presents with   • Follow-up       Cancer Stage at diagnosis: IIIC    Referral Physician: No ref  provider found    Primary Care Physician:  Brayan Chaney DO     Nickname: Janell Norman    Spouse: Candis Berry ECO    Today's ECO     Goals and Barriers:  Current Goal: Minimize effects of disease burden, extend life  Barriers to accomplishing this: None    Patient's Capacity to Self Care:  Patient is able to self care    Code Status: not addressed today    Advanced directives: not addressed today    ASSESSMENT & PLAN      Diagnosis ICD-10-CM Associated Orders   1  Seminoma [facility])  C62 90             This is a 45 y o  c PMHx notable for seasonal allergies, 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     5 year Survival rate 73% (Testicular Cancer Survival Rates  Testicular Cancer Prognosis) per the ACS  Baseline PFT: WNL    Discussion of decision making  • Oncology history updated, accordingly, during this visit  • Goals of care/patient communication  o I discussed with the patient the clinical course leading up to their cancer diagnosis  I reviewed relevant office notes, imaging reports and pathology result as well   o I told the patient that this is a case of potentially curable disease and what this means  We discussed that the goal of anti-cancer therapy is to provide best quality of life, extend overall survival, and progression free survival as shown in clinical trials   We also discussed that there might be a point when the cancer will no longer respond to this anti-neoplastic therapy    o I explained the risks/benefits of the proposed cancer therapy: Bleomycin 30 units IV, Etoposide 100mg/m2, cisplatin 20mg/m2, and after discussion including understanding risks of possible life-threatening complications and therapy-related malignancy development, informed consent for blood products and treatment has been signed and obtained  • TNM/Staging At Diagnosis  o [id number] S2  Cancer Staging   IIIC, poor risk (non pulmonary visceral mets)  • Disease Features/Tumor Markers/Genetics  o Tumor Marker: 1/18/2023:  (3x ULN), AFP (5 2, WNL), HCG (13, slightly high)  o Notable Path Features:   1/11/2023: Lymph node, left neck, excision: Germ cell tumor, compatible with metastatic seminoma  Background nonnecrotizing granulomatous inflammation  • Treatment: BEP  • Other Supportive care:  • Treatment Team Members  o Surgeon: Dr Rafael Herndon  o Rad Onc  o Palliative  • Labs: 12/28/2022: creat 0 96, T bili 0 63, WBC 6 49k, Hgb 15 9, plt 244k  • Diagnostics   1/3/2023 CT soft tissue neck: Moderate left-sided adenopathy primarily within the left neck, posterior jugular chain levels 3 through 5 with a prominent node present in the upper mediastinum  1/3/2023 CT Chest w/c: Left supraclavicular, superior mediastinal, posterior mediastinal (15mm)/retrocrural (2 2 cm)and suspected partially imaged left abdominal adenopathy  1/4/2023: CT Abd/pelv w/c: Bulky retroperitoneal lymphadenopathy consistent with lymphoma (left para-aortic region extending into the left upper quadrant measures 8 2 x 10 9 cm  Aortocaval lymphadenopathy measures 3 7 x 5 2 cm  More inferior left para-aortic lymphadenopathy at the bifurcation measures 5 0 x 5 9 cm )  1/18/2023: Testicular U/S shows a left testicular lesion (1 5 x 1 7 x 0 9 cm) and the RP adenopathy is more to the Left of the aorta    Discussion of decision making    I personally reviewed the following lab results, the image studies, pathology, other specialty/physicians consult notes and recommendations, and outside medical records from [facility]   I had a lengthy discussion with the patient and shared the work-up findings  We discussed the diagnosis and management plan as below  I spent 49 minutes reviewing the records (labs, clinician notes, outside records, medical history, ordering medicine/tests/procedures, interpreting the imaging/labs previously done) and coordination of care as well as direct time with the patient today, of which greater than 50% of the time was spent in counseling and coordination of care with the patient/family  · Plan/Labs  · F/u Urology for goal of orchiectomy  Infusion chairs ordered for BEP q21 days x4 cycles with Neulasta support, C2 coming up  · CT CAP w/c scheduled for 4/5/2023 after BEP for post chemo restaging along with tumor markers at that time        Follow Up: q3 weeks for 4 cycles while on systemic therapy (scheduled thru 4/18/2023)    All questions were answered to the patient's satisfaction during this encounter  The patient knows the contact information for our office and knows to reach out for any relevant concerns related to this encounter  They are to call for any temperature 100 4 or higher, new symptoms including but not restricted to shaking chills, decreased appetite, nausea, vomiting, diarrhea, increased fatigue, shortness of breath or chest pain, confusion, and not feeling the strength to come to the clinic  For all other listed problems and medical diagnosis in their chart - they are managed by PCP and/or other specialists, which the patient acknowledges  Thank you very much for your consultation and making us a part of this patient's care  We are continuing to follow closely with you  Please do not hesitate to reach out to me with any additional questions or concerns  Nancy Flores MD  Hematology & Medical Oncology Staff Physician             Disclaimer: This document was prepared using M Modal Fluency Direct technology   If a word or phrase is confusing, or does not make sense, this is likely due to recognition error which was not discovered during this clinician's review  If you believe an error has occurred, please contact me through [address] [place] line for rashida? cation  ONCOLOGY HISTORY OF PRESENT ILLNESS        Oncology History   Seminoma ([facility] )   1/11/2023 -  Cancer Staged    Staging form: Testis, AJCC 8th Edition  - Clinical stage from 1/11/2023: cT4, cN3, pM1a, S2 - Signed by Ulices Tejada MD on 1/20/2023  Stage prefix: Initial diagnosis  Lactate dehydrogenase (LDH) (U/L): 699  Human chorionic gonadotropin (hCG) (mIU/mL): 13  Alpha fetoprotein (AFP) (ng/mL): 5 2  Laterality: Left  Sites of metastasis: Distant lymph nodes, NOS, Retroperitoneal lymph node, NOS  Source of metastatic specimen: Supraclavicular Lymph Nodes  Staged by: Managing physician       1/19/2023 Initial Diagnosis    Seminoma ([facility] )     1/30/2023 -  Chemotherapy    pegfilgrastim (Joelene Matters), 6 mg, Subcutaneous, Once, 1 of 4 cycles  Administration: 6 mg (2/6/2023)  bleomycin (BLENOXANE) IVPB, 30 Units, Intravenous, Once, 1 of 4 cycles  Administration: 30 Units (1/30/2023), 30 Units (2/6/2023), 30 Units (2/13/2023)  CISplatin (PLATINOL) infusion, 20 mg/m2 = 52 2 mg, Intravenous, Once, 1 of 4 cycles  Administration: 52 2 mg (1/30/2023), 52 2 mg (1/31/2023), 52 2 mg (2/1/2023), 52 2 mg (2/2/2023), 52 2 mg (2/3/2023)  fosaprepitant (EMEND) IVPB, 150 mg, Intravenous, Once, 1 of 4 cycles  Administration: 150 mg (1/30/2023)           SUBJECTIVE  (INTERVAL HISTORY)      Clotting History None   Bleeding History None   Cancer History Seminoma   Family Cancer History Mom/sister (breast), same sister (melanoma), father (basal cell skin cancer)   H/O Blood/Plt Transfusion None   Tobacco/etoh/drug abuse No nicotine use, etoh abuse, rec drug use       Cancer Screening history C-scope (2019)   Occupation  at [employer]     Slight pain in the L groin and L neck since 12/19/2022 that has significantly improved  These discomfort has been stable      Interval events: doing well, no acute issues  I have reviewed the relevant past medical, surgical, social and family history  I have also reviewed allergies and medications for this patient  Review of Systems    Baseline weight: 300 lbs    Has had intermittent lower back, thigh rash since summer of 2022  Denies F/C, N/V, SOB, CP, LH, HA, itching, gen weakness, melena, hematuria, hematochezia, falls, diarrhea, or constipation       A 10-point review of system was performed, pertinent positive and negative were detailed as above  Otherwise, the 10-point review of system was negative  Past Medical History:   Diagnosis Date   • Allergic 1/1/2000    seasonal allergies   • Impacted cerumen    • Lymphadenopathy    • Obesity, unspecified    • Seasonal allergies    • Seminoma of testis ([facility] )    • Testicular cancer ([facility] ) 01/17/2023   • Torn meniscus     right knee- surgical repair today 7/19/2021   • Wears glasses        Past Surgical History:   Procedure Laterality Date   • FACIAL/NECK BIOPSY Left 1/11/2023    Procedure: EXCISION OF LEFT NECK LYMPH NODE WITH LYMPHOMA PROTOCOL;  Surgeon: Zulema Joseph MD;  Location: [facility];  Service: Surgical Oncology   • FL GUIDED CENTRAL VENOUS ACCESS DEVICE INSERTION  1/23/2023   • HEMORROIDECTOMY     • KNEE SURGERY  7/19/2021   • UT ARTHRS KNE SURG W/MENISCECTOMY MED/LAT W/SHVG Right 07/19/2021    Procedure: ARTHROSCOPY KNEE, partial lateral meniscectomy;  Surgeon: Lucien Merritt MD;  Location: [facility];  Service: Orthopedics   • TUNNELED VENOUS PORT PLACEMENT N/A 1/23/2023    Procedure: INSERTION VENOUS PORT (PORT-A-CATH);   Surgeon: Zulema Joseph MD;  Location: [facility];  Service: Surgical Oncology       Family History   Problem Relation Age of Onset   • Cancer Mother         Breast Cancer   • Breast cancer Mother    • Breast cancer additional onset Mother    • Cancer Father         Skin carcenoma   • Skin cancer Father    • Breast cancer additional onset Sister    • Cancer Sister Breast Cancer and Skin carcenoma   • Skin cancer Brother        Social History     Socioeconomic History   • Marital status: /Civil Union     Spouse name: Not on file   • Number of children: Not on file   • Years of education: Not on file   • Highest education level: Not on file   Occupational History   • Not on file   Tobacco Use   • Smoking status: Never     Passive exposure: Past   • Smokeless tobacco: Never   • Tobacco comments:     During childhood, both parents smoked  Vaping Use   • Vaping Use: Never used   Substance and Sexual Activity   • Alcohol use: Not Currently     Alcohol/week: 3 0 standard drinks     Types: 1 Glasses of wine, 1 Cans of beer, 1 Shots of liquor per week     Comment: 1-2 weekly   • Drug use: Never   • Sexual activity: Yes     Partners: Female   Other Topics Concern   • Not on file   Social History Narrative   • Not on file     Social Determinants of Health     Financial Resource Strain: Not on file   Food Insecurity: No Food Insecurity   • Worried About Running Out of Food in the Last Year: Never true   • Ran Out of Food in the Last Year: Never true   Transportation Needs: No Transportation Needs   • Lack of Transportation (Medical): No   • Lack of Transportation (Non-Medical):  No   Physical Activity: Not on file   Stress: Not on file   Social Connections: Not on file   Intimate Partner Violence: Not on file   Housing Stability: Low Risk    • Unable to Pay for Housing in the Last Year: No   • Number of Places Lived in the Last Year: 1   • Unstable Housing in the Last Year: No       Allergies   Allergen Reactions   • Kade Aceves - Food Allergy Vomiting       Current Outpatient Medications   Medication Sig Dispense Refill   • Ascorbic Acid (vitamin C) 100 MG tablet Take 100 mg by mouth daily (Patient not taking: Reported on 2/9/2023)     • Elderberry 500 MG CAPS Take by mouth in the morning (Patient not taking: Reported on 2/9/2023)     • HYDROcodone-acetaminophen (Norco) 5-325 mg per tablet Take 1 tablet by mouth every 6 (six) hours as needed for pain Max Daily Amount: 4 tablets (Patient not taking: Reported on 1/20/2023) 10 tablet 0   • loratadine (CLARITIN) 10 mg tablet Take 10 mg by mouth daily     • ondansetron (ZOFRAN-ODT) 8 mg disintegrating tablet Take 1 tablet (8 mg total) by mouth every 8 (eight) hours as needed for nausea or vomiting 90 tablet 2   • prochlorperazine (COMPAZINE) 10 mg tablet Take 1 tablet (10 mg total) by mouth every 6 (six) hours as needed for nausea or vomiting 30 tablet 0   • triamcinolone (KENALOG) 0 1 % cream      • zinc gluconate 50 mg tablet Take 50 mg by mouth daily (Patient not taking: Reported on 2/9/2023)       No current facility-administered medications for this visit  (Not in a hospital admission)      Objective:     24 Hour Vitals Assessment:     Vitals:    02/17/23 0849   BP: 118/82   Pulse: 96   Resp: 16   Temp: 97 8 °F (36 6 °C)   SpO2: 97%       PHYSICIAN EXAM    General: Appearance: alert, cooperative, no distress  HEENT: Normocephalic, atraumatic  No scleral icterus  conjunctivae clear  EOMI  Chest: No tenderness to palpation  No open wound noted  Lungs: Clear to auscultation bilaterally, Respirations unlabored  Cardiac: Regular rate and rhythm, +S1and S2  Abdomen: Soft, non-tender, non-distended  Bowel sounds are normal    Extremities:  No edema, cyanosis, clubbing  Skin: Skin color, turgor are normal  No rashes  Lymphatics: no palpable axillary, or inguinal adenopathy  L supra-clavicular LN palpated with incision c/d/i    Port:  c/d/i    Neurologic: Awake, Alert, and oriented, no gross focal deficits noted b/l  DATA REVIEW:    Pathology Result:    Final Diagnosis   Date Value Ref Range Status   01/11/2023   Final    A  Lymph node, left neck, excision:  -Germ cell tumor, compatible with metastatic seminoma (see note)  -Background nonnecrotizing granulomatous inflammation (see note)  Comment:      This is an appended report  These results have been appended to a previously preliminary verified report  10/25/2019   Final    A  Cecum, cold biopsy:  - Portion of benign colonic mucosa with prominent reactive lymphoid aggregate  B  Ascending colon, cold snare:  - Tubular adenoma  - Negative for high grade dysplasia/ carcinoma  C  Colon, splenic flexure, cold snare:  - Tubular adenoma  - Negative for high grade dysplasia/ carcinoma  Image Results:   Image result are reviewed and documented in Hematology/Oncology history    XR chest 1 view portable  Narrative: CHEST     INDICATION:   Neutropenic fever  COMPARISON:  Chest x-ray 2/6/2023    EXAM PERFORMED/VIEWS:  XR CHEST PORTABLE    FINDINGS:  Left-sided Mediport line tip terminates at the SVC  Cardiomediastinal silhouette appears unremarkable  The lungs are clear  No pneumothorax or pleural effusion  Osseous structures appear within normal limits for patient age  Impression: No active pulmonary disease  Workstation performed: [id number]      LABS:  Lab data are reviewed and documented in HemOnc history         Lab Results   Component Value Date    HGB 11 7 (L) 02/15/2023    HCT 33 9 (L) 02/15/2023    MCV 92 02/15/2023    PLT 64 (L) 02/15/2023    WBC 15 22 (H) 02/15/2023    NRBC 0 02/14/2023    BANDSPCT 11 (H) 02/15/2023    ATYLMPCT 2 (H) 02/15/2023     Lab Results   Component Value Date    K 3 8 02/14/2023     02/14/2023    CO2 26 02/14/2023    BUN 18 02/14/2023    CREATININE 1 18 02/14/2023    GLUF 96 01/21/2023    CALCIUM 8 3 (L) 02/14/2023    CORRECTEDCA 8 9 02/14/2023    AST 19 02/14/2023    ALT 16 02/14/2023    ALKPHOS 64 02/14/2023    EGFR 69 02/14/2023       No results found for: IRON, TIBC, FERRITIN    No results found for: [id number]    Recent Labs     02/15/23  0452   WBC 15 22*       By:  Carrie Carballo MD, 2/17/2023, 8:59 AM

## 2023-02-07 NOTE — TELEPHONE ENCOUNTER
Received vm from patient spouse in regards to EKG, "Nithin Correia  It's Tomluciano Santiago calling  I just wanted to mention one thing, sorry to bother you  I was just talking with Jimmy Rothman about his EKG results and he raised a good   1 of the EKG leads was placed right where over his port  When they did that, we noticed when we took off the lead  So I don't know if that has something to do with that change in the EKG because he's never had any issues before  So anyway, we'll mention it to our PCP as well, but I just wanted to let you know that  OK  Thank you so much  Have a great day and we appreciate all of your help   Bye "

## 2023-02-07 NOTE — DISCHARGE INSTRUCTIONS
Please keep close eye on your temperature  If you have persistent fevers to return to the ER      Please call your hematologist oncologist for close follow-up

## 2023-02-09 ENCOUNTER — OFFICE VISIT (OUTPATIENT)
Dept: FAMILY MEDICINE CLINIC | Facility: CLINIC | Age: 53
End: 2023-02-09

## 2023-02-09 VITALS
BODY MASS INDEX: 36.06 KG/M2 | OXYGEN SATURATION: 97 % | HEART RATE: 64 BPM | SYSTOLIC BLOOD PRESSURE: 136 MMHG | TEMPERATURE: 96.9 F | DIASTOLIC BLOOD PRESSURE: 84 MMHG | HEIGHT: 75 IN | WEIGHT: 290 LBS

## 2023-02-09 DIAGNOSIS — R11.0 CHEMOTHERAPY-INDUCED NAUSEA: ICD-10-CM

## 2023-02-09 DIAGNOSIS — T45.1X5A CHEMOTHERAPY-INDUCED NAUSEA: ICD-10-CM

## 2023-02-09 DIAGNOSIS — C62.90 SEMINOMA (HCC): Primary | ICD-10-CM

## 2023-02-09 RX ORDER — ONDANSETRON 8 MG/1
8 TABLET, ORALLY DISINTEGRATING ORAL EVERY 8 HOURS PRN
Qty: 90 TABLET | Refills: 2 | Status: SHIPPED | OUTPATIENT
Start: 2023-02-09 | End: 2023-02-17 | Stop reason: SDUPTHER

## 2023-02-09 NOTE — PROGRESS NOTES
Assessment/Plan:  EKG repeated today in the office looks completely normal   No inferior wall changes  No sign of prior issues  We discussed possibly getting a cardiologist involved but considering he is asymptomatic with a normal looking EKG I do not believe this is necessary at this time  1  Seminoma ([address] )  -     POCT ECG    2  Chemotherapy-induced nausea  -     ondansetron (ZOFRAN-ODT) 8 mg disintegrating tablet; Take 1 tablet (8 mg total) by mouth every 8 (eight) hours as needed for nausea or vomiting          Subjective:      Patient ID: Dirk Duran is a 45 y o  male  Patient is seen today for evaluation after recent ER visit after chemotherapy treatment for seminoma  EKG apparently was abnormal   In reviewing that EKG it looks like there may be some lead misplacement at the time and he believes the lead may have been placed over his port             The following portions of the patient's history were reviewed and updated as appropriate: allergies, current medications, past family history, past medical history, past social history, past surgical history, and problem list     Review of Systems      Objective:      /84 (BP Location: Left arm, Patient Position: Sitting, Cuff Size: Standard)   Pulse 64   Temp (!) 96 9 °F (36 1 °C) (Tympanic)   Ht 6' 3" (1 905 m)   Wt 132 kg (290 lb)   SpO2 97%   BMI 36 25 kg/m²          Physical Exam

## 2023-02-11 ENCOUNTER — APPOINTMENT (OUTPATIENT)
Dept: LAB | Facility: MEDICAL CENTER | Age: 53
End: 2023-02-11

## 2023-02-11 ENCOUNTER — TELEPHONE (OUTPATIENT)
Dept: HEMATOLOGY ONCOLOGY | Facility: CLINIC | Age: 53
End: 2023-02-11

## 2023-02-11 DIAGNOSIS — C62.90 SEMINOMA (HCC): ICD-10-CM

## 2023-02-11 LAB
BACTERIA BLD CULT: NORMAL
BACTERIA BLD CULT: NORMAL
BASOPHILS # BLD MANUAL: 0.01 THOUSAND/UL (ref 0–0.1)
BASOPHILS NFR MAR MANUAL: 1 % (ref 0–1)
EOSINOPHIL # BLD MANUAL: 0.04 THOUSAND/UL (ref 0–0.4)
EOSINOPHIL NFR BLD MANUAL: 3 % (ref 0–6)
ERYTHROCYTE [DISTWIDTH] IN BLOOD BY AUTOMATED COUNT: 11.3 % (ref 11.6–15.1)
HCT VFR BLD AUTO: 37.5 % (ref 36.5–49.3)
HGB BLD-MCNC: 13.4 G/DL (ref 12–17)
LYMPHOCYTES # BLD AUTO: 0.96 THOUSAND/UL (ref 0.6–4.47)
LYMPHOCYTES # BLD AUTO: 73 % (ref 14–44)
MCH RBC QN AUTO: 32.1 PG (ref 26.8–34.3)
MCHC RBC AUTO-ENTMCNC: 35.7 G/DL (ref 31.4–37.4)
MCV RBC AUTO: 90 FL (ref 82–98)
MONOCYTES # BLD AUTO: 0.09 THOUSAND/UL (ref 0–1.22)
MONOCYTES NFR BLD: 7 % (ref 4–12)
NEUTROPHILS # BLD MANUAL: 0.08 THOUSAND/UL (ref 1.85–7.62)
NEUTS BAND NFR BLD MANUAL: 1 % (ref 0–8)
NEUTS SEG NFR BLD AUTO: 5 % (ref 43–75)
PLATELET # BLD AUTO: 54 THOUSANDS/UL (ref 149–390)
PLATELET BLD QL SMEAR: ABNORMAL
PMV BLD AUTO: 10.4 FL (ref 8.9–12.7)
PROMYELOCYTES NFR BLD MANUAL: 1 % (ref 0–0)
RBC # BLD AUTO: 4.18 MILLION/UL (ref 3.88–5.62)
RBC MORPH BLD: NORMAL
SMUDGE CELLS BLD QL SMEAR: PRESENT
VARIANT LYMPHS # BLD AUTO: 9 %
WBC # BLD AUTO: 1.32 THOUSAND/UL (ref 4.31–10.16)

## 2023-02-11 NOTE — TELEPHONE ENCOUNTER
Lab Result: White blood cell count 1 32    Date/Time Drawn: 02/11/2023  7:50   Ordering Provider: Dr Akil Crook Name: El Hammond @ [facility]  [phone]     Paged Dr Radha Sinclair via TC      The following critical/stat result was read back to the lab as stated above and [employer] to the on-call provider  The provider confirmed receipt of the message

## 2023-02-13 ENCOUNTER — HOSPITAL ENCOUNTER (INPATIENT)
Facility: HOSPITAL | Age: 53
LOS: 2 days | Discharge: HOME | End: 2023-02-15
Attending: EMERGENCY MEDICINE | Admitting: INTERNAL MEDICINE

## 2023-02-13 ENCOUNTER — APPOINTMENT (EMERGENCY)
Dept: RADIOLOGY | Facility: HOSPITAL | Age: 53
End: 2023-02-13

## 2023-02-13 ENCOUNTER — TELEPHONE (OUTPATIENT)
Dept: HEMATOLOGY ONCOLOGY | Facility: CLINIC | Age: 53
End: 2023-02-13

## 2023-02-13 ENCOUNTER — HOSPITAL ENCOUNTER (OUTPATIENT)
Dept: INFUSION CENTER | Facility: CLINIC | Age: 53
Discharge: HOME | End: 2023-02-13

## 2023-02-13 ENCOUNTER — PATIENT OUTREACH (OUTPATIENT)
Dept: HEMATOLOGY ONCOLOGY | Facility: CLINIC | Age: 53
End: 2023-02-13

## 2023-02-13 VITALS
DIASTOLIC BLOOD PRESSURE: 85 MMHG | WEIGHT: 288.8 LBS | HEART RATE: 99 BPM | TEMPERATURE: 97.2 F | RESPIRATION RATE: 18 BRPM | HEIGHT: 75 IN | BODY MASS INDEX: 35.91 KG/M2 | SYSTOLIC BLOOD PRESSURE: 129 MMHG

## 2023-02-13 DIAGNOSIS — Z79.899 ON ANTINEOPLASTIC CHEMOTHERAPY: ICD-10-CM

## 2023-02-13 DIAGNOSIS — R50.81 NEUTROPENIC FEVER (HCC): Primary | ICD-10-CM

## 2023-02-13 DIAGNOSIS — D70.9 NEUTROPENIC FEVER (HCC): Primary | ICD-10-CM

## 2023-02-13 DIAGNOSIS — C62.90 SEMINOMA (HCC): ICD-10-CM

## 2023-02-13 DIAGNOSIS — C62.90 SEMINOMA (HCC): Primary | ICD-10-CM

## 2023-02-13 PROBLEM — R00.0 TACHYCARDIA: Status: ACTIVE | Noted: 2023-02-13

## 2023-02-13 PROBLEM — E87.6 HYPOKALEMIA: Status: ACTIVE | Noted: 2023-02-13

## 2023-02-13 PROBLEM — R51.9 HEADACHE: Status: ACTIVE | Noted: 2023-02-13

## 2023-02-13 LAB
2HR DELTA HS TROPONIN: 0 NG/L
4HR DELTA HS TROPONIN: -1 NG/L
ALBUMIN SERPL BCP-MCNC: 4 G/DL (ref 3.5–5)
ALP SERPL-CCNC: 94 U/L (ref 34–104)
ALT SERPL W P-5'-P-CCNC: 21 U/L (ref 7–52)
ANION GAP SERPL CALCULATED.3IONS-SCNC: 10 MMOL/L (ref 4–13)
APTT PPP: 28 SECONDS (ref 23–37)
AST SERPL W P-5'-P-CCNC: 29 U/L (ref 13–39)
ATRIAL RATE: 109 BPM
ATRIAL RATE: 99 BPM
BASOPHILS # BLD AUTO: 0.02 THOUSANDS/ÂΜL (ref 0–0.1)
BASOPHILS NFR BLD AUTO: 0 % (ref 0–1)
BILIRUB SERPL-MCNC: 0.59 MG/DL (ref 0.2–1)
BILIRUB UR QL STRIP: NEGATIVE
BUN SERPL-MCNC: 21 MG/DL (ref 5–25)
CALCIUM SERPL-MCNC: 9.2 MG/DL (ref 8.4–10.2)
CARDIAC TROPONIN I PNL SERPL HS: 6 NG/L
CARDIAC TROPONIN I PNL SERPL HS: 7 NG/L
CARDIAC TROPONIN I PNL SERPL HS: 7 NG/L
CHLORIDE SERPL-SCNC: 104 MMOL/L (ref 96–108)
CLARITY UR: CLEAR
CO2 SERPL-SCNC: 23 MMOL/L (ref 21–32)
COLOR UR: YELLOW
CREAT SERPL-MCNC: 1.17 MG/DL (ref 0.6–1.3)
EOSINOPHIL # BLD AUTO: 0.05 THOUSAND/ÂΜL (ref 0–0.61)
EOSINOPHIL NFR BLD AUTO: 1 % (ref 0–6)
ERYTHROCYTE [DISTWIDTH] IN BLOOD BY AUTOMATED COUNT: 11.4 % (ref 11.6–15.1)
FLUAV RNA RESP QL NAA+PROBE: NEGATIVE
FLUBV RNA RESP QL NAA+PROBE: NEGATIVE
GFR SERPL CREATININE-BSD FRML MDRD: 70 ML/MIN/1.73SQ M
GLUCOSE SERPL-MCNC: 112 MG/DL (ref 65–140)
GLUCOSE UR STRIP-MCNC: NEGATIVE MG/DL
HCT VFR BLD AUTO: 38.5 % (ref 36.5–49.3)
HGB BLD-MCNC: 13.8 G/DL (ref 12–17)
HGB UR QL STRIP.AUTO: NEGATIVE
IMM GRANULOCYTES # BLD AUTO: >0.5 THOUSAND/UL (ref 0–0.2)
IMM GRANULOCYTES NFR BLD AUTO: 16 % (ref 0–2)
INR PPP: 1.07 (ref 0.84–1.19)
KETONES UR STRIP-MCNC: ABNORMAL MG/DL
LACTATE SERPL-SCNC: 1.4 MMOL/L (ref 0.5–2)
LACTATE SERPL-SCNC: 2.1 MMOL/L (ref 0.5–2)
LEUKOCYTE ESTERASE UR QL STRIP: NEGATIVE
LYMPHOCYTES # BLD AUTO: 0.33 THOUSANDS/ÂΜL (ref 0.6–4.47)
LYMPHOCYTES NFR BLD AUTO: 5 % (ref 14–44)
MCH RBC QN AUTO: 31.9 PG (ref 26.8–34.3)
MCHC RBC AUTO-ENTMCNC: 35.8 G/DL (ref 31.4–37.4)
MCV RBC AUTO: 89 FL (ref 82–98)
MONOCYTES # BLD AUTO: 1.53 THOUSAND/ÂΜL (ref 0.17–1.22)
MONOCYTES NFR BLD AUTO: 22 % (ref 4–12)
NEUTROPHILS # BLD AUTO: 3.89 THOUSANDS/ÂΜL (ref 1.85–7.62)
NEUTS SEG NFR BLD AUTO: 56 % (ref 43–75)
NITRITE UR QL STRIP: NEGATIVE
NRBC BLD AUTO-RTO: 1 /100 WBCS
P AXIS: 38 DEGREES
P AXIS: 40 DEGREES
PH UR STRIP.AUTO: 5.5 [PH]
PLATELET # BLD AUTO: 70 THOUSANDS/UL (ref 149–390)
PMV BLD AUTO: 9.9 FL (ref 8.9–12.7)
POTASSIUM SERPL-SCNC: 3.3 MMOL/L (ref 3.5–5.3)
PR INTERVAL: 162 MS
PR INTERVAL: 166 MS
PROCALCITONIN SERPL-MCNC: 1.73 NG/ML
PROT SERPL-MCNC: 7.3 G/DL (ref 6.4–8.4)
PROT UR STRIP-MCNC: NEGATIVE MG/DL
PROTHROMBIN TIME: 13.9 SECONDS (ref 11.6–14.5)
QRS AXIS: 10 DEGREES
QRS AXIS: 21 DEGREES
QRSD INTERVAL: 96 MS
QRSD INTERVAL: 98 MS
QT INTERVAL: 316 MS
QT INTERVAL: 330 MS
QTC INTERVAL: 423 MS
QTC INTERVAL: 425 MS
RBC # BLD AUTO: 4.32 MILLION/UL (ref 3.88–5.62)
RSV RNA RESP QL NAA+PROBE: NEGATIVE
SARS-COV-2 RNA RESP QL NAA+PROBE: NEGATIVE
SODIUM SERPL-SCNC: 137 MMOL/L (ref 135–147)
SP GR UR STRIP.AUTO: 1.02 (ref 1–1.03)
T WAVE AXIS: -1 DEGREES
T WAVE AXIS: -4 DEGREES
UROBILINOGEN UR QL STRIP.AUTO: 0.2 E.U./DL
VENTRICULAR RATE: 109 BPM
VENTRICULAR RATE: 99 BPM
WBC # BLD AUTO: 6.91 THOUSAND/UL (ref 4.31–10.16)

## 2023-02-13 RX ORDER — ACETAMINOPHEN 325 MG/1
650 TABLET ORAL EVERY 6 HOURS PRN
Status: DISCONTINUED | OUTPATIENT
Start: 2023-02-13 | End: 2023-02-13

## 2023-02-13 RX ORDER — ACETAMINOPHEN 325 MG/1
650 TABLET ORAL ONCE
Status: COMPLETED | OUTPATIENT
Start: 2023-02-13 | End: 2023-02-13

## 2023-02-13 RX ORDER — ONDANSETRON 2 MG/ML
4 INJECTION INTRAMUSCULAR; INTRAVENOUS EVERY 6 HOURS PRN
Status: DISCONTINUED | OUTPATIENT
Start: 2023-02-13 | End: 2023-02-15 | Stop reason: HOSPADM

## 2023-02-13 RX ORDER — HEPARIN SODIUM 5000 [USP'U]/ML
5000 INJECTION, SOLUTION INTRAVENOUS; SUBCUTANEOUS EVERY 8 HOURS SCHEDULED
Status: DISCONTINUED | OUTPATIENT
Start: 2023-02-13 | End: 2023-02-13

## 2023-02-13 RX ORDER — LORATADINE 10 MG/1
10 TABLET ORAL DAILY
Status: DISCONTINUED | OUTPATIENT
Start: 2023-02-14 | End: 2023-02-15 | Stop reason: HOSPADM

## 2023-02-13 RX ORDER — SODIUM CHLORIDE 9 MG/ML
20 INJECTION, SOLUTION INTRAVENOUS ONCE
Status: COMPLETED | OUTPATIENT
Start: 2023-02-13 | End: 2023-02-13

## 2023-02-13 RX ORDER — ACETAMINOPHEN 325 MG/1
650 TABLET ORAL EVERY 6 HOURS PRN
Status: DISCONTINUED | OUTPATIENT
Start: 2023-02-13 | End: 2023-02-15 | Stop reason: HOSPADM

## 2023-02-13 RX ORDER — POTASSIUM CHLORIDE 20 MEQ/1
40 TABLET, EXTENDED RELEASE ORAL ONCE
Status: COMPLETED | OUTPATIENT
Start: 2023-02-13 | End: 2023-02-13

## 2023-02-13 RX ADMIN — ACETAMINOPHEN 325MG 650 MG: 325 TABLET ORAL at 23:26

## 2023-02-13 RX ADMIN — ACETAMINOPHEN 650 MG: 325 TABLET ORAL at 08:54

## 2023-02-13 RX ADMIN — POTASSIUM CHLORIDE 40 MEQ: 1500 TABLET, EXTENDED RELEASE ORAL at 21:42

## 2023-02-13 RX ADMIN — SODIUM CHLORIDE 20 ML/HR: 0.9 INJECTION, SOLUTION INTRAVENOUS at 08:54

## 2023-02-13 RX ADMIN — DIPHENHYDRAMINE HYDROCHLORIDE 25 MG: 50 INJECTION, SOLUTION INTRAMUSCULAR; INTRAVENOUS at 08:54

## 2023-02-13 RX ADMIN — SODIUM CHLORIDE 1000 ML: 0.9 INJECTION, SOLUTION INTRAVENOUS at 16:47

## 2023-02-13 RX ADMIN — ONDANSETRON HYDROCHLORIDE 4 MG: 2 SOLUTION INTRAMUSCULAR; INTRAVENOUS at 23:26

## 2023-02-13 RX ADMIN — BLEOMYCIN SULFATE 30 UNITS: 30 POWDER, FOR SOLUTION INTRAMUSCULAR; INTRAPLEURAL; INTRAVENOUS; SUBCUTANEOUS at 09:45

## 2023-02-13 RX ADMIN — PIPERACILLIN AND TAZOBACTAM 3.38 G: 3; .375 INJECTION, POWDER, FOR SOLUTION INTRAVENOUS at 18:36

## 2023-02-13 NOTE — ED ATTENDING ATTESTATION
2/13/2023  Yakov BELLAMY July, DO, saw and evaluated the patient  I have discussed the patient with the resident/non-physician practitioner and agree with the resident's/non-physician practitioner's findings, Plan of Care, and MDM as documented in the resident's/non-physician practitioner's note, except where noted  All available labs and Radiology studies were reviewed  I was present for key portions of any procedure(s) performed by the resident/non-physician practitioner and I was immediately available to provide assistance  At this point I agree with the current assessment done in the Emergency Department  I have conducted an independent evaluation of this patient a history and physical is as follows:    ED Course         Critical Care Time  Procedures    47-year-old male recently diagnosed in January with testicular cancer presents to the ED with fevers up to 103, rigors and outpatient labs showing neutropenia  Patient sent to the ED by his oncologist   He did receive Neulasta prior to coming to the ED  Patient has had no other infectious symptoms  On exam he is awake and alert in no acute distress  Heart is tachycardic without murmur  Lungs are clear  Abdomen is soft and nontender  Skin is warm and dry without rashes  Secondary to his severe neutropenia, fevers and rigors suspect sepsis    Will do septic work-up, give a dose of broad-based antibiotic and admit for further work-up

## 2023-02-13 NOTE — PROGRESS NOTES
Received VM from pt's wife:  Veronique Nj, this is Alisa Cueva calling  It is around 12:10 on Monday  I did leave a message for Lian Ball and just waiting for a call back  Shanthi Zuluaga seems to be having another reaction to the bleomycin  He's having chills and Breakers and the stump is 99  So I was just trying to get in touch with somebody  If you could call us back when you get this message, please, [phone]  Thanks  Bye  Noted Lian Ball, RN, has returned call to Ilia after reviewing with Dr Indira Rachel  Outreach to pt's wife, Ilia  Pt now with temp of 103 that after removing blankets went to 102 9   650mg of tylenol prior to Bleomycin and again at 12noon  Outreach to [place]  Per Laura Mcmillan PA-C, ER ana advised  Informed Candis and informed her that detailed notes were included regarding neutropenia in transfer order  Wife expressing concern over needing to go to ER again  Explained that the concern with his fevers this time is that he is also neutropenic which makes him at increased risk for infection, making the fevers more concerning  She inquired about changes to his regimen afer the second week of having to go to ER after Bleomycin and assured her I would reach out to Dr Indira Rachel    Agreeable to ER evaluation

## 2023-02-13 NOTE — TELEPHONE ENCOUNTER
Received vm from patient spouse, "Hi, this message is for Jayy Pace  This is Carrington Sonu calling  If you could please give me a call at [phone]  I'm calling in regard to my , Eusebio Hess  He is having a reaction again on after his bleomycin  It is 1218  Thanks  Bye "    Reviewed with Dr Kyle Parsons what patient had received on 2/13/23 at infusion treatment appointment  Per Dr Kyle Parsons, chills are an expected symptom to be experiencing  Reviewed to monitor and tyelnol PRN patient to reach out to our office if fever continues and or worsens  Patient spouse verbalized understanding  Patient spouse stated that patient temperature was 99 degrees and PRN tylenol was given around 12 noon

## 2023-02-13 NOTE — TELEPHONE ENCOUNTER
vm received from patient spouse, "Kathy Blanc, this is Rene Parikh calling  If you could please give us a call back at [phone]  Leonel Saunders is having chills right now  I'm he just had his treatment again today  Thank you again  Rene Parikh calling for Darrell Santos [phone]  Thanks bye "     Patient received bleomycin, benadryl 25mg IVPB and Tylenol 650mg PO at 0855am on 2/13/23   Patient experiencing Chills currently at 1200pm

## 2023-02-13 NOTE — ED PROVIDER NOTES
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 presenting to the ED today for concerns of neutropenic fever  Patient had rigors earlier today with a fever Tmax of 103  Received Tylenol prior to arrival here  Discussed with his oncologist as well as nurse line at home prior to coming in and after they reviewed his CBC they were concerned about neutropenic fever  Patient says that this happened to him last week however at that time his CBC was within normal limits and he initially presented to the hospital and had a full work-up and there were no concerns for neutropenic fever at that time  Currently patient denies any pain  Denies any nausea  No shortness of breath or chest pain  No urinary symptoms  States that besides the rigors otherwise was feeling well today before and after  He did get Neupogen after the CBC that was drawn on Saturday  Prior to Admission Medications   Prescriptions Last Dose Informant Patient Reported? Taking?    Ascorbic Acid (vitamin C) 100 MG tablet Not Taking  Yes No   Sig: Take 100 mg by mouth daily   Patient not taking: Reported on 2/9/2023   Elderberry 500 MG CAPS Not Taking  Yes No   Sig: Take by mouth in the morning   Patient not taking: Reported on 2/9/2023   HYDROcodone-acetaminophen (Norco) 5-325 mg per tablet Not Taking  No No   Sig: Take 1 tablet by mouth every 6 (six) hours as needed for pain Max Daily Amount: 4 tablets   Patient not taking: Reported on 1/20/2023   loratadine (CLARITIN) 10 mg tablet   Yes Yes   Sig: Take 10 mg by mouth daily   ondansetron (ZOFRAN-ODT) 8 mg disintegrating tablet   No Yes   Sig: Take 1 tablet (8 mg total) by mouth every 8 (eight) hours as needed for nausea or vomiting prochlorperazine (COMPAZINE) 10 mg tablet   No Yes   Sig: Take 1 tablet (10 mg total) by mouth every 6 (six) hours as needed for nausea or vomiting   triamcinolone (KENALOG) 0 1 % cream   Yes Yes   zinc gluconate 50 mg tablet Not Taking  Yes No   Sig: Take 50 mg by mouth daily   Patient not taking: Reported on 2/9/2023      Facility-Administered Medications: None       Past Medical History:   Diagnosis Date   • Allergic 1/1/2000    seasonal allergies   • Impacted cerumen    • Lymphadenopathy    • Obesity, unspecified    • Seasonal allergies    • Seminoma of testis ([facility] [address] )    • Testicular cancer ([facility] [address] ) 01/17/2023   • Torn meniscus     right knee- surgical repair today 7/19/2021   • Wears glasses        Past Surgical History:   Procedure Laterality Date   • FACIAL/NECK BIOPSY Left 1/11/2023    Procedure: EXCISION OF LEFT NECK LYMPH NODE WITH LYMPHOMA PROTOCOL;  Surgeon: Zulema Joseph MD;  Location: [facility];  Service: Surgical Oncology   • FL GUIDED CENTRAL VENOUS ACCESS DEVICE INSERTION  1/23/2023   • HEMORROIDECTOMY     • KNEE SURGERY  7/19/2021   • PA ARTHRS KNE SURG W/MENISCECTOMY MED/LAT W/SHVG Right 07/19/2021    Procedure: ARTHROSCOPY KNEE, partial lateral meniscectomy;  Surgeon: Lucien Merritt MD;  Location: [facility];  Service: Orthopedics   • TUNNELED VENOUS PORT PLACEMENT N/A 1/23/2023    Procedure: INSERTION VENOUS PORT (PORT-A-CATH); Surgeon: Zulema Joseph MD;  Location: [facility];  Service: Surgical Oncology       Family History   Problem Relation Age of Onset   • Cancer Mother         Breast Cancer   • Breast cancer Mother    • Breast cancer additional onset Mother    • Cancer Father         Skin carcenoma   • Skin cancer Father    • Breast cancer additional onset Sister    • Cancer Sister         Breast Cancer and Skin carcenoma   • Skin cancer Brother      I have reviewed and agree with the history as documented      E-Cigarette/Vaping   • E-Cigarette Use Never User      E-Cigarette/Vaping Substances   • Nicotine No    • THC No    • CBD No    • Flavoring No    • Other No    • Unknown No      Social History     Tobacco Use   • Smoking status: Never   • Smokeless tobacco: Never   Vaping Use   • Vaping Use: Never used   Substance Use Topics   • Alcohol use: Not Currently     Alcohol/week: 3 0 standard drinks     Types: 1 Glasses of wine, 1 Cans of beer, 1 Shots of liquor per week     Comment: 1-2 weekly   • Drug use: Never        Review of Systems   Constitutional: Positive for fever  HENT: Negative for hearing loss  Eyes: Negative for visual disturbance  Respiratory: Negative for shortness of breath  Cardiovascular: Negative for chest pain  Gastrointestinal: Negative for abdominal pain, constipation, diarrhea, nausea and vomiting  Genitourinary: Negative for difficulty urinating  Musculoskeletal: Negative for myalgias  Skin: Negative for rash  Neurological: Negative for dizziness  Psychiatric/Behavioral: Negative for agitation  All other systems reviewed and are negative  Physical Exam  ED Triage Vitals [02/13/23 1545]   Temperature Pulse Respirations Blood Pressure SpO2   99 6 °F (37 6 °C) (!) 133 20 109/62 96 %      Temp Source Heart Rate Source Patient Position - Orthostatic VS BP Location FiO2 (%)   Oral Monitor Sitting Right arm --      Pain Score       No Pain             Orthostatic Vital Signs  Vitals:    02/13/23 1545 02/13/23 1823   BP: 109/62 115/56   Pulse: (!) 133 (!) 107   Patient Position - Orthostatic VS: Sitting Lying       Physical Exam  Vitals and nursing note reviewed  Constitutional:       General: He is not in acute distress  Appearance: Normal appearance  He is not ill-appearing  HENT:      Head: Normocephalic and atraumatic  Right Ear: External ear normal       Left Ear: External ear normal       Nose: Nose normal  No congestion  Mouth/Throat:      Mouth: Mucous membranes are moist       Pharynx: Oropharynx is clear   No oropharyngeal exudate  Eyes:      General:         Right eye: No discharge  Left eye: No discharge  Extraocular Movements: Extraocular movements intact  Conjunctiva/sclera: Conjunctivae normal       Pupils: Pupils are equal, round, and reactive to light  Cardiovascular:      Rate and Rhythm: Normal rate and regular rhythm  Pulses: Normal pulses  Heart sounds: Normal heart sounds  Pulmonary:      Effort: Pulmonary effort is normal  No respiratory distress  Breath sounds: Normal breath sounds  No wheezing  Abdominal:      General: Abdomen is flat  Bowel sounds are normal  There is no distension  Palpations: Abdomen is soft  Tenderness: There is no abdominal tenderness  Musculoskeletal:         General: No swelling  Normal range of motion  Cervical back: Normal range of motion  No rigidity  Skin:     General: Skin is warm and dry  Capillary Refill: Capillary refill takes less than 2 seconds  Neurological:      General: No focal deficit present  Mental Status: He is alert and oriented to person, place, and time  Mental status is at baseline  Cranial Nerves: No cranial nerve deficit  Motor: No weakness        Gait: Gait normal    Psychiatric:         Mood and Affect: Mood normal          Behavior: Behavior normal          ED Medications  Medications   sodium chloride 0 9 % bolus 1,000 mL (1,000 mL Intravenous New Bag 2/13/23 1647)   piperacillin-tazobactam (ZOSYN) IVPB 3 375 g (3 375 g Intravenous New Bag 2/13/23 1836)       Diagnostic Studies  Results Reviewed     Procedure Component Value Units Date/Time    FLU/RSV/COVID - if FLU/RSV clinically relevant [[id number]]  (Normal) Collected: 02/13/23 1820    Lab Status: Final result Specimen: Nares from Nasopharyngeal Swab Updated: 02/13/23 1920     SARS-CoV-2 Negative     INFLUENZA A PCR Negative     INFLUENZA B PCR Negative     RSV PCR Negative    Narrative:      FOR PEDIATRIC PATIENTS - copy/paste COVID Guidelines URL to browser: [email or web address]    SARS-CoV-2 assay is a Nucleic Acid Amplification assay intended for the  qualitative detection of nucleic acid from SARS-CoV-2 in nasopharyngeal  swabs  Results are for the presumptive identification of SARS-CoV-2 RNA  Positive results are indicative of infection with SARS-CoV-2, the virus  causing COVID-19, but do not rule out bacterial infection or co-infection  with other viruses  Laboratories within the [place] and its  territories are required to report all positive results to the appropriate  public health authorities  Negative results do not preclude SARS-CoV-2  infection and should not be used as the sole basis for treatment or other  patient management decisions  Negative results must be combined with  clinical observations, patient history, and epidemiological information  This test has not been FDA cleared or approved  This test has been authorized by FDA under an Emergency Use Authorization  (EUA)  This test is only authorized for the duration of time the  declaration that circumstances exist justifying the authorization of the  emergency use of an in vitro diagnostic tests for detection of SARS-CoV-2  virus and/or diagnosis of COVID-19 infection under section 564(b)(1) of  the Act, 21 U  S C  445MRZ-5(G)(9), unless the authorization is terminated  or revoked sooner  The test has been validated but independent review by FDA  and CLIA is pending  Test performed using [employer] Gene[employer]pert: This RT-PCR assay targets N2,  a region unique to SARS-CoV-2  A conserved region in the E-gene was chosen  for pan-Sarbecovirus detection which includes SARS-CoV-2  According to CMS-2020-01-R, this platform meets the definition of high-throughput technology  Lactic acid 2 Hours [[id number]] Collected: 02/13/23 1841    Lab Status:  In process Specimen: Blood from Hand, Right Updated: 02/13/23 1901    HS Troponin I 2hr [[id number]] Collected: 02/13/23 1841    Lab Status:  In process Specimen: Blood from Hand, Right Updated: 02/13/23 1904    UA w Reflex to Microscopic w Reflex to Culture [[id number]]  (Abnormal) Collected: 02/13/23 1821    Lab Status: Final result Specimen: Urine, Clean Catch Updated: 02/13/23 1853     Color, UA Yellow     Clarity, UA Clear     Specific [place], UA 1 020     pH, UA 5 5     Leukocytes, UA Negative     Nitrite, UA Negative     Protein, UA Negative mg/dl      Glucose, UA Negative mg/dl      Ketones, UA Trace mg/dl      Urobilinogen, UA 0 2 E U /dl      Bilirubin, UA Negative     Occult Blood, UA Negative    HS Troponin I 4hr [[id number]]     Lab Status: No result Specimen: Blood     Comprehensive metabolic panel [[id number]]  (Abnormal) Collected: 02/13/23 1644    Lab Status: Final result Specimen: Blood from Arm, Right Updated: 02/13/23 1808     Sodium 137 mmol/L      Potassium 3 3 mmol/L      Chloride 104 mmol/L      CO2 23 mmol/L      ANION GAP 10 mmol/L      BUN 21 mg/dL      Creatinine 1 17 mg/dL      Glucose 112 mg/dL      Calcium 9 2 mg/dL      AST 29 U/L      ALT 21 U/L      Alkaline Phosphatase 94 U/L      Total Protein 7 3 g/dL      Albumin 4 0 g/dL      Total Bilirubin 0 59 mg/dL      eGFR 70 ml/min/1 73sq m     Narrative:      Ludy guidelines for Chronic Kidney Disease (CKD):   •  Stage 1 with normal or high GFR (GFR > 90 mL/min/1 73 square meters)  •  Stage 2 Mild CKD (GFR = 60-89 mL/min/1 73 square meters)  •  Stage 3A Moderate CKD (GFR = 45-59 mL/min/1 73 square meters)  •  Stage 3B Moderate CKD (GFR = 30-44 mL/min/1 73 square meters)  •  Stage 4 Severe CKD (GFR = 15-29 mL/min/1 73 square meters)  •  Stage 5 End Stage CKD (GFR <15 mL/min/1 73 square meters)  Note: GFR calculation is accurate only with a steady state creatinine    Lactic acid [[id number]]  (Abnormal) Collected: 02/13/23 1644    Lab Status: Final result Specimen: Blood from Arm, Right Updated: 02/13/23 1806     LACTIC ACID 2 1 mmol/L     Narrative:      Result may be elevated if tourniquet was used during collection  Procalcitonin [[id number]]  (Abnormal) Collected: 02/13/23 1644    Lab Status: Final result Specimen: Blood from Arm, Right Updated: 02/13/23 1734     Procalcitonin 1 73 ng/ml     HS Troponin 0hr (reflex protocol) [[id number]]  (Normal) Collected: 02/13/23 1644    Lab Status: Final result Specimen: Blood from Arm, Right Updated: 02/13/23 1732     hs TnI 0hr 7 ng/L     CBC and differential [[id number]]  (Abnormal) Collected: 02/13/23 1644    Lab Status: Final result Specimen: Blood from Arm, Right Updated: 02/13/23 1725     WBC 6 91 Thousand/uL      RBC 4 32 Million/uL      Hemoglobin 13 8 g/dL      Hematocrit 38 5 %      MCV 89 fL      MCH 31 9 pg      MCHC 35 8 g/dL      RDW 11 4 %      MPV 9 9 fL      Platelets 70 Thousands/uL      nRBC 1 /100 WBCs      Neutrophils Relative 56 %      Immat GRANS % 16 %      Lymphocytes Relative 5 %      Monocytes Relative 22 %      Eosinophils Relative 1 %      Basophils Relative 0 %      Neutrophils Absolute 3 89 Thousands/µL      Immature Grans Absolute >0 50 Thousand/uL      Lymphocytes Absolute 0 33 Thousands/µL      Monocytes Absolute 1 53 Thousand/µL      Eosinophils Absolute 0 05 Thousand/µL      Basophils Absolute 0 02 Thousands/µL     Protime-INR [[id number]]  (Normal) Collected: 02/13/23 1644    Lab Status: Final result Specimen: Blood from Arm, Right Updated: 02/13/23 1718     Protime 13 9 seconds      INR 1 07    APTT [[id number]]  (Normal) Collected: 02/13/23 1644    Lab Status: Final result Specimen: Blood from Arm, Right Updated: 02/13/23 1718     PTT 28 seconds     Blood culture #1 [[id number]] Collected: 02/13/23 1703    Lab Status: In process Specimen: Blood from Hand, Left Updated: 02/13/23 1707    Blood culture #2 [[id number]] Collected: 02/13/23 1644    Lab Status:  In process Specimen: Blood from Arm, Right Updated: 02/13/23 1654                 XR chest 1 view portable   ED Interpretation by Jonah Calabrese MD (02/13 1722)   No acute cardiopulmonary disease as read by me  Port Present  Procedures  Procedures      ED Course  ED Course as of 02/13/23 1931 Mon Feb 13, 2023   1736 Procalcitonin(!): 1 73  Given his neutropenia and elevated procal, concern for infection  Patient already given dose of Zosyn   1808 LACTIC ACID(!!): 2 1  Patient getting 1 L fluids at this time  SBIRT 20yo+    Flowsheet Row Most Recent Value   SBIRT (23 yo +)    In order to provide better care to our patients, we are screening all of our patients for alcohol and drug use  Would it be okay to ask you these screening questions? Unable to answer at this time Filed at: 02/13/2023 [address] Making  70-year-old male with history significant for seminoma that is being treated with chemotherapy as an outpatient presenting to the ED today with concern secondary to neutropenic fevers  At this time no clear source given the patient otherwise unremarkable physical exam   Will do broad work-up at this time to evaluate  We will start empiric treatment with first dose of Zosyn here  Broad work-up to include blood cultures, urine culture, chest x-ray, flu COVID and RSV swab  We will also include CBC, CMP, procalcitonin, lactic  Patient with elevated procalcitonin but so far otherwise unremarkable exam   Blood culture still pending  Was given 1 L normal saline bolus for his tachycardia initially as well as for his lactic of 2 1  Case was discussed with [facility]'s internal medicine and patient was admitted to their service in stable condition  Neutropenic fever ([address] ): acute illness or injury  Amount and/or Complexity of Data Reviewed  Labs: ordered  Decision-making details documented in ED Course  Radiology: ordered and independent interpretation performed        Risk  Decision regarding hospitalization  Disposition  Final diagnoses:   Neutropenic fever ([address] )     Time reflects when diagnosis was documented in both MDM as applicable and the Disposition within this note     Time User Action Codes Description Comment    2/13/2023  6:32 PM Mariana Sharma Add [D70 9,  R50 81] Neutropenic fever [facility])       ED Disposition     ED Disposition   Admit    Condition   Stable    Date/Time   Mon Feb 13, 2023  6:32 PM    Comment   Case was discussed with Dr Jeaneth Weaver and the patient's admission status was agreed to be Admission Status: inpatient status to the service of Dr Jeaneth Weaver   Follow-up Information    None         Patient's Medications   Discharge Prescriptions    No medications on file     No discharge procedures on file  PDMP Review       Value Time User    PDMP Reviewed  Yes 1/11/2023  8:29 AM Elly Sue PA-C           ED Provider  Attending physically available and evaluated Tiffanie De Leon  MIA managed the patient along with the ED Attending      Electronically Signed by         Brenda Stokes MD  02/13/23 5652

## 2023-02-13 NOTE — ED NOTES
Tylenol 650mg, zofran 8mg given at 1200 prior to arrival       Jak Oliveira, [address] [place] [address]  02/13/23 1982

## 2023-02-14 DIAGNOSIS — D70.1 CHEMOTHERAPY-INDUCED NEUTROPENIA (HCC): ICD-10-CM

## 2023-02-14 DIAGNOSIS — T45.1X5A CHEMOTHERAPY-INDUCED NEUTROPENIA (HCC): ICD-10-CM

## 2023-02-14 DIAGNOSIS — C62.90 SEMINOMA (HCC): Primary | ICD-10-CM

## 2023-02-14 LAB
ALBUMIN SERPL BCP-MCNC: 3.3 G/DL (ref 3.5–5)
ALP SERPL-CCNC: 64 U/L (ref 34–104)
ALT SERPL W P-5'-P-CCNC: 16 U/L (ref 7–52)
ANION GAP SERPL CALCULATED.3IONS-SCNC: 6 MMOL/L (ref 4–13)
AST SERPL W P-5'-P-CCNC: 19 U/L (ref 13–39)
ATRIAL RATE: 88 BPM
BILIRUB SERPL-MCNC: 0.45 MG/DL (ref 0.2–1)
BUN SERPL-MCNC: 18 MG/DL (ref 5–25)
CALCIUM ALBUM COR SERPL-MCNC: 8.9 MG/DL (ref 8.3–10.1)
CALCIUM SERPL-MCNC: 8.3 MG/DL (ref 8.4–10.2)
CHLORIDE SERPL-SCNC: 106 MMOL/L (ref 96–108)
CO2 SERPL-SCNC: 26 MMOL/L (ref 21–32)
CREAT SERPL-MCNC: 1.18 MG/DL (ref 0.6–1.3)
ERYTHROCYTE [DISTWIDTH] IN BLOOD BY AUTOMATED COUNT: 11.8 % (ref 11.6–15.1)
GFR SERPL CREATININE-BSD FRML MDRD: 69 ML/MIN/1.73SQ M
GLUCOSE SERPL-MCNC: 94 MG/DL (ref 65–140)
HCT VFR BLD AUTO: 33.3 % (ref 36.5–49.3)
HGB BLD-MCNC: 11.6 G/DL (ref 12–17)
MCH RBC QN AUTO: 31.7 PG (ref 26.8–34.3)
MCHC RBC AUTO-ENTMCNC: 34.8 G/DL (ref 31.4–37.4)
MCV RBC AUTO: 91 FL (ref 82–98)
NRBC BLD AUTO-RTO: 0 /100 WBCS
P AXIS: 22 DEGREES
PLATELET # BLD AUTO: 50 THOUSANDS/UL (ref 149–390)
PMV BLD AUTO: 9.5 FL (ref 8.9–12.7)
POTASSIUM SERPL-SCNC: 3.8 MMOL/L (ref 3.5–5.3)
PR INTERVAL: 174 MS
PROCALCITONIN SERPL-MCNC: 5.21 NG/ML
PROT SERPL-MCNC: 6.1 G/DL (ref 6.4–8.4)
QRS AXIS: -4 DEGREES
QRSD INTERVAL: 100 MS
QT INTERVAL: 356 MS
QTC INTERVAL: 430 MS
RBC # BLD AUTO: 3.66 MILLION/UL (ref 3.88–5.62)
SODIUM SERPL-SCNC: 138 MMOL/L (ref 135–147)
T WAVE AXIS: 2 DEGREES
VENTRICULAR RATE: 88 BPM
WBC # BLD AUTO: 12.31 THOUSAND/UL (ref 4.31–10.16)

## 2023-02-14 RX ADMIN — CEFEPIME HYDROCHLORIDE 2000 MG: 2 INJECTION, POWDER, FOR SOLUTION INTRAVENOUS at 16:53

## 2023-02-14 RX ADMIN — ACETAMINOPHEN 325MG 650 MG: 325 TABLET ORAL at 14:26

## 2023-02-14 RX ADMIN — CEFEPIME HYDROCHLORIDE 2000 MG: 2 INJECTION, POWDER, FOR SOLUTION INTRAVENOUS at 00:32

## 2023-02-14 RX ADMIN — CEFEPIME HYDROCHLORIDE 2000 MG: 2 INJECTION, POWDER, FOR SOLUTION INTRAVENOUS at 23:51

## 2023-02-14 RX ADMIN — ACETAMINOPHEN 325MG 650 MG: 325 TABLET ORAL at 08:16

## 2023-02-14 RX ADMIN — ACETAMINOPHEN 325MG 650 MG: 325 TABLET ORAL at 20:40

## 2023-02-14 RX ADMIN — CEFEPIME HYDROCHLORIDE 2000 MG: 2 INJECTION, POWDER, FOR SOLUTION INTRAVENOUS at 08:17

## 2023-02-14 NOTE — UTILIZATION REVIEW
Initial Clinical Review    Admission: Date/Time/Statement:   Admission Orders (From admission, onward)     Ordered        02/13/23 1834  INPATIENT ADMISSION  Once                      Orders Placed This Encounter   Procedures   • INPATIENT ADMISSION     Standing Status:   Standing     Number of Occurrences:   1     Order Specific Question:   Level of Care     Answer:   Med Surg [16]     Order Specific Question:   Estimated length of stay     Answer:   More than 2 Midnights     Order Specific Question:   Certification     Answer:   I certify that inpatient services are medically necessary for this patient for a duration of greater than two midnights  See H&P and MD Progress Notes for additional information about the patient's course of treatment  ED Arrival Information     Expected   2/13/2023     Arrival   2/13/2023 15:36    Acuity   Emergent            Means of arrival   Walk-In    Escorted by   Self    Service   Hospitalist    Admission type   Emergency            Arrival complaint   Neutropenic fever [facility])           Chief Complaint   Patient presents with   • Fever - 9 weeks to 74 years     Patient reports fever 103 at home today, chills  Recent chemotherapy treatment  Referred to ED by oncology  Initial Presentation: 45 y o  male presents to the ED from home at the direction of oncology with fever of 103F and rigors after receiving chemo earlier in the day  He took Tylenol PTA  This occurred the week before but was found not to have neutropenic fever  He has no other symptoms on presentation  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, etopside, cisplastin q 21d  Received Neupogen on 2/11  In the ED Labs - leukopenia, elevated lactic acid, neutropenia, low K 3 3, elevated procal, negative troponins  Imaging shows no acute disease  He was treated with IV fluids, PO KDur 40 mEq, IV antibiotics    On admission exam he had mild frontal headache with tenderness to palpation, no meningismus or neck tenderness  He is ill-appearing  He is admitted to INPATIENT status with Neutropenic fever - IV antibiotics, vitals, blood cultures, neutropenic prec  Headache - mild - supportive care  Hypokalemia - replete and trend  Tachycardia - monitor for sepsis  Date: 2/14   Day 2:   Today is afebrile with Normal VS   WBC is 12 31, platelets 50, low calcium, procal up to 5 71  Remains on IV antibiotics  2/14 ID Consult - Fever in the setting of recent neutropenia, continue IV Cefepime, follow blood cultures, fever curve, consider pre-tx prior to next chemo  ED Triage Vitals [02/13/23 1545]   Temperature Pulse Respirations Blood Pressure SpO2   99 6 °F (37 6 °C) (!) 133 20 109/62 96 %      Temp Source Heart Rate Source Patient Position - Orthostatic VS BP Location FiO2 (%)   Oral Monitor Sitting Right arm --      Pain Score       No Pain          Wt Readings from Last 1 Encounters:   02/13/23 (!) 139 kg (306 lb 14 1 oz)     Additional Vital Signs:   02/14/23 1500 98 4 °F (36 9 °C) 77 19 123/71 83 96 % None (Room air) Lying   02/14/23 1150 97 6 °F (36 4 °C) 79 18 110/71 78 95 % None (Room air) Lying   02/14/23 0807 99 °F (37 2 °C) 88 20 111/69 85 95 % None (Room air) Sitting   02/14/23 0308 98 1 °F (36 7 °C) 83 16 100/66 71 93 % -- Lying   02/13/23 2239 99 °F (37 2 °C) 98 16 122/71 85 98 % None (Room air) Sitting   02/13/23 2204 100 2 °F (37 9 °C) 87 16 113/59 -- 97 % None (Room air) Lying   02/13/23 1823 -- 107 Abnormal  18 115/56 -- 99 % None (Room air) Lying      Pertinent Labs/Diagnostic Test Results:       XR chest 1 view portable   ED Interpretation by Karely Esquivel MD (02/13 1722)   No acute cardiopulmonary disease as read by me  Port Present  Final Result by Abel Lunsford MD (43/61 0795)      No active pulmonary disease                    Workstation performed: [id number]           Results from last 7 days   Lab Units 02/13/23 1820   SARS-COV-2  Negative     Results from last 7 days   Lab Units 02/14/23  0543 02/13/23  1644 02/11/23  0750   WBC Thousand/uL 12 31* 6 91 1 32*   HEMOGLOBIN g/dL 11 6* 13 8 13 4   HEMATOCRIT % 33 3* 38 5 37 5   PLATELETS Thousands/uL 50* 70* 54*   NEUTROS ABS Thousands/µL  --  3 89  --    BANDS PCT %  --   --  1         Results from last 7 days   Lab Units 02/14/23  0543 02/13/23  1644   SODIUM mmol/L 138 137   POTASSIUM mmol/L 3 8 3 3*   CHLORIDE mmol/L 106 104   CO2 mmol/L 26 23   ANION GAP mmol/L 6 10   BUN mg/dL 18 21   CREATININE mg/dL 1 18 1 17   EGFR ml/min/1 73sq m 69 70   CALCIUM mg/dL 8 3* 9 2     Results from last 7 days   Lab Units 02/14/23  0543 02/13/23  1644   AST U/L 19 29   ALT U/L 16 21   ALK PHOS U/L 64 94   TOTAL PROTEIN g/dL 6 1* 7 3   ALBUMIN g/dL 3 3* 4 0   TOTAL BILIRUBIN mg/dL 0 45 0 59         Results from last 7 days   Lab Units 02/14/23  0543 02/13/23  1644   GLUCOSE RANDOM mg/dL 94 112         Results from last 7 days   Lab Units 02/13/23  2157 02/13/23  1841 02/13/23  1644   HS TNI 0HR ng/L  --   --  7   HS TNI 2HR ng/L  --  7  --    HSTNI D2 ng/L  --  0  --    HS TNI 4HR ng/L 6  --   --    HSTNI D4 ng/L -1  --   --          Results from last 7 days   Lab Units 02/13/23  1644   PROTIME seconds 13 9   INR  1 07   PTT seconds 28         Results from last 7 days   Lab Units 02/14/23  0543 02/13/23  1644   PROCALCITONIN ng/ml 5 21* 1 73*     Results from last 7 days   Lab Units 02/13/23  1841 02/13/23  1644   LACTIC ACID mmol/L 1 4 2 1*     Results from last 7 days   Lab Units 02/13/23  1821   CLARITY UA  Clear   COLOR UA  Yellow   SPEC GRAV UA  1 020   PH UA  5 5   GLUCOSE UA mg/dl Negative   KETONES UA mg/dl Trace*   BLOOD UA  Negative   PROTEIN UA mg/dl Negative   NITRITE UA  Negative   BILIRUBIN UA  Negative   UROBILINOGEN UA E U /dl 0 2   LEUKOCYTES UA  Negative     Results from last 7 days   Lab Units 02/13/23  1820   INFLUENZA A PCR  Negative   INFLUENZA B PCR  Negative   RSV PCR  Negative     Results from last 7 days   Lab Units 02/13/23  1703 02/13/23  1644   BLOOD CULTURE  Received in Microbiology Lab  Culture in Progress  Received in Microbiology Lab  Culture in Progress  ED Treatment:   Medication Administration from 02/13/2023 1506 to 02/13/2023 2248       Date/Time Order Dose Route Action     02/13/2023 1647 EST sodium chloride 0 9 % bolus 1,000 mL 1,000 mL Intravenous New Bag     02/13/2023 1836 EST piperacillin-tazobactam (ZOSYN) IVPB 3 375 g 3 375 g Intravenous New Bag     02/13/2023 2142 EST potassium chloride (K-DUR,KLOR-CON) CR tablet 40 mEq 40 mEq Oral Given        Past Medical History:   Diagnosis Date   • Allergic 1/1/2000    seasonal allergies   • Impacted cerumen    • Lymphadenopathy    • Obesity, unspecified    • Seasonal allergies    • Seminoma of testis ([facility] )    • Testicular cancer ([facility] ) 01/17/2023   • Torn meniscus     right knee- surgical repair today 7/19/2021   • Wears glasses      Present on Admission:  • Seminoma ([facility] )  • Obesity, Class II, BMI 35-39 9      Admitting Diagnosis: Neutropenic fever ([facility] ) [D70 9, R50 81]  Age/Sex: 45 y o  male  Admission Orders:  Scheduled Medications:  cefepime, 2,000 mg, Intravenous, Q8H  loratadine, 10 mg, Oral, Daily      Continuous IV Infusions:     PRN Meds:  acetaminophen, 650 mg, Oral, Q6H PRN - x 1 2/13, x 2 2/14  ondansetron, 4 mg, Intravenous, Q6H PRN - x 1 2/13    VS q 4 hr   Neutropenic prec  IP CONSULT TO INFECTIOUS DISEASES    Network Utilization Review Department  ATTENTION: Please call with any questions or concerns to [phone] and carefully listen to the prompts so that you are directed to the right person  All voicemails are confidential   Vashti Saleem all requests for admission clinical reviews, approved or denied determinations and any other requests to dedicated fax number below belonging to the campus where the patient is receiving treatment   List of dedicated fax numbers for the Facilities:  FACILITY NAME UR FAX NUMBER   ADMISSION DENIALS (Administrative/Medical Necessity) [address] [facility] (Maternity/NICU/Pediatrics) Sirena Hurd [address] [facility] [place] [address] [phone]   [address] [place] [id number] [place] [address] [address] [facility] 310 [facility] [address] [phone]

## 2023-02-14 NOTE — ASSESSMENT & PLAN NOTE
Patient recently diagnosed with neutropenia with ANC of 80 now improved  -Given prehospital fevers and mild tachycardia in emergency room will continue to monitor for recurrence of neutropenic fever  -cxr wet read w/o acute cardiopulmonary disease, ua bland covid/flu/rsv negative  -Received IV Zosyn in the ED we will continue with IV cefepime RTC  -Monitor CBC vital signs blood cultures procalcitonin consult ID  -neutropenic precautions for now but if this remains improved can likely d/c

## 2023-02-14 NOTE — PLAN OF CARE
Problem: PAIN - ADULT  Goal: Verbalizes/displays adequate comfort level or baseline comfort level  Description: Interventions:  - Encourage patient to monitor pain and request assistance  - Assess pain using appropriate pain scale  - Administer analgesics based on type and severity of pain and evaluate response  - Implement non-pharmacological measures as appropriate and evaluate response  - Consider cultural and social influences on pain and pain management  - Notify physician/advanced practitioner if interventions unsuccessful or patient reports new pain  Outcome: Progressing     Problem: INFECTION - ADULT  Goal: Absence or prevention of progression during hospitalization  Description: INTERVENTIONS:  - Assess and monitor for signs and symptoms of infection  - Monitor lab/diagnostic results  - Monitor all insertion sites, i e  indwelling lines, tubes, and drains  - Monitor endotracheal if appropriate and nasal secretions for changes in amount and color  - [place] appropriate cooling/warming therapies per order  - Administer medications as ordered  - Instruct and encourage patient and family to use good hand hygiene technique  - Identify and instruct in appropriate isolation precautions for identified infection/condition  Outcome: Progressing  Goal: Absence of fever/infection during neutropenic period  Description: INTERVENTIONS:  - Monitor WBC    Outcome: Progressing     Problem: DISCHARGE PLANNING  Goal: Discharge to home or other facility with appropriate resources  Description: INTERVENTIONS:  - Identify barriers to discharge w/patient and caregiver  - Arrange for needed discharge resources and transportation as appropriate  - Identify discharge learning needs (meds, wound care, etc )  - Arrange for interpretive services to assist at discharge as needed  - Refer to Case Management Department for coordinating discharge planning if the patient needs post-hospital services based on physician/advanced practitioner order or complex needs related to functional status, cognitive ability, or social support system  Outcome: Progressing     Problem: Knowledge Deficit  Goal: Patient/family/caregiver demonstrates understanding of disease process, treatment plan, medications, and discharge instructions  Description: Complete learning assessment and assess knowledge base    Interventions:  - Provide teaching at level of understanding  - Provide teaching via preferred learning methods  Outcome: Progressing

## 2023-02-14 NOTE — ASSESSMENT & PLAN NOTE
Frontal nonradiating  Mild    No red flag s/sx and no meningismus or loss of vision  -supportive care

## 2023-02-14 NOTE — H&P
[address] [facility]  H&P- Vladimir Kidney 1970, 45 y o  male MRN: [id number]  Unit/Bed#: ED-10 Encounter: [id number]  Primary Care Provider: Sade Sharpe DO   Date and time admitted to hospital: 2/13/2023  3:58 PM    * Neutropenic fever ([address] )  Assessment & Plan  Patient recently diagnosed with neutropenia with ANC of 80 now improved  -Given prehospital fevers and mild tachycardia in emergency room will continue to monitor for recurrence of neutropenic fever  -cxr wet read w/o acute cardiopulmonary disease, ua bland covid/flu/rsv negative  -Received IV Zosyn in the ED we will continue with IV cefepime RTC  -Monitor CBC vital signs blood cultures procalcitonin consult ID  -neutropenic precautions for now but if this remains improved can likely d/c    Headache  Assessment & Plan  Frontal nonradiating  Mild  No red flag s/sx and no meningismus or loss of vision  -supportive care    Hypokalemia  Assessment & Plan  Mild replete and repeat BMP in a m  Tachycardia  Assessment & Plan  Mild sinus on clinical exam  Monitor for sepsis/sirs    [facility]  Assessment & Plan  Recently diagnosed starting on chemotx  On bleomycin, etopside, cisplastin q 21d    Obesity, Class II, BMI 35-39 9  Assessment & Plan  bmi 36 noted      VTE Prophylaxis: Enoxaparin (Lovenox)  / sequential compression device   Code Status: fc  POLST: There is no POLST form on file for this patient (pre-hospital)  Discussion with family:  Wife at bedside    Anticipated Length of Stay:  Patient will be admitted on an Inpatient basis with an anticipated length of stay of  Greater than 2 midnights  Justification for Hospital Stay: neutropenic fever    Total Time for Visit, including Counseling / Coordination of Care: 45 minutes  Greater than 50% of this total time spent on direct patient counseling and coordination of care      Chief Complaint:   fever    History of Present Illness:    Vladimir Downey is a 45 y o  male who presents with [facility] stage IV seminoma, obesity coming in the hospital for neutropenic fever  Patient is currently undergoing active chemotherapy with triple therapy crest cisplatin, etoposide, and bleomycin  He last had Neulasta the week prior on February 7, 2023  He been doing well until patient developed rigors chills fever with a Tmax of 103 3 on day of admission after his chemotherapy  He noted fatigue but otherwise no new chest pain shortness of breath sore throat cough congestion nausea vomiting diarrhea dysuria frequency urgency  No new rash or wounds  No meningismus but does have slight frontal headache which is nonradiating without any associated vision loss or other red flag s/sx  He had op cbc concerning for severe neutropenia w/anc 80  On repeat today this has improved but given concern for possible neutropenic fever admission was requested  Review of Systems:    Review of Systems   Constitutional: Positive for chills, fatigue and fever  Negative for appetite change  HENT: Negative for congestion and sore throat  Respiratory: Negative for cough and shortness of breath  Cardiovascular: Negative for chest pain  Gastrointestinal: Negative for abdominal pain, diarrhea, nausea and vomiting  Musculoskeletal: Negative for neck stiffness  Skin: Negative for rash  Neurological: Positive for headaches  Negative for light-headedness         Past Medical and Surgical History:     Past Medical History:   Diagnosis Date   • Allergic 1/1/2000    seasonal allergies   • Impacted cerumen    • Lymphadenopathy    • Obesity, unspecified    • Seasonal allergies    • Seminoma of testis ([facility] )    • Testicular cancer ([facility] ) 01/17/2023   • Torn meniscus     right knee- surgical repair today 7/19/2021   • Wears glasses        Past Surgical History:   Procedure Laterality Date   • FACIAL/NECK BIOPSY Left 1/11/2023    Procedure: EXCISION OF LEFT NECK LYMPH NODE WITH LYMPHOMA PROTOCOL;  Surgeon: Jessica Mcknight MD; Location: [facility];  Service: Surgical Oncology   • FL GUIDED CENTRAL VENOUS ACCESS DEVICE INSERTION  1/23/2023   • HEMORROIDECTOMY     • KNEE SURGERY  7/19/2021   • MI ARTHRS KNE SURG W/MENISCECTOMY MED/LAT W/SHVG Right 07/19/2021    Procedure: ARTHROSCOPY KNEE, partial lateral meniscectomy;  Surgeon: Sil Rashid MD;  Location: [facility];  Service: Orthopedics   • TUNNELED VENOUS PORT PLACEMENT N/A 1/23/2023    Procedure: INSERTION VENOUS PORT (PORT-A-CATH); Surgeon: Annita Miner MD;  Location: [facility];  Service: Surgical Oncology       Meds/Allergies:    Prior to Admission medications    Medication Sig Start Date End Date Taking? Authorizing Provider   loratadine (CLARITIN) 10 mg tablet Take 10 mg by mouth daily   Yes Historical Provider, MD   ondansetron (ZOFRAN-ODT) 8 mg disintegrating tablet Take 1 tablet (8 mg total) by mouth every 8 (eight) hours as needed for nausea or vomiting 2/9/23  Yes Lindsay Cintron DO   prochlorperazine (COMPAZINE) 10 mg tablet Take 1 tablet (10 mg total) by mouth every 6 (six) hours as needed for nausea or vomiting 2/7/23  Yes Doris Angel MD   triamcinolone (KENALOG) 0 1 % cream  12/11/22  Yes Historical Provider, MD   Ascorbic Acid (vitamin C) 100 MG tablet Take 100 mg by mouth daily  Patient not taking: Reported on 2/9/2023    Historical Provider, MD   Elderberry 500 MG CAPS Take by mouth in the morning  Patient not taking: Reported on 2/9/2023    Historical Provider, MD   HYDROcodone-acetaminophen (Norco) 5-325 mg per tablet Take 1 tablet by mouth every 6 (six) hours as needed for pain Max Daily Amount: 4 tablets  Patient not taking: Reported on 1/20/2023 1/6/23   Annita Miner MD   zinc gluconate 50 mg tablet Take 50 mg by mouth daily  Patient not taking: Reported on 2/9/2023    Historical Provider, MD     I have reviewed home medications with patient personally  Allergies:    Allergies   Allergen Reactions   • Horace Curling - Food Allergy Vomiting Social History:     Marital Status: /Civil Union   Occupation:   Patient Pre-hospital Living Situation:   Patient Pre-hospital Level of Mobility:   Patient Pre-hospital Diet Restrictions:   Substance Use History:   Social History     Substance and Sexual Activity   Alcohol Use Not Currently   • Alcohol/week: 3 0 standard drinks   • Types: 1 Glasses of wine, 1 Cans of beer, 1 Shots of liquor per week    Comment: 1-2 weekly     Social History     Tobacco Use   Smoking Status Never   Smokeless Tobacco Never     Social History     Substance and Sexual Activity   Drug Use Never       Family History:        Family History   Problem Relation Age of Onset   • Cancer Mother         Breast Cancer   • Breast cancer Mother    • Breast cancer additional onset Mother    • Cancer Father         Skin carcenoma   • Skin cancer Father    • Breast cancer additional onset Sister    • Cancer Sister         Breast Cancer and Skin carcenoma   • Skin cancer Brother        Physical Exam:     Vitals:   Blood Pressure: 113/59 (02/13/23 2204)  Pulse: 87 (02/13/23 2204)  Temperature: 100 2 °F (37 9 °C) (02/13/23 2204)  Temp Source: Oral (02/13/23 2204)  Respirations: 16 (02/13/23 2204)  Height: 6' 3" (190 5 cm) (02/13/23 1545)  Weight - Scale: 131 kg (289 lb) (02/13/23 1545)  SpO2: 97 % (02/13/23 2204)    Physical Exam  Vitals reviewed  Constitutional:       General: He is not in acute distress  Appearance: He is ill-appearing  He is not toxic-appearing or diaphoretic  HENT:      Head: Normocephalic and atraumatic  Right Ear: External ear normal       Left Ear: External ear normal       Nose: Nose normal       Mouth/Throat:      Mouth: Mucous membranes are dry  Pharynx: No posterior oropharyngeal erythema  Eyes:      Extraocular Movements: Extraocular movements intact  Cardiovascular:      Rate and Rhythm: Normal rate and regular rhythm  Heart sounds: No murmur heard  No friction rub  No gallop  Pulmonary:      Effort: No respiratory distress  Breath sounds: No wheezing, rhonchi or rales  Abdominal:      General: There is no distension  Palpations: Abdomen is soft  There is no mass  Tenderness: There is no abdominal tenderness  There is no guarding or rebound  Hernia: No hernia is present  Musculoskeletal:      Cervical back: Normal range of motion  Right lower leg: No edema  Left lower leg: No edema  Comments: Mild frontal headache with tenderness to palpation no meningismus or neck tenderness to palpation   Skin:     General: Skin is dry  Findings: No erythema or rash  Neurological:      Mental Status: He is alert  Psychiatric:         Mood and Affect: Mood normal            Additional Data:     Lab Results: I have personally reviewed pertinent reports        Results from last 7 days   Lab Units 02/13/23  1644 02/11/23  0750   WBC Thousand/uL 6 91 1 32*   HEMOGLOBIN g/dL 13 8 13 4   HEMATOCRIT % 38 5 37 5   PLATELETS Thousands/uL 70* 54*   BANDS PCT %  --  1   NEUTROS PCT % 56  --    LYMPHS PCT % 5*  --    LYMPHO PCT %  --  73*   MONOS PCT % 22*  --    MONO PCT %  --  7   EOS PCT % 1 3     Results from last 7 days   Lab Units 02/13/23  1644   SODIUM mmol/L 137   POTASSIUM mmol/L 3 3*   CHLORIDE mmol/L 104   CO2 mmol/L 23   BUN mg/dL 21   CREATININE mg/dL 1 17   ANION GAP mmol/L 10   CALCIUM mg/dL 9 2   ALBUMIN g/dL 4 0   TOTAL BILIRUBIN mg/dL 0 59   ALK PHOS U/L 94   ALT U/L 21   AST U/L 29   GLUCOSE RANDOM mg/dL 112     Results from last 7 days   Lab Units 02/13/23  1644   INR  1 07             Results from last 7 days   Lab Units 02/13/23  1841 02/13/23  1644   LACTIC ACID mmol/L 1 4 2 1*   PROCALCITONIN ng/ml  --  1 73*       Imaging: I have personally reviewed pertinent films in PACS and No acute cardiopulmonary disease no widened mediastinum    XR chest 1 view portable   ED Interpretation by Stephon Hall MD (02/13 1722)   No acute cardiopulmonary disease as read by me  Port Present  EKG, Pathology, and Other Studies Reviewed on Admission:   · EKG: Normal sinus rhythm    Allscripts / Epic Records Reviewed: Yes     ** Please Note: This note has been constructed using a voice recognition system   **

## 2023-02-15 ENCOUNTER — TELEPHONE (OUTPATIENT)
Dept: HEMATOLOGY ONCOLOGY | Facility: CLINIC | Age: 53
End: 2023-02-15

## 2023-02-15 ENCOUNTER — PATIENT OUTREACH (OUTPATIENT)
Dept: HEMATOLOGY ONCOLOGY | Facility: CLINIC | Age: 53
End: 2023-02-15

## 2023-02-15 VITALS
WEIGHT: 306.88 LBS | TEMPERATURE: 97.7 F | HEIGHT: 75 IN | SYSTOLIC BLOOD PRESSURE: 127 MMHG | HEART RATE: 72 BPM | BODY MASS INDEX: 38.16 KG/M2 | OXYGEN SATURATION: 95 % | DIASTOLIC BLOOD PRESSURE: 60 MMHG | RESPIRATION RATE: 18 BRPM

## 2023-02-15 LAB
BASOPHILS # BLD MANUAL: 0 THOUSAND/UL (ref 0–0.1)
BASOPHILS NFR MAR MANUAL: 0 % (ref 0–1)
EOSINOPHIL # BLD MANUAL: 0.15 THOUSAND/UL (ref 0–0.4)
EOSINOPHIL NFR BLD MANUAL: 1 % (ref 0–6)
ERYTHROCYTE [DISTWIDTH] IN BLOOD BY AUTOMATED COUNT: 11.9 % (ref 11.6–15.1)
HCT VFR BLD AUTO: 33.9 % (ref 36.5–49.3)
HGB BLD-MCNC: 11.7 G/DL (ref 12–17)
LYMPHOCYTES # BLD AUTO: 1.67 THOUSAND/UL (ref 0.6–4.47)
LYMPHOCYTES # BLD AUTO: 11 % (ref 14–44)
MCH RBC QN AUTO: 31.8 PG (ref 26.8–34.3)
MCHC RBC AUTO-ENTMCNC: 34.5 G/DL (ref 31.4–37.4)
MCV RBC AUTO: 92 FL (ref 82–98)
METAMYELOCYTES NFR BLD MANUAL: 6 % (ref 0–1)
MONOCYTES # BLD AUTO: 1.98 THOUSAND/UL (ref 0–1.22)
MONOCYTES NFR BLD: 13 % (ref 4–12)
MYELOCYTES NFR BLD MANUAL: 4 % (ref 0–1)
NEUTROPHILS # BLD MANUAL: 9.59 THOUSAND/UL (ref 1.85–7.62)
NEUTS BAND NFR BLD MANUAL: 11 % (ref 0–8)
NEUTS SEG NFR BLD AUTO: 52 % (ref 43–75)
OVALOCYTES BLD QL SMEAR: PRESENT
PLATELET # BLD AUTO: 64 THOUSANDS/UL (ref 149–390)
PLATELET BLD QL SMEAR: ABNORMAL
PMV BLD AUTO: 9.9 FL (ref 8.9–12.7)
RBC # BLD AUTO: 3.68 MILLION/UL (ref 3.88–5.62)
VARIANT LYMPHS # BLD AUTO: 2 %
WBC # BLD AUTO: 15.22 THOUSAND/UL (ref 4.31–10.16)

## 2023-02-15 RX ADMIN — CEFEPIME HYDROCHLORIDE 2000 MG: 2 INJECTION, POWDER, FOR SOLUTION INTRAVENOUS at 08:01

## 2023-02-15 NOTE — PROGRESS NOTES
[facility]  Progress Note - Dirk Duran 1970, 45 y o  male MRN: [id number]  Unit/Bed#: E4 -01 Encounter: [id number]  Primary Care Provider: Razia Gaitan,    Date and time admitted to hospital: 2/13/2023  3:58 PM    Headache  Assessment & Plan  Frontal nonradiating  Mild  No red flag s/sx and no meningismus or loss of vision  -supportive care  - resolved    Hypokalemia  Assessment & Plan  Mild; replete and repeat BMP in a m  Tachycardia  Assessment & Plan  Mild sinus on clinical exam  Monitor for sepsis/sirs    Seminoma [facility])  Assessment & Plan  Recently diagnosed starting on chemotx  On bleomycin, etopside, cisplastin q 21d    * Neutropenic fever ([facility] [address] )  Assessment & Plan  Patient recently diagnosed with neutropenia with ANC of 80; now improved  -Given prehospital fevers and mild tachycardia in emergency room will continue to monitor for recurrence of neutropenic fever  -cxr wet read w/o acute cardiopulmonary disease, ua bland, covid/flu/rsv negative  -Received IV Zosyn in the ED; we will continue with IV cefepime RTC  -Monitor CBC vital signs blood cultures procalcitonin consult ID  -neutropenic precautions for now but if this remains improved can likely d/c  - likely 2/2 to chemo; check blood cx's tomorrow and consider d/c if stable and cultures negative      VTE Pharmacologic Prophylaxis:   Pharmacologic: Pharmacologic VTE Prophylaxis contraindicated due to pancytopenia  Mechanical VTE Prophylaxis in Place: Yes    Patient Centered Rounds: I have performed bedside rounds with nursing staff today  Discussions with Specialists or Other Care Team Provider: Oncology and ID    Education and Discussions with Family / Patient: Discussed treatment plan with family and patient who agree with current plan; encouraged to ask questions and participate  Time Spent for Care: 45 minutes    More than 50% of total time spent on counseling and coordination of care as described above     Current Length of Stay: 1 day(s)    Current Patient Status: Inpatient   Certification Statement: The patient will continue to require additional inpatient hospital stay due to neturopenic fever    Discharge Plan: TBD, likely 24 hours    Code Status: Level 1 - Full Code      Subjective:   No acute events overnight; monitor and d/c tomorrow is afebrile and bl cx's negative    Objective:     Vitals:   Temp (24hrs), Av 6 °F (37 °C), Min:97 6 °F (36 4 °C), Max:100 2 °F (37 9 °C)    Temp:  [97 6 °F (36 4 °C)-100 2 °F (37 9 °C)] 98 1 °F (36 7 °C)  HR:  [77-98] 77  Resp:  [16-20] 18  BP: (100-123)/(59-78) 121/78  SpO2:  [93 %-98 %] 98 %  Body mass index is 38 36 kg/m²  Input and Output Summary (last 24 hours): Intake/Output Summary (Last 24 hours) at 2023  Last data filed at 2023  Gross per 24 hour   Intake 1100 ml   Output --   Net 1100 ml       Physical Exam:     Physical Exam  Vitals and nursing note reviewed  Constitutional:       General: He is not in acute distress  Appearance: He is well-developed  HENT:      Head: Normocephalic and atraumatic  Eyes:      Conjunctiva/sclera: Conjunctivae normal    Cardiovascular:      Rate and Rhythm: Normal rate and regular rhythm  Heart sounds: No murmur heard  Pulmonary:      Effort: Pulmonary effort is normal  No respiratory distress  Breath sounds: Normal breath sounds  Abdominal:      Palpations: Abdomen is soft  Tenderness: There is no abdominal tenderness  Musculoskeletal:         General: No swelling  Cervical back: Neck supple  Skin:     General: Skin is warm and dry  Neurological:      Mental Status: He is alert     Psychiatric:         Mood and Affect: Mood normal            Additional Data:     Labs:    Results from last 7 days   Lab Units 23  0543 23  1644 23  0750   WBC Thousand/uL 12 31* 6 91 1 32*   HEMOGLOBIN g/dL 11 6* 13 8 13 4   HEMATOCRIT % 33 3* 38 5 37 5 PLATELETS Thousands/uL 50* 70* 54*   BANDS PCT %  --   --  1   NEUTROS PCT %  --  56  --    LYMPHS PCT %  --  5*  --    LYMPHO PCT %  --   --  73*   MONOS PCT %  --  22*  --    MONO PCT %  --   --  7   EOS PCT %  --  1 3     Results from last 7 days   Lab Units 02/14/23  0543   SODIUM mmol/L 138   POTASSIUM mmol/L 3 8   CHLORIDE mmol/L 106   CO2 mmol/L 26   BUN mg/dL 18   CREATININE mg/dL 1 18   ANION GAP mmol/L 6   CALCIUM mg/dL 8 3*   ALBUMIN g/dL 3 3*   TOTAL BILIRUBIN mg/dL 0 45   ALK PHOS U/L 64   ALT U/L 16   AST U/L 19   GLUCOSE RANDOM mg/dL 94     Results from last 7 days   Lab Units 02/13/23  1644   INR  1 07             Results from last 7 days   Lab Units 02/14/23  0543 02/13/23  1841 02/13/23  1644   LACTIC ACID mmol/L  --  1 4 2 1*   PROCALCITONIN ng/ml 5 21*  --  1 73*           * I Have Reviewed All Lab Data Listed Above  * Additional Pertinent Lab Tests Reviewed: All Labs Within Last 24 Hours Reviewed    Imaging:    Imaging Reports Reviewed Today Include: CXR  Imaging Personally Reviewed by Myself Includes:      Recent Cultures (last 7 days):     Results from last 7 days   Lab Units 02/13/23  1703 02/13/23  1644   BLOOD CULTURE  Received in Microbiology Lab  Culture in Progress  Received in Microbiology Lab  Culture in Progress         Last 24 Hours Medication List:   Current Facility-Administered Medications   Medication Dose Route Frequency Provider Last Rate   • acetaminophen  650 mg Oral Q6H PRN Alesia Fu PA-C     • cefepime  2,000 mg Intravenous Q8H Alesia Fu PA-C 2,000 mg (02/14/23 1653)   • loratadine  10 mg Oral Daily Alesia Fu PA-C     • ondansetron  4 mg Intravenous Q6H PRN Alesia Fu PA-C       Facility-Administered Medications Ordered in Other Encounters   Medication Dose Route Frequency Provider Last Rate   • alteplase  2 mg Solomon Ribera PRN Tricia Ordonez MD          Today, Patient Was Seen By: Darshana Alves MD    ** Please Note: Dictation voice to text software may have been used in the creation of this document   **

## 2023-02-15 NOTE — ASSESSMENT & PLAN NOTE
Frontal nonradiating  Mild    No red flag s/sx and no meningismus or loss of vision  -supportive care  - resolved

## 2023-02-15 NOTE — ASSESSMENT & PLAN NOTE
Patient recently diagnosed with neutropenia with ANC of 80; now improved  -Given prehospital fevers and mild tachycardia in emergency room will continue to monitor for recurrence of neutropenic fever  -cxr wet read w/o acute cardiopulmonary disease, ua bland, covid/flu/rsv negative  -Received IV Zosyn in the ED; we will continue with IV cefepime RTC  -Monitor CBC vital signs blood cultures procalcitonin consult ID  -neutropenic precautions for now but if this remains improved can likely d/c  - likely 2/2 to chemo; check blood cx's tomorrow and consider d/c if stable and cultures negative

## 2023-02-15 NOTE — PROGRESS NOTES
Progress Note - Infectious Disease   Vladimir Kidney 45 y o  male MRN: [id number]  Unit/Bed#: E4 -01 Encounter: [id number]      Impression/Plan:    1  Fever in the setting of recent neutropenia  The patient presented with fever after his chemotherapy infusion with Bleomycin 2/13  The patient was recently neutropenic with an [address] of 80 on 2/11, but he is not neutropenic at this time  He had a similar reaction with fever after his previous infusion of bleomycin on 2/6  Patient recently received Neulasta and white blood cell count is now elevated  He has no localizing symptoms of infection  Tmax was 100 2 in the ED but he is now afebrile  He had a frontal headache but no neck pain or stiffness, no photophobia or other evidence of CNS infection  Headache resolved  Chest x-ray without abnormalities, UA unremarkable, COVID/flu/RSV PCR negative, LFTs unremarkable  No signs of infection at his Port-A-Cath site, which was recently accessed for chemotherapy  Blood cultures no growth  Suspect this is all an infusion reaction related to chemotherapy              -stop Cefepime and monitor off antibiotics               -Follow-up blood cultures              -Monitor fever curve, white blood cell count, ANC              -Oncology follow up              -Consider pretreatment prior to next dose of chemotherapy     2   Stage IV seminoma  Recently started on chemotherapy with Bleomycin, etoposide, cisplatin  Complicated by brief neutropenia as above  Status post Neulasta 2/7 with improvement in white blood cell count, no leukocytosis      3   Leukocytosis  Brief, related to chemotherapy  [address] sonal was 80 on 2/11  Patient now with leukocytosis, likely resonse from Neulasta  -Monitor white blood cell count              -Follow-up cultures as above              -Oncology follow-up     4  Obesity    BMI 38     I have discussed the above management plan in detail with the primary service, the patient and his son at bedside  Okay for discharge from ID perspective  Antibiotics:  Cefepime day 2    Subjective:  Patient has no fever, chills, sweats; no nausea, vomiting, diarrhea; no cough, shortness of breath; no pain  No new symptoms  Tmax 99 9    Objective:  Vitals:  Temp:  [97 7 °F (36 5 °C)-99 9 °F (37 7 °C)] 97 7 °F (36 5 °C)  HR:  [72-79] 72  Resp:  [18-20] 18  BP: (110-127)/(60-78) 127/60  SpO2:  [94 %-98 %] 95 %  Temp (24hrs), Av 5 °F (36 9 °C), Min:97 7 °F (36 5 °C), Max:99 9 °F (37 7 °C)  Current: Temperature: 97 7 °F (36 5 °C)    Physical Exam:   General Appearance:  Alert, interactive, nontoxic, no acute distress  Throat: Oropharynx moist without lesions  Lungs:   Clear to auscultation bilaterally; no wheezes, rhonchi or rales; respirations unlabored   Heart:  RRR; no murmur, rub or gallop   Abdomen:   Soft, non-tender, non-distended, positive bowel sounds  Extremities: No clubbing, cyanosis or edema   Skin: No new rashes or lesions  No draining wounds noted  Left chest wall PortACath in place, no erythema        Labs: All pertinent labs and imaging studies were personally reviewed  Results from last 7 days   Lab Units 02/15/23  0452 23  0543 23  1644   WBC Thousand/uL 15 22* 12 31* 6 91   HEMOGLOBIN g/dL 11 7* 11 6* 13 8   PLATELETS Thousands/uL 64* 50* 70*     Results from last 7 days   Lab Units 23  0543 23  1644   SODIUM mmol/L 138 137   POTASSIUM mmol/L 3 8 3 3*   CHLORIDE mmol/L 106 104   CO2 mmol/L 26 23   BUN mg/dL 18 21   CREATININE mg/dL 1 18 1 17   EGFR ml/min/1 73sq m 69 70   CALCIUM mg/dL 8 3* 9 2   AST U/L 19 29   ALT U/L 16 21   ALK PHOS U/L 64 94     Results from last 7 days   Lab Units 23  0543 23  1644   PROCALCITONIN ng/ml 5 21* 1 73*                   Micro:  Results from last 7 days   Lab Units 23  1703 23  1644   BLOOD CULTURE  No Growth at 24 hrs  No Growth at 24 hrs         Imaging:  I have personally reviewed pertinent imaging study reports and images in PACS          CXR-no acute pulmonary disease

## 2023-02-15 NOTE — NURSING NOTE
IV removed  AVS and upcoming appointments reviewed with patient  No medications prescribed  Patient and spouse had no further questions  Patient discharged to home

## 2023-02-15 NOTE — PROGRESS NOTES
Received voicemail from pt's wife stating pt was admitted to hospital an wanted to make sure that Dr Ara Head is aware and that everything is in align with pt's treatment plan  Returned call to pt's wife, Iman Collier, and left voicemail staitng I had notified Dr Ara Head that pt was admitted so he is aware  From what I can see it does not appear that MedOnc was consulted but with pt's counts having already recovered an symptoms improved, they may not, however, I do note that pt is scheduled to see DAVID Head on Friday, 2/17/23, and treatment plan can be discussed at that time  According to the notes it looks like pt will be d/c'd either today or tomorrow  Encouraged call back with any questions/concerns

## 2023-02-15 NOTE — DISCHARGE SUMMARY
[facility]  Discharge- Linda Lynne 1970, 45 y o  male MRN: [id number]  Unit/Bed#: E4 -01 Encounter: [id number]  Primary Care Provider: Darryn Kirkland DO   Date and time admitted to hospital: 2/13/2023  3:58 PM    Headache  Assessment & Plan  Frontal nonradiating  Mild  No red flag s/sx and no meningismus or loss of vision  -supportive care  - resolved    Hypokalemia  Assessment & Plan  Mild; replete and repeat BMP in a m      Tachycardia  Assessment & Plan  Mild sinus on clinical exam  Monitor for sepsis/sirs    Seminoma [facility])  Assessment & Plan  Recently diagnosed starting on chemotx  On bleomycin, etopside, cisplastin q 21d    * Neutropenic fever ([facility] [address] )  Assessment & Plan  Patient recently diagnosed with neutropenia with ANC of 80; now improved  -Given prehospital fevers and mild tachycardia in emergency room will continue to monitor for recurrence of neutropenic fever  -cxr wet read w/o acute cardiopulmonary disease, ua bland, covid/flu/rsv negative  -Received IV Zosyn in the ED; we will continue with IV cefepime RTC  -Monitor CBC vital signs blood cultures procalcitonin consult ID  -neutropenic precautions for now but if this remains improved can likely d/c  - likely 2/2 to chemo; check blood cx's tomorrow and consider d/c if stable and cultures negative      Discharging Physician / Practitioner: Zena Mohr MD  PCP: Darryn Kirkland DO  Admission Date:   Admission Orders (From admission, onward)     Ordered        02/13/23 1834  INPATIENT ADMISSION  Once                      Discharge Date: 02/15/23    Medical Problems     Resolved Problems  Date Reviewed: 2/14/2023   None         Consultations During Hospital Stay:  · Infectious disease  · Discussed case with patient's outpatient oncologist    Procedures Performed:   · Chest x-ray    Significant Findings / Test Results:   · Low-grade fever  · Pancytopenia    Incidental Findings:   ·      Test Results Pending at Discharge (will require follow up): · None     Outpatient Tests Requested:  · CBC/CMP    Complications: None    Reason for Admission: Neutropenic fever    Hospital Course: As per HPI:    "Keeley Shultz is a 45 y o  male who presents with pmh stage IV seminoma, obesity coming in the hospital for neutropenic fever  Patient is currently undergoing active chemotherapy with triple therapy crest cisplatin, etoposide, and bleomycin  He last had Neulasta the week prior on February 7, 2023  He been doing well until patient developed rigors chills fever with a Tmax of 103 3 on day of admission after his chemotherapy  He noted fatigue but otherwise no new chest pain shortness of breath sore throat cough congestion nausea vomiting diarrhea dysuria frequency urgency  No new rash or wounds  No meningismus but does have slight frontal headache which is nonradiating without any associated vision loss or other red flag s/sx  He had op cbc concerning for severe neutropenia w/anc 80  On repeat today this has improved but given concern for possible neutropenic fever admission was requested "    Condition at Discharge: stable     Discharge Day Visit / Exam:     Subjective: Patient seen and examined at bedside, no acute distress or discomfort noted  Patient reports headache has resolved and he feels in his normal state of health  Blood cultures are negative at 24 hours; no other signs of acute infection  Discussed with infectious disease who believe, as does patient's outpatient oncologist, but fever was result of chemotherapy  Review of medicines revealed that bleomycin and etoposide both carry risks of fever and their side effect profile  Was treated with broad-spectrum antibiotics, however have stopped and patient stable for discharge today  Afebrile at 24 hours and chest x-ray and urine also negative for signs of infection      Discussed with patient's wife via phone and son at bedside, patient reports follow-up appointment with oncology later this week  Stable for discharge home; instructed to return should fevers come back  Vitals: Blood Pressure: 127/60 (02/15/23 1102)  Pulse: 72 (02/15/23 1102)  Temperature: 97 7 °F (36 5 °C) (02/15/23 1102)  Temp Source: Temporal (02/15/23 1102)  Respirations: 18 (02/15/23 1102)  Height: 6' 3" (190 5 cm) (02/13/23 2239)  Weight - Scale: (!) 139 kg (306 lb 14 1 oz) (02/13/23 2239)  SpO2: 95 % (02/15/23 1102)  Exam:   Physical Exam  Vitals and nursing note reviewed  Constitutional:       General: He is not in acute distress  Appearance: He is well-developed  HENT:      Head: Normocephalic and atraumatic  Eyes:      Conjunctiva/sclera: Conjunctivae normal    Cardiovascular:      Rate and Rhythm: Normal rate and regular rhythm  Heart sounds: No murmur heard  Pulmonary:      Effort: Pulmonary effort is normal  No respiratory distress  Breath sounds: Normal breath sounds  Abdominal:      Palpations: Abdomen is soft  Tenderness: There is no abdominal tenderness  Musculoskeletal:         General: No swelling  Cervical back: Neck supple  Skin:     General: Skin is warm and dry  Neurological:      Mental Status: He is alert  Psychiatric:         Mood and Affect: Mood normal          Discussion with Family: Discussed treatment plan with family and patient who agree with current plan; encouraged to ask questions and participate  Discharge instructions/Information to patient and family:   See after visit summary for information provided to patient and family  Provisions for Follow-Up Care:  See after visit summary for information related to follow-up care and any pertinent home health orders  Disposition:     Home    For Discharges to [facility] SNF:   · Not Applicable to this Patient - Not Applicable to this Patient    Planned Readmission: None     Discharge Statement:  I spent 45 minutes discharging the patient   This time was spent on the day of discharge  I had direct contact with the patient on the day of discharge  Greater than 50% of the total time was spent examining patient, answering all patient questions, arranging and discussing plan of care with patient as well as directly providing post-discharge instructions  Additional time then spent on discharge activities  Discharge Medications:  See after visit summary for reconciled discharge medications provided to patient and family        ** Please Note: This note has been constructed using a voice recognition system **

## 2023-02-15 NOTE — TELEPHONE ENCOUNTER
Per Patient and spouse would like to talk to Dr Kary Pemberton prior to deferring treatment   Please leave patient scheduled for 2/20/23

## 2023-02-15 NOTE — PLAN OF CARE
Problem: DISCHARGE PLANNING  Goal: Discharge to home or other facility with appropriate resources  Description: INTERVENTIONS:  - Identify barriers to discharge w/patient and caregiver  - Arrange for needed discharge resources and transportation as appropriate  - Identify discharge learning needs (meds, wound care, etc )  - Arrange for interpretive services to assist at discharge as needed  - Refer to Case Management Department for coordinating discharge planning if the patient needs post-hospital services based on physician/advanced practitioner order or complex needs related to functional status, cognitive ability, or social support system  2/15/2023 1019 by Nasima Ribera RN  Outcome: Progressing  2/15/2023 1019 by Nasima Ribera RN  Outcome: Progressing     Problem: Knowledge Deficit  Goal: Patient/family/caregiver demonstrates understanding of disease process, treatment plan, medications, and discharge instructions  Description: Complete learning assessment and assess knowledge base    Interventions:  - Provide teaching at level of understanding  - Provide teaching via preferred learning methods  2/15/2023 1019 by Nasima Ribera RN  Outcome: Progressing  2/15/2023 1019 by Nasima Ribera RN  Outcome: Progressing

## 2023-02-15 NOTE — PROGRESS NOTES
Received voicemail from Ilia requesting call back stating they have some questions  Returned call to pt's wife  They felt frustrated with lack of communication between teams and that Medical Oncology not being  consulted  We discussed hospitalization and concerns regarding plan of care  While on with her noted discussion regarding delaying treatment a week due to counts, and she wanted to discuss with Dr Kayla Shin before appts are cancelled  Stating his counts are improving and he can get repeat labs  Requesting to review plan of care with him prior to any changes  TEAMs message sent and number provided of wife  Provided support and assured her I would look into the process with hospitalizations and let her know what I learn

## 2023-02-16 ENCOUNTER — TELEPHONE (OUTPATIENT)
Dept: HEMATOLOGY ONCOLOGY | Facility: CLINIC | Age: 53
End: 2023-02-16

## 2023-02-16 ENCOUNTER — TRANSITIONAL CARE MANAGEMENT (OUTPATIENT)
Dept: FAMILY MEDICINE CLINIC | Facility: CLINIC | Age: 53
End: 2023-02-16

## 2023-02-16 ENCOUNTER — OFFICE VISIT (OUTPATIENT)
Dept: FAMILY MEDICINE CLINIC | Facility: CLINIC | Age: 53
End: 2023-02-16

## 2023-02-16 DIAGNOSIS — C62.90 SEMINOMA (HCC): ICD-10-CM

## 2023-02-16 DIAGNOSIS — T45.1X5A CHEMOTHERAPY-INDUCED NEUTROPENIA (HCC): Primary | ICD-10-CM

## 2023-02-16 DIAGNOSIS — D70.1 CHEMOTHERAPY-INDUCED NEUTROPENIA (HCC): Primary | ICD-10-CM

## 2023-02-16 RX ORDER — SODIUM CHLORIDE 9 MG/ML
20 INJECTION, SOLUTION INTRAVENOUS ONCE
Status: CANCELLED | OUTPATIENT
Start: 2023-02-20

## 2023-02-16 RX ORDER — ACETAMINOPHEN 325 MG/1
650 TABLET ORAL ONCE
Status: CANCELLED | OUTPATIENT
Start: 2023-02-20

## 2023-02-16 RX ORDER — SODIUM CHLORIDE 9 MG/ML
20 INJECTION, SOLUTION INTRAVENOUS ONCE
Status: CANCELLED | OUTPATIENT
Start: 2023-02-21

## 2023-02-16 RX ORDER — ACETAMINOPHEN 325 MG/1
650 TABLET ORAL ONCE
Status: CANCELLED | OUTPATIENT
Start: 2023-03-06

## 2023-02-16 RX ORDER — SODIUM CHLORIDE 9 MG/ML
20 INJECTION, SOLUTION INTRAVENOUS ONCE
Status: CANCELLED | OUTPATIENT
Start: 2023-02-22

## 2023-02-16 RX ORDER — ACETAMINOPHEN 325 MG/1
650 TABLET ORAL ONCE
Status: CANCELLED | OUTPATIENT
Start: 2023-02-27

## 2023-02-16 RX ORDER — SODIUM CHLORIDE 9 MG/ML
20 INJECTION, SOLUTION INTRAVENOUS ONCE
Status: CANCELLED | OUTPATIENT
Start: 2023-02-23

## 2023-02-16 RX ORDER — SODIUM CHLORIDE 9 MG/ML
20 INJECTION, SOLUTION INTRAVENOUS ONCE
Status: CANCELLED | OUTPATIENT
Start: 2023-02-27

## 2023-02-16 RX ORDER — SODIUM CHLORIDE 9 MG/ML
20 INJECTION, SOLUTION INTRAVENOUS ONCE
Status: CANCELLED | OUTPATIENT
Start: 2023-03-06

## 2023-02-16 RX ORDER — SODIUM CHLORIDE 9 MG/ML
20 INJECTION, SOLUTION INTRAVENOUS ONCE
Status: CANCELLED | OUTPATIENT
Start: 2023-02-24

## 2023-02-16 NOTE — TELEPHONE ENCOUNTER
Call out to patient spouse in regards to questions and concerns  Patient spouse, Yolanda Myrick and stated that patient appointed her as main contact, reviewed we can get verbal consent to ensure Yolanda Myrick is to be the preferred contact vs  Patient during conversations of patient care  Patient spouse had stated that all in depth questions and concerns were addressed during conversation with Dr Stacy Oneal on 2/16/23  Patient spouse asked about any type of foods that would help increase patient platelet levels  Message out to David from Oncology Nutrition services,     "there is not a specific food that will increase platelets  However, in general adequate calorie/protein/nutrition intake can help when blood counts are low"     Patient spouse asked about hydration chairs for patient, reviewed we can discuss further with Dr Stacy Oneal during appointment on 2/17/23 on frequency and amount  Patient spouse appreciative of call

## 2023-02-16 NOTE — PROGRESS NOTES
Virtual TCM Visit:    Verification of patient location:    Patient is located in the following state in which I hold an active license [place]    Assessment/Plan: Patient seems to be doing well after recent hospitalization  He will continue to follow with oncology as scheduled  Recommend return to office for recheck if any recurrent symptoms  Problem List Items Addressed This Visit        Other    Seminoma ([facility] [address] )    Chemotherapy-induced neutropenia ([facility] [address] ) - Primary        Reason for visit is for recheck after recent hospitalization    Encounter provider Fadi Allen DO     Provider located at [facility] [address] [id number]    Recent Visits  Date Type Provider Dept   02/09/23 Office Visit Fadi Allen DO [address] recent visits within past 7 days and meeting all other requirements  Today's Visits  Date Type Provider Dept   02/16/23 Office Visit Fadi Allen DO [facility]   Showing today's visits and meeting all other requirements  Future Appointments  No visits were found meeting these conditions  Showing future appointments within next 150 days and meeting all other requirements       After connecting through [employer], the patient was identified by name and date of birth  Lion Colin was informed that this is a telemedicine visit and that the visit is being conducted through the [address] Now platform  He agrees to proceed     My office door was closed  No one else was in the room  He acknowledged consent and understanding of privacy and security of the video platform  The patient has agreed to participate and understands they can discontinue the visit at any time  Patient is aware this is a billable service  Transitional Care Management Review:  Lion Colin is a 45 y o  male here for TCM follow up       During the TCM phone call patient stated:    TCM Call     Date and time call was made  2/16/2023 10:12 AM Patient was hospitialized at  [facility] [address]    Date of Admission  02/13/23    Date of discharge  02/15/23    Diagnosis  Neutropenic fever    Disposition  Home    Were the patients medications reviewed and updated  No    Current Symptoms  None      TCM Call     Post hospital issues  None    Should patient be enrolled in anticoag monitoring? No    Scheduled for follow up? Yes    Patients specialists  Other (comment)    Other specialists names  Hemo    Did you obtain your prescribed medications  Yes    Do you need help managing your prescriptions or medications  No    Is transportation to your appointment needed  No    I have advised the patient to call PCP with any new or worsening symptoms  Sydney Rubio MA    Living Arrangements  Spouse or Significiant other    Support System  Partner; Friends; Family    The type of support provided  Physical; Emotional; Financial    Do you have social support  Yes, as much as I need    Are you recieving any outpatient services  Yes    What type of services  Chemo    Are you recieving home care services  No    Are you using any community resources  No    Current waiver services  No    Have you fallen in the last 12 months  No        Subjective:     Patient ID: Keeley Shultz is a 45 y o  male  Patient was recently hospitalized for defect for chemotherapeutic treatment  He is discharged to home  Blood cultures were negative  He is due to follow-up with oncology this week  He has a CBC pending for 2 days from now to recheck platelet counts  Review of Systems   Constitutional: Negative  HENT: Negative  Eyes: Negative  Respiratory: Negative  Cardiovascular: Negative  Gastrointestinal: Negative  Endocrine: Negative  Genitourinary: Negative  Musculoskeletal: Negative  Skin: Negative  Allergic/Immunologic: Negative  Neurological: Negative  Hematological: Negative  Psychiatric/Behavioral: Negative  Objective:     There were no vitals filed for this visit  Physical Exam  Constitutional:       General: He is not in acute distress  Appearance: He is well-developed  He is not diaphoretic  Neurological:      Mental Status: He is alert and oriented to person, place, and time  Psychiatric:         Behavior: Behavior normal          Thought Content: Thought content normal          Judgment: Judgment normal          Medications have been reviewed by provider in current encounter    I spent 20 minutes with the patient during this visit  Danny Osler, DO      VIRTUAL VISIT DISCLAIMER    Jason Kelly verbally agrees to participate in [facility]  Pt is aware that [facility] could be limited without vital signs or the ability to perform a full hands-on physical exam  Peter Weston Siemens understands he or the provider may request at any time to terminate the video visit and request the patient to seek care or treatment in person

## 2023-02-16 NOTE — UTILIZATION REVIEW
NOTIFICATION OF ADMISSION DISCHARGE   This is a Notification of Discharge from [address]  Please be advised that this patient has been discharge from our facility  Below you will find the admission and discharge date and time including the patient’s disposition  UTILIZATION REVIEW CONTACT:  Jeanie Bourgeois  Utilization   Network Utilization Review Department  Phone: [phone] x carefully listen to the prompts  All voicemails are confidential   Email: [email or web address]     ADMISSION INFORMATION  PRESENTATION DATE: 2/13/2023  3:58 PM  OBERVATION ADMISSION DATE:   INPATIENT ADMISSION DATE: 2/13/23  6:34 PM   DISCHARGE DATE: 2/15/2023  2:27 PM   DISPOSITION:Home/Self Care    IMPORTANT INFORMATION:  Send all requests for admission clinical reviews, approved or denied determinations and any other requests to dedicated fax number below belonging to the campus where the patient is receiving treatment   List of dedicated fax numbers:  [address] DENIALS (Administrative/Medical Necessity) [phone]   [address] (Maternity/NICU/Pediatrics) [phone]   [facility] [phone]   [facility] 87 [phone]   [address] [phone]   220 [facility] [phone] 90 [facility] [phone]   [address] [place], [place] [address] [phone]   [facility]  [phone]   4054 [facility] [phone]   [facility] [address] [facility] [phone]

## 2023-02-17 ENCOUNTER — TELEPHONE (OUTPATIENT)
Dept: HEMATOLOGY ONCOLOGY | Facility: CLINIC | Age: 53
End: 2023-02-17

## 2023-02-17 ENCOUNTER — PATIENT OUTREACH (OUTPATIENT)
Dept: HEMATOLOGY ONCOLOGY | Facility: CLINIC | Age: 53
End: 2023-02-17

## 2023-02-17 ENCOUNTER — OFFICE VISIT (OUTPATIENT)
Dept: HEMATOLOGY ONCOLOGY | Facility: CLINIC | Age: 53
End: 2023-02-17

## 2023-02-17 ENCOUNTER — TELEPHONE (OUTPATIENT)
Dept: GENETICS | Facility: CLINIC | Age: 53
End: 2023-02-17

## 2023-02-17 VITALS
BODY MASS INDEX: 36.06 KG/M2 | TEMPERATURE: 97.8 F | DIASTOLIC BLOOD PRESSURE: 82 MMHG | HEART RATE: 96 BPM | SYSTOLIC BLOOD PRESSURE: 118 MMHG | OXYGEN SATURATION: 97 % | WEIGHT: 290 LBS | HEIGHT: 75 IN | RESPIRATION RATE: 16 BRPM

## 2023-02-17 DIAGNOSIS — T45.1X5A CHEMOTHERAPY-INDUCED NAUSEA: ICD-10-CM

## 2023-02-17 DIAGNOSIS — C62.90 SEMINOMA (HCC): Primary | ICD-10-CM

## 2023-02-17 DIAGNOSIS — R11.0 CHEMOTHERAPY-INDUCED NAUSEA: ICD-10-CM

## 2023-02-17 RX ORDER — ONDANSETRON 8 MG/1
8 TABLET, ORALLY DISINTEGRATING ORAL EVERY 8 HOURS PRN
Qty: 90 TABLET | Refills: 2 | Status: SHIPPED | OUTPATIENT
Start: 2023-02-17

## 2023-02-17 NOTE — TELEPHONE ENCOUNTER
Post-Test Genetic Counseling Consult Note     Today I spoke with Mario's wife Alin Guillaume over the phone to review the results of his genetic test for hereditary cancer  We met previously on 1/27 for pre-test counseling  A copy of this consult note and genetic test result will be shared with the patient  SUMMARY:    Test(s): [facility] Cancer Panel (47 genes): APC, MARIO, AXIN2, BARD1, BMPR1A, BRCA1, BRCA2, BRIP1, CDH1, CDK4, CDKN2A, CHEK2, CTNNA1, DICER1, EPCAM, GREM1, HOXB13, KIT, MEN1, MLH1, MSH2, MSH3, MSH6, MUTYH, NBN, NF1, NTHL1, PALB2, PDGFRA, PMS2, POLD1, POLE, PTEN, RAD50, RAD51C, RAD51D, SDHA, SDHB, SDHC, SDHD, SMAD4, SMARCA4, STK11, TP53, TSC1, TSC2, VHL      Result: 2 Variants of uncertain significance     Variant 1  NTHL1  c 787C>T (p R263C); heterozygous; uncertain significance     Variant 2  SDHC c 15G>T (p L5F); heterozygous; uncertain significance     Assessment: A variant of uncertain significance (VUS) means that a change was identified in a specific gene but it cannot be determined whether the variant is associated with an increased risk of cancer or is a harmless genetic change  It is possible that the variant was seen in only a handful of individuals, or there may be conflicting or incomplete information in the medical literature about the variant and its association with hereditary cancer  The significance of the NTHL1 and SDHC variants is currently not known and therefore this test result cannot be used to help determine Maricarmen Shah cancer risks  Risks and Testing for Family Members:    Genetic testing for these variants is not recommended for relatives who wish to determine their cancer risks for purposes of determining medical management  The presence or absence of these variants in a relative is not clinically meaningful unless the variant is reclassified in the future       The laboratory will continue to accumulate information on these variants and will reclassify them as either a positive or negative genetic test result when they are confident that they have adequate information  As updated information is obtained, we will notify Kenyon Esposito  It is important to note that the majority of variants of uncertain significance are reclassified as likely benign or benign as additional information about the variant becomes available  Despite this result, Mario's first-degree relatives may be at increased risk for the cancers based on the family history  We recommend they discuss screening and management recommendations with their healthcare providers  If Kenyon Esposito has any affected family members with a cancer diagnosis, especially at a young age, they may still consider genetic testing  Relatives who wish to pursue genetic testing can reach out to the [address] [place] [address] Road (9257) to schedule an appointment or visit www [facility] org to identify a local genetic counselor  Plan:   There are no additional recommendations based on Mario's result  he should continue cancer screening and medical management as clinically indicated and as determined appropriate by his healthcare providers  VUS Result: Kenyon Esposito was strongly encouraged to contact us regarding any changes in his personal or family history of cancer as these changes could alter our recommendation regarding genetic testing and/or cancer screening  Kenyon Esposito was also encouraged to follow up with us on an annual basis as variant classifications are subject to change

## 2023-02-17 NOTE — PROGRESS NOTES
[10:10 AM] Aly Arroyo  Good morning, Dr Vi Peralta called me yesterday  Answered questions  Marina Quiroga had his appt today  All good  Praying bloodwork is within parameters so can move forward with tx on Monday  Platelets need to be 85,000 or close  He is adding a steroid on Mondays now with Bleomycin  That should help  All is well  [10:10 AM] Felipe Alvarez went well  [10:11 AM] Trudy Olivera  That's great!  I am happy to hear  [facility] ([facility]) is Marina Quiroga doing? [10:17 AM] Aly Cruz  He feels good, appetite is good  we were all cooking dinner together last evening  Trying to make things that increase platelets  Tuna on the grill last night with broccoli and bean salad  tonight [place] and spinach  He will have labs tomorrow  Do you think it matters if he has labwork Saturday or Sunday? I just thought of the possibility of Sunday to give one more day for platelets to increase  Not sure if mark would agree because would probably have to go to the hospital for bloodwork as opposed to outpatient lab where he normally goes but just a thought  Just not sure if we would have results in time if he would agree to go on Sunday        [10:18 AM] Rubia Alegria should have asked at his appt but just thought of it    not sure if 1 day would make a difference   ? He did go from 50 to 62 in one day this week  [04:51 Bridgette Cabreraon said that he needs to focus on his physical and mental well being and healing so asked if I can be his advocate and primary contact  He did make that known at his appt today  Just an FYI, he is in a good place we just need to charge on  [facility] Thank you for your help and guidance!!!     [11:00 AM] Turdy Olivera  I'm inquiring about Sunday labs   I don't think it should be an issue but he will be limited on location   Is the plan for you to check MyChart for results since it is a weekend and to come to appointment if platelet INWAO>96,192?     [11:01 AM] Aly Cruz  Yes the plan is to check cordell and he said if platelets are 35,130 or close to go for tx on Monday      [11:02 AM] Olivia Khan  Just want to be sure if mark agrees to go on Sunday that results will be back in time  I would think so  [11:12 AM] Brionna Greene think so as well    [11:12 AM] Howie Casper am calling the lab to confirm they have hours on Sunday to see if it is even  a possibility     [11:12 AM] Howie Casper am calling the lab to confirm they have hours on Sunday to see if it is even  a possibility      [11:14 AM] Kandace Smalls want to risk it if not able to confirm it's possible     [11:14 AM] Andrea Morel  definitely not   like 1     [11:24 Antionette Tavera said Sunday was fine but I recommend going first thing in the morning     [11:29 [address], [address], great! Yes, we are early people so thats fine!  Lab is open 8-12 on Sunday morning     [11:29 AM] Olivia Khan  I confirmed

## 2023-02-17 NOTE — TELEPHONE ENCOUNTER
Dr Jose Curiel saw patient in clinic today 2/17/23  Reviewed patient to have labs done on 2/18/23 and if platelets are less than 85k patient to not be treated on Monday 2/20/23  Patient spouse and patient are aware of parameter set and reviewed with Dr Jose Curiel due to patient safety and health would be at risk  Patient will not go to [facility] infusion if Platelets are not within parameters

## 2023-02-17 NOTE — PROGRESS NOTES
Dr Diomedes Ayala saw patient in clinic today 2/17/23  Reviewed patient to have labs done on 2/18/23 and if platelets are less than 85k patient to not be treated on Monday 2/20/23  Patient spouse and patient are aware of parameter set and reviewed with Dr Diomedes Ayala due to patient safety and health would be at risk  Infusion Manager Armani notified

## 2023-02-18 LAB
BACTERIA BLD CULT: NORMAL
BACTERIA BLD CULT: NORMAL

## 2023-02-19 ENCOUNTER — APPOINTMENT (OUTPATIENT)
Dept: LAB | Facility: HOSPITAL | Age: 53
End: 2023-02-19

## 2023-02-19 DIAGNOSIS — T45.1X5A CHEMOTHERAPY-INDUCED NEUTROPENIA (HCC): ICD-10-CM

## 2023-02-19 DIAGNOSIS — D70.1 CHEMOTHERAPY-INDUCED NEUTROPENIA (HCC): ICD-10-CM

## 2023-02-19 DIAGNOSIS — C62.90 SEMINOMA (HCC): ICD-10-CM

## 2023-02-19 LAB
BASOPHILS # BLD MANUAL: 0 THOUSAND/UL (ref 0–0.1)
BASOPHILS NFR MAR MANUAL: 0 % (ref 0–1)
EOSINOPHIL # BLD MANUAL: 0 THOUSAND/UL (ref 0–0.4)
EOSINOPHIL NFR BLD MANUAL: 0 % (ref 0–6)
ERYTHROCYTE [DISTWIDTH] IN BLOOD BY AUTOMATED COUNT: 11.8 % (ref 11.6–15.1)
HCT VFR BLD AUTO: 36.5 % (ref 36.5–49.3)
HGB BLD-MCNC: 12.8 G/DL (ref 12–17)
LG PLATELETS BLD QL SMEAR: PRESENT
LYMPHOCYTES # BLD AUTO: 12 % (ref 14–44)
LYMPHOCYTES # BLD AUTO: 2.16 THOUSAND/UL (ref 0.6–4.47)
MCH RBC QN AUTO: 32.2 PG (ref 26.8–34.3)
MCHC RBC AUTO-ENTMCNC: 35.1 G/DL (ref 31.4–37.4)
MCV RBC AUTO: 92 FL (ref 82–98)
METAMYELOCYTES NFR BLD MANUAL: 4 % (ref 0–1)
MONOCYTES # BLD AUTO: 1.8 THOUSAND/UL (ref 0–1.22)
MONOCYTES NFR BLD: 10 % (ref 4–12)
MYELOCYTES NFR BLD MANUAL: 5 % (ref 0–1)
NEUTROPHILS # BLD MANUAL: 12.03 THOUSAND/UL (ref 1.85–7.62)
NEUTS BAND NFR BLD MANUAL: 10 % (ref 0–8)
NEUTS SEG NFR BLD AUTO: 57 % (ref 43–75)
OVALOCYTES BLD QL SMEAR: PRESENT
PLATELET # BLD AUTO: 200 THOUSANDS/UL (ref 149–390)
PLATELET BLD QL SMEAR: ADEQUATE
PMV BLD AUTO: 9.6 FL (ref 8.9–12.7)
RBC # BLD AUTO: 3.98 MILLION/UL (ref 3.88–5.62)
VARIANT LYMPHS # BLD AUTO: 2 %
WBC # BLD AUTO: 17.96 THOUSAND/UL (ref 4.31–10.16)

## 2023-02-20 ENCOUNTER — TELEPHONE (OUTPATIENT)
Dept: GENETICS | Facility: CLINIC | Age: 53
End: 2023-02-20

## 2023-02-20 ENCOUNTER — HOSPITAL ENCOUNTER (OUTPATIENT)
Dept: INFUSION CENTER | Facility: CLINIC | Age: 53
Discharge: HOME | End: 2023-02-20

## 2023-02-20 ENCOUNTER — TELEPHONE (OUTPATIENT)
Dept: HEMATOLOGY ONCOLOGY | Facility: CLINIC | Age: 53
End: 2023-02-20

## 2023-02-20 ENCOUNTER — PATIENT OUTREACH (OUTPATIENT)
Dept: HEMATOLOGY ONCOLOGY | Facility: CLINIC | Age: 53
End: 2023-02-20

## 2023-02-20 VITALS
WEIGHT: 291.45 LBS | HEIGHT: 75 IN | TEMPERATURE: 97.8 F | HEART RATE: 79 BPM | DIASTOLIC BLOOD PRESSURE: 75 MMHG | BODY MASS INDEX: 36.24 KG/M2 | RESPIRATION RATE: 18 BRPM | SYSTOLIC BLOOD PRESSURE: 118 MMHG

## 2023-02-20 DIAGNOSIS — T45.1X5A CHEMOTHERAPY-INDUCED NEUTROPENIA (HCC): ICD-10-CM

## 2023-02-20 DIAGNOSIS — D70.1 CHEMOTHERAPY-INDUCED NEUTROPENIA (HCC): ICD-10-CM

## 2023-02-20 DIAGNOSIS — C62.90 SEMINOMA (HCC): Primary | ICD-10-CM

## 2023-02-20 PROBLEM — E83.42 HYPOMAGNESEMIA: Status: ACTIVE | Noted: 2023-02-20

## 2023-02-20 LAB
ALBUMIN SERPL BCP-MCNC: 3.5 G/DL (ref 3.5–5)
ALP SERPL-CCNC: 100 U/L (ref 46–116)
ALT SERPL W P-5'-P-CCNC: 20 U/L (ref 12–78)
ANION GAP SERPL CALCULATED.3IONS-SCNC: 8 MMOL/L (ref 4–13)
AST SERPL W P-5'-P-CCNC: 31 U/L (ref 5–45)
BILIRUB SERPL-MCNC: 0.32 MG/DL (ref 0.2–1)
BUN SERPL-MCNC: 18 MG/DL (ref 5–25)
CALCIUM SERPL-MCNC: 8.6 MG/DL (ref 8.3–10.1)
CHLORIDE SERPL-SCNC: 102 MMOL/L (ref 96–108)
CO2 SERPL-SCNC: 27 MMOL/L (ref 21–32)
CREAT SERPL-MCNC: 0.97 MG/DL (ref 0.6–1.3)
GFR SERPL CREATININE-BSD FRML MDRD: 88 ML/MIN/1.73SQ M
GLUCOSE P FAST SERPL-MCNC: 96 MG/DL (ref 65–99)
MAGNESIUM SERPL-MCNC: 1.8 MG/DL (ref 1.9–2.7)
POTASSIUM SERPL-SCNC: 4.5 MMOL/L (ref 3.5–5.3)
PROT SERPL-MCNC: 7.3 G/DL (ref 6.4–8.4)
SODIUM SERPL-SCNC: 137 MMOL/L (ref 135–147)

## 2023-02-20 RX ORDER — MAGNESIUM SULFATE HEPTAHYDRATE 40 MG/ML
2 INJECTION, SOLUTION INTRAVENOUS ONCE
Status: CANCELLED
Start: 2023-02-21

## 2023-02-20 RX ORDER — MAGNESIUM SULFATE HEPTAHYDRATE 40 MG/ML
2 INJECTION, SOLUTION INTRAVENOUS ONCE
Status: CANCELLED
Start: 2023-02-21 | End: 2023-02-21

## 2023-02-20 RX ORDER — SODIUM CHLORIDE 9 MG/ML
20 INJECTION, SOLUTION INTRAVENOUS ONCE
Status: COMPLETED | OUTPATIENT
Start: 2023-02-20 | End: 2023-02-20

## 2023-02-20 RX ORDER — ACETAMINOPHEN 325 MG/1
650 TABLET ORAL ONCE
Status: COMPLETED | OUTPATIENT
Start: 2023-02-20 | End: 2023-02-20

## 2023-02-20 RX ADMIN — SODIUM CHLORIDE 500 ML: 0.9 INJECTION, SOLUTION INTRAVENOUS at 08:29

## 2023-02-20 RX ADMIN — ACETAMINOPHEN 650 MG: 325 TABLET ORAL at 08:32

## 2023-02-20 RX ADMIN — SODIUM CHLORIDE 30 UNITS: 9 INJECTION, SOLUTION INTRAVENOUS at 12:42

## 2023-02-20 RX ADMIN — DIPHENHYDRAMINE HYDROCHLORIDE 25 MG: 50 INJECTION, SOLUTION INTRAMUSCULAR; INTRAVENOUS at 11:54

## 2023-02-20 RX ADMIN — SODIUM CHLORIDE 500 ML: 0.9 INJECTION, SOLUTION INTRAVENOUS at 13:00

## 2023-02-20 RX ADMIN — SODIUM CHLORIDE 20 ML/HR: 0.9 INJECTION, SOLUTION INTRAVENOUS at 08:29

## 2023-02-20 RX ADMIN — FOSAPREPITANT 150 MG: 150 INJECTION, POWDER, LYOPHILIZED, FOR SOLUTION INTRAVENOUS at 08:59

## 2023-02-20 RX ADMIN — ETOPOSIDE 261 MG: 20 INJECTION INTRAVENOUS at 10:39

## 2023-02-20 RX ADMIN — CISPLATIN 52.2 MG: 1 INJECTION, SOLUTION INTRAVENOUS at 09:34

## 2023-02-20 RX ADMIN — DEXAMETHASONE SODIUM PHOSPHATE: 10 INJECTION, SOLUTION INTRAMUSCULAR; INTRAVENOUS at 08:32

## 2023-02-20 NOTE — TELEPHONE ENCOUNTER
----- Message from Di Comer sent at 2/17/2023 12:10 PM EST -----  Regarding: complete  GC Completed Chart     Result Delivery: Patient requested INNFOCUShart message of lab result/result note    Monthly Review: Does not need monthly review- COMPLETE

## 2023-02-20 NOTE — PROGRESS NOTES
Pt tolerated treatment without incident  Mag level sent this morning, resulted at 1 8  Lester Fisher RN notified, per Dr Kannan Caal pt will get 2gm Mag tomorrow   Pt provided with AVS

## 2023-02-20 NOTE — PROGRESS NOTES
Outreach to pt's wife, Mariaa Ng, and left voicemail stating I saw pt's counts and happy to see he was able to proceed with Cycle 2 today  Advised I would follow-up later this week but to call if any questions/concerns      Tentative end of treatment 4/17/23 sent to Dr Deyvi Weaver and Evelyn Jackson, Surgery scheduler  (Cycle 4 starts 4/3/23)

## 2023-02-20 NOTE — PROGRESS NOTES
Reviewed patient Magnesium level resulted at 1 8  Per Dr Anna Willett, patient to recieve 2 grams magnesium sulfate this week once during infusion  Received message from infusion RN, reviewed that patient is no longer taking PO magnesium at home  Reviewed Dr Steele Cure recommendations and that they would be able to accommodate on 2/21/23  Title: addition of Magnesium 2 GM    Date patient scheduled: 2/21/23    Original medication ordered: n/a    New Medication ordered: Magnesium 2 GM    Office RN notified patient? ? My chart message to patient  Is the patient scheduled within 24 hours? ? If yes, follow up with verbal telephone call  Spoke with [facility], RN at [facility] infusion on 2/20/23 at 210pm    Office RN to route to [facility]  Infusion  pool routes to [facility]  Infusion tech to receive message, confirm scheduled treatment duration matches ordered treatment duration or adjust accordingly, and re-link appointment request orders  Infusion tech to notify pharmacy and finance

## 2023-02-20 NOTE — TELEPHONE ENCOUNTER
Reviewed patient Magnesium level resulted at 1 8  Per Dr Berkley Worley, patient to recieve 2 grams magnesium sulfate this week once during infusion  Received message from infusion RN, reviewed that patient is no longer taking PO magnesium at home  Reviewed Dr Mikaela Carter recommendations and that they would be able to accommodate on 2/21/23  Title: addition of Magnesium 2 GM    Date patient scheduled: 2/21/23    Original medication ordered: n/a    New Medication ordered: Magnesium 2 GM    Office RN notified patient? ? My chart message to patient  Is the patient scheduled within 24 hours? ? If yes, follow up with verbal telephone call  Spoke with Tawanda Arzate RN at [address] [place] [address] infusion on 2/20/23 at 210pm    Office RN to route to [facility]  Infusion  pool routes to [facility]  Infusion tech to receive message, confirm scheduled treatment duration matches ordered treatment duration or adjust accordingly, and re-link appointment request orders  Infusion tech to notify pharmacy and finance

## 2023-02-21 ENCOUNTER — HOSPITAL ENCOUNTER (OUTPATIENT)
Dept: INFUSION CENTER | Facility: CLINIC | Age: 53
Discharge: HOME | End: 2023-02-21

## 2023-02-21 VITALS
TEMPERATURE: 98.5 F | RESPIRATION RATE: 18 BRPM | DIASTOLIC BLOOD PRESSURE: 71 MMHG | BODY MASS INDEX: 37.01 KG/M2 | SYSTOLIC BLOOD PRESSURE: 123 MMHG | WEIGHT: 297.62 LBS | HEIGHT: 75 IN | HEART RATE: 83 BPM

## 2023-02-21 DIAGNOSIS — E83.42 HYPOMAGNESEMIA: Primary | ICD-10-CM

## 2023-02-21 DIAGNOSIS — D70.1 CHEMOTHERAPY-INDUCED NEUTROPENIA (HCC): ICD-10-CM

## 2023-02-21 DIAGNOSIS — C62.90 SEMINOMA (HCC): ICD-10-CM

## 2023-02-21 DIAGNOSIS — T45.1X5A CHEMOTHERAPY-INDUCED NEUTROPENIA (HCC): ICD-10-CM

## 2023-02-21 RX ORDER — MAGNESIUM SULFATE HEPTAHYDRATE 40 MG/ML
2 INJECTION, SOLUTION INTRAVENOUS ONCE
Status: COMPLETED | OUTPATIENT
Start: 2023-02-21 | End: 2023-02-21

## 2023-02-21 RX ORDER — SODIUM CHLORIDE 9 MG/ML
20 INJECTION, SOLUTION INTRAVENOUS ONCE
Status: COMPLETED | OUTPATIENT
Start: 2023-02-21 | End: 2023-02-21

## 2023-02-21 RX ADMIN — ETOPOSIDE 261 MG: 20 INJECTION INTRAVENOUS at 10:52

## 2023-02-21 RX ADMIN — DEXAMETHASONE SODIUM PHOSPHATE: 10 INJECTION, SOLUTION INTRAMUSCULAR; INTRAVENOUS at 08:43

## 2023-02-21 RX ADMIN — SODIUM CHLORIDE 20 ML/HR: 0.9 INJECTION, SOLUTION INTRAVENOUS at 08:43

## 2023-02-21 RX ADMIN — SODIUM CHLORIDE 500 ML: 0.9 INJECTION, SOLUTION INTRAVENOUS at 08:25

## 2023-02-21 RX ADMIN — SODIUM CHLORIDE 52.2 MG: 0.9 INJECTION, SOLUTION INTRAVENOUS at 09:42

## 2023-02-21 RX ADMIN — SODIUM CHLORIDE 500 ML: 0.9 INJECTION, SOLUTION INTRAVENOUS at 12:28

## 2023-02-21 RX ADMIN — MAGNESIUM SULFATE HEPTAHYDRATE 2 G: 40 INJECTION, SOLUTION INTRAVENOUS at 12:22

## 2023-02-21 RX ADMIN — ALTEPLASE 2 MG: 2.2 INJECTION, POWDER, LYOPHILIZED, FOR SOLUTION INTRAVENOUS at 08:32

## 2023-02-21 NOTE — PROGRESS NOTES
Unable to aspirate blood return from port a cath  Peripheral IV inserted for hydration and premeds  Cathflo instilled per protocol  Will continue to follow

## 2023-02-22 ENCOUNTER — HOSPITAL ENCOUNTER (OUTPATIENT)
Dept: INFUSION CENTER | Facility: CLINIC | Age: 53
Discharge: HOME | End: 2023-02-22

## 2023-02-22 VITALS
WEIGHT: 300.71 LBS | HEIGHT: 75 IN | DIASTOLIC BLOOD PRESSURE: 81 MMHG | TEMPERATURE: 98 F | HEART RATE: 65 BPM | RESPIRATION RATE: 18 BRPM | SYSTOLIC BLOOD PRESSURE: 133 MMHG | BODY MASS INDEX: 37.39 KG/M2

## 2023-02-22 DIAGNOSIS — E83.42 HYPOMAGNESEMIA: Primary | ICD-10-CM

## 2023-02-22 DIAGNOSIS — D70.1 CHEMOTHERAPY-INDUCED NEUTROPENIA (HCC): ICD-10-CM

## 2023-02-22 DIAGNOSIS — T45.1X5A CHEMOTHERAPY-INDUCED NEUTROPENIA (HCC): ICD-10-CM

## 2023-02-22 DIAGNOSIS — C62.90 SEMINOMA (HCC): ICD-10-CM

## 2023-02-22 RX ORDER — SODIUM CHLORIDE 9 MG/ML
20 INJECTION, SOLUTION INTRAVENOUS ONCE
Status: COMPLETED | OUTPATIENT
Start: 2023-02-22 | End: 2023-02-22

## 2023-02-22 RX ADMIN — SODIUM CHLORIDE 500 ML: 0.9 INJECTION, SOLUTION INTRAVENOUS at 08:32

## 2023-02-22 RX ADMIN — DEXAMETHASONE SODIUM PHOSPHATE: 10 INJECTION, SOLUTION INTRAMUSCULAR; INTRAVENOUS at 08:32

## 2023-02-22 RX ADMIN — SODIUM CHLORIDE 52.2 MG: 0.9 INJECTION, SOLUTION INTRAVENOUS at 09:33

## 2023-02-22 RX ADMIN — ETOPOSIDE 261 MG: 20 INJECTION INTRAVENOUS at 10:40

## 2023-02-22 RX ADMIN — SODIUM CHLORIDE 500 ML: 0.9 INJECTION, SOLUTION INTRAVENOUS at 12:06

## 2023-02-22 RX ADMIN — SODIUM CHLORIDE 20 ML/HR: 0.9 INJECTION, SOLUTION INTRAVENOUS at 08:31

## 2023-02-22 NOTE — PROGRESS NOTES
Pt tolerated treatment today without incident  Pt is aware of appt time for tomorrow  Pt and wife aware to monitor for symptoms of fluid overload and to monitor wt tomorrow  Pt's wife is an RN and verbalizes understanding

## 2023-02-23 ENCOUNTER — TELEPHONE (OUTPATIENT)
Dept: HEMATOLOGY ONCOLOGY | Facility: CLINIC | Age: 53
End: 2023-02-23

## 2023-02-23 ENCOUNTER — HOSPITAL ENCOUNTER (OUTPATIENT)
Dept: INFUSION CENTER | Facility: CLINIC | Age: 53
Discharge: HOME | End: 2023-02-23

## 2023-02-23 VITALS
HEART RATE: 61 BPM | WEIGHT: 302.03 LBS | HEIGHT: 75 IN | TEMPERATURE: 98 F | DIASTOLIC BLOOD PRESSURE: 77 MMHG | BODY MASS INDEX: 37.55 KG/M2 | SYSTOLIC BLOOD PRESSURE: 128 MMHG | RESPIRATION RATE: 18 BRPM

## 2023-02-23 DIAGNOSIS — T45.1X5A CHEMOTHERAPY-INDUCED NEUTROPENIA (HCC): ICD-10-CM

## 2023-02-23 DIAGNOSIS — C62.90 SEMINOMA (HCC): ICD-10-CM

## 2023-02-23 DIAGNOSIS — D70.1 CHEMOTHERAPY-INDUCED NEUTROPENIA (HCC): ICD-10-CM

## 2023-02-23 DIAGNOSIS — E83.42 HYPOMAGNESEMIA: Primary | ICD-10-CM

## 2023-02-23 RX ORDER — SODIUM CHLORIDE 9 MG/ML
20 INJECTION, SOLUTION INTRAVENOUS ONCE
Status: COMPLETED | OUTPATIENT
Start: 2023-02-23 | End: 2023-02-23

## 2023-02-23 RX ORDER — ACETAMINOPHEN 325 MG/1
650 TABLET ORAL EVERY 6 HOURS PRN
Status: CANCELLED
Start: 2023-02-23

## 2023-02-23 RX ORDER — FUROSEMIDE 10 MG/ML
10 INJECTION INTRAMUSCULAR; INTRAVENOUS ONCE
Status: CANCELLED
Start: 2023-02-24 | End: 2023-02-24

## 2023-02-23 RX ORDER — FUROSEMIDE 10 MG/ML
10 INJECTION INTRAMUSCULAR; INTRAVENOUS ONCE
Status: CANCELLED
Start: 2023-02-23 | End: 2023-02-23

## 2023-02-23 RX ORDER — ACETAMINOPHEN 325 MG/1
650 TABLET ORAL EVERY 6 HOURS PRN
Status: CANCELLED
Start: 2023-02-24

## 2023-02-23 RX ORDER — FUROSEMIDE 10 MG/ML
10 INJECTION INTRAMUSCULAR; INTRAVENOUS ONCE
Status: COMPLETED | OUTPATIENT
Start: 2023-02-23 | End: 2023-02-23

## 2023-02-23 RX ORDER — ACETAMINOPHEN 325 MG/1
650 TABLET ORAL EVERY 6 HOURS PRN
Status: DISCONTINUED | OUTPATIENT
Start: 2023-02-23 | End: 2023-02-26 | Stop reason: HOSPADM

## 2023-02-23 RX ADMIN — SODIUM CHLORIDE 500 ML: 0.9 INJECTION, SOLUTION INTRAVENOUS at 08:46

## 2023-02-23 RX ADMIN — SODIUM CHLORIDE 500 ML: 0.9 INJECTION, SOLUTION INTRAVENOUS at 11:45

## 2023-02-23 RX ADMIN — DEXAMETHASONE SODIUM PHOSPHATE: 10 INJECTION, SOLUTION INTRAMUSCULAR; INTRAVENOUS at 08:52

## 2023-02-23 RX ADMIN — FUROSEMIDE 10 MG: 20 INJECTION, SOLUTION INTRAMUSCULAR; INTRAVENOUS at 08:49

## 2023-02-23 RX ADMIN — ETOPOSIDE 261 MG: 20 INJECTION INTRAVENOUS at 10:45

## 2023-02-23 RX ADMIN — SODIUM CHLORIDE 20 ML/HR: 0.9 INJECTION, SOLUTION INTRAVENOUS at 08:47

## 2023-02-23 RX ADMIN — ACETAMINOPHEN 650 MG: 325 TABLET ORAL at 08:49

## 2023-02-23 RX ADMIN — SODIUM CHLORIDE 52.2 MG: 0.9 INJECTION, SOLUTION INTRAVENOUS at 09:42

## 2023-02-23 NOTE — PROGRESS NOTES
Reviewed with Dr Lety Worthy patient expressed concern of abdominal bloating and increase in weight  Patient also reported increase in bilateral lower extremity edema  Patient declines any shortness of breath and or chest pain at this time  Per Dr Lety Worthy patient to receive tylenol as requested and Lasix 10mg IV on 2/23/23 and 2/24/23  Infusion RN notified  Title: Addition of Lasix 10mg IV and Tylenol 650mg PO    Date patient scheduled: 2/23/23 and 2/24/23    Original medication ordered: n/a    New Medication ordered: Lasix 10mg IV and Tylenol 650mg PO    Office RN notified patient? ? Teams message to Infusion RN, Hanna    Is the patient scheduled within 24 hours? ? If yes, follow up with verbal telephone call  Office RN to route to [facility]  Infusion  pool routes to [facility]  Infusion tech to receive message, confirm scheduled treatment duration matches ordered treatment duration or adjust accordingly, and re-link appointment request orders  Infusion tech to notify pharmacy and finance

## 2023-02-23 NOTE — TELEPHONE ENCOUNTER
Reviewed with Dr Xochitl Stapleton patient expressed concern of abdominal bloating and increase in weight  Patient also reported increase in bilateral lower extremity edema  Patient declines any shortness of breath and or chest pain at this time  Per Dr Xochitl Stapleton patient to receive tylenol as requested and Lasix 10mg IV on 2/23/23 and 2/24/23  Infusion RN notified  Title: Addition of Lasix 10mg IV and Tylenol 650mg PO    Date patient scheduled: 2/23/23 and 2/24/23    Original medication ordered: n/a    New Medication ordered: Lasix 10mg IV and Tylenol 650mg PO    Office RN notified patient? ? Teams message to Infusion RN, Hanna    Is the patient scheduled within 24 hours? ? If yes, follow up with verbal telephone call  Office RN to route to [facility]  Infusion  pool routes to [facility]  Infusion tech to receive message, confirm scheduled treatment duration matches ordered treatment duration or adjust accordingly, and re-link appointment request orders  Infusion tech to notify pharmacy and finance

## 2023-02-23 NOTE — PROGRESS NOTES
Patient tolerated infusion without incidents  IV lasix and Tylenol PRN given to patient (see note from [address])  Denies need for AVS, aware to return tomorrow for next appointment

## 2023-02-24 ENCOUNTER — PATIENT OUTREACH (OUTPATIENT)
Dept: HEMATOLOGY ONCOLOGY | Facility: CLINIC | Age: 53
End: 2023-02-24

## 2023-02-24 ENCOUNTER — HOSPITAL ENCOUNTER (OUTPATIENT)
Dept: INFUSION CENTER | Facility: CLINIC | Age: 53
End: 2023-02-24

## 2023-02-24 VITALS
SYSTOLIC BLOOD PRESSURE: 132 MMHG | DIASTOLIC BLOOD PRESSURE: 79 MMHG | TEMPERATURE: 97.7 F | RESPIRATION RATE: 18 BRPM | BODY MASS INDEX: 36.81 KG/M2 | HEIGHT: 75 IN | WEIGHT: 296.08 LBS | HEART RATE: 61 BPM

## 2023-02-24 DIAGNOSIS — C62.90 SEMINOMA (HCC): ICD-10-CM

## 2023-02-24 DIAGNOSIS — D70.1 CHEMOTHERAPY-INDUCED NEUTROPENIA (HCC): ICD-10-CM

## 2023-02-24 DIAGNOSIS — T45.1X5A CHEMOTHERAPY-INDUCED NEUTROPENIA (HCC): ICD-10-CM

## 2023-02-24 DIAGNOSIS — E83.42 HYPOMAGNESEMIA: Primary | ICD-10-CM

## 2023-02-24 RX ORDER — SODIUM CHLORIDE 9 MG/ML
20 INJECTION, SOLUTION INTRAVENOUS ONCE
Status: COMPLETED | OUTPATIENT
Start: 2023-02-24 | End: 2023-02-24

## 2023-02-24 RX ORDER — ACETAMINOPHEN 325 MG/1
650 TABLET ORAL EVERY 6 HOURS PRN
Status: DISCONTINUED | OUTPATIENT
Start: 2023-02-24 | End: 2023-02-27 | Stop reason: HOSPADM

## 2023-02-24 RX ORDER — FUROSEMIDE 10 MG/ML
10 INJECTION INTRAMUSCULAR; INTRAVENOUS ONCE
Status: COMPLETED | OUTPATIENT
Start: 2023-02-24 | End: 2023-02-24

## 2023-02-24 RX ADMIN — SODIUM CHLORIDE 20 ML/HR: 0.9 INJECTION, SOLUTION INTRAVENOUS at 08:27

## 2023-02-24 RX ADMIN — SODIUM CHLORIDE 500 ML: 0.9 INJECTION, SOLUTION INTRAVENOUS at 11:42

## 2023-02-24 RX ADMIN — SODIUM CHLORIDE 500 ML: 0.9 INJECTION, SOLUTION INTRAVENOUS at 08:20

## 2023-02-24 RX ADMIN — SODIUM CHLORIDE 52.2 MG: 0.9 INJECTION, SOLUTION INTRAVENOUS at 09:21

## 2023-02-24 RX ADMIN — DEXAMETHASONE SODIUM PHOSPHATE: 10 INJECTION, SOLUTION INTRAMUSCULAR; INTRAVENOUS at 08:21

## 2023-02-24 RX ADMIN — FOSAPREPITANT 150 MG: 150 INJECTION, POWDER, LYOPHILIZED, FOR SOLUTION INTRAVENOUS at 11:43

## 2023-02-24 RX ADMIN — ETOPOSIDE 261 MG: 20 INJECTION INTRAVENOUS at 10:24

## 2023-02-24 RX ADMIN — FUROSEMIDE 10 MG: 10 INJECTION, SOLUTION INTRAMUSCULAR; INTRAVENOUS at 08:20

## 2023-02-24 NOTE — PROGRESS NOTES
Received message from infusion RN in regards to patient asking for [facility] for assistance with antinausea control  Reviewed no interactions between lasix and Emend   Added per patient request

## 2023-02-24 NOTE — PROGRESS NOTES
Received voicemail from pt's wife:  Farooq Khan, it's Chloe Chavis calling  I'm sorry I missed your call yesterday  Yes, we were super excited about Mario's platelets over the weekend, so that worked out really well  So he seems to be doing fine this week so far, having a little bit of nausea and whatnot that he seems to be doing OK otherwise  They did schedule his fourth round of treatments at the [facility] for the week of April 3rd They gave us those dates today  So we have those in our calendar  I did see your note about in my chart about notifying Dr Jaya Hernández and the procedure  about when Guru Rendon chemo is supposed to be wrapped up, so I didn't  I assume someone will reach out to us just to let us know  Like, I imagine we need an office visit with Doctor Kris Luna and to talk about the surgery plan at some point  So if you hear anything about that, if you could just let us know, that would be great I think that's all for now  Fingers crossed that everything continues to go well and we will talk to you later  Thank you  Bye  Return call to pt's wife, Nelida Arce, and advised I did receive her message and scheduling will reach out with surgery information  Reviewed pt's week  Had some issues with abdominal bloating and edema  Received IV lasix 10mg with good results  Encouraged to watch sodium intake but to call if symptoms worsen or develop additional symptoms

## 2023-02-26 ENCOUNTER — APPOINTMENT (OUTPATIENT)
Dept: LAB | Facility: HOSPITAL | Age: 53
End: 2023-02-26

## 2023-02-26 DIAGNOSIS — C62.90 SEMINOMA (HCC): ICD-10-CM

## 2023-02-26 DIAGNOSIS — E83.42 HYPOMAGNESEMIA: ICD-10-CM

## 2023-02-26 DIAGNOSIS — T45.1X5A CHEMOTHERAPY-INDUCED NEUTROPENIA (HCC): ICD-10-CM

## 2023-02-26 DIAGNOSIS — D70.1 CHEMOTHERAPY-INDUCED NEUTROPENIA (HCC): ICD-10-CM

## 2023-02-26 LAB
BASOPHILS # BLD AUTO: 0.05 THOUSANDS/ÂΜL (ref 0–0.1)
BASOPHILS NFR BLD AUTO: 1 % (ref 0–1)
EOSINOPHIL # BLD AUTO: 0.01 THOUSAND/ÂΜL (ref 0–0.61)
EOSINOPHIL NFR BLD AUTO: 0 % (ref 0–6)
ERYTHROCYTE [DISTWIDTH] IN BLOOD BY AUTOMATED COUNT: 11.9 % (ref 11.6–15.1)
HCT VFR BLD AUTO: 34.4 % (ref 36.5–49.3)
HGB BLD-MCNC: 12.2 G/DL (ref 12–17)
IMM GRANULOCYTES # BLD AUTO: 0.04 THOUSAND/UL (ref 0–0.2)
IMM GRANULOCYTES NFR BLD AUTO: 1 % (ref 0–2)
LYMPHOCYTES # BLD AUTO: 1.3 THOUSANDS/ÂΜL (ref 0.6–4.47)
LYMPHOCYTES NFR BLD AUTO: 24 % (ref 14–44)
MCH RBC QN AUTO: 31.7 PG (ref 26.8–34.3)
MCHC RBC AUTO-ENTMCNC: 35.5 G/DL (ref 31.4–37.4)
MCV RBC AUTO: 89 FL (ref 82–98)
MONOCYTES # BLD AUTO: 0.08 THOUSAND/ÂΜL (ref 0.17–1.22)
MONOCYTES NFR BLD AUTO: 2 % (ref 4–12)
NEUTROPHILS # BLD AUTO: 3.98 THOUSANDS/ÂΜL (ref 1.85–7.62)
NEUTS SEG NFR BLD AUTO: 72 % (ref 43–75)
NRBC BLD AUTO-RTO: 0 /100 WBCS
PLATELET # BLD AUTO: 439 THOUSANDS/UL (ref 149–390)
PMV BLD AUTO: 9.6 FL (ref 8.9–12.7)
RBC # BLD AUTO: 3.85 MILLION/UL (ref 3.88–5.62)
WBC # BLD AUTO: 5.46 THOUSAND/UL (ref 4.31–10.16)

## 2023-02-27 ENCOUNTER — HOSPITAL ENCOUNTER (OUTPATIENT)
Dept: INFUSION CENTER | Facility: CLINIC | Age: 53
Discharge: HOME | End: 2023-02-27

## 2023-02-27 VITALS — HEIGHT: 75 IN | BODY MASS INDEX: 35.96 KG/M2 | WEIGHT: 289.24 LBS

## 2023-02-27 DIAGNOSIS — T45.1X5A CHEMOTHERAPY-INDUCED NEUTROPENIA (HCC): ICD-10-CM

## 2023-02-27 DIAGNOSIS — D70.1 CHEMOTHERAPY-INDUCED NEUTROPENIA (HCC): ICD-10-CM

## 2023-02-27 DIAGNOSIS — C62.90 SEMINOMA (HCC): ICD-10-CM

## 2023-02-27 DIAGNOSIS — E83.42 HYPOMAGNESEMIA: Primary | ICD-10-CM

## 2023-02-27 RX ORDER — ACETAMINOPHEN 325 MG/1
650 TABLET ORAL ONCE
Status: COMPLETED | OUTPATIENT
Start: 2023-02-27 | End: 2023-02-27

## 2023-02-27 RX ORDER — SODIUM CHLORIDE 9 MG/ML
20 INJECTION, SOLUTION INTRAVENOUS ONCE
Status: COMPLETED | OUTPATIENT
Start: 2023-02-27 | End: 2023-02-27

## 2023-02-27 RX ADMIN — DIPHENHYDRAMINE HYDROCHLORIDE 25 MG: 50 INJECTION, SOLUTION INTRAMUSCULAR; INTRAVENOUS at 11:43

## 2023-02-27 RX ADMIN — PEGFILGRASTIM 6 MG: KIT SUBCUTANEOUS at 13:25

## 2023-02-27 RX ADMIN — BLEOMYCIN SULFATE 30 UNITS: 30 POWDER, FOR SOLUTION INTRAMUSCULAR; INTRAPLEURAL; INTRAVENOUS; SUBCUTANEOUS at 12:57

## 2023-02-27 RX ADMIN — SODIUM CHLORIDE 20 ML/HR: 0.9 INJECTION, SOLUTION INTRAVENOUS at 11:38

## 2023-02-27 RX ADMIN — DEXAMETHASONE SODIUM PHOSPHATE 10 MG: 10 INJECTION, SOLUTION INTRAMUSCULAR; INTRAVENOUS at 12:08

## 2023-02-27 RX ADMIN — ACETAMINOPHEN 650 MG: 325 TABLET ORAL at 11:43

## 2023-02-27 NOTE — PROGRESS NOTES
Patient arrived on unit for treatment  Denies any present issues/concerns  Cleared for treatment today  Tolerated Bleomycin without incident  Neulasta onpro applied to [place]  Patient and wife aware of time it will infuse on 2/28/2023  Aware of next infusion appointment   AVS given to patient

## 2023-02-28 ENCOUNTER — PATIENT OUTREACH (OUTPATIENT)
Dept: HEMATOLOGY ONCOLOGY | Facility: CLINIC | Age: 53
End: 2023-02-28

## 2023-02-28 ENCOUNTER — TELEPHONE (OUTPATIENT)
Dept: OTHER | Facility: OTHER | Age: 53
End: 2023-02-28

## 2023-02-28 DIAGNOSIS — R30.0 DYSURIA: Primary | ICD-10-CM

## 2023-02-28 RX ORDER — CIPROFLOXACIN 500 MG/1
500 TABLET, FILM COATED ORAL EVERY 12 HOURS SCHEDULED
Qty: 10 TABLET | Refills: 0 | Status: SHIPPED | OUTPATIENT
Start: 2023-02-28 | End: 2023-03-05

## 2023-02-28 NOTE — PROGRESS NOTES
I talked with the patient's wife by phone today  Patient reports dysuria over the past few days  No fever  No change in appetite  Some visible blood on wiping  Plts 400k 2 days ago  Cipro 500 every 12 x5 days is prescribed  If symptoms resolve overnight, continue antibiotics  If symptoms do not resolve or worsen proceed with urinalysis and culture as requested  She voiced understanding and agreement

## 2023-02-28 NOTE — PROGRESS NOTES
Received voicemail from pt's wife stating pt had a good day yesterday and night with no ER trip  Feels adding the steroid prior to Bleo really helped out  Inquiring about a letter from insurance about denial for recent admission  Looking for recommendations on who to contact  Returned call to Latha Watkins  Pt did well after treatment yesterday and through the night  Discussed insurance denial for hospitalization and advised call to Medical Billing at [phone]  Updated on change to CT scan and f/u with Dr Ankita Brito based on completion of chemotherapy

## 2023-02-28 NOTE — TELEPHONE ENCOUNTER
Patient's wife is calling with concerns that he has been experiencing burning with urination and a small amount of blood after starting chemotherapy    Paged to on call provider via TC

## 2023-03-01 ENCOUNTER — PATIENT OUTREACH (OUTPATIENT)
Dept: HEMATOLOGY ONCOLOGY | Facility: CLINIC | Age: 53
End: 2023-03-01

## 2023-03-01 ENCOUNTER — DOCUMENTATION (OUTPATIENT)
Dept: HEMATOLOGY ONCOLOGY | Facility: CLINIC | Age: 53
End: 2023-03-01

## 2023-03-01 NOTE — PROGRESS NOTES
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

## 2023-03-01 NOTE — PROGRESS NOTES
Received voicemail from pt's wife 2/28/23 at [address]  "Kendy Kirby, it's Marquita Rumple calling  I know we just spoke this afternoon, but at the end of the day Karan Mathew was having some slight bloody drainage when urinating when you went to urinate  So I just, it was just like a couple of dribbles  He's lefebre who? I was having some slight burning, an irritation type burning  So just wanted to let you know, we did reach out to the oncologist on call, which was Doctor Iris Bowers who ordered Cipro just in case  And Doctor Iris Bowers said that if his symptoms worsen to go to the lab and have a urine test tomorrow  But if the symptoms do not worsen and they remain the same or improved to just not have the urine test and just continue with the Cipro  So I just wanted to make you aware, I did not speak to urology, but as I said, we did speak to oncology  So we'll continue to monitor through the night and hopefully all goes well  Thank you so much and I'm sure we'll be in touch soon  Bye "    Received voicemail from pt's wife 3/1/23 at [address] [facility]:  "Again, it's Marquita Rumple calling  It's about 8:40 AM on Wednesday  I was just wondering if you could possibly give me a call  I just wanted to talk to you about what transpired yesterday with Karan Mathew after we spoke  My number is [phone]  Thank you  Have a great day  Bye  Returned call to pt's wife  States pt took his second dose of antibiotic, Cipro this morning  Trace amount of bleeding this morning then turned pink to yellow with urination and the yellow  Dysuria improving  No fever  Symptoms improving  Encouraged aggressive hydration  Wife requesting Urology be notified  Folow-up call to Ilia and iformed her I did update Dr Soto Quiñonez on the above ad he is in agreement with plan for antibiotics and conservative measures  Encouraged her to call with any new or worsening symptoms

## 2023-03-02 NOTE — PROGRESS NOTES
Hematology/Oncology Outpatient Office Note    Date of Service: 3/10/2023    [facility] Ibarra  [facility]  [phone]    Reason for Consultation:   Chief Complaint   Patient presents with   • Follow-up       Cancer Stage at diagnosis: IIIC    Referral Physician: No ref  provider found    Primary Care Physician:  Vladimir Yanez DO     Nickname: Ikertobias Greenwood    Spouse: Candis Berry ECO    Today's ECO     Goals and Barriers:  Current Goal: Minimize effects of disease burden, extend life  Barriers to accomplishing this: None    Patient's Capacity to Self Care:  Patient is able to self care    Code Status: not addressed today    Advanced directives: not addressed today    ASSESSMENT & PLAN      Diagnosis ICD-10-CM Associated Orders   1  Seminoma [facility])  C62 90             This is a 45 y o  c PMHx notable for seasonal allergies, 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     5 year Survival rate 73% (Testicular Cancer Survival Rates  Testicular Cancer Prognosis) per the ACS  Baseline PFT: WNL    Discussion of decision making  • Oncology history updated, accordingly, during this visit  • Goals of care/patient communication  o I discussed with the patient the clinical course leading up to their cancer diagnosis  I reviewed relevant office notes, imaging reports and pathology result as well   o I told the patient that this is a case of potentially curable disease and what this means  We discussed that the goal of anti-cancer therapy is to provide best quality of life, extend overall survival, and progression free survival as shown in clinical trials   We also discussed that there might be a point when the cancer will no longer respond to this anti-neoplastic therapy    o I explained the risks/benefits of the proposed cancer therapy: Bleomycin 30 units IV, Etoposide 100mg/m2, cisplatin 20mg/m2, and after discussion including understanding risks of possible life-threatening complications and therapy-related malignancy development, informed consent for blood products and treatment has been signed and obtained  • TNM/Staging At Diagnosis  o [id number] S2  Cancer Staging   IIIC, poor risk (non pulmonary visceral mets)  • Disease Features/Tumor Markers/Genetics  o Tumor Marker: 1/18/2023:  (3x ULN), AFP (5 2, WNL), HCG (13, slightly high)  o Notable Path Features:   1/11/2023: Lymph node, left neck, excision: Germ cell tumor, compatible with metastatic seminoma  Background nonnecrotizing granulomatous inflammation  • Treatment: BEP  • Other Supportive care:  • Treatment Team Members  o Surgeon: Dr Dung Kingsley  o Rad Onc  o Palliative  • Labs: 12/28/2022: creat 0 96, T bili 0 63, WBC 6 49k, Hgb 15 9, plt 244k  • Diagnostics   1/3/2023 CT soft tissue neck: Moderate left-sided adenopathy primarily within the left neck, posterior jugular chain levels 3 through 5 with a prominent node present in the upper mediastinum  1/3/2023 CT Chest w/c: Left supraclavicular, superior mediastinal, posterior mediastinal (15mm)/retrocrural (2 2 cm)and suspected partially imaged left abdominal adenopathy  1/4/2023: CT Abd/pelv w/c: Bulky retroperitoneal lymphadenopathy consistent with lymphoma (left para-aortic region extending into the left upper quadrant measures 8 2 x 10 9 cm  Aortocaval lymphadenopathy measures 3 7 x 5 2 cm  More inferior left para-aortic lymphadenopathy at the bifurcation measures 5 0 x 5 9 cm )  1/18/2023: Testicular U/S shows a left testicular lesion (1 5 x 1 7 x 0 9 cm) and the RP adenopathy is more to the Left of the aorta    Discussion of decision making    I personally reviewed the following lab results, the image studies, pathology, other specialty/physicians consult notes and recommendations, and outside medical records from [facility]   I had a lengthy discussion with the patient and shared the work-up findings  We discussed the diagnosis and management plan as below  I spent 47 minutes reviewing the records (labs, clinician notes, outside records, medical history, ordering medicine/tests/procedures, interpreting the imaging/labs previously done) and coordination of care as well as direct time with the patient today, of which greater than 50% of the time was spent in counseling and coordination of care with the patient/family  · Plan/Labs  · F/u Urology for goal of orchiectomy  Infusion chairs ordered for BEP q21 days x4 cycles with Neulasta support, C3 coming up  · Added IV Lasix 10mg to day 4 of C3 and C4 due to fluid induced hypervolemia  · CT CAP w/c scheduled for 4/5/2023 after BEP for post chemo restaging along with tumor markers at that time        Follow Up: q3 weeks for 4 cycles while on systemic therapy (scheduled thru 4/18/2023)    All questions were answered to the patient's satisfaction during this encounter  The patient knows the contact information for our office and knows to reach out for any relevant concerns related to this encounter  They are to call for any temperature 100 4 or higher, new symptoms including but not restricted to shaking chills, decreased appetite, nausea, vomiting, diarrhea, increased fatigue, shortness of breath or chest pain, confusion, and not feeling the strength to come to the clinic  For all other listed problems and medical diagnosis in their chart - they are managed by PCP and/or other specialists, which the patient acknowledges  Thank you very much for your consultation and making us a part of this patient's care  We are continuing to follow closely with you  Please do not hesitate to reach out to me with any additional questions or concerns  Nancy Contreras MD  Hematology & Medical Oncology Staff Physician             Disclaimer: This document was prepared using [employer] Direct technology   If a word or phrase is confusing, or does not make sense, this is likely due to recognition error which was not discovered during this clinician's review  If you believe an error has occurred, please contact me through [address] [facility] line for rashida? cation        ONCOLOGY HISTORY OF PRESENT ILLNESS        Oncology History   Seminoma ([facility] [address] )   1/11/2023 -  Cancer Staged    Staging form: Testis, AJCC 8th Edition  - Clinical stage from 1/11/2023: cT4, cN3, pM1a, S2 - Signed by Tate Brady MD on 1/20/2023  Stage prefix: Initial diagnosis  Lactate dehydrogenase (LDH) (U/L): 699  Human chorionic gonadotropin (hCG) (mIU/mL): 13  Alpha fetoprotein (AFP) (ng/mL): 5 2  Laterality: Left  Sites of metastasis: Distant lymph nodes, NOS, Retroperitoneal lymph node, NOS  Source of metastatic specimen: Supraclavicular Lymph Nodes  Staged by: Managing physician       1/19/2023 Initial Diagnosis    Seminoma ([facility] [address] )     1/30/2023 -  Chemotherapy    pegfilgrastim (Sowmya Jose Elias), 6 mg, Subcutaneous, Once, 2 of 4 cycles  Administration: 6 mg (2/6/2023), 6 mg (2/27/2023), 6 mg (3/6/2023)  bleomycin (BLENOXANE) IVPB, 30 Units, Intravenous, Once, 2 of 4 cycles  Administration: 30 Units (1/30/2023), 30 Units (2/6/2023), 30 Units (2/13/2023), 30 Units (2/20/2023), 30 Units (2/27/2023), 30 Units (3/6/2023)  CISplatin (PLATINOL) infusion, 20 mg/m2 = 52 2 mg, Intravenous, Once, 2 of 4 cycles  Administration: 52 2 mg (1/30/2023), 52 2 mg (1/31/2023), 52 2 mg (2/1/2023), 52 2 mg (2/2/2023), 52 2 mg (2/3/2023), 52 2 mg (2/20/2023), 52 2 mg (2/21/2023), 52 2 mg (2/22/2023), 52 2 mg (2/23/2023), 52 2 mg (2/24/2023)  fosaprepitant (EMEND) IVPB, 150 mg, Intravenous, Once, 2 of 4 cycles  Administration: 150 mg (1/30/2023), 150 mg (2/20/2023), 150 mg (2/24/2023)           SUBJECTIVE  (INTERVAL HISTORY)      Clotting History None   Bleeding History None   Cancer History Seminoma   Family Cancer History Mom/sister (breast), same sister (melanoma), father (basal cell skin cancer)   H/O Blood/Plt Transfusion None Tobacco/etoh/drug abuse No nicotine use, etoh abuse, rec drug use       Cancer Screening history C-scope (2019)   Occupation  at [employer] groin and L neck pain  Interval events: doing well, no acute issues except hip pain  I have reviewed the relevant past medical, surgical, social and family history  I have also reviewed allergies and medications for this patient  Review of Systems    Baseline weight: 300 lbs    Has had intermittent lower back, thigh rash since summer of 2022  Denies F/C, N/V, SOB, CP, LH, HA, itching, gen weakness, melena, hematuria, hematochezia, falls, diarrhea, or constipation       A 10-point review of system was performed, pertinent positive and negative were detailed as above  Otherwise, the 10-point review of system was negative  Past Medical History:   Diagnosis Date   • Allergic 1/1/2000    seasonal allergies   • Impacted cerumen    • Lymphadenopathy    • Obesity, unspecified    • Seasonal allergies    • Seminoma of testis ([facility] [address] )    • Testicular cancer ([facility] [address] ) 01/17/2023   • Torn meniscus     right knee- surgical repair today 7/19/2021   • Wears glasses        Past Surgical History:   Procedure Laterality Date   • FACIAL/NECK BIOPSY Left 1/11/2023    Procedure: EXCISION OF LEFT NECK LYMPH NODE WITH LYMPHOMA PROTOCOL;  Surgeon: Belle Brown MD;  Location: [facility];  Service: Surgical Oncology   • FL GUIDED CENTRAL VENOUS ACCESS DEVICE INSERTION  1/23/2023   • HEMORROIDECTOMY     • KNEE SURGERY  7/19/2021   • WA ARTHRS KNE SURG W/MENISCECTOMY MED/LAT W/SHVG Right 07/19/2021    Procedure: ARTHROSCOPY KNEE, partial lateral meniscectomy;  Surgeon: Meek Gould MD;  Location: [facility];  Service: Orthopedics   • TUNNELED VENOUS PORT PLACEMENT N/A 1/23/2023    Procedure: INSERTION VENOUS PORT (PORT-A-CATH);   Surgeon: Belle Brown MD;  Location: [facility];  Service: Surgical Oncology       Family History   Problem Relation Age of Onset   • Cancer Mother         Breast Cancer   • Breast cancer Mother    • Breast cancer additional onset Mother    • Cancer Father         Skin carcenoma   • Skin cancer Father    • Breast cancer additional onset Sister    • Cancer Sister         Breast Cancer and Skin carcenoma   • Skin cancer Brother        Social History     Socioeconomic History   • Marital status: /Civil Union     Spouse name: Not on file   • Number of children: Not on file   • Years of education: Not on file   • Highest education level: Not on file   Occupational History   • Not on file   Tobacco Use   • Smoking status: Never     Passive exposure: Past   • Smokeless tobacco: Never   • Tobacco comments:     During childhood, both parents smoked  Vaping Use   • Vaping Use: Never used   Substance and Sexual Activity   • Alcohol use: Not Currently     Alcohol/week: 3 0 standard drinks     Types: 1 Glasses of wine, 1 Cans of beer, 1 Shots of liquor per week     Comment: 1-2 weekly   • Drug use: Never   • Sexual activity: Yes     Partners: Female   Other Topics Concern   • Not on file   Social History Narrative   • Not on file     Social Determinants of Health     Financial Resource Strain: Not on file   Food Insecurity: No Food Insecurity   • Worried About Running Out of Food in the Last Year: Never true   • Ran Out of Food in the Last Year: Never true   Transportation Needs: No Transportation Needs   • Lack of Transportation (Medical): No   • Lack of Transportation (Non-Medical):  No   Physical Activity: Not on file   Stress: Not on file   Social Connections: Not on file   Intimate Partner Violence: Not on file   Housing Stability: Low Risk    • Unable to Pay for Housing in the Last Year: No   • Number of Places Lived in the Last Year: 1   • Unstable Housing in the Last Year: No       Allergies   Allergen Reactions   • Dianah Dandy - Food Allergy Vomiting       Current Outpatient Medications   Medication Sig Dispense Refill   • loratadine (CLARITIN) 10 mg tablet Take 10 mg by mouth daily     • ondansetron (ZOFRAN-ODT) 8 mg disintegrating tablet Take 1 tablet (8 mg total) by mouth every 8 (eight) hours as needed for nausea or vomiting 90 tablet 2   • prochlorperazine (COMPAZINE) 10 mg tablet Take 1 tablet (10 mg total) by mouth every 6 (six) hours as needed for nausea or vomiting 30 tablet 0   • triamcinolone (KENALOG) 0 1 % cream        No current facility-administered medications for this visit  (Not in a hospital admission)      Objective:     24 Hour Vitals Assessment:     Vitals:    03/10/23 0842   BP: 144/70   Resp: 16   Temp: 98 9 °F (37 2 °C)       PHYSICIAN EXAM    General: Appearance: alert, cooperative, no distress  HEENT: Normocephalic, atraumatic  No scleral icterus  conjunctivae clear  EOMI  Chest: No tenderness to palpation  No open wound noted  Lungs: Clear to auscultation bilaterally, Respirations unlabored  Cardiac: Regular rate and rhythm, +S1and S2  Abdomen: Soft, non-tender, non-distended  Bowel sounds are normal    Extremities:  No edema, cyanosis, clubbing  Skin: Skin color, turgor are normal  No rashes  Lymphatics: no palpable axillary, or inguinal adenopathy  L supra-clavicular LN palpated with incision c/d/i    Port:  c/d/i    Neurologic: Awake, Alert, and oriented, no gross focal deficits noted b/l  DATA REVIEW:    Pathology Result:    Final Diagnosis   Date Value Ref Range Status   01/11/2023   Final    A  Lymph node, left neck, excision:  -Germ cell tumor, compatible with metastatic seminoma (see note)  -Background nonnecrotizing granulomatous inflammation (see note)  Comment: This is an appended report  These results have been appended to a previously preliminary verified report  10/25/2019   Final    A  Cecum, cold biopsy:  - Portion of benign colonic mucosa with prominent reactive lymphoid aggregate  B  Ascending colon, cold snare:  - Tubular adenoma    - Negative for high grade dysplasia/ carcinoma  C  Colon, splenic flexure, cold snare:  - Tubular adenoma  - Negative for high grade dysplasia/ carcinoma  Image Results:   Image result are reviewed and documented in Hematology/Oncology history    XR chest 1 view portable  Narrative: CHEST     INDICATION:   Neutropenic fever  COMPARISON:  Chest x-ray 2/6/2023    EXAM PERFORMED/VIEWS:  XR CHEST PORTABLE    FINDINGS:  Left-sided Mediport line tip terminates at the SVC  Cardiomediastinal silhouette appears unremarkable  The lungs are clear  No pneumothorax or pleural effusion  Osseous structures appear within normal limits for patient age  Impression: No active pulmonary disease  Workstation performed: [id number]      LABS:  Lab data are reviewed and documented in HemOnc history  Lab Results   Component Value Date    HGB 10 2 (L) 03/04/2023    HCT 28 8 (L) 03/04/2023    MCV 89 03/04/2023     (L) 03/04/2023    WBC 3 47 (L) 03/04/2023    NRBC 1 03/04/2023    BANDSPCT 5 03/04/2023    ATYLMPCT 2 (H) 02/19/2023     Lab Results   Component Value Date    K 4 5 02/19/2023     02/19/2023    CO2 27 02/19/2023    BUN 18 02/19/2023    CREATININE 0 97 02/19/2023    GLUF 96 02/19/2023    CALCIUM 8 6 02/19/2023    CORRECTEDCA 8 9 02/14/2023    AST 31 02/19/2023    ALT 20 02/19/2023    ALKPHOS 100 02/19/2023    EGFR 88 02/19/2023       No results found for: IRON, TIBC, FERRITIN    No results found for: [id number]    No results for input(s): WBC, CREAT in the last 72 hours      Invalid input(s):  PLT    By:  Seth Lundborg, MD, 3/10/2023, 8:47 AM

## 2023-03-04 ENCOUNTER — APPOINTMENT (OUTPATIENT)
Dept: LAB | Facility: HOSPITAL | Age: 53
End: 2023-03-04

## 2023-03-04 DIAGNOSIS — E83.42 HYPOMAGNESEMIA: ICD-10-CM

## 2023-03-04 DIAGNOSIS — T45.1X5A CHEMOTHERAPY-INDUCED NEUTROPENIA (HCC): ICD-10-CM

## 2023-03-04 DIAGNOSIS — C62.90 SEMINOMA (HCC): ICD-10-CM

## 2023-03-04 DIAGNOSIS — D70.1 CHEMOTHERAPY-INDUCED NEUTROPENIA (HCC): ICD-10-CM

## 2023-03-04 LAB
BASOPHILS # BLD MANUAL: 0 THOUSAND/UL (ref 0–0.1)
BASOPHILS NFR MAR MANUAL: 0 % (ref 0–1)
EOSINOPHIL # BLD MANUAL: 0 THOUSAND/UL (ref 0–0.4)
EOSINOPHIL NFR BLD MANUAL: 0 % (ref 0–6)
ERYTHROCYTE [DISTWIDTH] IN BLOOD BY AUTOMATED COUNT: 11.9 % (ref 11.6–15.1)
HCT VFR BLD AUTO: 28.8 % (ref 36.5–49.3)
HGB BLD-MCNC: 10.2 G/DL (ref 12–17)
LYMPHOCYTES # BLD AUTO: 1.25 THOUSAND/UL (ref 0.6–4.47)
LYMPHOCYTES # BLD AUTO: 36 % (ref 14–44)
MCH RBC QN AUTO: 31.6 PG (ref 26.8–34.3)
MCHC RBC AUTO-ENTMCNC: 35.4 G/DL (ref 31.4–37.4)
MCV RBC AUTO: 89 FL (ref 82–98)
MONOCYTES # BLD AUTO: 0.38 THOUSAND/UL (ref 0–1.22)
MONOCYTES NFR BLD: 11 % (ref 4–12)
NEUTROPHILS # BLD MANUAL: 1.84 THOUSAND/UL (ref 1.85–7.62)
NEUTS BAND NFR BLD MANUAL: 5 % (ref 0–8)
NEUTS SEG NFR BLD AUTO: 48 % (ref 43–75)
NRBC BLD AUTO-RTO: 1 /100 WBC (ref 0–2)
PLATELET # BLD AUTO: 110 THOUSANDS/UL (ref 149–390)
PLATELET BLD QL SMEAR: ABNORMAL
PMV BLD AUTO: 9.9 FL (ref 8.9–12.7)
RBC # BLD AUTO: 3.23 MILLION/UL (ref 3.88–5.62)
RBC MORPH BLD: NORMAL
WBC # BLD AUTO: 3.47 THOUSAND/UL (ref 4.31–10.16)

## 2023-03-06 ENCOUNTER — HOSPITAL ENCOUNTER (OUTPATIENT)
Dept: INFUSION CENTER | Facility: CLINIC | Age: 53
Discharge: HOME | End: 2023-03-06

## 2023-03-06 VITALS
TEMPERATURE: 97 F | BODY MASS INDEX: 35.53 KG/M2 | SYSTOLIC BLOOD PRESSURE: 127 MMHG | WEIGHT: 285.72 LBS | RESPIRATION RATE: 16 BRPM | HEART RATE: 103 BPM | DIASTOLIC BLOOD PRESSURE: 81 MMHG | HEIGHT: 75 IN

## 2023-03-06 DIAGNOSIS — E87.70 HYPERVOLEMIA, UNSPECIFIED HYPERVOLEMIA TYPE: ICD-10-CM

## 2023-03-06 DIAGNOSIS — C62.90 SEMINOMA (HCC): ICD-10-CM

## 2023-03-06 DIAGNOSIS — D70.1 CHEMOTHERAPY-INDUCED NEUTROPENIA (HCC): ICD-10-CM

## 2023-03-06 DIAGNOSIS — E83.42 HYPOMAGNESEMIA: Primary | ICD-10-CM

## 2023-03-06 DIAGNOSIS — T45.1X5A CHEMOTHERAPY-INDUCED NEUTROPENIA (HCC): ICD-10-CM

## 2023-03-06 RX ORDER — SODIUM CHLORIDE 9 MG/ML
20 INJECTION, SOLUTION INTRAVENOUS ONCE
Status: CANCELLED | OUTPATIENT
Start: 2023-03-14

## 2023-03-06 RX ORDER — ACETAMINOPHEN 325 MG/1
650 TABLET ORAL ONCE
Status: COMPLETED | OUTPATIENT
Start: 2023-03-06 | End: 2023-03-06

## 2023-03-06 RX ORDER — ACETAMINOPHEN 325 MG/1
650 TABLET ORAL ONCE
Status: CANCELLED | OUTPATIENT
Start: 2023-03-20

## 2023-03-06 RX ORDER — SODIUM CHLORIDE 9 MG/ML
20 INJECTION, SOLUTION INTRAVENOUS ONCE
Status: CANCELLED | OUTPATIENT
Start: 2023-03-13

## 2023-03-06 RX ORDER — SODIUM CHLORIDE 9 MG/ML
20 INJECTION, SOLUTION INTRAVENOUS ONCE
Status: CANCELLED | OUTPATIENT
Start: 2023-03-16

## 2023-03-06 RX ORDER — SODIUM CHLORIDE 9 MG/ML
20 INJECTION, SOLUTION INTRAVENOUS ONCE
Status: COMPLETED | OUTPATIENT
Start: 2023-03-06 | End: 2023-03-06

## 2023-03-06 RX ORDER — ACETAMINOPHEN 325 MG/1
650 TABLET ORAL ONCE
Status: CANCELLED | OUTPATIENT
Start: 2023-03-13

## 2023-03-06 RX ORDER — ACETAMINOPHEN 325 MG/1
650 TABLET ORAL ONCE
OUTPATIENT
Start: 2023-03-27

## 2023-03-06 RX ORDER — SODIUM CHLORIDE 9 MG/ML
20 INJECTION, SOLUTION INTRAVENOUS ONCE
Status: CANCELLED | OUTPATIENT
Start: 2023-03-17

## 2023-03-06 RX ORDER — SODIUM CHLORIDE 9 MG/ML
20 INJECTION, SOLUTION INTRAVENOUS ONCE
Status: CANCELLED | OUTPATIENT
Start: 2023-03-15

## 2023-03-06 RX ORDER — SODIUM CHLORIDE 9 MG/ML
20 INJECTION, SOLUTION INTRAVENOUS ONCE
OUTPATIENT
Start: 2023-03-27

## 2023-03-06 RX ORDER — SODIUM CHLORIDE 9 MG/ML
20 INJECTION, SOLUTION INTRAVENOUS ONCE
Status: CANCELLED | OUTPATIENT
Start: 2023-03-20

## 2023-03-06 RX ADMIN — SODIUM CHLORIDE 20 ML/HR: 0.9 INJECTION, SOLUTION INTRAVENOUS at 10:17

## 2023-03-06 RX ADMIN — DIPHENHYDRAMINE HYDROCHLORIDE 25 MG: 50 INJECTION, SOLUTION INTRAMUSCULAR; INTRAVENOUS at 10:41

## 2023-03-06 RX ADMIN — PEGFILGRASTIM 6 MG: KIT SUBCUTANEOUS at 11:54

## 2023-03-06 RX ADMIN — BLEOMYCIN SULFATE 30 UNITS: 30 POWDER, FOR SOLUTION INTRAMUSCULAR; INTRAPLEURAL; INTRAVENOUS; SUBCUTANEOUS at 11:30

## 2023-03-06 RX ADMIN — ACETAMINOPHEN 650 MG: 325 TABLET ORAL at 10:42

## 2023-03-06 RX ADMIN — DEXAMETHASONE SODIUM PHOSPHATE 10 MG: 10 INJECTION, SOLUTION INTRAMUSCULAR; INTRAVENOUS at 10:18

## 2023-03-06 NOTE — PROGRESS NOTES
Patient arrived on unit for treatment  Denies any present issues/concerns  Cleared for treatment today  Tolerated Bleomycin without incident  Neulasta onpro applied to LA   Patient and wife aware of time it will infuse on 3/7/23

## 2023-03-10 ENCOUNTER — OFFICE VISIT (OUTPATIENT)
Dept: HEMATOLOGY ONCOLOGY | Facility: CLINIC | Age: 53
End: 2023-03-10

## 2023-03-10 VITALS
RESPIRATION RATE: 16 BRPM | WEIGHT: 292 LBS | DIASTOLIC BLOOD PRESSURE: 70 MMHG | TEMPERATURE: 98.9 F | BODY MASS INDEX: 36.31 KG/M2 | SYSTOLIC BLOOD PRESSURE: 144 MMHG | HEIGHT: 75 IN

## 2023-03-10 DIAGNOSIS — C62.90 SEMINOMA (HCC): Primary | ICD-10-CM

## 2023-03-10 PROBLEM — E87.70 HYPERVOLEMIA: Chronic | Status: ACTIVE | Noted: 2023-03-10

## 2023-03-10 RX ORDER — FUROSEMIDE 10 MG/ML
10 INJECTION INTRAMUSCULAR; INTRAVENOUS ONCE
Status: CANCELLED
Start: 2023-03-16 | End: 2023-03-16

## 2023-03-12 ENCOUNTER — TELEPHONE (OUTPATIENT)
Dept: OTHER | Facility: OTHER | Age: 53
End: 2023-03-12

## 2023-03-12 ENCOUNTER — APPOINTMENT (OUTPATIENT)
Dept: LAB | Facility: HOSPITAL | Age: 53
End: 2023-03-12

## 2023-03-12 DIAGNOSIS — D70.1 CHEMOTHERAPY-INDUCED NEUTROPENIA (HCC): ICD-10-CM

## 2023-03-12 DIAGNOSIS — E83.42 HYPOMAGNESEMIA: ICD-10-CM

## 2023-03-12 DIAGNOSIS — C62.90 SEMINOMA (HCC): ICD-10-CM

## 2023-03-12 DIAGNOSIS — T45.1X5A CHEMOTHERAPY-INDUCED NEUTROPENIA (HCC): ICD-10-CM

## 2023-03-12 DIAGNOSIS — E87.70 HYPERVOLEMIA, UNSPECIFIED HYPERVOLEMIA TYPE: ICD-10-CM

## 2023-03-12 LAB
ALBUMIN SERPL BCP-MCNC: 4 G/DL (ref 3.5–5)
ALP SERPL-CCNC: 222 U/L (ref 34–104)
ALT SERPL W P-5'-P-CCNC: 13 U/L (ref 7–52)
ANION GAP SERPL CALCULATED.3IONS-SCNC: 9 MMOL/L (ref 4–13)
AST SERPL W P-5'-P-CCNC: 20 U/L (ref 13–39)
BASOPHILS # BLD MANUAL: 0 THOUSAND/UL (ref 0–0.1)
BASOPHILS NFR MAR MANUAL: 0 % (ref 0–1)
BILIRUB SERPL-MCNC: 0.38 MG/DL (ref 0.2–1)
BUN SERPL-MCNC: 17 MG/DL (ref 5–25)
CALCIUM SERPL-MCNC: 9.4 MG/DL (ref 8.4–10.2)
CHLORIDE SERPL-SCNC: 101 MMOL/L (ref 96–108)
CO2 SERPL-SCNC: 29 MMOL/L (ref 21–32)
CREAT SERPL-MCNC: 1.1 MG/DL (ref 0.6–1.3)
EOSINOPHIL # BLD MANUAL: 0 THOUSAND/UL (ref 0–0.4)
EOSINOPHIL NFR BLD MANUAL: 0 % (ref 0–6)
ERYTHROCYTE [DISTWIDTH] IN BLOOD BY AUTOMATED COUNT: 14.3 % (ref 11.6–15.1)
GFR SERPL CREATININE-BSD FRML MDRD: 75 ML/MIN/1.73SQ M
GLUCOSE P FAST SERPL-MCNC: 95 MG/DL (ref 65–99)
HCT VFR BLD AUTO: 31.7 % (ref 36.5–49.3)
HGB BLD-MCNC: 10.5 G/DL (ref 12–17)
LYMPHOCYTES # BLD AUTO: 2.98 THOUSAND/UL (ref 0.6–4.47)
LYMPHOCYTES # BLD AUTO: 4 % (ref 14–44)
MAGNESIUM SERPL-MCNC: 1.4 MG/DL (ref 1.9–2.7)
MCH RBC QN AUTO: 31.3 PG (ref 26.8–34.3)
MCHC RBC AUTO-ENTMCNC: 33.1 G/DL (ref 31.4–37.4)
MCV RBC AUTO: 94 FL (ref 82–98)
MONOCYTES # BLD AUTO: 2.23 THOUSAND/UL (ref 0–1.22)
MONOCYTES NFR BLD: 3 % (ref 4–12)
NEUTROPHILS # BLD MANUAL: 69.26 THOUSAND/UL (ref 1.85–7.62)
NEUTS BAND NFR BLD MANUAL: 15 % (ref 0–8)
NEUTS SEG NFR BLD AUTO: 78 % (ref 43–75)
PATHOLOGY REVIEW: YES
PLATELET # BLD AUTO: 98 THOUSANDS/UL (ref 149–390)
PLATELET BLD QL SMEAR: ABNORMAL
PMV BLD AUTO: 9.3 FL (ref 8.9–12.7)
POTASSIUM SERPL-SCNC: 3.8 MMOL/L (ref 3.5–5.3)
PROT SERPL-MCNC: 7.1 G/DL (ref 6.4–8.4)
RBC # BLD AUTO: 3.36 MILLION/UL (ref 3.88–5.62)
RBC MORPH BLD: NORMAL
SODIUM SERPL-SCNC: 139 MMOL/L (ref 135–147)
WBC # BLD AUTO: 74.47 THOUSAND/UL (ref 4.31–10.16)

## 2023-03-12 NOTE — TELEPHONE ENCOUNTER
Lab Result: WBC 74 47   Date/Time Drawn: 03-12-23 @ 1000   Ordering Provider: Dr Steph Ordoñez Name: Chet Evans  The following critical/stat result was read back to the lab as stated above and [employer] to the on-call provider  The provider confirmed receipt of the message

## 2023-03-13 ENCOUNTER — HOSPITAL ENCOUNTER (OUTPATIENT)
Dept: INFUSION CENTER | Facility: CLINIC | Age: 53
Discharge: HOME | End: 2023-03-13

## 2023-03-13 ENCOUNTER — TELEPHONE (OUTPATIENT)
Dept: HEMATOLOGY ONCOLOGY | Facility: CLINIC | Age: 53
End: 2023-03-13

## 2023-03-13 VITALS
HEIGHT: 75 IN | SYSTOLIC BLOOD PRESSURE: 114 MMHG | WEIGHT: 289.68 LBS | HEART RATE: 97 BPM | DIASTOLIC BLOOD PRESSURE: 77 MMHG | BODY MASS INDEX: 36.02 KG/M2 | TEMPERATURE: 97 F | RESPIRATION RATE: 18 BRPM

## 2023-03-13 DIAGNOSIS — D70.1 CHEMOTHERAPY-INDUCED NEUTROPENIA (HCC): ICD-10-CM

## 2023-03-13 DIAGNOSIS — E83.42 HYPOMAGNESEMIA: Primary | ICD-10-CM

## 2023-03-13 DIAGNOSIS — T45.1X5A CHEMOTHERAPY-INDUCED NEUTROPENIA (HCC): ICD-10-CM

## 2023-03-13 DIAGNOSIS — C62.90 SEMINOMA (HCC): ICD-10-CM

## 2023-03-13 DIAGNOSIS — E87.70 HYPERVOLEMIA, UNSPECIFIED HYPERVOLEMIA TYPE: ICD-10-CM

## 2023-03-13 RX ORDER — MAGNESIUM SULFATE HEPTAHYDRATE 40 MG/ML
2 INJECTION, SOLUTION INTRAVENOUS ONCE
Status: CANCELLED
Start: 2023-03-13

## 2023-03-13 RX ORDER — MAGNESIUM SULFATE HEPTAHYDRATE 40 MG/ML
2 INJECTION, SOLUTION INTRAVENOUS ONCE
Status: COMPLETED | OUTPATIENT
Start: 2023-03-13 | End: 2023-03-13

## 2023-03-13 RX ORDER — ACETAMINOPHEN 325 MG/1
650 TABLET ORAL ONCE
Status: COMPLETED | OUTPATIENT
Start: 2023-03-13 | End: 2023-03-13

## 2023-03-13 RX ORDER — SODIUM CHLORIDE 9 MG/ML
20 INJECTION, SOLUTION INTRAVENOUS ONCE
Status: COMPLETED | OUTPATIENT
Start: 2023-03-13 | End: 2023-03-13

## 2023-03-13 RX ADMIN — DEXAMETHASONE SODIUM PHOSPHATE: 10 INJECTION, SOLUTION INTRAMUSCULAR; INTRAVENOUS at 08:59

## 2023-03-13 RX ADMIN — CISPLATIN 52.2 MG: 50 INJECTION, SOLUTION INTRAVENOUS at 12:13

## 2023-03-13 RX ADMIN — MAGNESIUM SULFATE HEPTAHYDRATE 2 G: 40 INJECTION, SOLUTION INTRAVENOUS at 15:00

## 2023-03-13 RX ADMIN — SODIUM CHLORIDE 500 ML: 0.9 INJECTION, SOLUTION INTRAVENOUS at 10:29

## 2023-03-13 RX ADMIN — DIPHENHYDRAMINE HYDROCHLORIDE 25 MG: 50 INJECTION, SOLUTION INTRAMUSCULAR; INTRAVENOUS at 09:24

## 2023-03-13 RX ADMIN — SODIUM CHLORIDE 20 ML/HR: 0.9 INJECTION, SOLUTION INTRAVENOUS at 08:45

## 2023-03-13 RX ADMIN — ETOPOSIDE 261 MG: 20 INJECTION INTRAVENOUS at 13:18

## 2023-03-13 RX ADMIN — ACETAMINOPHEN 650 MG: 325 TABLET ORAL at 08:58

## 2023-03-13 RX ADMIN — BLEOMYCIN SULFATE 30 UNITS: 30 POWDER, FOR SOLUTION INTRAMUSCULAR; INTRAPLEURAL; INTRAVENOUS; SUBCUTANEOUS at 14:42

## 2023-03-13 RX ADMIN — MAGNESIUM SULFATE HEPTAHYDRATE 2 G: 40 INJECTION, SOLUTION INTRAVENOUS at 10:33

## 2023-03-13 RX ADMIN — FOSAPREPITANT 150 MG: 150 INJECTION, POWDER, LYOPHILIZED, FOR SOLUTION INTRAVENOUS at 09:56

## 2023-03-13 RX ADMIN — SODIUM CHLORIDE 500 ML: 0.9 INJECTION, SOLUTION INTRAVENOUS at 15:00

## 2023-03-13 NOTE — PLAN OF CARE
Problem: Knowledge Deficit  Goal: Patient/family/caregiver demonstrates understanding of disease process, treatment plan, medications, and discharge instructions  Description: Complete learning assessment and assess knowledge base    Interventions:  - Provide teaching at level of understanding  - Provide teaching via preferred learning methods  3/13/2023 0953 by Alyssa Du RN  Outcome: Progressing  3/13/2023 0953 by Alyssa Du RN  Outcome: Progressing

## 2023-03-13 NOTE — PROGRESS NOTES
Pt  Denies new symptoms or concerns today  Labs reviewed  WBC 74 47,  ANC  69 2 Plt 98, Mg 1 4   RN notified office of Dr Indira Rachel  Per Dr Indira Rachel, ok to proceed with treatment  Magnesium 2grm added to both pre and post hydration today

## 2023-03-13 NOTE — PROGRESS NOTES
Title: addition of Magnesium 2GM x2      Date patient scheduled: 3/13/23    Original medication ordered:na    New Medication ordered: Magnesium 2GM pre and post hydration (2 doses total)    Is the patient scheduled within 24 hours? ? If yes, follow up with verbal telephone call  TEAMS message to Kali Vanessa RN     Office RN to route to [facility]  Infusion  pool routes to [facility]  Infusion tech to receive message, confirm scheduled treatment duration matches ordered treatment duration or adjust accordingly, and re-link appointment request orders  Infusion tech to notify pharmacy and finance

## 2023-03-13 NOTE — PROGRESS NOTES
Pt  Tolerated chemotherapy, hydration and Magnesium without adverse event  Future appointments reviewed  AVS provided

## 2023-03-13 NOTE — TELEPHONE ENCOUNTER
Received vm from patient spouse, "Signa Denver, this is Elvis Ruddle calling  It's Monday around two o'clock  I'm Mario Fast asked me to just reach out to you and doctor Juancarlos Rubalcava, he saw the pathology report regarding his differential, his CBC with differential and it was addressing his elevated white blood cell count  So he just wanted me to reach out to see if Doctor Wendy Soto is concerned about this or just to see what his thoughts are, if this is just purely related to the chemo and Neulasta or if there's a concern  So if you could please give me a call back to let me know at six, one, zero, seven, three, seven, seven, one, one five, I'd appreciate it  Mario Juan Miguel 's at his treatment now and that's going really well  Other than that, he's been, you know, he's doing fine  So is, I mean if it's just that, you know, related to the medications and nothing for us to be concerned about, if you could just let us know  Thank you so much and I hope you're having a great day  Bye "    Reviewed with Dr Kary Pemberton, patient labs elevated due to neulasta  My chart message to patient and patient spouse

## 2023-03-14 ENCOUNTER — HOSPITAL ENCOUNTER (OUTPATIENT)
Dept: INFUSION CENTER | Facility: CLINIC | Age: 53
Discharge: HOME | End: 2023-03-14

## 2023-03-14 VITALS
TEMPERATURE: 96.5 F | BODY MASS INDEX: 37.01 KG/M2 | WEIGHT: 297.62 LBS | SYSTOLIC BLOOD PRESSURE: 107 MMHG | HEIGHT: 75 IN | HEART RATE: 80 BPM | RESPIRATION RATE: 18 BRPM | DIASTOLIC BLOOD PRESSURE: 67 MMHG

## 2023-03-14 DIAGNOSIS — T45.1X5A CHEMOTHERAPY-INDUCED NEUTROPENIA (HCC): ICD-10-CM

## 2023-03-14 DIAGNOSIS — E87.70 HYPERVOLEMIA, UNSPECIFIED HYPERVOLEMIA TYPE: ICD-10-CM

## 2023-03-14 DIAGNOSIS — C62.90 SEMINOMA (HCC): ICD-10-CM

## 2023-03-14 DIAGNOSIS — D70.1 CHEMOTHERAPY-INDUCED NEUTROPENIA (HCC): ICD-10-CM

## 2023-03-14 DIAGNOSIS — E83.42 HYPOMAGNESEMIA: Primary | ICD-10-CM

## 2023-03-14 RX ORDER — FUROSEMIDE 10 MG/ML
10 INJECTION INTRAMUSCULAR; INTRAVENOUS ONCE
Status: CANCELLED
Start: 2023-03-14 | End: 2023-03-14

## 2023-03-14 RX ORDER — SODIUM CHLORIDE 9 MG/ML
20 INJECTION, SOLUTION INTRAVENOUS ONCE
Status: COMPLETED | OUTPATIENT
Start: 2023-03-14 | End: 2023-03-14

## 2023-03-14 RX ORDER — FUROSEMIDE 10 MG/ML
10 INJECTION INTRAMUSCULAR; INTRAVENOUS ONCE
Status: COMPLETED | OUTPATIENT
Start: 2023-03-14 | End: 2023-03-14

## 2023-03-14 RX ADMIN — SODIUM CHLORIDE 20 ML/HR: 0.9 INJECTION, SOLUTION INTRAVENOUS at 08:46

## 2023-03-14 RX ADMIN — SODIUM CHLORIDE 500 ML: 0.9 INJECTION, SOLUTION INTRAVENOUS at 11:43

## 2023-03-14 RX ADMIN — ETOPOSIDE 261 MG: 20 INJECTION INTRAVENOUS at 10:43

## 2023-03-14 RX ADMIN — FUROSEMIDE 10 MG: 10 INJECTION, SOLUTION INTRAMUSCULAR; INTRAVENOUS at 08:47

## 2023-03-14 RX ADMIN — DEXAMETHASONE SODIUM PHOSPHATE: 10 INJECTION, SOLUTION INTRAMUSCULAR; INTRAVENOUS at 08:49

## 2023-03-14 RX ADMIN — CISPLATIN 52.2 MG: 50 INJECTION, SOLUTION INTRAVENOUS at 09:38

## 2023-03-14 RX ADMIN — SODIUM CHLORIDE 500 ML: 0.9 INJECTION, SOLUTION INTRAVENOUS at 08:37

## 2023-03-14 NOTE — PROGRESS NOTES
Received notification that patient increased in weight from 131 4 lbs to 135lbs overnight with bilateral lower extremity edema  Patient declines and sob  Patient does also report abdominal bloating  Reviewed with Dr Tete Mcdermott, lasix 10mg IV one time ordered per recommendations  Infusion RN notified

## 2023-03-14 NOTE — PROGRESS NOTES
Pt presents for hydration, cisplatin, etoposide, hydration  No new meds  Pt feels bloated and has b/l non-pitting ankle edema  Pt gained weight overnight: from 131 4kg to 135kg  Otherwise no new concerns  Lasix added per Dr Mackay Finely office  Pt tolerated treatment without adverse reaction  Future visits discussed  AVS declined

## 2023-03-15 ENCOUNTER — HOSPITAL ENCOUNTER (OUTPATIENT)
Dept: INFUSION CENTER | Facility: CLINIC | Age: 53
Discharge: HOME | End: 2023-03-15

## 2023-03-15 VITALS
SYSTOLIC BLOOD PRESSURE: 108 MMHG | WEIGHT: 297.62 LBS | HEART RATE: 68 BPM | RESPIRATION RATE: 18 BRPM | TEMPERATURE: 98.1 F | HEIGHT: 75 IN | DIASTOLIC BLOOD PRESSURE: 71 MMHG | BODY MASS INDEX: 37.01 KG/M2

## 2023-03-15 DIAGNOSIS — T45.1X5A CHEMOTHERAPY-INDUCED NEUTROPENIA (HCC): ICD-10-CM

## 2023-03-15 DIAGNOSIS — C62.90 SEMINOMA (HCC): ICD-10-CM

## 2023-03-15 DIAGNOSIS — E83.42 HYPOMAGNESEMIA: ICD-10-CM

## 2023-03-15 DIAGNOSIS — E87.70 HYPERVOLEMIA, UNSPECIFIED HYPERVOLEMIA TYPE: Primary | ICD-10-CM

## 2023-03-15 DIAGNOSIS — D70.1 CHEMOTHERAPY-INDUCED NEUTROPENIA (HCC): ICD-10-CM

## 2023-03-15 DIAGNOSIS — E83.42 HYPOMAGNESEMIA: Primary | ICD-10-CM

## 2023-03-15 DIAGNOSIS — E87.70 HYPERVOLEMIA, UNSPECIFIED HYPERVOLEMIA TYPE: ICD-10-CM

## 2023-03-15 RX ORDER — SODIUM CHLORIDE 9 MG/ML
20 INJECTION, SOLUTION INTRAVENOUS ONCE
Status: COMPLETED | OUTPATIENT
Start: 2023-03-15 | End: 2023-03-15

## 2023-03-15 RX ORDER — FUROSEMIDE 10 MG/ML
20 INJECTION INTRAMUSCULAR; INTRAVENOUS ONCE
Status: CANCELLED
Start: 2023-03-16 | End: 2023-03-16

## 2023-03-15 RX ADMIN — ETOPOSIDE 261 MG: 20 INJECTION INTRAVENOUS at 10:49

## 2023-03-15 RX ADMIN — CISPLATIN 52.2 MG: 50 INJECTION, SOLUTION INTRAVENOUS at 09:38

## 2023-03-15 RX ADMIN — SODIUM CHLORIDE 20 ML/HR: 0.9 INJECTION, SOLUTION INTRAVENOUS at 08:27

## 2023-03-15 RX ADMIN — SODIUM CHLORIDE 250 ML: 0.9 INJECTION, SOLUTION INTRAVENOUS at 11:58

## 2023-03-15 RX ADMIN — DEXAMETHASONE SODIUM PHOSPHATE: 10 INJECTION, SOLUTION INTRAMUSCULAR; INTRAVENOUS at 08:29

## 2023-03-15 RX ADMIN — SODIUM CHLORIDE 500 ML: 0.9 INJECTION, SOLUTION INTRAVENOUS at 08:27

## 2023-03-15 NOTE — PROGRESS NOTES
Received notification from infusion RN in regards to patient still bloated, bilateral lower extremity edema and no increase in urine output  Reviewed with Dr Ankita Brito  post hydration we are decreasing from 500mL to 250mL, increasing lasix tomorrow to 20mg due to increased risk of RANDALL from too much Potassium   we will check potassium prior to Monday 3/20/23

## 2023-03-15 NOTE — PROGRESS NOTES
Pt presents for day 3 chemotherapy in stable condition  Pt admits  to continued/mildly worsening bilat ankle edema  Pt admits to continued abdominal bloating but this symptom mildly improved  Pt's weight unchanged from yesterday  Dr Sha Chiu notified via Earline Pardo RN  Pt will receive 20mg IV Lasix tomorrow and post treatment IV hydration volume decreased today to 250cc  Pt made aware of all and appreciative  Pt received treatment today without incident  Pt declines AVS, aware of appt tomorrow for day 4 chemotherapy

## 2023-03-16 ENCOUNTER — HOSPITAL ENCOUNTER (OUTPATIENT)
Dept: INFUSION CENTER | Facility: CLINIC | Age: 53
Discharge: HOME | End: 2023-03-16

## 2023-03-16 VITALS
SYSTOLIC BLOOD PRESSURE: 121 MMHG | TEMPERATURE: 97.3 F | DIASTOLIC BLOOD PRESSURE: 79 MMHG | RESPIRATION RATE: 18 BRPM | WEIGHT: 300.93 LBS | HEART RATE: 61 BPM | BODY MASS INDEX: 37.42 KG/M2 | HEIGHT: 75 IN

## 2023-03-16 DIAGNOSIS — C62.90 SEMINOMA (HCC): ICD-10-CM

## 2023-03-16 DIAGNOSIS — D70.1 CHEMOTHERAPY-INDUCED NEUTROPENIA (HCC): ICD-10-CM

## 2023-03-16 DIAGNOSIS — T45.1X5A CHEMOTHERAPY-INDUCED NEUTROPENIA (HCC): ICD-10-CM

## 2023-03-16 DIAGNOSIS — E87.70 HYPERVOLEMIA, UNSPECIFIED HYPERVOLEMIA TYPE: ICD-10-CM

## 2023-03-16 DIAGNOSIS — E83.42 HYPOMAGNESEMIA: Primary | ICD-10-CM

## 2023-03-16 RX ORDER — SODIUM CHLORIDE 9 MG/ML
20 INJECTION, SOLUTION INTRAVENOUS ONCE
Status: COMPLETED | OUTPATIENT
Start: 2023-03-16 | End: 2023-03-16

## 2023-03-16 RX ORDER — FUROSEMIDE 10 MG/ML
20 INJECTION INTRAMUSCULAR; INTRAVENOUS ONCE
Status: COMPLETED | OUTPATIENT
Start: 2023-03-16 | End: 2023-03-16

## 2023-03-16 RX ADMIN — SODIUM CHLORIDE 250 ML: 0.9 INJECTION, SOLUTION INTRAVENOUS at 08:45

## 2023-03-16 RX ADMIN — ALTEPLASE 2 MG: 2.2 INJECTION, POWDER, LYOPHILIZED, FOR SOLUTION INTRAVENOUS at 08:24

## 2023-03-16 RX ADMIN — SODIUM CHLORIDE 20 ML/HR: 0.9 INJECTION, SOLUTION INTRAVENOUS at 08:31

## 2023-03-16 RX ADMIN — FUROSEMIDE 20 MG: 20 INJECTION, SOLUTION INTRAMUSCULAR; INTRAVENOUS at 08:47

## 2023-03-16 RX ADMIN — DEXAMETHASONE SODIUM PHOSPHATE: 10 INJECTION, SOLUTION INTRAMUSCULAR; INTRAVENOUS at 08:33

## 2023-03-16 RX ADMIN — CISPLATIN 52.2 MG: 50 INJECTION, SOLUTION INTRAVENOUS at 09:52

## 2023-03-16 RX ADMIN — SODIUM CHLORIDE 250 ML: 0.9 INJECTION, SOLUTION INTRAVENOUS at 12:14

## 2023-03-16 RX ADMIN — ETOPOSIDE 261 MG: 20 INJECTION INTRAVENOUS at 11:01

## 2023-03-16 NOTE — PROGRESS NOTES
Pt presents for day 4 of treatment and has gained 3 lbs since yesterday  Otoniel Rodriguez RN notified, per Dr Anna Willett pre and post hydration will be decreased from 500mL to 250mL over 1 hour  Pt already to receive 20mg IV lasix today

## 2023-03-16 NOTE — PROGRESS NOTES
Reviewed with Dr Luis Fernando Parekh, patient to receive 250mL bolus pre and post treatment  Patient due to have lasix 20mg due to 3lb increase

## 2023-03-17 ENCOUNTER — HOSPITAL ENCOUNTER (OUTPATIENT)
Dept: INFUSION CENTER | Facility: CLINIC | Age: 53
End: 2023-03-17

## 2023-03-17 VITALS
BODY MASS INDEX: 36.87 KG/M2 | TEMPERATURE: 98.1 F | HEIGHT: 75 IN | HEART RATE: 67 BPM | DIASTOLIC BLOOD PRESSURE: 73 MMHG | RESPIRATION RATE: 18 BRPM | WEIGHT: 296.52 LBS | SYSTOLIC BLOOD PRESSURE: 113 MMHG

## 2023-03-17 DIAGNOSIS — E87.70 HYPERVOLEMIA, UNSPECIFIED HYPERVOLEMIA TYPE: ICD-10-CM

## 2023-03-17 DIAGNOSIS — T45.1X5A CHEMOTHERAPY-INDUCED NEUTROPENIA (HCC): ICD-10-CM

## 2023-03-17 DIAGNOSIS — D70.1 CHEMOTHERAPY-INDUCED NEUTROPENIA (HCC): ICD-10-CM

## 2023-03-17 DIAGNOSIS — C62.90 SEMINOMA (HCC): ICD-10-CM

## 2023-03-17 DIAGNOSIS — E87.70 HYPERVOLEMIA, UNSPECIFIED HYPERVOLEMIA TYPE: Primary | ICD-10-CM

## 2023-03-17 DIAGNOSIS — E83.42 HYPOMAGNESEMIA: Primary | ICD-10-CM

## 2023-03-17 DIAGNOSIS — E83.42 HYPOMAGNESEMIA: ICD-10-CM

## 2023-03-17 RX ORDER — SODIUM CHLORIDE 9 MG/ML
20 INJECTION, SOLUTION INTRAVENOUS ONCE
Status: COMPLETED | OUTPATIENT
Start: 2023-03-17 | End: 2023-03-17

## 2023-03-17 RX ADMIN — SODIUM CHLORIDE 500 ML: 0.9 INJECTION, SOLUTION INTRAVENOUS at 08:24

## 2023-03-17 RX ADMIN — ETOPOSIDE 261 MG: 20 INJECTION INTRAVENOUS at 10:26

## 2023-03-17 RX ADMIN — SODIUM CHLORIDE 20 ML/HR: 0.9 INJECTION, SOLUTION INTRAVENOUS at 08:25

## 2023-03-17 RX ADMIN — SODIUM CHLORIDE 500 ML: 0.9 INJECTION, SOLUTION INTRAVENOUS at 11:30

## 2023-03-17 RX ADMIN — DEXAMETHASONE SODIUM PHOSPHATE: 10 INJECTION, SOLUTION INTRAMUSCULAR; INTRAVENOUS at 08:25

## 2023-03-17 RX ADMIN — CISPLATIN 52.2 MG: 50 INJECTION, SOLUTION INTRAVENOUS at 09:23

## 2023-03-19 ENCOUNTER — APPOINTMENT (OUTPATIENT)
Dept: LAB | Facility: HOSPITAL | Age: 53
End: 2023-03-19

## 2023-03-19 DIAGNOSIS — C62.90 SEMINOMA (HCC): ICD-10-CM

## 2023-03-19 DIAGNOSIS — E83.42 HYPOMAGNESEMIA: ICD-10-CM

## 2023-03-19 DIAGNOSIS — D70.1 CHEMOTHERAPY-INDUCED NEUTROPENIA (HCC): ICD-10-CM

## 2023-03-19 DIAGNOSIS — E87.70 HYPERVOLEMIA, UNSPECIFIED HYPERVOLEMIA TYPE: ICD-10-CM

## 2023-03-19 DIAGNOSIS — T45.1X5A CHEMOTHERAPY-INDUCED NEUTROPENIA (HCC): ICD-10-CM

## 2023-03-19 LAB
BASOPHILS # BLD AUTO: 0.05 THOUSANDS/ÂΜL (ref 0–0.1)
BASOPHILS NFR BLD AUTO: 1 % (ref 0–1)
EOSINOPHIL # BLD AUTO: 0.02 THOUSAND/ÂΜL (ref 0–0.61)
EOSINOPHIL NFR BLD AUTO: 0 % (ref 0–6)
ERYTHROCYTE [DISTWIDTH] IN BLOOD BY AUTOMATED COUNT: 13.7 % (ref 11.6–15.1)
HCT VFR BLD AUTO: 27 % (ref 36.5–49.3)
HGB BLD-MCNC: 9.3 G/DL (ref 12–17)
IMM GRANULOCYTES # BLD AUTO: 0.08 THOUSAND/UL (ref 0–0.2)
IMM GRANULOCYTES NFR BLD AUTO: 1 % (ref 0–2)
LYMPHOCYTES # BLD AUTO: 1.31 THOUSANDS/ÂΜL (ref 0.6–4.47)
LYMPHOCYTES NFR BLD AUTO: 19 % (ref 14–44)
MCH RBC QN AUTO: 31.5 PG (ref 26.8–34.3)
MCHC RBC AUTO-ENTMCNC: 34.4 G/DL (ref 31.4–37.4)
MCV RBC AUTO: 92 FL (ref 82–98)
MONOCYTES # BLD AUTO: 0.04 THOUSAND/ÂΜL (ref 0.17–1.22)
MONOCYTES NFR BLD AUTO: 1 % (ref 4–12)
NEUTROPHILS # BLD AUTO: 5.46 THOUSANDS/ÂΜL (ref 1.85–7.62)
NEUTS SEG NFR BLD AUTO: 78 % (ref 43–75)
NRBC BLD AUTO-RTO: 0 /100 WBCS
PLATELET # BLD AUTO: 172 THOUSANDS/UL (ref 149–390)
PMV BLD AUTO: 8.8 FL (ref 8.9–12.7)
RBC # BLD AUTO: 2.95 MILLION/UL (ref 3.88–5.62)
WBC # BLD AUTO: 6.96 THOUSAND/UL (ref 4.31–10.16)

## 2023-03-20 ENCOUNTER — APPOINTMENT (OUTPATIENT)
Dept: LAB | Facility: HOSPITAL | Age: 53
End: 2023-03-20

## 2023-03-20 ENCOUNTER — PATIENT OUTREACH (OUTPATIENT)
Dept: HEMATOLOGY ONCOLOGY | Facility: CLINIC | Age: 53
End: 2023-03-20

## 2023-03-20 ENCOUNTER — HOSPITAL ENCOUNTER (OUTPATIENT)
Dept: INFUSION CENTER | Facility: CLINIC | Age: 53
Discharge: HOME | End: 2023-03-20

## 2023-03-20 ENCOUNTER — TELEPHONE (OUTPATIENT)
Dept: HEMATOLOGY ONCOLOGY | Facility: CLINIC | Age: 53
End: 2023-03-20

## 2023-03-20 VITALS
RESPIRATION RATE: 18 BRPM | TEMPERATURE: 98.7 F | DIASTOLIC BLOOD PRESSURE: 78 MMHG | BODY MASS INDEX: 35.5 KG/M2 | WEIGHT: 285.5 LBS | HEIGHT: 75 IN | SYSTOLIC BLOOD PRESSURE: 113 MMHG | HEART RATE: 82 BPM

## 2023-03-20 DIAGNOSIS — E87.70 HYPERVOLEMIA, UNSPECIFIED HYPERVOLEMIA TYPE: ICD-10-CM

## 2023-03-20 DIAGNOSIS — D70.1 CHEMOTHERAPY-INDUCED NEUTROPENIA (HCC): ICD-10-CM

## 2023-03-20 DIAGNOSIS — T45.1X5A CHEMOTHERAPY-INDUCED NEUTROPENIA (HCC): ICD-10-CM

## 2023-03-20 DIAGNOSIS — C62.90 SEMINOMA (HCC): ICD-10-CM

## 2023-03-20 DIAGNOSIS — E83.42 HYPOMAGNESEMIA: Primary | ICD-10-CM

## 2023-03-20 DIAGNOSIS — E83.42 HYPOMAGNESEMIA: ICD-10-CM

## 2023-03-20 LAB
ALBUMIN SERPL BCP-MCNC: 3.9 G/DL (ref 3.5–5)
ALP SERPL-CCNC: 78 U/L (ref 34–104)
ALT SERPL W P-5'-P-CCNC: 14 U/L (ref 7–52)
ANION GAP SERPL CALCULATED.3IONS-SCNC: 8 MMOL/L (ref 4–13)
AST SERPL W P-5'-P-CCNC: 11 U/L (ref 13–39)
BILIRUB SERPL-MCNC: 1.14 MG/DL (ref 0.2–1)
BUN SERPL-MCNC: 31 MG/DL (ref 5–25)
CALCIUM SERPL-MCNC: 8.7 MG/DL (ref 8.4–10.2)
CHLORIDE SERPL-SCNC: 98 MMOL/L (ref 96–108)
CO2 SERPL-SCNC: 28 MMOL/L (ref 21–32)
CREAT SERPL-MCNC: 1 MG/DL (ref 0.6–1.3)
GFR SERPL CREATININE-BSD FRML MDRD: 84 ML/MIN/1.73SQ M
GLUCOSE SERPL-MCNC: 115 MG/DL (ref 65–140)
MAGNESIUM SERPL-MCNC: 1.5 MG/DL (ref 1.9–2.7)
POTASSIUM SERPL-SCNC: 3.9 MMOL/L (ref 3.5–5.3)
PROT SERPL-MCNC: 6.5 G/DL (ref 6.4–8.4)
SODIUM SERPL-SCNC: 134 MMOL/L (ref 135–147)

## 2023-03-20 RX ORDER — SODIUM CHLORIDE 9 MG/ML
20 INJECTION, SOLUTION INTRAVENOUS ONCE
Status: COMPLETED | OUTPATIENT
Start: 2023-03-20 | End: 2023-03-20

## 2023-03-20 RX ORDER — ACETAMINOPHEN 325 MG/1
650 TABLET ORAL ONCE
Status: COMPLETED | OUTPATIENT
Start: 2023-03-20 | End: 2023-03-20

## 2023-03-20 RX ORDER — MAGNESIUM SULFATE HEPTAHYDRATE 40 MG/ML
2 INJECTION, SOLUTION INTRAVENOUS ONCE
Status: CANCELLED
Start: 2023-03-20

## 2023-03-20 RX ORDER — MAGNESIUM SULFATE HEPTAHYDRATE 40 MG/ML
2 INJECTION, SOLUTION INTRAVENOUS ONCE
Status: COMPLETED | OUTPATIENT
Start: 2023-03-20 | End: 2023-03-20

## 2023-03-20 RX ADMIN — DEXAMETHASONE SODIUM PHOSPHATE 10 MG: 10 INJECTION, SOLUTION INTRAMUSCULAR; INTRAVENOUS at 11:40

## 2023-03-20 RX ADMIN — PEGFILGRASTIM 6 MG: KIT SUBCUTANEOUS at 12:25

## 2023-03-20 RX ADMIN — DIPHENHYDRAMINE HYDROCHLORIDE 25 MG: 50 INJECTION, SOLUTION INTRAMUSCULAR; INTRAVENOUS at 11:17

## 2023-03-20 RX ADMIN — ACETAMINOPHEN 650 MG: 325 TABLET ORAL at 11:17

## 2023-03-20 RX ADMIN — MAGNESIUM SULFATE HEPTAHYDRATE 2 G: 40 INJECTION, SOLUTION INTRAVENOUS at 12:48

## 2023-03-20 RX ADMIN — SODIUM CHLORIDE 20 ML/HR: 0.9 INJECTION, SOLUTION INTRAVENOUS at 11:17

## 2023-03-20 RX ADMIN — BLEOMYCIN 30 UNITS: 15 POWDER, FOR SOLUTION INTRAMUSCULAR; INTRAPLEURAL; INTRAVENOUS; SUBCUTANEOUS at 12:17

## 2023-03-20 NOTE — PROGRESS NOTES
Pt had labs drawn prior to coming in, Mag 1 5  Pratima Ruiz RN notified, 2gm IV mag ordered for today  Pt tolerated treatment without incident  Neulasta OnPro applied to left arm per protocol, pt aware of completion date/time   Declined AVS

## 2023-03-20 NOTE — TELEPHONE ENCOUNTER
Received call back from Ilia  Apologized for labs not being drawn as ordered  Will notify patient with lab changed and encouraged patient to reach out with concerns  Patient aware that fasting is not required for labs  No additional questions at this time

## 2023-03-20 NOTE — PROGRESS NOTES
Received voicemail from pt's wife:  Ashish Cerrato, it's Aristeo Wilton calling  It's Monday morning about 9:45  We just got a message from the infusion center that they needed  Peter needed to have lab work prior to his treatment today done at the hospital  Apparently this lab work was ordered prior  We didn't see it in my chart and we're not aware of it and no one told us about it  We did have, he did have lab work done yesterday and it just wasn't mentioned to us, so  I don't know if any if you could please relay  I tried to call Grace Hoyt, but on her answering machine it's mentioned someone by the name of nilay  So I'm not sure if Grace Lai is off today  But if there are any changes, if we could just please be notified if things are ordered because now we were planning to leave for the infusion center 10:30 and we just got this call at 9:30  So now I have to quickly get Marcella Mazariegos ready, get them over to the hospital, get blood work before the treatment  This is a lot and we could have had it done yesterday  So I appreciate all of your help  And if you could just please relay a message that we just need to be made aware of changes or additions  Thank you  If you need to reach me, my number [phone]  Thank you and have a good day  Noted that Lauren Collins, RN, spoke with pt's wife regarding above

## 2023-03-20 NOTE — PROGRESS NOTES
Added 2 gram Magnesium IV to treatment on 3/20, per Dr Mariana Calabrese recommendations  Savana ROCHE at infusion center aware

## 2023-03-21 NOTE — PROGRESS NOTES
Hematology/Oncology Outpatient Office Note    Date of Service: 3/31/2023    [facility]AT[facility] DEMETRIO Ibarra  [facility]  [phone]    Reason for Consultation:   Chief Complaint   Patient presents with   • Follow-up       Cancer Stage at diagnosis: IIIC    Referral Physician: No ref  provider found    Primary Care Physician:  Sonny Carney DO     Nickname: Royal Cruz    Spouse: Candis Berry ECO    Today's ECO     Goals and Barriers:  Current Goal: Minimize effects of disease burden, extend life  Barriers to accomplishing this: None    Patient's Capacity to Self Care:  Patient is able to self care    Code Status: not addressed today    Advanced directives: not addressed today    ASSESSMENT & PLAN      Diagnosis ICD-10-CM Associated Orders   1  Seminoma [facility])  C62 90             This is a 45 y o  c PMHx notable for seasonal allergies, 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     5 year Survival rate 73% (Testicular Cancer Survival Rates  Testicular Cancer Prognosis) per the ACS  Baseline PFT: WNL    C3D15 bleomycin omitted due to severe pancytopenia requiring 2U PRBC  Discussion of decision making  • Oncology history updated, accordingly, during this visit  • Goals of care/patient communication  o I discussed with the patient the clinical course leading up to their cancer diagnosis  I reviewed relevant office notes, imaging reports and pathology result as well   o I told the patient that this is a case of potentially curable disease and what this means  We discussed that the goal of anti-cancer therapy is to provide best quality of life, extend overall survival, and progression free survival as shown in clinical trials   We also discussed that there might be a point when the cancer will no longer respond to this anti-neoplastic therapy    o I explained the risks/benefits of the proposed cancer therapy: Bleomycin 30 units IV, Etoposide 100mg/m2, cisplatin 20mg/m2, and after discussion including understanding risks of possible life-threatening complications and therapy-related malignancy development, informed consent for blood products and treatment has been signed and obtained  • TNM/Staging At Diagnosis  o [id number] S2  Cancer Staging   IIIC, poor risk (non pulmonary visceral mets)  • Disease Features/Tumor Markers/Genetics  o Tumor Marker: 1/18/2023:  (3x ULN), AFP (5 2, WNL), HCG (13, slightly high)  o Notable Path Features:   1/11/2023: Lymph node, left neck, excision: Germ cell tumor, compatible with metastatic seminoma  Background nonnecrotizing granulomatous inflammation  • Treatment: BEP  • Other Supportive care:  • Treatment Team Members  o Surgeon: Dr Glenn Shafer  o Rad Onc  o Palliative  • Labs: 12/28/2022: creat 0 96, T bili 0 63, WBC 6 49k, Hgb 15 9, plt 244k  • Diagnostics   1/3/2023 CT soft tissue neck: Moderate left-sided adenopathy primarily within the left neck, posterior jugular chain levels 3 through 5 with a prominent node present in the upper mediastinum  1/3/2023 CT Chest w/c: Left supraclavicular, superior mediastinal, posterior mediastinal (15mm)/retrocrural (2 2 cm)and suspected partially imaged left abdominal adenopathy  1/4/2023: CT Abd/pelv w/c: Bulky retroperitoneal lymphadenopathy consistent with lymphoma (left para-aortic region extending into the left upper quadrant measures 8 2 x 10 9 cm  Aortocaval lymphadenopathy measures 3 7 x 5 2 cm  More inferior left para-aortic lymphadenopathy at the bifurcation measures 5 0 x 5 9 cm )  1/18/2023: Testicular U/S shows a left testicular lesion (1 5 x 1 7 x 0 9 cm) and the RP adenopathy is more to the Left of the aorta    Discussion of decision making    I personally reviewed the following lab results, the image studies, pathology, other specialty/physicians consult notes and recommendations, and outside medical records from [facility]   I had a lengthy discussion with the patient and shared the work-up findings  We discussed the diagnosis and management plan as below  I spent 51 minutes reviewing the records (labs, clinician notes, outside records, medical history, ordering medicine/tests/procedures, interpreting the imaging/labs previously done) and coordination of care as well as direct time with the patient today, of which greater than 50% of the time was spent in counseling and coordination of care with the patient/family  · Plan/Labs  · F/u Urology for goal of orchiectomy  Infusion chairs ordered for BEP q21 days x4 cycles with Neulasta support, C4 coming up  · Added IV Magnesium 2 grams (D1 D3), IV Lasix 10mg to day 4 of C3 and C4 due to fluid induced hypervolemia  · 1U PRBC this weekend if Hgb <8  · CT CAP w/c scheduled for 5/1/2023 after BEP for post chemo restaging along with tumor markers at that time        Follow Up: q3 weeks for 4 cycles while on systemic therapy (scheduled thru 4/18/2023)    All questions were answered to the patient's satisfaction during this encounter  The patient knows the contact information for our office and knows to reach out for any relevant concerns related to this encounter  They are to call for any temperature 100 4 or higher, new symptoms including but not restricted to shaking chills, decreased appetite, nausea, vomiting, diarrhea, increased fatigue, shortness of breath or chest pain, confusion, and not feeling the strength to come to the clinic  For all other listed problems and medical diagnosis in their chart - they are managed by PCP and/or other specialists, which the patient acknowledges  Thank you very much for your consultation and making us a part of this patient's care  We are continuing to follow closely with you  Please do not hesitate to reach out to me with any additional questions or concerns  Nancy Hicks MD  Hematology & Medical Oncology Staff Physician Disclaimer: This document was prepared using [employer] technology  If a word or phrase is confusing, or does not make sense, this is likely due to recognition error which was not discovered during this clinician's review  If you believe an error has occurred, please contact me through [address] [place] line for rashida? cation        ONCOLOGY HISTORY OF PRESENT ILLNESS        Oncology History   [facility]   1/11/2023 -  Cancer Staged    Staging form: Testis, AJCC 8th Edition  - Clinical stage from 1/11/2023: cT4, cN3, pM1a, S2 - Signed by Paula Lopez MD on 1/20/2023  Stage prefix: Initial diagnosis  Lactate dehydrogenase (LDH) (U/L): 699  Human chorionic gonadotropin (hCG) (mIU/mL): 13  Alpha fetoprotein (AFP) (ng/mL): 5 2  Laterality: Left  Sites of metastasis: Distant lymph nodes, NOS, Retroperitoneal lymph node, NOS  Source of metastatic specimen: Supraclavicular Lymph Nodes  Staged by: Managing physician       1/19/2023 Initial Diagnosis    [facility] ([facility] )     1/30/2023 -  Chemotherapy    pegfilgrastim (Delavan Padjose), 6 mg, Subcutaneous, Once, 3 of 4 cycles  Administration: 6 mg (2/6/2023), 6 mg (2/27/2023), 6 mg (3/20/2023), 6 mg (3/6/2023)  bleomycin (BLENOXANE) IVPB, 30 Units, Intravenous, Once, 3 of 4 cycles  Administration: 30 Units (1/30/2023), 30 Units (2/6/2023), 30 Units (2/13/2023), 30 Units (2/20/2023), 30 Units (2/27/2023), 30 Units (3/6/2023), 30 Units (3/13/2023), 30 Units (3/20/2023)  CISplatin (PLATINOL) infusion, 20 mg/m2 = 52 2 mg, Intravenous, Once, 3 of 4 cycles  Administration: 52 2 mg (1/30/2023), 52 2 mg (1/31/2023), 52 2 mg (2/1/2023), 52 2 mg (2/2/2023), 52 2 mg (2/3/2023), 52 2 mg (2/20/2023), 52 2 mg (2/21/2023), 52 2 mg (2/22/2023), 52 2 mg (2/23/2023), 52 2 mg (2/24/2023), 52 2 mg (3/13/2023), 52 2 mg (3/14/2023), 52 2 mg (3/15/2023), 52 2 mg (3/16/2023), 52 2 mg (3/17/2023)  fosaprepitant (EMEND) IVPB, 150 mg, Intravenous, Once, 3 of 4 cycles  Administration: 150 mg (1/30/2023), 150 mg (2/20/2023), 150 mg (3/13/2023), 150 mg (2/24/2023)           SUBJECTIVE  (INTERVAL HISTORY)      Clotting History None   Bleeding History None   Cancer History Seminoma   Family Cancer History Mom/sister (breast), same sister (melanoma), father (basal cell skin cancer)   H/O Blood/Plt Transfusion None   Tobacco/etoh/drug abuse No nicotine use, etoh abuse, rec drug use       Cancer Screening history C-scope (2019)   Occupation  at [address] [place] [address] groin and L neck pain  Interval events: doing well, no acute issues  Had fatigue and weakness earlier this week  I have reviewed the relevant past medical, surgical, social and family history  I have also reviewed allergies and medications for this patient  Review of Systems    Baseline weight: 300 lbs    Has had intermittent lower back, thigh rash since summer of 2022  Denies F/C, N/V, SOB, CP, LH, HA, itching, gen weakness, melena, hematuria, hematochezia, falls, diarrhea, or constipation       A 10-point review of system was performed, pertinent positive and negative were detailed as above  Otherwise, the 10-point review of system was negative        Past Medical History:   Diagnosis Date   • Allergic 1/1/2000    seasonal allergies   • Impacted cerumen    • Lymphadenopathy    • Obesity, unspecified    • Seasonal allergies    • Seminoma of testis [facility])    • [facility] 01/17/2023   • Torn meniscus     right knee- surgical repair today 7/19/2021   • Wears glasses        Past Surgical History:   Procedure Laterality Date   • FACIAL/NECK BIOPSY Left 1/11/2023    Procedure: EXCISION OF LEFT NECK LYMPH NODE WITH LYMPHOMA PROTOCOL;  Surgeon: Krys Flowers MD;  Location: [facility];  Service: Surgical Oncology   • FL GUIDED CENTRAL VENOUS ACCESS DEVICE INSERTION  1/23/2023   • HEMORROIDECTOMY     • KNEE SURGERY  7/19/2021   • AZ ARTHRS KNE SURG W/MENISCECTOMY MED/LAT W/SHVG Right 07/19/2021    Procedure: ARTHROSCOPY KNEE, partial lateral meniscectomy;  Surgeon: Kamran Carroll MD;  Location: [facility];  Service: Orthopedics   • TUNNELED VENOUS PORT PLACEMENT N/A 1/23/2023    Procedure: INSERTION VENOUS PORT (PORT-A-CATH); Surgeon: Stephanie Cameron MD;  Location:  MAIN OR;  Service: Surgical Oncology       Family History   Problem Relation Age of Onset   • Cancer Mother         Breast Cancer   • Breast cancer Mother    • Breast cancer additional onset Mother    • Cancer Father         Skin carcenoma   • Skin cancer Father    • Breast cancer additional onset Sister    • Cancer Sister         Breast Cancer and Skin carcenoma   • Skin cancer Brother        Social History     Socioeconomic History   • Marital status: /Civil Union     Spouse name: Not on file   • Number of children: Not on file   • Years of education: Not on file   • Highest education level: Not on file   Occupational History   • Not on file   Tobacco Use   • Smoking status: Never     Passive exposure: Past   • Smokeless tobacco: Never   • Tobacco comments:     During childhood, both parents smoked  Vaping Use   • Vaping Use: Never used   Substance and Sexual Activity   • Alcohol use: Not Currently     Alcohol/week: 3 0 standard drinks     Types: 1 Glasses of wine, 1 Cans of beer, 1 Shots of liquor per week     Comment: 1-2 weekly   • Drug use: Never   • Sexual activity: Yes     Partners: Female   Other Topics Concern   • Not on file   Social History Narrative   • Not on file     Social Determinants of Health     Financial Resource Strain: Not on file   Food Insecurity: No Food Insecurity   • Worried About Running Out of Food in the Last Year: Never true   • Ran Out of Food in the Last Year: Never true   Transportation Needs: No Transportation Needs   • Lack of Transportation (Medical): No   • Lack of Transportation (Non-Medical):  No   Physical Activity: Not on file   Stress: Not on file   Social Connections: Not on file   Intimate Partner Violence: Not on file   Housing Stability: Low Risk    • Unable to Pay for Housing in the Last Year: No   • Number of Places Lived in the Last Year: 1   • Unstable Housing in the Last Year: No       Allergies   Allergen Reactions   • Mo Maravilladaniellamarcy - Food Allergy Vomiting       Current Outpatient Medications   Medication Sig Dispense Refill   • loratadine (CLARITIN) 10 mg tablet Take 10 mg by mouth daily     • ondansetron (ZOFRAN-ODT) 8 mg disintegrating tablet Take 1 tablet (8 mg total) by mouth every 8 (eight) hours as needed for nausea or vomiting 90 tablet 2   • prochlorperazine (COMPAZINE) 10 mg tablet Take 1 tablet (10 mg total) by mouth every 6 (six) hours as needed for nausea or vomiting 30 tablet 0   • triamcinolone (KENALOG) 0 1 % cream        No current facility-administered medications for this visit  (Not in a hospital admission)      Objective:     24 Hour Vitals Assessment:     Vitals:    03/31/23 0834   BP: 122/68   Pulse: 100   Resp: 18   Temp: 97 8 °F (36 6 °C)   SpO2: 94%       PHYSICIAN EXAM    General: Appearance: alert, cooperative, no distress  HEENT: Normocephalic, atraumatic  No scleral icterus  conjunctivae clear  EOMI  Chest: No tenderness to palpation  No open wound noted  Lungs: Clear to auscultation bilaterally, Respirations unlabored  Cardiac: Regular rate and rhythm, +S1and S2  Abdomen: Soft, non-tender, non-distended  Bowel sounds are normal    Extremities:  No edema, cyanosis, clubbing  Skin: Skin color, turgor are normal  No rashes  Lymphatics: no palpable axillary, or inguinal adenopathy  L supra-clavicular LN palpated with incision c/d/i    Port:  c/d/i    Neurologic: Awake, Alert, and oriented, no gross focal deficits noted b/l  DATA REVIEW:    Pathology Result:    Final Diagnosis   Date Value Ref Range Status   01/11/2023   Final    A  Lymph node, left neck, excision:  -Germ cell tumor, compatible with metastatic seminoma (see note)    -Background nonnecrotizing granulomatous inflammation (see note)  Comment: This is an appended report  These results have been appended to a previously preliminary verified report  10/25/2019   Final    A  Cecum, cold biopsy:  - Portion of benign colonic mucosa with prominent reactive lymphoid aggregate  B  Ascending colon, cold snare:  - Tubular adenoma  - Negative for high grade dysplasia/ carcinoma  C  Colon, splenic flexure, cold snare:  - Tubular adenoma  - Negative for high grade dysplasia/ carcinoma  Image Results:   Image result are reviewed and documented in Hematology/Oncology history    XR chest 1 view portable  Narrative: CHEST     INDICATION:   Neutropenic fever  COMPARISON:  Chest x-ray 2/6/2023    EXAM PERFORMED/VIEWS:  XR CHEST PORTABLE    FINDINGS:  Left-sided Mediport line tip terminates at the SVC  Cardiomediastinal silhouette appears unremarkable  The lungs are clear  No pneumothorax or pleural effusion  Osseous structures appear within normal limits for patient age  Impression: No active pulmonary disease  Workstation performed: [id number]      LABS:  Lab data are reviewed and documented in HemOnc history  Lab Results   Component Value Date    HGB 7 6 (L) 03/27/2023    HCT 21 9 (L) 03/27/2023    MCV 90 03/27/2023    PLT 29 (LL) 03/27/2023    WBC 3 58 (L) 03/27/2023    NRBC 1 03/27/2023    BANDSPCT 1 03/27/2023    ATYLMPCT 2 (H) 03/26/2023     Lab Results   Component Value Date    K 3 9 03/27/2023     03/27/2023    CO2 28 03/27/2023    BUN 18 03/27/2023    CREATININE 1 00 03/27/2023    GLUF 90 03/27/2023    CALCIUM 8 7 03/27/2023    CORRECTEDCA 8 9 02/14/2023    AST 11 (L) 03/27/2023    ALT 9 03/27/2023    ALKPHOS 56 03/27/2023    EGFR 84 03/27/2023       No results found for: IRON, TIBC, FERRITIN    No results found for: [id number]    No results for input(s): WBC, CREAT in the last 72 hours      Invalid input(s):  PLT    By:  Jada Gomez MD, 3/31/2023, 8:47 AM

## 2023-03-23 ENCOUNTER — TELEPHONE (OUTPATIENT)
Dept: HEMATOLOGY ONCOLOGY | Facility: CLINIC | Age: 53
End: 2023-03-23

## 2023-03-23 DIAGNOSIS — D70.1 CHEMOTHERAPY-INDUCED NEUTROPENIA (HCC): ICD-10-CM

## 2023-03-23 DIAGNOSIS — C62.90 SEMINOMA (HCC): ICD-10-CM

## 2023-03-23 DIAGNOSIS — E83.42 HYPOMAGNESEMIA: ICD-10-CM

## 2023-03-23 DIAGNOSIS — T45.1X5A CHEMOTHERAPY-INDUCED NEUTROPENIA: ICD-10-CM

## 2023-03-23 DIAGNOSIS — E87.70 HYPERVOLEMIA, UNSPECIFIED HYPERVOLEMIA TYPE: Primary | ICD-10-CM

## 2023-03-23 DIAGNOSIS — R11.0 NAUSEA: ICD-10-CM

## 2023-03-23 DIAGNOSIS — T45.1X5A CHEMOTHERAPY-INDUCED NEUTROPENIA (HCC): ICD-10-CM

## 2023-03-23 DIAGNOSIS — D70.1 CHEMOTHERAPY-INDUCED NEUTROPENIA: ICD-10-CM

## 2023-03-23 RX ORDER — SODIUM CHLORIDE 9 MG/ML
20 INJECTION, SOLUTION INTRAVENOUS ONCE
Status: CANCELLED | OUTPATIENT
Start: 2023-04-07

## 2023-03-23 RX ORDER — MAGNESIUM SULFATE HEPTAHYDRATE 40 MG/ML
2 INJECTION, SOLUTION INTRAVENOUS ONCE
Status: CANCELLED
Start: 2023-04-05 | End: 2023-04-05

## 2023-03-23 RX ORDER — SODIUM CHLORIDE 9 MG/ML
20 INJECTION, SOLUTION INTRAVENOUS ONCE
Status: CANCELLED | OUTPATIENT
Start: 2023-04-17

## 2023-03-23 RX ORDER — ACETAMINOPHEN 325 MG/1
650 TABLET ORAL ONCE
Status: CANCELLED | OUTPATIENT
Start: 2023-04-17

## 2023-03-23 RX ORDER — FUROSEMIDE 10 MG/ML
10 INJECTION INTRAMUSCULAR; INTRAVENOUS ONCE
Status: CANCELLED
Start: 2023-04-06 | End: 2023-04-06

## 2023-03-23 RX ORDER — MAGNESIUM SULFATE HEPTAHYDRATE 40 MG/ML
2 INJECTION, SOLUTION INTRAVENOUS ONCE
Status: CANCELLED
Start: 2023-04-07 | End: 2023-04-07

## 2023-03-23 RX ORDER — ACETAMINOPHEN 325 MG/1
650 TABLET ORAL ONCE
Status: CANCELLED | OUTPATIENT
Start: 2023-04-03

## 2023-03-23 RX ORDER — SODIUM CHLORIDE 9 MG/ML
20 INJECTION, SOLUTION INTRAVENOUS ONCE
Status: CANCELLED | OUTPATIENT
Start: 2023-04-03

## 2023-03-23 RX ORDER — MAGNESIUM SULFATE HEPTAHYDRATE 40 MG/ML
2 INJECTION, SOLUTION INTRAVENOUS ONCE
Status: CANCELLED
Start: 2023-04-03 | End: 2023-04-03

## 2023-03-23 RX ORDER — SODIUM CHLORIDE 9 MG/ML
20 INJECTION, SOLUTION INTRAVENOUS ONCE
Status: CANCELLED | OUTPATIENT
Start: 2023-04-10

## 2023-03-23 RX ORDER — SODIUM CHLORIDE 9 MG/ML
20 INJECTION, SOLUTION INTRAVENOUS ONCE
Status: CANCELLED | OUTPATIENT
Start: 2023-04-06

## 2023-03-23 RX ORDER — SODIUM CHLORIDE 9 MG/ML
20 INJECTION, SOLUTION INTRAVENOUS ONCE
Status: CANCELLED | OUTPATIENT
Start: 2023-04-05

## 2023-03-23 RX ORDER — ACETAMINOPHEN 325 MG/1
650 TABLET ORAL ONCE
Status: CANCELLED | OUTPATIENT
Start: 2023-04-10

## 2023-03-23 RX ORDER — SODIUM CHLORIDE 9 MG/ML
20 INJECTION, SOLUTION INTRAVENOUS ONCE
Status: CANCELLED | OUTPATIENT
Start: 2023-04-04

## 2023-03-23 NOTE — TELEPHONE ENCOUNTER
Reviewed recommendations from Pharmacy and Dr Zoey Chao  Patient to receive Magnesium 2 GM on day one pre and post hydration    Treatment days 3 & 5 patient to receive Magnesium 2 GM post hydration    And Magnesium level to be obtained more frequently

## 2023-03-23 NOTE — PROGRESS NOTES
Reviewed recommendations from Pharmacy and Dr Alicia Castro  Patient to receive Magnesium 2 GM on day one pre and post hydration    Treatment days 3 & 5 patient to receive Magnesium 2 GM post hydration    And Magnesium level to be obtained more frequently

## 2023-03-24 ENCOUNTER — PATIENT OUTREACH (OUTPATIENT)
Dept: HEMATOLOGY ONCOLOGY | Facility: CLINIC | Age: 53
End: 2023-03-24

## 2023-03-24 NOTE — PROGRESS NOTES
MID ASSESSMENT      Are you having any side effects from your treatment?  - Nausea, Zofran helps  - Headaches intermittently, takes tylenol PRN  - Some fluid retention, get lasix during tx    Are you eating and drinking properly? - Yes    Do you have any urinary issues?  - No     Are you having any pain?  - Occasional headaches     Have needs changed for a palliative care referral?  - No    How would you describe your mood (Calm, Anxious, Depressed, Tired, Overwhelmed)? - Calm    Do you know when your upcoming appointments are?  - Yes, Mychart    Do you have a good support system? - Yes    Are you interested in any support groups? - No    Do you have any questions or concerns regarding your treatment plan?  - No      Outreach made to patient and spoke with wife  I introduced myself and explained my role  We went over  MID assessment questions together  She answered all questions thoroughly  She does not have any other questions or concerns at this time  Wife is concerned about follow up with Urology office  I let wife know that I will be sending reminder message to urology clinical pool to schedule follow up  She voiced understanding  She has my direct line if she needs anything moving forward  After speaking directly with Dr Kenia Tyson, he would like to see patient soon to discuss orchiectomy and to get patient schedued  I called wife back and made her aware  She voiced understanding  Message was sent to [address] [place] [address] to schedule appt asap  I will follow up on appt in the near future

## 2023-03-26 ENCOUNTER — TELEPHONE (OUTPATIENT)
Dept: OTHER | Facility: OTHER | Age: 53
End: 2023-03-26

## 2023-03-26 ENCOUNTER — DOCUMENTATION (OUTPATIENT)
Dept: HEMATOLOGY ONCOLOGY | Facility: MEDICAL CENTER | Age: 53
End: 2023-03-26

## 2023-03-26 ENCOUNTER — HOSPITAL ENCOUNTER (EMERGENCY)
Facility: HOSPITAL | Age: 53
Discharge: HOME | End: 2023-03-27
Attending: EMERGENCY MEDICINE

## 2023-03-26 ENCOUNTER — APPOINTMENT (OUTPATIENT)
Dept: LAB | Facility: HOSPITAL | Age: 53
End: 2023-03-26

## 2023-03-26 VITALS
BODY MASS INDEX: 36.04 KG/M2 | RESPIRATION RATE: 16 BRPM | TEMPERATURE: 98.9 F | DIASTOLIC BLOOD PRESSURE: 53 MMHG | HEART RATE: 84 BPM | OXYGEN SATURATION: 95 % | SYSTOLIC BLOOD PRESSURE: 98 MMHG | WEIGHT: 288.36 LBS

## 2023-03-26 DIAGNOSIS — E83.42 HYPOMAGNESEMIA: ICD-10-CM

## 2023-03-26 DIAGNOSIS — D70.1 CHEMOTHERAPY-INDUCED NEUTROPENIA (HCC): ICD-10-CM

## 2023-03-26 DIAGNOSIS — C62.90 SEMINOMA (HCC): ICD-10-CM

## 2023-03-26 DIAGNOSIS — D69.6 THROMBOCYTOPENIA (HCC): ICD-10-CM

## 2023-03-26 DIAGNOSIS — D72.819 LEUKOPENIA: ICD-10-CM

## 2023-03-26 DIAGNOSIS — T45.1X5A CHEMOTHERAPY-INDUCED NEUTROPENIA (HCC): ICD-10-CM

## 2023-03-26 DIAGNOSIS — E87.70 HYPERVOLEMIA, UNSPECIFIED HYPERVOLEMIA TYPE: ICD-10-CM

## 2023-03-26 DIAGNOSIS — D64.9 ANEMIA: Primary | ICD-10-CM

## 2023-03-26 LAB
ABO GROUP BLD: NORMAL
ABO GROUP BLD: NORMAL
ALBUMIN SERPL BCP-MCNC: 3.6 G/DL (ref 3.5–5)
ALBUMIN SERPL BCP-MCNC: 3.8 G/DL (ref 3.5–5)
ALP SERPL-CCNC: 52 U/L (ref 34–104)
ALP SERPL-CCNC: 57 U/L (ref 34–104)
ALT SERPL W P-5'-P-CCNC: 9 U/L (ref 7–52)
ALT SERPL W P-5'-P-CCNC: 9 U/L (ref 7–52)
ANION GAP SERPL CALCULATED.3IONS-SCNC: 8 MMOL/L (ref 4–13)
ANION GAP SERPL CALCULATED.3IONS-SCNC: 9 MMOL/L (ref 4–13)
APTT PPP: 27 SECONDS (ref 23–37)
AST SERPL W P-5'-P-CCNC: 7 U/L (ref 13–39)
AST SERPL W P-5'-P-CCNC: 8 U/L (ref 13–39)
BASOPHILS # BLD MANUAL: 0 THOUSAND/UL (ref 0–0.1)
BASOPHILS # BLD MANUAL: 0.01 THOUSAND/UL (ref 0–0.1)
BASOPHILS NFR MAR MANUAL: 0 % (ref 0–1)
BASOPHILS NFR MAR MANUAL: 1 % (ref 0–1)
BILIRUB SERPL-MCNC: 0.75 MG/DL (ref 0.2–1)
BILIRUB SERPL-MCNC: 0.76 MG/DL (ref 0.2–1)
BLD GP AB SCN SERPL QL: NEGATIVE
BUN SERPL-MCNC: 21 MG/DL (ref 5–25)
BUN SERPL-MCNC: 22 MG/DL (ref 5–25)
CALCIUM SERPL-MCNC: 8.8 MG/DL (ref 8.4–10.2)
CALCIUM SERPL-MCNC: 8.9 MG/DL (ref 8.4–10.2)
CHLORIDE SERPL-SCNC: 101 MMOL/L (ref 96–108)
CHLORIDE SERPL-SCNC: 101 MMOL/L (ref 96–108)
CO2 SERPL-SCNC: 27 MMOL/L (ref 21–32)
CO2 SERPL-SCNC: 27 MMOL/L (ref 21–32)
CREAT SERPL-MCNC: 0.95 MG/DL (ref 0.6–1.3)
CREAT SERPL-MCNC: 1.02 MG/DL (ref 0.6–1.3)
DACRYOCYTES BLD QL SMEAR: PRESENT
EOSINOPHIL # BLD MANUAL: 0 THOUSAND/UL (ref 0–0.4)
EOSINOPHIL # BLD MANUAL: 0.02 THOUSAND/UL (ref 0–0.4)
EOSINOPHIL NFR BLD MANUAL: 0 % (ref 0–6)
EOSINOPHIL NFR BLD MANUAL: 3 % (ref 0–6)
ERYTHROCYTE [DISTWIDTH] IN BLOOD BY AUTOMATED COUNT: 13.2 % (ref 11.6–15.1)
ERYTHROCYTE [DISTWIDTH] IN BLOOD BY AUTOMATED COUNT: 13.2 % (ref 11.6–15.1)
ERYTHROCYTE [DISTWIDTH] IN BLOOD BY AUTOMATED COUNT: 13.3 % (ref 11.6–15.1)
FLUAV RNA RESP QL NAA+PROBE: NEGATIVE
FLUBV RNA RESP QL NAA+PROBE: NEGATIVE
GFR SERPL CREATININE-BSD FRML MDRD: 82 ML/MIN/1.73SQ M
GFR SERPL CREATININE-BSD FRML MDRD: 90 ML/MIN/1.73SQ M
GLUCOSE P FAST SERPL-MCNC: 90 MG/DL (ref 65–99)
GLUCOSE SERPL-MCNC: 95 MG/DL (ref 65–140)
HCT VFR BLD AUTO: 15.7 % (ref 36.5–49.3)
HCT VFR BLD AUTO: 17.8 % (ref 36.5–49.3)
HCT VFR BLD AUTO: 20.1 % (ref 36.5–49.3)
HGB BLD-MCNC: 5.5 G/DL (ref 12–17)
HGB BLD-MCNC: 6.1 G/DL (ref 12–17)
HGB BLD-MCNC: 7 G/DL (ref 12–17)
HYPERCHROMIA BLD QL SMEAR: PRESENT
INR PPP: 1.12 (ref 0.84–1.19)
LYMPHOCYTES # BLD AUTO: 0.47 THOUSAND/UL (ref 0.6–4.47)
LYMPHOCYTES # BLD AUTO: 0.53 THOUSAND/UL (ref 0.6–4.47)
LYMPHOCYTES # BLD AUTO: 68 % (ref 14–44)
LYMPHOCYTES # BLD AUTO: 70 % (ref 14–44)
MAGNESIUM SERPL-MCNC: 1 MG/DL (ref 1.9–2.7)
MAGNESIUM SERPL-MCNC: 1.2 MG/DL (ref 1.9–2.7)
MCH RBC QN AUTO: 31 PG (ref 26.8–34.3)
MCH RBC QN AUTO: 31.4 PG (ref 26.8–34.3)
MCH RBC QN AUTO: 31.7 PG (ref 26.8–34.3)
MCHC RBC AUTO-ENTMCNC: 34.3 G/DL (ref 31.4–37.4)
MCHC RBC AUTO-ENTMCNC: 34.8 G/DL (ref 31.4–37.4)
MCHC RBC AUTO-ENTMCNC: 35 G/DL (ref 31.4–37.4)
MCV RBC AUTO: 90 FL (ref 82–98)
MCV RBC AUTO: 90 FL (ref 82–98)
MCV RBC AUTO: 91 FL (ref 82–98)
MONOCYTES # BLD AUTO: 0.09 THOUSAND/UL (ref 0–1.22)
MONOCYTES # BLD AUTO: 0.09 THOUSAND/UL (ref 0–1.22)
MONOCYTES NFR BLD: 12 % (ref 4–12)
MONOCYTES NFR BLD: 13 % (ref 4–12)
MYELOCYTES NFR BLD MANUAL: 2 % (ref 0–1)
NEUTROPHILS # BLD MANUAL: 0.08 THOUSAND/UL (ref 1.85–7.62)
NEUTROPHILS # BLD MANUAL: 0.13 THOUSAND/UL (ref 1.85–7.62)
NEUTS BAND NFR BLD MANUAL: 6 % (ref 0–8)
NEUTS SEG NFR BLD AUTO: 11 % (ref 43–75)
NEUTS SEG NFR BLD AUTO: 12 % (ref 43–75)
NRBC BLD AUTO-RTO: 0 /100 WBCS
PHOSPHATE SERPL-MCNC: 3.3 MG/DL (ref 2.7–4.5)
PLATELET # BLD AUTO: 28 THOUSANDS/UL (ref 149–390)
PLATELET # BLD AUTO: 7 THOUSANDS/UL (ref 149–390)
PLATELET # BLD AUTO: 7 THOUSANDS/UL (ref 149–390)
PLATELET BLD QL SMEAR: ABNORMAL
PLATELET BLD QL SMEAR: ABNORMAL
PMV BLD AUTO: 9.7 FL (ref 8.9–12.7)
PMV BLD AUTO: 9.9 FL (ref 8.9–12.7)
PMV BLD AUTO: 9.9 FL (ref 8.9–12.7)
POTASSIUM SERPL-SCNC: 3.7 MMOL/L (ref 3.5–5.3)
POTASSIUM SERPL-SCNC: 3.9 MMOL/L (ref 3.5–5.3)
PROT SERPL-MCNC: 6.1 G/DL (ref 6.4–8.4)
PROT SERPL-MCNC: 6.4 G/DL (ref 6.4–8.4)
PROTHROMBIN TIME: 14.4 SECONDS (ref 11.6–14.5)
RBC # BLD AUTO: 1.75 MILLION/UL (ref 3.88–5.62)
RBC # BLD AUTO: 1.97 MILLION/UL (ref 3.88–5.62)
RBC # BLD AUTO: 2.21 MILLION/UL (ref 3.88–5.62)
RBC MORPH BLD: PRESENT
RBC MORPH BLD: PRESENT
RH BLD: POSITIVE
RH BLD: POSITIVE
RSV RNA RESP QL NAA+PROBE: NEGATIVE
SARS-COV-2 RNA RESP QL NAA+PROBE: NEGATIVE
SODIUM SERPL-SCNC: 136 MMOL/L (ref 135–147)
SODIUM SERPL-SCNC: 137 MMOL/L (ref 135–147)
SPECIMEN EXPIRATION DATE: NORMAL
VARIANT LYMPHS # BLD AUTO: 2 %
WBC # BLD AUTO: 0.69 THOUSAND/UL (ref 4.31–10.16)
WBC # BLD AUTO: 0.76 THOUSAND/UL (ref 4.31–10.16)
WBC # BLD AUTO: 1.57 THOUSAND/UL (ref 4.31–10.16)

## 2023-03-26 RX ORDER — ACETAMINOPHEN 325 MG/1
650 TABLET ORAL ONCE
Status: COMPLETED | OUTPATIENT
Start: 2023-03-26 | End: 2023-03-26

## 2023-03-26 RX ORDER — MAGNESIUM SULFATE HEPTAHYDRATE 40 MG/ML
4 INJECTION, SOLUTION INTRAVENOUS ONCE
Status: CANCELLED
Start: 2023-04-03

## 2023-03-26 RX ORDER — MAGNESIUM SULFATE HEPTAHYDRATE 40 MG/ML
2 INJECTION, SOLUTION INTRAVENOUS ONCE
Status: COMPLETED | OUTPATIENT
Start: 2023-03-26 | End: 2023-03-26

## 2023-03-26 RX ORDER — UREA 10 %
500 LOTION (ML) TOPICAL ONCE
Status: COMPLETED | OUTPATIENT
Start: 2023-03-26 | End: 2023-03-26

## 2023-03-26 RX ADMIN — SODIUM CHLORIDE 1000 ML: 0.9 INJECTION, SOLUTION INTRAVENOUS at 15:08

## 2023-03-26 RX ADMIN — Medication 500 MG: at 15:47

## 2023-03-26 RX ADMIN — MAGNESIUM SULFATE HEPTAHYDRATE 2 G: 40 INJECTION, SOLUTION INTRAVENOUS at 15:41

## 2023-03-26 RX ADMIN — ACETAMINOPHEN 325MG 650 MG: 325 TABLET ORAL at 20:46

## 2023-03-26 NOTE — TELEPHONE ENCOUNTER
Lab Result: WBC 0 69, Platelet 7, hemoglobin 6 1   Date/Time Drawn: 3/26/23 @0849   Ordering Provider: Dr Bethany Alcala Name: Daryle Nearing following critical/stat result was read back to the lab as stated above and [employer] to the on-call provider  The provider confirmed receipt of the message

## 2023-03-26 NOTE — ED PROVIDER NOTES
Final Diagnosis:  1  Anemia    2  Thrombocytopenia (HCC)    3  Leukopenia    4  Hypomagnesemia        Chief Complaint   Patient presents with   • Abnormal Lab     Pt presents to the ED after being referred to oncologist for low hgb (6), low platelets, neutropenia  Pt on chemo for testicular CA  Pt reports generalized weakness, fatigue     HPI  Patient presents for evaluation of abnormal lab values  Patient wife bedside provide history  He states that patient is currently on a multidrug therapy for testicular cancer  They state that he had blood work done today and were informed that there were some abnormalities that he should come in and be evaluated for  Patient states that he overall feels that he is at his baseline health  Denies any fever or chills  Abdominal pain, dysuria hematuria  No bleeding  No falls  Patient states that he does have some nausea which is baseline for him  Unless otherwise specified:  - No language barrier    - History obtained from patient  - There are no limitations to the history obtained  - Previous charting was reviewed    PMH:   has a past medical history of Allergic (1/1/2000), Impacted cerumen, Lymphadenopathy, Obesity, unspecified, Seasonal allergies, 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[address] ), Testicular cancer ([facility] [address] ) (01/17/2023), Torn meniscus, and Wears glasses  PSH:   has a past surgical history that includes Hemorroidectomy; pr arthrs kne surg w/meniscectomy med/lat w/shvg (Right, 07/19/2021); Knee surgery (7/19/2021); FACIAL/NECK BIOPSY (Left, 1/11/2023); FL guided central venous access device insertion (1/23/2023); and Tunneled venous port placement (N/A, 1/23/2023)  ROS:  Review of Systems   - 13 point ROS was performed and all are normal unless stated in the history above  - Nursing note reviewed  Vitals reviewed  - Orders placed by myself and/or advanced practitioner / resident          PE:   Vitals:    03/26/23 2050 03/26/23 2106 03/26/23 2121 03/26/23 2313 BP: 108/55 103/57 105/55 98/53   BP Location:       Pulse: 85 86 87 84   Resp: 18 16 16 16   Temp: 98 9 °F (37 2 °C) 99 6 °F (37 6 °C) 99 3 °F (37 4 °C) 98 9 °F (37 2 °C)   TempSrc:  Oral Oral Oral   SpO2:  96% 95% 95%   Weight:         Vitals reviewed by me  Unless otherwise specified above:    General: VS reviewed  Appears in NAD    Head: Normocephalic, atraumatic  Eyes: EOM-I      ENT: Atraumatic external nose and ears  No drooling  Neck: No JVD  CV: No pallor noted  Lungs:   No tachypnea  No respiratory distress    Abdomen:  Soft, non-tender, non-distended    MSK:   No obvious deformity    Skin: Dry, intact  No obvious rash  Psychiatric/Behavioral: Appropriate mood and affect   Exam: deferred    Physical Exam     CriticalCare Time  Performed by: Junior Jennifer MD  Authorized by: Junior Jennifer MD     Critical care provider statement:     Critical care time (minutes):  37    Critical care was necessary to treat or prevent imminent or life-threatening deterioration of the following conditions:  Dehydration, circulatory failure and metabolic crisis    Critical care was time spent personally by me on the following activities:  Obtaining history from patient or surrogate, development of treatment plan with patient or surrogate, examination of patient, review of old charts, re-evaluation of patient's condition, ordering and review of laboratory studies and ordering and performing treatments and interventions       A:  - Nursing note reviewed                        No orders to display     Orders Placed This Encounter   Procedures   • Critical Care   • FLU/RSV/COVID - if FLU/RSV clinically relevant   • CBC and differential   • Protime-INR   • APTT   • Comprehensive metabolic panel   • Magnesium   • Phosphorus   • CBC and differential   • Type and screen   • ABORh Recheck - Contact Blood Bank Prior to Collection   • Prepare Leukoreduced RBC: 2 Units   • Prepare Leukoreduced Platelet Pheresis (1 pheresis product = 6-8 pooled units): 1 Product     Labs Reviewed   CBC AND DIFFERENTIAL - Abnormal       Result Value Ref Range Status    WBC 0 76 (*) 4 31 - 10 16 Thousand/uL Final    RBC 1 75 (*) 3 88 - 5 62 Million/uL Final    Hemoglobin 5 5 (*) 12 0 - 17 0 g/dL Final    Hematocrit 15 7 (*) 36 5 - 49 3 % Final    MCV 90  82 - 98 fL Final    MCH 31 4  26 8 - 34 3 pg Final    MCHC 35 0  31 4 - 37 4 g/dL Final    RDW 13 2  11 6 - 15 1 % Final    MPV 9 9  8 9 - 12 7 fL Final    Platelets 7 (*) 957 - 390 Thousands/uL Final   COMPREHENSIVE METABOLIC PANEL - Abnormal    Sodium 137  135 - 147 mmol/L Final    Potassium 3 7  3 5 - 5 3 mmol/L Final    Chloride 101  96 - 108 mmol/L Final    CO2 27  21 - 32 mmol/L Final    ANION GAP 9  4 - 13 mmol/L Final    BUN 22  5 - 25 mg/dL Final    Creatinine 1 02  0 60 - 1 30 mg/dL Final    Comment: Standardized to IDMS reference method    Glucose 95  65 - 140 mg/dL Final    Comment: If the patient is fasting, the ADA then defines impaired fasting glucose as > 100 mg/dL and diabetes as > or equal to 123 mg/dL  Calcium 8 8  8 4 - 10 2 mg/dL Final    AST 7 (*) 13 - 39 U/L Final    ALT 9  7 - 52 U/L Final    Comment: Specimen collection should occur prior to Sulfasalazine administration due to the potential for falsely depressed results       Alkaline Phosphatase 52  34 - 104 U/L Final    Total Protein 6 1 (*) 6 4 - 8 4 g/dL Final    Albumin 3 6  3 5 - 5 0 g/dL Final    Total Bilirubin 0 75  0 20 - 1 00 mg/dL Final    eGFR 82  ml/min/1 73sq m Final    Narrative:     [place] guidelines for Chronic Kidney Disease (CKD):   •  Stage 1 with normal or high GFR (GFR > 90 mL/min/1 73 square meters)  •  Stage 2 Mild CKD (GFR = 60-89 mL/min/1 73 square meters)  •  Stage 3A Moderate CKD (GFR = 45-59 mL/min/1 73 square meters)  •  Stage 3B Moderate CKD (GFR = 30-44 mL/min/1 73 square meters)  •  Stage 4 Severe CKD (GFR = 15-29 mL/min/1 73 square meters)  • Stage 5 End Stage CKD (GFR <15 mL/min/1 73 square meters)  Note: GFR calculation is accurate only with a steady state creatinine   MAGNESIUM - Abnormal    Magnesium 1 0 (*) 1 9 - 2 7 mg/dL Final   CBC AND DIFFERENTIAL - Abnormal    WBC 1 57 (*) 4 31 - 10 16 Thousand/uL Final    RBC 2 21 (*) 3 88 - 5 62 Million/uL Final    Hemoglobin 7 0 (*) 12 0 - 17 0 g/dL Final    Hematocrit 20 1 (*) 36 5 - 49 3 % Final    MCV 91  82 - 98 fL Final    MCH 31 7  26 8 - 34 3 pg Final    MCHC 34 8  31 4 - 37 4 g/dL Final    RDW 13 2  11 6 - 15 1 % Final    MPV 9 7  8 9 - 12 7 fL Final    Platelets 28 (*) 368 - 390 Thousands/uL Final    nRBC 0  /100 WBCs Final    Narrative:     See Manual Diff Done Within 3 Days  This is an appended report  These results have been appended to a previously verified report  MANUAL DIFFERENTIAL(PHLEBS DO NOT ORDER) - Abnormal    Segmented % 11 (*) 43 - 75 % Final    Bands % 6  0 - 8 % Final    Lymphocytes % 70 (*) 14 - 44 % Final    Monocytes % 12  4 - 12 % Final    Eosinophils, % 0  0 - 6 % Final    Basophils % 1  0 - 1 % Final    Absolute Neutrophils 0 13 (*) 1 85 - 7 62 Thousand/uL Final    Lymphocytes Absolute 0 53 (*) 0 60 - 4 47 Thousand/uL Final    Monocytes Absolute 0 09  0 00 - 1 22 Thousand/uL Final    Eosinophils Absolute 0 00  0 00 - 0 40 Thousand/uL Final    Basophils Absolute 0 01  0 00 - 0 10 Thousand/uL Final    Total Counted     Final    RBC Morphology Present   Final    Hypochromia Present   Final    Platelet Estimate Decreased (*) Adequate Final   COVID19, INFLUENZA A/B, RSV PCR, [facility] - Normal    SARS-CoV-2 Negative  Negative Final    Comment:      INFLUENZA A PCR Negative  Negative Final    Comment:      INFLUENZA B PCR Negative  Negative Final    Comment:      RSV PCR Negative  Negative Final    Comment:      Narrative:     FOR PEDIATRIC PATIENTS - copy/paste COVID Guidelines URL to browser: [email or web address]    SARS-CoV-2 assay is a Nucleic Acid Amplification assay intended for the  qualitative detection of nucleic acid from SARS-CoV-2 in nasopharyngeal  swabs  Results are for the presumptive identification of SARS-CoV-2 RNA  Positive results are indicative of infection with SARS-CoV-2, the virus  causing COVID-19, but do not rule out bacterial infection or co-infection  with other viruses  Laboratories within the [place] and its  territories are required to report all positive results to the appropriate  public health authorities  Negative results do not preclude SARS-CoV-2  infection and should not be used as the sole basis for treatment or other  patient management decisions  Negative results must be combined with  clinical observations, patient history, and epidemiological information  This test has not been FDA cleared or approved  This test has been authorized by FDA under an Emergency Use Authorization  (EUA)  This test is only authorized for the duration of time the  declaration that circumstances exist justifying the authorization of the  emergency use of an in vitro diagnostic tests for detection of SARS-CoV-2  virus and/or diagnosis of COVID-19 infection under section 564(b)(1) of  the Act, 21 U  S C  079JNK-8(C)(6), unless the authorization is terminated  or revoked sooner  The test has been validated but independent review by FDA  and CLIA is pending  Test performed using [employer] Gene[employer]pert: This RT-PCR assay targets N2,  a region unique to SARS-CoV-2  A conserved region in the E-gene was chosen  for pan-Sarbecovirus detection which includes SARS-CoV-2  According to CMS-2020-01-R, this platform meets the definition of high-throughput technology     PROTIME-INR - Normal    Protime 14 4  11 6 - 14 5 seconds Final    INR 1 12  0 84 - 1 19 Final   APTT - Normal    PTT 27  23 - 37 seconds Final    Comment: Therapeutic Heparin Range =  60-90 seconds   PHOSPHORUS - Normal    Phosphorus 3 3  2 7 - 4 5 mg/dL Final TYPE AND SCREEN    ABO Grouping O   Final    Rh Factor Positive   Final    Antibody Screen Negative   Final    Specimen Expiration Date [id number]   Final   ABORH RECHECK    ABO Grouping O   Final    Rh Factor Positive   Final         Final Diagnosis:  1  Anemia    2  Thrombocytopenia (HCC)    3  Leukopenia    4  Hypomagnesemia        P:  -Patient with benign physical exam   Review of records show that the patient was sent in for anemia, thrombocytopenia, and leukopenia  A note put in by oncology suggested that he receive transfusions and then evaluate if he needs coming to the hospital or be discharged  I discussed this with the medical team   They believe this would be a good plan  I reviewed this with patient and wife  We will provide transfusions, search for any signs of infection and then decide on disposition   -Provided with transfusions  Patient and wife would like to go home which is reasonable  Return precautions reviewed  Unless otherwise noted the patient's medications were reviewed and they should continue as directed      Medications   sodium chloride 0 9 % bolus 1,000 mL (0 mL Intravenous Stopped 3/26/23 1712)   magnesium sulfate 2 g/50 mL IVPB (premix) 2 g (0 g Intravenous Stopped 3/26/23 1800)   magnesium gluconate (MAGONATE) tablet 500 mg (500 mg Oral Given 3/26/23 1547)   acetaminophen (TYLENOL) tablet 650 mg (650 mg Oral Given 3/26/23 2046)     Time reflects when diagnosis was documented in both MDM as applicable and the Disposition within this note     Time User Action Codes Description Comment    3/26/2023  9:06 PM Lelon Rist Add [D64 9] Anemia     3/26/2023  9:06 PM Jeniffer Cole Add [D69 6] Thrombocytopenia ([address] )     3/26/2023  9:06 PM Lelon Rist Add [D72 819] Leukopenia     3/26/2023  9:06 PM Lelon Rist Add [E83 42] Hypomagnesemia       ED Disposition     ED Disposition   Discharge    Condition   Stable    Date/Time   Sun Mar 26, 2023  9:06 PM    Comment   [facility] [place] "Gaudencio discharge to home/self care  Follow-up Information     Follow up With Specialties Details Why Contact Brooklyn Cid DO Family Medicine Schedule an appointment as soon as possible for a visit   [address] [facility] [address] [facility]  [phone]          Discharge Medication List as of 3/26/2023 11:55 PM      CONTINUE these medications which have NOT CHANGED    Details   loratadine (CLARITIN) 10 mg tablet Take 10 mg by mouth daily, Historical Med      ondansetron (ZOFRAN-ODT) 8 mg disintegrating tablet Take 1 tablet (8 mg total) by mouth every 8 (eight) hours as needed for nausea or vomiting, Starting Fri 2/17/2023, Normal      prochlorperazine (COMPAZINE) 10 mg tablet Take 1 tablet (10 mg total) by mouth every 6 (six) hours as needed for nausea or vomiting, Starting Tue 2/7/2023, Normal      triamcinolone (KENALOG) 0 1 % cream Historical Med           No discharge procedures on file  Prior to Admission Medications   Prescriptions Last Dose Informant Patient Reported? Taking?   loratadine (CLARITIN) 10 mg tablet   Yes No   Sig: Take 10 mg by mouth daily   ondansetron (ZOFRAN-ODT) 8 mg disintegrating tablet   No No   Sig: Take 1 tablet (8 mg total) by mouth every 8 (eight) hours as needed for nausea or vomiting   prochlorperazine (COMPAZINE) 10 mg tablet   No No   Sig: Take 1 tablet (10 mg total) by mouth every 6 (six) hours as needed for nausea or vomiting   triamcinolone (KENALOG) 0 1 % cream   Yes No      Facility-Administered Medications: None       Portions of the record may have been created with voice recognition software  Occasional wrong word or \"sound a like\" substitutions may have occurred due to the inherent limitations of voice recognition software  Read the chart carefully and recognize, using context, where substitutions have occurred      Electronically signed by:  MD Dru Mazariegos MD  03/27/23 0030    "

## 2023-03-26 NOTE — PROGRESS NOTES
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  I spoke to Mr Harsh George wife today  Patient was actually doing okay, no fevers, no bleeding or excessive bruising, no significant symptoms other than fatigue  Patient is to [facility] now  I spoke to Olga, the charge nurse in the [facility]  Patient will need to be evaluated by the ER medical staff to see if Mr Antony Torres only needs blood and platelets or if patient should be admitted for not only blood and platelets but also to rule out any signs of infection/neutropenic fevers etc     Patient was scheduled for chemotherapy for tomorrow - obviously this likely will not happen  I will contact the patient's primary oncologist, Dr Gely Ly

## 2023-03-27 ENCOUNTER — TELEPHONE (OUTPATIENT)
Dept: HEMATOLOGY ONCOLOGY | Facility: CLINIC | Age: 53
End: 2023-03-27

## 2023-03-27 ENCOUNTER — HOSPITAL ENCOUNTER (OUTPATIENT)
Dept: INFUSION CENTER | Facility: CLINIC | Age: 53
Discharge: HOME | End: 2023-03-27

## 2023-03-27 ENCOUNTER — PATIENT OUTREACH (OUTPATIENT)
Dept: HEMATOLOGY ONCOLOGY | Facility: CLINIC | Age: 53
End: 2023-03-27

## 2023-03-27 VITALS
RESPIRATION RATE: 16 BRPM | HEART RATE: 74 BPM | TEMPERATURE: 98.4 F | DIASTOLIC BLOOD PRESSURE: 74 MMHG | SYSTOLIC BLOOD PRESSURE: 116 MMHG

## 2023-03-27 DIAGNOSIS — T45.1X5A CHEMOTHERAPY-INDUCED NEUTROPENIA (HCC): ICD-10-CM

## 2023-03-27 DIAGNOSIS — C62.90 SEMINOMA (HCC): Primary | ICD-10-CM

## 2023-03-27 DIAGNOSIS — D70.1 CHEMOTHERAPY-INDUCED NEUTROPENIA (HCC): ICD-10-CM

## 2023-03-27 DIAGNOSIS — E83.42 HYPOMAGNESEMIA: Primary | ICD-10-CM

## 2023-03-27 DIAGNOSIS — C62.90 SEMINOMA (HCC): ICD-10-CM

## 2023-03-27 DIAGNOSIS — E87.6 HYPOKALEMIA: ICD-10-CM

## 2023-03-27 DIAGNOSIS — E87.70 HYPERVOLEMIA, UNSPECIFIED HYPERVOLEMIA TYPE: Primary | ICD-10-CM

## 2023-03-27 DIAGNOSIS — E87.70 HYPERVOLEMIA, UNSPECIFIED HYPERVOLEMIA TYPE: ICD-10-CM

## 2023-03-27 DIAGNOSIS — E83.42 HYPOMAGNESEMIA: ICD-10-CM

## 2023-03-27 LAB
ABO GROUP BLD BPU: NORMAL
ALBUMIN SERPL BCP-MCNC: 3.7 G/DL (ref 3.5–5)
ALP SERPL-CCNC: 56 U/L (ref 34–104)
ALT SERPL W P-5'-P-CCNC: 9 U/L (ref 7–52)
ANION GAP SERPL CALCULATED.3IONS-SCNC: 7 MMOL/L (ref 4–13)
AST SERPL W P-5'-P-CCNC: 11 U/L (ref 13–39)
BASOPHILS # BLD MANUAL: 0 THOUSAND/UL (ref 0–0.1)
BASOPHILS NFR MAR MANUAL: 0 % (ref 0–1)
BILIRUB SERPL-MCNC: 0.62 MG/DL (ref 0.2–1)
BPU ID: NORMAL
BUN SERPL-MCNC: 18 MG/DL (ref 5–25)
CALCIUM SERPL-MCNC: 8.7 MG/DL (ref 8.4–10.2)
CHLORIDE SERPL-SCNC: 102 MMOL/L (ref 96–108)
CO2 SERPL-SCNC: 28 MMOL/L (ref 21–32)
CREAT SERPL-MCNC: 1 MG/DL (ref 0.6–1.3)
CROSSMATCH: NORMAL
CROSSMATCH: NORMAL
EOSINOPHIL # BLD MANUAL: 0.04 THOUSAND/UL (ref 0–0.4)
EOSINOPHIL NFR BLD MANUAL: 1 % (ref 0–6)
ERYTHROCYTE [DISTWIDTH] IN BLOOD BY AUTOMATED COUNT: 13.4 % (ref 11.6–15.1)
GFR SERPL CREATININE-BSD FRML MDRD: 84 ML/MIN/1.73SQ M
GLUCOSE P FAST SERPL-MCNC: 90 MG/DL (ref 65–99)
GLUCOSE SERPL-MCNC: 90 MG/DL (ref 65–140)
HCT VFR BLD AUTO: 21.9 % (ref 36.5–49.3)
HGB BLD-MCNC: 7.6 G/DL (ref 12–17)
HYPERCHROMIA BLD QL SMEAR: PRESENT
LYMPHOCYTES # BLD AUTO: 1.18 THOUSAND/UL (ref 0.6–4.47)
LYMPHOCYTES # BLD AUTO: 33 % (ref 14–44)
MAGNESIUM SERPL-MCNC: 1.4 MG/DL (ref 1.9–2.7)
MCH RBC QN AUTO: 31.1 PG (ref 26.8–34.3)
MCHC RBC AUTO-ENTMCNC: 34.7 G/DL (ref 31.4–37.4)
MCV RBC AUTO: 90 FL (ref 82–98)
MONOCYTES # BLD AUTO: 0.75 THOUSAND/UL (ref 0–1.22)
MONOCYTES NFR BLD: 21 % (ref 4–12)
NEUTROPHILS # BLD MANUAL: 1.61 THOUSAND/UL (ref 1.85–7.62)
NEUTS BAND NFR BLD MANUAL: 1 % (ref 0–8)
NEUTS SEG NFR BLD AUTO: 44 % (ref 43–75)
NRBC BLD AUTO-RTO: 1 /100 WBC (ref 0–2)
PLATELET # BLD AUTO: 29 THOUSANDS/UL (ref 149–390)
PLATELET BLD QL SMEAR: ABNORMAL
PMV BLD AUTO: 10.1 FL (ref 8.9–12.7)
POTASSIUM SERPL-SCNC: 3.9 MMOL/L (ref 3.5–5.3)
PROT SERPL-MCNC: 6.2 G/DL (ref 6.4–8.4)
RBC # BLD AUTO: 2.44 MILLION/UL (ref 3.88–5.62)
RBC MORPH BLD: PRESENT
SODIUM SERPL-SCNC: 137 MMOL/L (ref 135–147)
UNIT DISPENSE STATUS: NORMAL
UNIT PRODUCT CODE: NORMAL
UNIT PRODUCT VOLUME: 276 ML
UNIT PRODUCT VOLUME: 350 ML
UNIT PRODUCT VOLUME: 350 ML
UNIT RH: NORMAL
WBC # BLD AUTO: 3.58 THOUSAND/UL (ref 4.31–10.16)

## 2023-03-27 RX ORDER — MAGNESIUM SULFATE HEPTAHYDRATE 40 MG/ML
4 INJECTION, SOLUTION INTRAVENOUS ONCE
Status: CANCELLED | OUTPATIENT
Start: 2023-03-27

## 2023-03-27 RX ORDER — MAGNESIUM SULFATE HEPTAHYDRATE 40 MG/ML
4 INJECTION, SOLUTION INTRAVENOUS ONCE
Status: COMPLETED | OUTPATIENT
Start: 2023-03-27 | End: 2023-03-27

## 2023-03-27 RX ADMIN — MAGNESIUM SULFATE HEPTAHYDRATE 4 G: 40 INJECTION, SOLUTION INTRAVENOUS at 10:39

## 2023-03-27 NOTE — PROGRESS NOTES
Patient arrived to unit  Platelet count 28 and Bleomycin to be deferred for 1 week  Patient tolerated Magnesium 4 grams over 2 hours without incident  AVS declined and patient aware of next appointment  Patient left in stable condition

## 2023-03-27 NOTE — PROGRESS NOTES
Patients wife returned my call  She stated that patient was in ER yesterday with HG 5 5 and PLTS 7  He received 2 units of blood and PLTS  Wife stated that infusion center put STAT labs in this morning for patient to have completed before his tx this morning  Wife asked if patient should be following infusion instructions  I instructed her that he should defiantly get his CBC, CMP, MAG rechecked this morning so he is able to proceed with tx if labs are in range  She voiced understanding

## 2023-03-27 NOTE — PROGRESS NOTES
Patients wife returned my call stating that patients tx today was postponed due to out of range labs  Patients wife and patient are concerned and would like to proceed with his full tx (BEP) next week rather then just the bleomycin if possible  They strongly would rather do his full tx next week and complete just the bleomycin at another time if possible  Patients would like to speak with Orlando Devi RN about this  I let wife know that I will be sending message to Orlando Devi to address with Dr Rajeev Grady and return her call to discuss  She voiced understanding

## 2023-03-27 NOTE — PROGRESS NOTES
Patient wife called before I was in office this radha James LM asking me to return her to give me an update on what has been going on  Patient was in the ER on 3/26/23  I returned her call and no answer, I LM asking her to return my call  She has my direct line for returned call

## 2023-03-27 NOTE — TELEPHONE ENCOUNTER
"Reviewed request from patient and patient spouse for patient on 4/3 to start cycle 4 and to not have day 15 of cycle 3  Reviewed with Dr Guillermo Rodriguez, that is [place]  \" patient has neulasta on pro for day 8 of cycle 4-added per Dr Guillermo Rodriguez recommendations  Title: neulasta on pro addition  Date patient scheduled: 4/10    Original medication ordered: n/a    New Medication ordered:Neulasta onpro    Office RN notified patient? ? yes    Is the patient scheduled within 24 hours? ? If yes, follow up with verbal telephone call  no  Office RN to route to [facility]  Infusion  pool routes to [facility]  Infusion tech to receive message, confirm scheduled treatment duration matches ordered treatment duration or adjust accordingly, and re-link appointment request orders  Infusion tech to notify pharmacy and finance       "

## 2023-03-27 NOTE — PROGRESS NOTES
[facility], treatment plan updated and infusion RN updated patient spouse on 3/27/23 at [phone] am

## 2023-03-27 NOTE — PROGRESS NOTES
"Reviewed request from patient and patient spouse for patient on 4/3 to start cycle 4 and to not have day 15 of cycle 3  Reviewed with Dr Jm Calvert, that is [place]  \" patient has neulasta on pro for day 8 of cycle 4-added per Dr Jm Calvert recommendations  Title: neulasta on pro addition  Date patient scheduled: 4/10    Original medication ordered: n/a    New Medication ordered:Neulasta onpro    Office RN notified patient? ? yes    Is the patient scheduled within 24 hours? ? If yes, follow up with verbal telephone call  no  Office RN to route to [facility]  Infusion  pool routes to [facility]  Infusion tech to receive message, confirm scheduled treatment duration matches ordered treatment duration or adjust accordingly, and re-link appointment request orders  Infusion tech to notify pharmacy and finance       "

## 2023-03-27 NOTE — TELEPHONE ENCOUNTER
Noted  Patient was in ED, received treatment and evaluation  Patient receiving STAT labs on 3/27/23

## 2023-03-28 ENCOUNTER — TELEPHONE (OUTPATIENT)
Dept: UROLOGY | Facility: CLINIC | Age: 53
End: 2023-03-28

## 2023-03-28 NOTE — TELEPHONE ENCOUNTER
----- Message from Nataly Teran MA sent at 3/28/2023 10:01 AM EDT -----  Please schedule appt  Thank you   ----- Message -----  From: Nataly Teran MA  Sent: 3/24/2023   5:24 PM EDT  To: Hortencia Hussein MD, Zachery Guillen, RN, #    Per Dr Narinder Ferrari patient is to be seen ASAP to discuss orchiectomy  Patient completing chemo on 4/17/23  Wife prefers to see Dr Narinder Ferrari in [address] [address] office  Please make me aware of appt, thank you

## 2023-03-28 NOTE — TELEPHONE ENCOUNTER
Due to patients infusion schedule, soonest available appt in any office with Dr Elyssa Hoyt is 4/11/23 at [address] [place] [address] in the [place] office  The available days in [place] patient had 8 hr infusion sessions  Please confirm appt

## 2023-03-29 RX ORDER — MAGNESIUM SULFATE HEPTAHYDRATE 40 MG/ML
2 INJECTION, SOLUTION INTRAVENOUS ONCE
Status: CANCELLED
Start: 2023-04-05 | End: 2023-04-05

## 2023-03-30 NOTE — TELEPHONE ENCOUNTER
Left message for patient to call office back to confirm appointment with Dr Glenn Shafer at the [facility] office on 4/11/23 with a 8:45 arrival time

## 2023-03-30 NOTE — TELEPHONE ENCOUNTER
Patient's wife returning call      She is confirming the appt on 4/11 at 9 am with arrival of 8:45 with Dr Tiarra Sams in [facility] [place]

## 2023-03-31 ENCOUNTER — APPOINTMENT (OUTPATIENT)
Dept: LAB | Facility: CLINIC | Age: 53
End: 2023-03-31

## 2023-03-31 ENCOUNTER — TELEPHONE (OUTPATIENT)
Dept: HEMATOLOGY ONCOLOGY | Facility: CLINIC | Age: 53
End: 2023-03-31

## 2023-03-31 ENCOUNTER — OFFICE VISIT (OUTPATIENT)
Dept: HEMATOLOGY ONCOLOGY | Facility: CLINIC | Age: 53
End: 2023-03-31

## 2023-03-31 ENCOUNTER — HOSPITAL ENCOUNTER (OUTPATIENT)
Dept: INFUSION CENTER | Facility: HOSPITAL | Age: 53
Discharge: HOME | End: 2023-03-31

## 2023-03-31 VITALS
DIASTOLIC BLOOD PRESSURE: 74 MMHG | HEART RATE: 72 BPM | RESPIRATION RATE: 18 BRPM | TEMPERATURE: 97.4 F | SYSTOLIC BLOOD PRESSURE: 126 MMHG

## 2023-03-31 VITALS
BODY MASS INDEX: 36.43 KG/M2 | SYSTOLIC BLOOD PRESSURE: 122 MMHG | WEIGHT: 293 LBS | HEIGHT: 75 IN | RESPIRATION RATE: 18 BRPM | OXYGEN SATURATION: 94 % | TEMPERATURE: 97.8 F | HEART RATE: 100 BPM | DIASTOLIC BLOOD PRESSURE: 68 MMHG

## 2023-03-31 DIAGNOSIS — D64.9 NORMOCYTIC ANEMIA: ICD-10-CM

## 2023-03-31 DIAGNOSIS — T45.1X5A CHEMOTHERAPY-INDUCED NEUTROPENIA (HCC): ICD-10-CM

## 2023-03-31 DIAGNOSIS — E83.42 HYPOMAGNESEMIA: ICD-10-CM

## 2023-03-31 DIAGNOSIS — C62.90 SEMINOMA (HCC): ICD-10-CM

## 2023-03-31 DIAGNOSIS — D70.1 CHEMOTHERAPY-INDUCED NEUTROPENIA (HCC): ICD-10-CM

## 2023-03-31 DIAGNOSIS — E87.70 HYPERVOLEMIA, UNSPECIFIED HYPERVOLEMIA TYPE: ICD-10-CM

## 2023-03-31 DIAGNOSIS — D64.9 NORMOCYTIC ANEMIA: Primary | ICD-10-CM

## 2023-03-31 DIAGNOSIS — C62.90 SEMINOMA (HCC): Primary | ICD-10-CM

## 2023-03-31 LAB
ABO GROUP BLD: NORMAL
ALBUMIN SERPL BCP-MCNC: 3.2 G/DL (ref 3.5–5)
ALP SERPL-CCNC: 105 U/L (ref 46–116)
ALT SERPL W P-5'-P-CCNC: 15 U/L (ref 12–78)
ANION GAP SERPL CALCULATED.3IONS-SCNC: 3 MMOL/L (ref 4–13)
ANISOCYTOSIS BLD QL SMEAR: PRESENT
AST SERPL W P-5'-P-CCNC: 19 U/L (ref 5–45)
BASOPHILS # BLD MANUAL: 0 THOUSAND/UL (ref 0–0.1)
BASOPHILS NFR MAR MANUAL: 0 % (ref 0–1)
BILIRUB SERPL-MCNC: 0.27 MG/DL (ref 0.2–1)
BLD GP AB SCN SERPL QL: NEGATIVE
BUN SERPL-MCNC: 16 MG/DL (ref 5–25)
CALCIUM ALBUM COR SERPL-MCNC: 9 MG/DL (ref 8.3–10.1)
CALCIUM SERPL-MCNC: 8.4 MG/DL (ref 8.3–10.1)
CHLORIDE SERPL-SCNC: 107 MMOL/L (ref 96–108)
CO2 SERPL-SCNC: 28 MMOL/L (ref 21–32)
CREAT SERPL-MCNC: 0.97 MG/DL (ref 0.6–1.3)
EOSINOPHIL # BLD MANUAL: 0 THOUSAND/UL (ref 0–0.4)
EOSINOPHIL NFR BLD MANUAL: 0 % (ref 0–6)
ERYTHROCYTE [DISTWIDTH] IN BLOOD BY AUTOMATED COUNT: 14.6 % (ref 11.6–15.1)
GFR SERPL CREATININE-BSD FRML MDRD: 88 ML/MIN/1.73SQ M
GLUCOSE P FAST SERPL-MCNC: 111 MG/DL (ref 65–99)
HCT VFR BLD AUTO: 23.5 % (ref 36.5–49.3)
HGB BLD-MCNC: 7.6 G/DL (ref 12–17)
LYMPHOCYTES # BLD AUTO: 10 % (ref 14–44)
LYMPHOCYTES # BLD AUTO: 3.18 THOUSAND/UL (ref 0.6–4.47)
MAGNESIUM SERPL-MCNC: 1.6 MG/DL (ref 1.6–2.6)
MCH RBC QN AUTO: 30.9 PG (ref 26.8–34.3)
MCHC RBC AUTO-ENTMCNC: 32.3 G/DL (ref 31.4–37.4)
MCV RBC AUTO: 96 FL (ref 82–98)
METAMYELOCYTES NFR BLD MANUAL: 3 % (ref 0–1)
MONOCYTES # BLD AUTO: 1.27 THOUSAND/UL (ref 0–1.22)
MONOCYTES NFR BLD: 4 % (ref 4–12)
NEUTROPHILS # BLD MANUAL: 25.09 THOUSAND/UL (ref 1.85–7.62)
NEUTS BAND NFR BLD MANUAL: 14 % (ref 0–8)
NEUTS SEG NFR BLD AUTO: 65 % (ref 43–75)
PLATELET # BLD AUTO: 80 THOUSANDS/UL (ref 149–390)
PLATELET BLD QL SMEAR: ABNORMAL
PMV BLD AUTO: 9.7 FL (ref 8.9–12.7)
POTASSIUM SERPL-SCNC: 3.8 MMOL/L (ref 3.5–5.3)
PROT SERPL-MCNC: 6.5 G/DL (ref 6.4–8.4)
RBC # BLD AUTO: 2.46 MILLION/UL (ref 3.88–5.62)
RH BLD: POSITIVE
SODIUM SERPL-SCNC: 138 MMOL/L (ref 135–147)
SPECIMEN EXPIRATION DATE: NORMAL
VARIANT LYMPHS # BLD AUTO: 4 %
WBC # BLD AUTO: 31.76 THOUSAND/UL (ref 4.31–10.16)

## 2023-03-31 RX ORDER — SODIUM CHLORIDE 9 MG/ML
20 INJECTION, SOLUTION INTRAVENOUS ONCE
Status: CANCELLED | OUTPATIENT
Start: 2023-03-31

## 2023-03-31 RX ORDER — SODIUM CHLORIDE 9 MG/ML
20 INJECTION, SOLUTION INTRAVENOUS ONCE
Status: DISCONTINUED | OUTPATIENT
Start: 2023-03-31 | End: 2023-04-03 | Stop reason: HOSPADM

## 2023-03-31 NOTE — TELEPHONE ENCOUNTER
Cheikh Brice, it's Luis Fernando Briones calling  Mario and I are at the [facility] now with supportive access  Then we're just waiting for the blood to arrive to transfuse  We just had a quick question  Since he had his lab work today, we're wondering does he need to have additional lab work over the weekend prior to treatment on Monday? If you could please let us know that I'd appreciate it  My phone number is [phone]  Thank you so much  We appreciate your help  Bye  Call out to patient spouse, reviewed recommendations in regards to no further labs needed prior to Monday 4/3/23 infusion

## 2023-03-31 NOTE — PROGRESS NOTES
Reviewed labs with Dr Farrah Oliver, WBC and ANC is elevated due to nelasta  Patient okay to be treated as scheduled with HMG of 7 6 and Plts of 80

## 2023-03-31 NOTE — TELEPHONE ENCOUNTER
Call out to [facility] infusion   Spoke with staff patient able to have 1 unit PRBCs at 11am   Patient spouse aware

## 2023-04-01 LAB
ABO GROUP BLD BPU: NORMAL
BPU ID: NORMAL
CROSSMATCH: NORMAL
UNIT DISPENSE STATUS: NORMAL
UNIT PRODUCT CODE: NORMAL
UNIT PRODUCT VOLUME: 350 ML
UNIT RH: NORMAL

## 2023-04-03 ENCOUNTER — HOSPITAL ENCOUNTER (OUTPATIENT)
Dept: INFUSION CENTER | Facility: CLINIC | Age: 53
Discharge: HOME | End: 2023-04-03

## 2023-04-03 VITALS — BODY MASS INDEX: 36.02 KG/M2 | WEIGHT: 289.68 LBS | HEIGHT: 75 IN

## 2023-04-03 DIAGNOSIS — E83.42 HYPOMAGNESEMIA: Primary | ICD-10-CM

## 2023-04-03 DIAGNOSIS — T45.1X5A CHEMOTHERAPY-INDUCED NEUTROPENIA (HCC): ICD-10-CM

## 2023-04-03 DIAGNOSIS — C62.90 SEMINOMA (HCC): ICD-10-CM

## 2023-04-03 DIAGNOSIS — E87.70 HYPERVOLEMIA, UNSPECIFIED HYPERVOLEMIA TYPE: ICD-10-CM

## 2023-04-03 DIAGNOSIS — D70.1 CHEMOTHERAPY-INDUCED NEUTROPENIA (HCC): ICD-10-CM

## 2023-04-03 RX ORDER — ACETAMINOPHEN 325 MG/1
650 TABLET ORAL ONCE
Status: COMPLETED | OUTPATIENT
Start: 2023-04-03 | End: 2023-04-03

## 2023-04-03 RX ORDER — MAGNESIUM SULFATE HEPTAHYDRATE 40 MG/ML
2 INJECTION, SOLUTION INTRAVENOUS ONCE
Status: COMPLETED | OUTPATIENT
Start: 2023-04-03 | End: 2023-04-03

## 2023-04-03 RX ORDER — SODIUM CHLORIDE 9 MG/ML
20 INJECTION, SOLUTION INTRAVENOUS ONCE
Status: COMPLETED | OUTPATIENT
Start: 2023-04-03 | End: 2023-04-03

## 2023-04-03 RX ADMIN — MAGNESIUM SULFATE HEPTAHYDRATE 2 G: 40 INJECTION, SOLUTION INTRAVENOUS at 15:02

## 2023-04-03 RX ADMIN — CISPLATIN 52.2 MG: 50 INJECTION, SOLUTION INTRAVENOUS at 12:21

## 2023-04-03 RX ADMIN — DIPHENHYDRAMINE HYDROCHLORIDE 25 MG: 50 INJECTION, SOLUTION INTRAMUSCULAR; INTRAVENOUS at 10:55

## 2023-04-03 RX ADMIN — BLEOMYCIN SULFATE 30 UNITS: 30 POWDER, FOR SOLUTION INTRAMUSCULAR; INTRAPLEURAL; INTRAVENOUS; SUBCUTANEOUS at 14:40

## 2023-04-03 RX ADMIN — MAGNESIUM SULFATE HEPTAHYDRATE 2 G: 40 INJECTION, SOLUTION INTRAVENOUS at 08:52

## 2023-04-03 RX ADMIN — SODIUM CHLORIDE 500 ML: 0.9 INJECTION, SOLUTION INTRAVENOUS at 15:01

## 2023-04-03 RX ADMIN — SODIUM CHLORIDE 500 ML: 0.9 INJECTION, SOLUTION INTRAVENOUS at 08:45

## 2023-04-03 RX ADMIN — SODIUM CHLORIDE 20 ML/HR: 0.9 INJECTION, SOLUTION INTRAVENOUS at 08:45

## 2023-04-03 RX ADMIN — FOSAPREPITANT 150 MG: 150 INJECTION, POWDER, LYOPHILIZED, FOR SOLUTION INTRAVENOUS at 11:35

## 2023-04-03 RX ADMIN — ETOPOSIDE 261 MG: 20 INJECTION INTRAVENOUS at 13:24

## 2023-04-03 RX ADMIN — ACETAMINOPHEN 650 MG: 325 TABLET ORAL at 10:31

## 2023-04-03 RX ADMIN — DEXAMETHASONE SODIUM PHOSPHATE: 10 INJECTION, SOLUTION INTRAMUSCULAR; INTRAVENOUS at 10:32

## 2023-04-04 ENCOUNTER — HOSPITAL ENCOUNTER (OUTPATIENT)
Dept: INFUSION CENTER | Facility: CLINIC | Age: 53
Discharge: HOME | End: 2023-04-04

## 2023-04-04 ENCOUNTER — TELEPHONE (OUTPATIENT)
Dept: HEMATOLOGY ONCOLOGY | Facility: CLINIC | Age: 53
End: 2023-04-04

## 2023-04-04 VITALS
HEIGHT: 75 IN | DIASTOLIC BLOOD PRESSURE: 75 MMHG | HEART RATE: 105 BPM | SYSTOLIC BLOOD PRESSURE: 160 MMHG | WEIGHT: 294.98 LBS | TEMPERATURE: 97.7 F | BODY MASS INDEX: 36.68 KG/M2 | RESPIRATION RATE: 18 BRPM

## 2023-04-04 DIAGNOSIS — E83.42 HYPOMAGNESEMIA: ICD-10-CM

## 2023-04-04 DIAGNOSIS — E83.42 HYPOMAGNESEMIA: Primary | ICD-10-CM

## 2023-04-04 DIAGNOSIS — T45.1X5A CHEMOTHERAPY-INDUCED NEUTROPENIA (HCC): ICD-10-CM

## 2023-04-04 DIAGNOSIS — E87.70 HYPERVOLEMIA, UNSPECIFIED HYPERVOLEMIA TYPE: ICD-10-CM

## 2023-04-04 DIAGNOSIS — D70.1 CHEMOTHERAPY-INDUCED NEUTROPENIA (HCC): Primary | ICD-10-CM

## 2023-04-04 DIAGNOSIS — C62.90 SEMINOMA (HCC): ICD-10-CM

## 2023-04-04 DIAGNOSIS — T45.1X5A CHEMOTHERAPY-INDUCED NEUTROPENIA (HCC): Primary | ICD-10-CM

## 2023-04-04 DIAGNOSIS — D70.1 CHEMOTHERAPY-INDUCED NEUTROPENIA (HCC): ICD-10-CM

## 2023-04-04 DIAGNOSIS — E87.6 HYPOKALEMIA: ICD-10-CM

## 2023-04-04 RX ORDER — METOCLOPRAMIDE HYDROCHLORIDE 5 MG/ML
5 INJECTION INTRAMUSCULAR; INTRAVENOUS ONCE
Status: CANCELLED
Start: 2023-04-05 | End: 2023-04-05

## 2023-04-04 RX ORDER — SODIUM CHLORIDE 9 MG/ML
20 INJECTION, SOLUTION INTRAVENOUS ONCE
Status: COMPLETED | OUTPATIENT
Start: 2023-04-04 | End: 2023-04-04

## 2023-04-04 RX ORDER — FUROSEMIDE 10 MG/ML
10 INJECTION INTRAMUSCULAR; INTRAVENOUS ONCE
Status: COMPLETED | OUTPATIENT
Start: 2023-04-04 | End: 2023-04-04

## 2023-04-04 RX ORDER — MAGNESIUM SULFATE HEPTAHYDRATE 40 MG/ML
2 INJECTION, SOLUTION INTRAVENOUS ONCE
Status: CANCELLED
Start: 2023-04-05 | End: 2023-04-05

## 2023-04-04 RX ORDER — FUROSEMIDE 10 MG/ML
10 INJECTION INTRAMUSCULAR; INTRAVENOUS ONCE
Status: CANCELLED
Start: 2023-04-04 | End: 2023-04-04

## 2023-04-04 RX ADMIN — SODIUM CHLORIDE 20 ML/HR: 0.9 INJECTION, SOLUTION INTRAVENOUS at 08:46

## 2023-04-04 RX ADMIN — SODIUM CHLORIDE 500 ML: 0.9 INJECTION, SOLUTION INTRAVENOUS at 12:04

## 2023-04-04 RX ADMIN — ONDANSETRON: 2 INJECTION INTRAMUSCULAR; INTRAVENOUS at 08:46

## 2023-04-04 RX ADMIN — FUROSEMIDE 10 MG: 10 INJECTION, SOLUTION INTRAMUSCULAR; INTRAVENOUS at 09:27

## 2023-04-04 RX ADMIN — CISPLATIN 52.2 MG: 50 INJECTION, SOLUTION INTRAVENOUS at 09:49

## 2023-04-04 RX ADMIN — SODIUM CHLORIDE 500 ML: 0.9 INJECTION, SOLUTION INTRAVENOUS at 08:52

## 2023-04-04 RX ADMIN — ETOPOSIDE 261 MG: 20 INJECTION INTRAVENOUS at 10:53

## 2023-04-04 NOTE — PROGRESS NOTES
Received notification from infusion RN in regards to patient asking for Reglan for breakthrough nausea/vomiting  Reviewed with Dr Iliana Stephenson  Wanted to give Reglan 5mg IV as premedication without IV zofran  Was reviewed by infusion RN that patient spouse would like to keep IV zofran in addition to having the Reglan IV  Per Dr Iliana Stephenson patient to have the zofran 16 mg and Reglan 5 mg IV and then have repeat EKG prior to any PO PRN reglan to be prescribed  Infusion RN notified

## 2023-04-04 NOTE — PROGRESS NOTES
day 1: 2 g prehydration + 2 g post hydration and then     2 g mg with post hydration on tx on days 3 and 5

## 2023-04-04 NOTE — PROGRESS NOTES
"Reviewed recommendations of IV reglan and that EKG would be needed post treatment on 4/5/23 to ensure no Qtc prolongation  Per Infusion RN, patient spouse does not want to \"Mess with an Ekg  \" Per report, patient spouse does not want IV reglan added to patient treatment plan on 4/5/23  Reviewed that EKG is needed for any additional anti-nausea medications per Dr Dayday Cuellar  Per infusion RN patient spouse refused EKG at this time  Order still placed if patient and spouse change mind  Dr Dayday Cuellar aware, IV reglan removed         "

## 2023-04-04 NOTE — PROGRESS NOTES
Pt tolerated treatment today without incident  Pt and wife have decided that he would not like to proceed with Reglan, either IV or PO  He would like to keep his treatment plan unchanged  He does not want to have an EKG  Pt and wife have decided they will work with Zofran alone for nausea management  Pt left unit in stable condition without question or concern

## 2023-04-04 NOTE — PROGRESS NOTES
Pt presented to infusion for day 2 Cisplatin/Etoposide  Pt had nausea, vomiting, diarrhea overnight  Diarrhea treated effectively with imodium  Zofran and compazine somewhat effective to control nausea but as stated, pt did have 3 episodes vomiting overnight  Pt had 5lb weight gain overnight, pt has abdominal bloating and feels this contributed to nausea  One time dose IV lasix ordered and given to pt  Pt has also received IV Zofran with dexamethasone  Pt admits to moderate relief of nausea, some nausea persists  Pt is voiding in BR frequently  Pt is tolerating treatment without reaction  Pt's wife, Sorin Lynne, questioning if perhaps Reglan would be helpful to control nausea  I have reached out to Dr Sue Virk nurse, [facility], to inquire

## 2023-04-04 NOTE — PROGRESS NOTES
Received notification from infusion RN in regards to patient presenting with abdomin bloating and bilateral lower extremity edema  Reviewed with Dr Tatiana Booth patient has n/v/d as well  Per Infusion RN patient took imodium and had relief from diarrhea  Patient feels n/v due to abdominal bloating  Per Dr Tatiana Booth patient to have lasix 10mg IV today 4/4/23 and on Thursday 4/6/23 due to 5lb weight gain over night as well  Infusion RN notified

## 2023-04-04 NOTE — TELEPHONE ENCOUNTER
Patient at infusion center and is requesting for reglan as patient has n/v with some relief from zofran and no relief with compazine

## 2023-04-05 ENCOUNTER — HOSPITAL ENCOUNTER (OUTPATIENT)
Dept: INFUSION CENTER | Facility: CLINIC | Age: 53
Discharge: HOME | End: 2023-04-05

## 2023-04-05 VITALS
HEART RATE: 62 BPM | BODY MASS INDEX: 36.73 KG/M2 | WEIGHT: 295.42 LBS | TEMPERATURE: 98.3 F | RESPIRATION RATE: 16 BRPM | DIASTOLIC BLOOD PRESSURE: 75 MMHG | HEIGHT: 75 IN | SYSTOLIC BLOOD PRESSURE: 125 MMHG

## 2023-04-05 DIAGNOSIS — E87.70 HYPERVOLEMIA, UNSPECIFIED HYPERVOLEMIA TYPE: ICD-10-CM

## 2023-04-05 DIAGNOSIS — E83.42 HYPOMAGNESEMIA: Primary | ICD-10-CM

## 2023-04-05 DIAGNOSIS — T45.1X5A CHEMOTHERAPY-INDUCED NEUTROPENIA (HCC): ICD-10-CM

## 2023-04-05 DIAGNOSIS — D70.1 CHEMOTHERAPY-INDUCED NEUTROPENIA (HCC): ICD-10-CM

## 2023-04-05 DIAGNOSIS — C62.90 SEMINOMA (HCC): ICD-10-CM

## 2023-04-05 RX ORDER — MAGNESIUM SULFATE HEPTAHYDRATE 40 MG/ML
2 INJECTION, SOLUTION INTRAVENOUS ONCE
Status: CANCELLED
Start: 2023-04-07 | End: 2023-04-07

## 2023-04-05 RX ORDER — MAGNESIUM SULFATE HEPTAHYDRATE 40 MG/ML
2 INJECTION, SOLUTION INTRAVENOUS ONCE
Status: COMPLETED | OUTPATIENT
Start: 2023-04-05 | End: 2023-04-05

## 2023-04-05 RX ORDER — SODIUM CHLORIDE 9 MG/ML
20 INJECTION, SOLUTION INTRAVENOUS ONCE
Status: COMPLETED | OUTPATIENT
Start: 2023-04-05 | End: 2023-04-05

## 2023-04-05 RX ADMIN — CISPLATIN 52.2 MG: 100 INJECTION, SOLUTION INTRAVENOUS at 09:50

## 2023-04-05 RX ADMIN — ONDANSETRON: 2 INJECTION INTRAMUSCULAR; INTRAVENOUS at 08:31

## 2023-04-05 RX ADMIN — SODIUM CHLORIDE 20 ML/HR: 0.9 INJECTION, SOLUTION INTRAVENOUS at 09:47

## 2023-04-05 RX ADMIN — SODIUM CHLORIDE 500 ML: 0.9 INJECTION, SOLUTION INTRAVENOUS at 12:26

## 2023-04-05 RX ADMIN — ETOPOSIDE 261 MG: 20 INJECTION INTRAVENOUS at 10:58

## 2023-04-05 RX ADMIN — MAGNESIUM SULFATE HEPTAHYDRATE 2 G: 40 INJECTION, SOLUTION INTRAVENOUS at 12:24

## 2023-04-05 RX ADMIN — SODIUM CHLORIDE 500 ML: 0.9 INJECTION, SOLUTION INTRAVENOUS at 08:30

## 2023-04-05 NOTE — PROGRESS NOTES
"Reviewed patient and patient spouse request for Jennifer Redder to be added to patient treatment day for 4/6/23  Per Dr Deangelo Gamino \"patient had received Jennifer Redder a few days ago, it is contraindicated to give again this week\"    Patient received emend on Monday 4/3/23   Infusion RN notified and my chart message sent to Patient  "

## 2023-04-05 NOTE — PROGRESS NOTES
Patient arrived on unit for treatment  Patient stated the nausea and vomiting is better this morning as compared to earlier in the week  Patient took immodium this am for diarrhea  Cleared for treatment today  Patient tolerated infusion without incident  Aware to return tomorrow for Day #4   Left unit in stable condition

## 2023-04-07 ENCOUNTER — TELEPHONE (OUTPATIENT)
Dept: UROLOGY | Facility: AMBULATORY SURGERY CENTER | Age: 53
End: 2023-04-07

## 2023-04-07 ENCOUNTER — TELEPHONE (OUTPATIENT)
Dept: OBGYN CLINIC | Facility: OTHER | Age: 53
End: 2023-04-07

## 2023-04-07 ENCOUNTER — HOSPITAL ENCOUNTER (OUTPATIENT)
Dept: INFUSION CENTER | Facility: CLINIC | Age: 53
End: 2023-04-07

## 2023-04-07 VITALS
RESPIRATION RATE: 18 BRPM | HEIGHT: 75 IN | HEART RATE: 62 BPM | BODY MASS INDEX: 36.56 KG/M2 | TEMPERATURE: 97.5 F | DIASTOLIC BLOOD PRESSURE: 74 MMHG | SYSTOLIC BLOOD PRESSURE: 118 MMHG | WEIGHT: 294 LBS

## 2023-04-07 DIAGNOSIS — D70.1 CHEMOTHERAPY-INDUCED NEUTROPENIA (HCC): ICD-10-CM

## 2023-04-07 DIAGNOSIS — E83.42 HYPOMAGNESEMIA: ICD-10-CM

## 2023-04-07 DIAGNOSIS — T45.1X5A CHEMOTHERAPY-INDUCED NEUTROPENIA (HCC): ICD-10-CM

## 2023-04-07 DIAGNOSIS — E83.42 HYPOMAGNESEMIA: Primary | ICD-10-CM

## 2023-04-07 DIAGNOSIS — E87.70 HYPERVOLEMIA, UNSPECIFIED HYPERVOLEMIA TYPE: ICD-10-CM

## 2023-04-07 DIAGNOSIS — C62.90 SEMINOMA (HCC): ICD-10-CM

## 2023-04-07 DIAGNOSIS — E87.70 HYPERVOLEMIA, UNSPECIFIED HYPERVOLEMIA TYPE: Primary | ICD-10-CM

## 2023-04-07 RX ORDER — MAGNESIUM SULFATE HEPTAHYDRATE 40 MG/ML
2 INJECTION, SOLUTION INTRAVENOUS ONCE
Status: COMPLETED | OUTPATIENT
Start: 2023-04-07 | End: 2023-04-07

## 2023-04-07 RX ORDER — SODIUM CHLORIDE 9 MG/ML
20 INJECTION, SOLUTION INTRAVENOUS ONCE
Status: COMPLETED | OUTPATIENT
Start: 2023-04-07 | End: 2023-04-07

## 2023-04-07 RX ADMIN — CISPLATIN 52.2 MG: 100 INJECTION, SOLUTION INTRAVENOUS at 09:23

## 2023-04-07 RX ADMIN — MAGNESIUM SULFATE HEPTAHYDRATE 2 G: 40 INJECTION, SOLUTION INTRAVENOUS at 11:55

## 2023-04-07 RX ADMIN — SODIUM CHLORIDE 500 ML: 0.9 INJECTION, SOLUTION INTRAVENOUS at 08:17

## 2023-04-07 RX ADMIN — ETOPOSIDE 261 MG: 20 INJECTION INTRAVENOUS at 10:34

## 2023-04-07 RX ADMIN — SODIUM CHLORIDE 500 ML: 0.9 INJECTION, SOLUTION INTRAVENOUS at 11:52

## 2023-04-07 RX ADMIN — SODIUM CHLORIDE 20 ML/HR: 0.9 INJECTION, SOLUTION INTRAVENOUS at 08:28

## 2023-04-07 RX ADMIN — ONDANSETRON: 2 INJECTION INTRAMUSCULAR; INTRAVENOUS at 08:28

## 2023-04-07 NOTE — TELEPHONE ENCOUNTER
Attempted call back with number provided  Unable to leave vm at this time due to un-confidential voicemail box  Call went right to voicemail with any identification of insurance company and or party's name

## 2023-04-07 NOTE — TELEPHONE ENCOUNTER
Corry Alonso called to make sure pt is making it to his appts and wanted to make sure there weren't any gaps   She wanted to discuss his care

## 2023-04-07 NOTE — TELEPHONE ENCOUNTER
Called patient and spoke with wife  Rescheduled appointment for 05/03/23 with Dr Kia Moore to discuss possible orchiectomy

## 2023-04-07 NOTE — TELEPHONE ENCOUNTER
Pt is under the care of  Alverto    Pt had appointment scheduled for:  04/11/23    Pt needs to reschedule MD appointment due to having chemo that day before   They were wondering they could see the Doctor either the last week of April or the first week of May     Please review and advice:     Pt can be reached at: [address]

## 2023-04-10 PROBLEM — R11.0 NAUSEA: Status: ACTIVE | Noted: 2023-04-10

## 2023-04-14 PROBLEM — R50.81 NEUTROPENIC FEVER (HCC): Status: RESOLVED | Noted: 2023-02-13 | Resolved: 2023-04-14

## 2023-04-14 PROBLEM — R11.2 INTRACTABLE NAUSEA AND VOMITING: Chronic | Status: ACTIVE | Noted: 2023-04-14

## 2023-04-14 PROBLEM — D70.9 NEUTROPENIC FEVER (HCC): Status: RESOLVED | Noted: 2023-02-13 | Resolved: 2023-04-14

## 2023-04-18 ENCOUNTER — APPOINTMENT (OUTPATIENT)
Dept: LAB | Facility: HOSPITAL | Age: 53
End: 2023-04-18

## 2023-04-18 ENCOUNTER — HOSPITAL ENCOUNTER (OUTPATIENT)
Dept: INFUSION CENTER | Facility: CLINIC | Age: 53
Discharge: HOME | End: 2023-04-18

## 2023-04-18 VITALS
SYSTOLIC BLOOD PRESSURE: 111 MMHG | HEART RATE: 72 BPM | DIASTOLIC BLOOD PRESSURE: 70 MMHG | TEMPERATURE: 98.7 F | RESPIRATION RATE: 18 BRPM

## 2023-04-18 DIAGNOSIS — C62.90 SEMINOMA (HCC): ICD-10-CM

## 2023-04-18 DIAGNOSIS — E87.6 HYPOKALEMIA: ICD-10-CM

## 2023-04-18 DIAGNOSIS — R11.0 NAUSEA: ICD-10-CM

## 2023-04-18 DIAGNOSIS — E83.42 HYPOMAGNESEMIA: ICD-10-CM

## 2023-04-18 DIAGNOSIS — T45.1X5A CHEMOTHERAPY-INDUCED NEUTROPENIA (HCC): ICD-10-CM

## 2023-04-18 DIAGNOSIS — R11.2 INTRACTABLE NAUSEA AND VOMITING: ICD-10-CM

## 2023-04-18 DIAGNOSIS — E87.70 HYPERVOLEMIA, UNSPECIFIED HYPERVOLEMIA TYPE: ICD-10-CM

## 2023-04-18 DIAGNOSIS — D64.9 NORMOCYTIC ANEMIA: Primary | ICD-10-CM

## 2023-04-18 DIAGNOSIS — D64.9 NORMOCYTIC ANEMIA: ICD-10-CM

## 2023-04-18 DIAGNOSIS — D70.1 CHEMOTHERAPY-INDUCED NEUTROPENIA (HCC): ICD-10-CM

## 2023-04-18 LAB
ABO GROUP BLD: NORMAL
ALBUMIN SERPL BCP-MCNC: 3.7 G/DL (ref 3.5–5)
ALP SERPL-CCNC: 91 U/L (ref 34–104)
ALT SERPL W P-5'-P-CCNC: 33 U/L (ref 7–52)
ANION GAP SERPL CALCULATED.3IONS-SCNC: 8 MMOL/L (ref 4–13)
AST SERPL W P-5'-P-CCNC: 20 U/L (ref 13–39)
BILIRUB SERPL-MCNC: 0.42 MG/DL (ref 0.2–1)
BLD GP AB SCN SERPL QL: NEGATIVE
BUN SERPL-MCNC: 14 MG/DL (ref 5–25)
CALCIUM SERPL-MCNC: 8.9 MG/DL (ref 8.4–10.2)
CHLORIDE SERPL-SCNC: 101 MMOL/L (ref 96–108)
CO2 SERPL-SCNC: 26 MMOL/L (ref 21–32)
CREAT SERPL-MCNC: 1.06 MG/DL (ref 0.6–1.3)
ERYTHROCYTE [DISTWIDTH] IN BLOOD BY AUTOMATED COUNT: 16.4 % (ref 11.6–15.1)
GFR SERPL CREATININE-BSD FRML MDRD: 79 ML/MIN/1.73SQ M
GLUCOSE P FAST SERPL-MCNC: 97 MG/DL (ref 65–99)
HCT VFR BLD AUTO: 16.9 % (ref 36.5–49.3)
HGB BLD-MCNC: 5.9 G/DL (ref 12–17)
MAGNESIUM SERPL-MCNC: 1.8 MG/DL (ref 1.9–2.7)
MCH RBC QN AUTO: 31.7 PG (ref 26.8–34.3)
MCHC RBC AUTO-ENTMCNC: 34.9 G/DL (ref 31.4–37.4)
MCV RBC AUTO: 91 FL (ref 82–98)
PLATELET # BLD AUTO: 21 THOUSANDS/UL (ref 149–390)
PMV BLD AUTO: 10.8 FL (ref 8.9–12.7)
POTASSIUM SERPL-SCNC: 3.2 MMOL/L (ref 3.5–5.3)
PROT SERPL-MCNC: 6.2 G/DL (ref 6.4–8.4)
RBC # BLD AUTO: 1.86 MILLION/UL (ref 3.88–5.62)
RH BLD: POSITIVE
SODIUM SERPL-SCNC: 135 MMOL/L (ref 135–147)
SPECIMEN EXPIRATION DATE: NORMAL
WBC # BLD AUTO: 23.28 THOUSAND/UL (ref 4.31–10.16)

## 2023-04-18 RX ORDER — SODIUM CHLORIDE 9 MG/ML
20 INJECTION, SOLUTION INTRAVENOUS ONCE
Status: COMPLETED | OUTPATIENT
Start: 2023-04-18 | End: 2023-04-18

## 2023-04-18 RX ORDER — SODIUM CHLORIDE 9 MG/ML
20 INJECTION, SOLUTION INTRAVENOUS ONCE
Status: CANCELLED | OUTPATIENT
Start: 2023-04-18

## 2023-04-18 RX ADMIN — SODIUM CHLORIDE 20 ML/HR: 0.9 INJECTION, SOLUTION INTRAVENOUS at 11:49

## 2023-04-18 RX ADMIN — SODIUM CHLORIDE 1000 ML: 0.9 INJECTION, SOLUTION INTRAVENOUS at 11:48

## 2023-04-18 NOTE — PROGRESS NOTES
Pt arrived to unit in good spirits  Pt's hgb today=5 9 Surprisingly, he still denies symptoms of anemia, admits to slight dyspnea walking up stairs  Pt states his oral hydration and food intake are definitely improving  He is managing nausea with prescribed Zofran  He is managing 1X/day diarrhea with imodium  Pt requesting to skip IV hydration tomorrow if this is approved by Dr Dejuan Hsu  Per Eduardo Wood, Dr Dejuan Hsu is ok with this and pt scheduled accordingly  Pt knows he is scheduled for Thursday and Friday for iv hydration  Pt will call tomorrow if he wishes to cancel IV hydration on Thursday

## 2023-04-18 NOTE — PROGRESS NOTES
Pt tolerated IV hydration and transfusion of one unit PRBC well without adverse reaction, left unit in stable condition accompanied by wife

## 2023-04-19 ENCOUNTER — HOSPITAL ENCOUNTER (OUTPATIENT)
Dept: INFUSION CENTER | Facility: CLINIC | Age: 53
End: 2023-04-19

## 2023-04-20 ENCOUNTER — HOSPITAL ENCOUNTER (OUTPATIENT)
Dept: INFUSION CENTER | Facility: CLINIC | Age: 53
Discharge: HOME | End: 2023-04-20

## 2023-04-20 VITALS
HEART RATE: 96 BPM | TEMPERATURE: 98.8 F | OXYGEN SATURATION: 96 % | RESPIRATION RATE: 18 BRPM | SYSTOLIC BLOOD PRESSURE: 113 MMHG | DIASTOLIC BLOOD PRESSURE: 74 MMHG

## 2023-04-20 DIAGNOSIS — E83.42 HYPOMAGNESEMIA: ICD-10-CM

## 2023-04-20 DIAGNOSIS — C62.90 SEMINOMA (HCC): ICD-10-CM

## 2023-04-20 DIAGNOSIS — E87.6 HYPOKALEMIA: Primary | ICD-10-CM

## 2023-04-20 DIAGNOSIS — R11.2 INTRACTABLE NAUSEA AND VOMITING: ICD-10-CM

## 2023-04-20 RX ADMIN — SODIUM CHLORIDE 1000 ML: 0.9 INJECTION, SOLUTION INTRAVENOUS at 09:46

## 2023-04-21 ENCOUNTER — APPOINTMENT (OUTPATIENT)
Dept: LAB | Facility: HOSPITAL | Age: 53
End: 2023-04-21

## 2023-04-21 ENCOUNTER — HOSPITAL ENCOUNTER (OUTPATIENT)
Dept: INFUSION CENTER | Facility: CLINIC | Age: 53
Discharge: HOME | End: 2023-04-21

## 2023-04-21 VITALS
DIASTOLIC BLOOD PRESSURE: 80 MMHG | RESPIRATION RATE: 16 BRPM | TEMPERATURE: 98.8 F | HEART RATE: 83 BPM | SYSTOLIC BLOOD PRESSURE: 121 MMHG

## 2023-04-21 DIAGNOSIS — C62.90 SEMINOMA (HCC): ICD-10-CM

## 2023-04-21 DIAGNOSIS — R11.2 INTRACTABLE NAUSEA AND VOMITING: ICD-10-CM

## 2023-04-21 DIAGNOSIS — E83.42 HYPOMAGNESEMIA: ICD-10-CM

## 2023-04-21 DIAGNOSIS — D64.9 NORMOCYTIC ANEMIA: Primary | ICD-10-CM

## 2023-04-21 DIAGNOSIS — D64.9 NORMOCYTIC ANEMIA: ICD-10-CM

## 2023-04-21 DIAGNOSIS — E87.6 HYPOKALEMIA: ICD-10-CM

## 2023-04-21 LAB
ABO GROUP BLD: NORMAL
ALBUMIN SERPL BCP-MCNC: 3.5 G/DL (ref 3.5–5)
ALP SERPL-CCNC: 197 U/L (ref 34–104)
ALT SERPL W P-5'-P-CCNC: 17 U/L (ref 7–52)
ANION GAP SERPL CALCULATED.3IONS-SCNC: 10 MMOL/L (ref 4–13)
ANISOCYTOSIS BLD QL SMEAR: PRESENT
AST SERPL W P-5'-P-CCNC: 26 U/L (ref 13–39)
BASOPHILS NFR MAR MANUAL: 0 % (ref 0–1)
BILIRUB SERPL-MCNC: 0.41 MG/DL (ref 0.2–1)
BLD GP AB SCN SERPL QL: NEGATIVE
BUN SERPL-MCNC: 10 MG/DL (ref 5–25)
CALCIUM SERPL-MCNC: 8.6 MG/DL (ref 8.4–10.2)
CHLORIDE SERPL-SCNC: 101 MMOL/L (ref 96–108)
CO2 SERPL-SCNC: 28 MMOL/L (ref 21–32)
CREAT SERPL-MCNC: 0.95 MG/DL (ref 0.6–1.3)
EOSINOPHIL NFR BLD MANUAL: 0 % (ref 0–6)
ERYTHROCYTE [DISTWIDTH] IN BLOOD BY AUTOMATED COUNT: 17 % (ref 11.6–15.1)
GFR SERPL CREATININE-BSD FRML MDRD: 90 ML/MIN/1.73SQ M
GLUCOSE SERPL-MCNC: 98 MG/DL (ref 65–140)
HCT VFR BLD AUTO: 18.1 % (ref 36.5–49.3)
HGB BLD-MCNC: 6.4 G/DL (ref 12–17)
LYMPHOCYTES # BLD AUTO: 10 % (ref 14–44)
MAGNESIUM SERPL-MCNC: 1.3 MG/DL (ref 1.9–2.7)
MCH RBC QN AUTO: 32.2 PG (ref 26.8–34.3)
MCHC RBC AUTO-ENTMCNC: 35.4 G/DL (ref 31.4–37.4)
MCV RBC AUTO: 91 FL (ref 82–98)
METAMYELOCYTES NFR BLD MANUAL: 10 % (ref 0–1)
MONOCYTES NFR BLD: 5 % (ref 4–12)
MYELOCYTES NFR BLD MANUAL: 2 % (ref 0–1)
NEUTS BAND NFR BLD MANUAL: 41 % (ref 0–8)
NEUTS SEG NFR BLD AUTO: 32 % (ref 43–75)
PATHOLOGY REVIEW: YES
PLATELET # BLD AUTO: 61 THOUSANDS/UL (ref 149–390)
PLATELET BLD QL SMEAR: ABNORMAL
PMV BLD AUTO: 10 FL (ref 8.9–12.7)
POTASSIUM SERPL-SCNC: 3.2 MMOL/L (ref 3.5–5.3)
PROT SERPL-MCNC: 5.9 G/DL (ref 6.4–8.4)
RBC # BLD AUTO: 1.99 MILLION/UL (ref 3.88–5.62)
RH BLD: POSITIVE
SODIUM SERPL-SCNC: 139 MMOL/L (ref 135–147)
SPECIMEN EXPIRATION DATE: NORMAL
WBC # BLD AUTO: 87.5 THOUSAND/UL (ref 4.31–10.16)

## 2023-04-21 RX ORDER — POTASSIUM CHLORIDE 14.9 MG/ML
20 INJECTION INTRAVENOUS ONCE
Status: COMPLETED | OUTPATIENT
Start: 2023-04-21 | End: 2023-04-21

## 2023-04-21 RX ORDER — MAGNESIUM SULFATE HEPTAHYDRATE 40 MG/ML
4 INJECTION, SOLUTION INTRAVENOUS ONCE
Status: COMPLETED | OUTPATIENT
Start: 2023-04-21 | End: 2023-04-21

## 2023-04-21 RX ORDER — SODIUM CHLORIDE 9 MG/ML
20 INJECTION, SOLUTION INTRAVENOUS ONCE
Status: COMPLETED | OUTPATIENT
Start: 2023-04-21 | End: 2023-04-21

## 2023-04-21 RX ORDER — SODIUM CHLORIDE 9 MG/ML
20 INJECTION, SOLUTION INTRAVENOUS ONCE
Status: CANCELLED | OUTPATIENT
Start: 2023-04-21

## 2023-04-21 RX ORDER — MAGNESIUM SULFATE HEPTAHYDRATE 40 MG/ML
4 INJECTION, SOLUTION INTRAVENOUS ONCE
Status: CANCELLED
Start: 2023-04-22 | End: 2023-04-22

## 2023-04-21 RX ORDER — POTASSIUM CHLORIDE 14.9 MG/ML
20 INJECTION INTRAVENOUS ONCE
Status: CANCELLED
Start: 2023-04-22 | End: 2023-04-22

## 2023-04-21 RX ADMIN — SODIUM CHLORIDE 20 ML/HR: 0.9 INJECTION, SOLUTION INTRAVENOUS at 11:35

## 2023-04-21 RX ADMIN — POTASSIUM CHLORIDE 20 MEQ: 14.9 INJECTION, SOLUTION INTRAVENOUS at 11:48

## 2023-04-21 RX ADMIN — MAGNESIUM SULFATE HEPTAHYDRATE 4 G: 40 INJECTION, SOLUTION INTRAVENOUS at 11:48

## 2023-04-21 NOTE — PROGRESS NOTES
Pt  Denies new symptoms or concerns today  Pt  Tolerated Potassium, Magnesium and 1 unit of PRBCs without adverse event  Future appointments reviewed  AVS  Declined

## 2023-04-21 NOTE — PLAN OF CARE
Problem: Knowledge Deficit  Goal: Patient/family/caregiver demonstrates understanding of disease process, treatment plan, medications, and discharge instructions  Description: Complete learning assessment and assess knowledge base    Interventions:  - Provide teaching at level of understanding  - Provide teaching via preferred learning methods  4/21/2023 1201 by Dennis Martinez RN  Outcome: Progressing  4/21/2023 1201 by Dennis Martinez RN  Outcome: Progressing

## 2023-04-22 NOTE — PROGRESS NOTES
Hematology/Oncology Outpatient Office Note    Date of Service: 2023    [address] [facility]  [facility]  [phone]    Reason for Consultation:   No chief complaint on file  Cancer Stage at diagnosis: IIIC    Referral Physician: No ref  provider found    Primary Care Physician:  Amelia Rincon DO     Nickname: Samina Dominguez    Spouse: Candis Berry ECO    Today's ECO     Goals and Barriers:  Current Goal: Minimize effects of disease burden, extend life  Barriers to accomplishing this: None    Patient's Capacity to Self Care:  Patient is able to self care    Code Status: not addressed today    Advanced directives: not addressed today    ASSESSMENT & PLAN      Diagnosis ICD-10-CM Associated Orders   1  Seminoma [facility])  C62 90             This is a 45 y o  c PMHx notable for seasonal allergies, 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     5 year Survival rate 73% (Testicular Cancer Survival Rates  Testicular Cancer Prognosis) per the ACS  Baseline PFT: WNL    C3D15 bleomycin omitted due to severe pancytopenia requiring 2U PRBC     2023  tumor board: get PET/CT, if any growth, would do dissection, Otherwise, start surveillance thereafter    Discussion of decision making   Oncology history updated, accordingly, during this visit   Goals of care/patient communication  o I discussed with the patient the clinical course leading up to their cancer diagnosis  I reviewed relevant office notes, imaging reports and pathology result as well   o I told the patient that this is a case of potentially curable disease and what this means  We discussed that the goal of anti-cancer therapy is to provide best quality of life, extend overall survival, and progression free survival as shown in clinical trials   We also discussed that there might be a point when the cancer will no longer respond to this anti-neoplastic therapy    o I explained the risks/benefits of the proposed cancer therapy: Bleomycin 30 units IV, Etoposide 100mg/m2, cisplatin 20mg/m2, and after discussion including understanding risks of possible life-threatening complications and therapy-related malignancy development, informed consent for blood products and treatment has been signed and obtained   TNM/Staging At Diagnosis  o [id number] S2  Cancer Staging   IIIC, poor risk (non pulmonary visceral mets)   Disease Features/Tumor Markers/Genetics  o Tumor Marker: 1/18/2023:  (3x ULN), AFP (5 2, WNL), HCG (13, slightly high)  - Post chemo tumor markers pending*  o Notable Path Features:   1/11/2023: Lymph node, left neck, excision: Germ cell tumor, compatible with metastatic seminoma  Background nonnecrotizing granulomatous inflammation   Treatment: BEP   Other Supportive care:   Treatment Team Members  o Surgeon: Dr Steff Salmon: 12/28/2022: creat 0 96, T bili 0 63, WBC 6 49k, Hgb 15 9, plt 244k   Diagnostics   1/3/2023 CT soft tissue neck: Moderate left-sided adenopathy primarily within the left neck, posterior jugular chain levels 3 through 5 with a prominent node present in the upper mediastinum  1/3/2023 CT Chest w/c: Left supraclavicular, superior mediastinal, posterior mediastinal (15mm)/retrocrural (2 2 cm)and suspected partially imaged left abdominal adenopathy  1/4/2023: CT Abd/pelv w/c: Bulky retroperitoneal lymphadenopathy consistent with lymphoma (left para-aortic region extending into the left upper quadrant measures 8 2 x 10 9 cm  Aortocaval lymphadenopathy measures 3 7 x 5 2 cm    More inferior left para-aortic lymphadenopathy at the bifurcation measures 5 0 x 5 9 cm )  1/18/2023: Testicular U/S shows a left testicular lesion (1 5 x 1 7 x 0 9 cm) and the RP adenopathy is more to the Left of the aorta  4/19/2023 CT Chest w/c: No evidence of acute intrathoracic pathology  5/1/2023 CT CAP w/c: Interval marked improvement of retroperitoneal adenopathy    Mild splenomegaly  Image 93, retrocaval, 9 mm previously 3 cm  Image 96, left para-aortic, 2 cm previously 7 5 cm    Discussion of decision making    I personally reviewed the following lab results, the image studies, pathology, other specialty/physicians consult notes and recommendations, and outside medical records [facility]  I had a lengthy discussion with the patient and shared the work-up findings  We discussed the diagnosis and management plan as below  I spent 34 minutes reviewing the records (labs, clinician notes, outside records, medical history, ordering medicine/tests/procedures, interpreting the imaging/labs previously done) and coordination of care as well as direct time with the patient today, of which greater than 50% of the time was spent in counseling and coordination of care with the patient/family  · Plan/Labs  · F/u Urology for goal of orchiectomy  Will follow CMP and Magnesium in 2 weeks to assess lingering effects of chemo (Mg and CMP ordered in a week prn if pt feeling unwell/cramps)  · PET/CT in a month, then will aim to start surveillance with CT CAP w/c in 4 months (CXR in 2 months) from that point  · AFP, LDH, HCG ordered q8 weeks standing until 5/2024    Surveillance for clinical stage III seminoma includes history and physical every 2 months for the first year following completion of chemotherapy which would be until April 2024 along with every 4-month CAT scan of the abdomen, pelvis along with every 2-month chest x-ray  Year 2 surveillance consists of every 3-month history and physical and serology markers, every 6-month CT abdomen, pelvis, and chest x-ray every 3 months      Years 3-4 surveillance consists of history and physical and markers every 6 months, annual chest x-ray and CT scan  Year 5 surveillance consists of annual chest x-ray and history and physical and markers with as needed CT scan      Follow Up: q2 months with H&P, serology markers starting next month    All questions were answered to the patient's satisfaction during this encounter  The patient knows the contact information for our office and knows to reach out for any relevant concerns related to this encounter  They are to call for any temperature 100 4 or higher, new symptoms including but not restricted to shaking chills, decreased appetite, nausea, vomiting, diarrhea, increased fatigue, shortness of breath or chest pain, confusion, and not feeling the strength to come to the clinic  For all other listed problems and medical diagnosis in their chart - they are managed by PCP and/or other specialists, which the patient acknowledges  Thank you very much for your consultation and making us a part of this patient's care  We are continuing to follow closely with you  Please do not hesitate to reach out to me with any additional questions or concerns  Nancy Singh MD  Hematology & Medical Oncology Staff Physician             Disclaimer: This document was prepared using [employer] Direct technology  If a word or phrase is confusing, or does not make sense, this is likely due to recognition error which was not discovered during this clinician's review  If you believe an error has occurred, please contact me through [address] [place] line for rashida? cation        ONCOLOGY HISTORY OF PRESENT ILLNESS        Oncology History   Seminoma ([facility] [address] )   1/11/2023 -  Cancer Staged    Staging form: Testis, AJCC 8th Edition  - Clinical stage from 1/11/2023: cT4, cN3, pM1a, S2 - Signed by Hiwot Reis MD on 1/20/2023  Stage prefix: Initial diagnosis  Lactate dehydrogenase (LDH) (U/L): 699  Human chorionic gonadotropin (hCG) (mIU/mL): 13  Alpha fetoprotein (AFP) (ng/mL): 5 2  Laterality: Left  Sites of metastasis: Distant lymph nodes, NOS, Retroperitoneal lymph node, NOS  Source of metastatic specimen: Supraclavicular Lymph Nodes  Staged by: Managing physician       1/19/2023 Initial Diagnosis    Seminoma ([facility] [address] )     1/30/2023 -  Chemotherapy    pegfilgrastim (Pam Benjamin), 6 mg, Subcutaneous, Once, 4 of 4 cycles  Administration: 6 mg (2/6/2023), 6 mg (2/27/2023), 6 mg (3/20/2023), 6 mg (3/6/2023), 6 mg (4/17/2023), 6 mg (4/10/2023)  bleomycin (BLENOXANE) IVPB, 30 Units, Intravenous, Once, 4 of 4 cycles  Administration: 30 Units (1/30/2023), 30 Units (2/6/2023), 30 Units (2/13/2023), 30 Units (2/20/2023), 30 Units (2/27/2023), 30 Units (3/6/2023), 30 Units (3/13/2023), 30 Units (3/20/2023), 30 Units (4/3/2023), 30 Units (4/10/2023), 30 Units (4/17/2023)  CISplatin (PLATINOL) infusion, 20 mg/m2 = 52 2 mg, Intravenous, Once, 4 of 4 cycles  Administration: 52 2 mg (1/30/2023), 52 2 mg (1/31/2023), 52 2 mg (2/1/2023), 52 2 mg (2/2/2023), 52 2 mg (2/3/2023), 52 2 mg (2/20/2023), 52 2 mg (2/21/2023), 52 2 mg (2/22/2023), 52 2 mg (2/23/2023), 52 2 mg (2/24/2023), 52 2 mg (3/13/2023), 52 2 mg (3/14/2023), 52 2 mg (3/15/2023), 52 2 mg (3/16/2023), 52 2 mg (3/17/2023), 52 2 mg (4/3/2023), 52 2 mg (4/4/2023), 52 2 mg (4/5/2023), 52 2 mg (4/6/2023), 52 2 mg (4/7/2023)  fosaprepitant (EMEND) IVPB, 150 mg, Intravenous, Once, 4 of 4 cycles  Administration: 150 mg (1/30/2023), 150 mg (2/20/2023), 150 mg (3/13/2023), 150 mg (4/3/2023), 150 mg (2/24/2023), 150 mg (4/10/2023)       Lots of IV Mag and several PRBC required during chemo  C3D15 deferred due to plt <10k, Hgb <8    SUBJECTIVE  (INTERVAL HISTORY)      Clotting History None   Bleeding History None   Cancer History Seminoma   Family Cancer History Mom/sister (breast), same sister (melanoma), father (basal cell skin cancer)   H/O Blood/Plt Transfusion None   Tobacco/etoh/drug abuse No nicotine use, etoh abuse, rec drug use       Cancer Screening history C-scope (2019)   Occupation  at [employer]       Interval events: doing much better, nausea is lingering but decreasing in frequency         I have reviewed the relevant past medical, surgical, social and family history  I have also reviewed allergies and medications for this patient  Review of Systems    Baseline weight: 300 lbs    Has had intermittent lower back, thigh rash since summer of 2022  Denies F/C, V, SOB, CP, LH, HA, itching, gen weakness, melena, hematuria, hematochezia, falls, diarrhea, or constipation       A 10-point review of system was performed, pertinent positive and negative were detailed as above  Otherwise, the 10-point review of system was negative  Past Medical History:   Diagnosis Date    Allergic 1/1/2000    seasonal allergies    Impacted cerumen     Lymphadenopathy     Obesity, unspecified     Seasonal allergies     Seminoma of testis ([facility] [address] )     Testicular cancer ([facility] [address] ) 01/17/2023    Torn meniscus     right knee- surgical repair today 7/19/2021    Wears glasses        Past Surgical History:   Procedure Laterality Date    FACIAL/NECK BIOPSY Left 1/11/2023    Procedure: EXCISION OF LEFT NECK LYMPH NODE WITH LYMPHOMA PROTOCOL;  Surgeon: Gianni Linder MD;  [facility];  Service: Surgical Oncology    FL GUIDED CENTRAL VENOUS ACCESS DEVICE INSERTION  1/23/2023    HEMORROIDECTOMY      KNEE SURGERY  7/19/2021    NE ARTHRS KNE SURG W/MENISCECTOMY MED/LAT W/SHVG Right 07/19/2021    Procedure: ARTHROSCOPY KNEE, partial lateral meniscectomy;  Surgeon: Norah Gamez MD;  Location: [facility];  Service: Orthopedics    TUNNELED VENOUS PORT PLACEMENT N/A 1/23/2023    Procedure: INSERTION VENOUS PORT (PORT-A-CATH);   Surgeon: Gianni Linder MD;  [facility];  Service: Surgical Oncology       Family History   Problem Relation Age of Onset    Cancer Mother         Breast Cancer    Breast cancer Mother     Breast cancer additional onset Mother     Cancer Father         Skin carcenoma    Skin cancer Father     Breast cancer additional onset Sister    Lynne Espinoza Cancer Sister         Breast Cancer and Skin carcenoma  Skin cancer Brother        Social History     Socioeconomic History    Marital status: /Civil Union     Spouse name: Not on file    Number of children: Not on file    Years of education: Not on file    Highest education level: Not on file   Occupational History    Not on file   Tobacco Use    Smoking status: Never     Passive exposure: Past    Smokeless tobacco: Never    Tobacco comments:     During childhood, both parents smoked  Vaping Use    Vaping Use: Never used   Substance and Sexual Activity    Alcohol use: Not Currently     Alcohol/week: 3 0 standard drinks     Types: 1 Glasses of wine, 1 Cans of beer, 1 Shots of liquor per week     Comment: 1-2 weekly    Drug use: Never    Sexual activity: Yes     Partners: Female   Other Topics Concern    Not on file   Social History Narrative    Not on file     Social Determinants of Health     Financial Resource Strain: Not on file   Food Insecurity: No Food Insecurity    Worried About Running Out of Food in the Last Year: Never true    Kenny of Food in the Last Year: Never true   Transportation Needs: No Transportation Needs    Lack of Transportation (Medical): No    Lack of Transportation (Non-Medical):  No   Physical Activity: Not on file   Stress: Not on file   Social Connections: Not on file   Intimate Partner Violence: Not on file   Housing Stability: Low Risk     Unable to Pay for Housing in the Last Year: No    Number of Places Lived in the Last Year: 1    Unstable Housing in the Last Year: No       Allergies   Allergen Reactions    Emil Kearns - Food Allergy Vomiting       Current Outpatient Medications   Medication Sig Dispense Refill    diphenoxylate-atropine (LOMOTIL) 2 5-0 025 mg per tablet Take 1 tablet by mouth 4 (four) times a day as needed for diarrhea To alternate with Imodium 30 tablet 0    loratadine (CLARITIN) 10 mg tablet Take 10 mg by mouth daily      omeprazole (PriLOSEC) 20 mg delayed release capsule Take 20 mg by mouth 2 (two) times a day      ondansetron (ZOFRAN-ODT) 8 mg disintegrating tablet Take 1 tablet (8 mg total) by mouth every 8 (eight) hours as needed for nausea or vomiting 90 tablet 2    triamcinolone (KENALOG) 0 1 % cream        No current facility-administered medications for this visit  Facility-Administered Medications Ordered in Other Visits   Medication Dose Route Frequency Provider Last Rate Last Admin    alteplase (CATHFLO) injection 2 mg  2 mg Intracatheter Q1MIN PRN Heidar Zollie Romberg, MD        alteplase (CATHFLO) injection 2 mg  2 mg Intracatheter Q1MIN PRN Opal Christine MD        alteplase (CATHFLO) injection 2 mg  2 mg Sherborn Mad PRN Opal Christine MD           (Not in a hospital admission)      Objective:     24 Hour Vitals Assessment:     There were no vitals filed for this visit  PHYSICIAN EXAM    General: Appearance: alert, cooperative, no distress  HEENT: Normocephalic, atraumatic  No scleral icterus  conjunctivae clear  EOMI  Chest: No tenderness to palpation  No open wound noted  Lungs: Clear to auscultation bilaterally, Respirations unlabored  Cardiac: Regular rate and rhythm, +S1and S2  Abdomen: Soft, non-tender, non-distended  Bowel sounds are normal    Extremities:  No edema, cyanosis, clubbing  Skin: Skin color, turgor are normal  No rashes  Lymphatics: no palpable axillary, or inguinal adenopathy  L supra-clavicular LN palpated with incision c/d/i    Port:  c/d/i    Neurologic: Awake, Alert, and oriented, no gross focal deficits noted b/l  DATA REVIEW:    Pathology Result:    Final Diagnosis   Date Value Ref Range Status   01/11/2023   Final    A  Lymph node, left neck, excision:  -Germ cell tumor, compatible with metastatic seminoma (see note)  -Background nonnecrotizing granulomatous inflammation (see note)  Comment: This is an appended report  These results have been appended to a previously preliminary verified report  10/25/2019   Final    A  Cecum, cold biopsy:  - Portion of benign colonic mucosa with prominent reactive lymphoid aggregate  B  Ascending colon, cold snare:  - Tubular adenoma  - Negative for high grade dysplasia/ carcinoma  C  Colon, splenic flexure, cold snare:  - Tubular adenoma  - Negative for high grade dysplasia/ carcinoma  Image Results:   Image result are reviewed and documented in Hematology/Oncology history    CT chest w contrast  Narrative: CT CHEST WITH IV CONTRAST    INDICATION:   R05 8: Other specified cough  R06 09: Other forms of dyspnea  COMPARISON:  1/3/2023  TECHNIQUE: CT examination of the chest was performed  Multiplanar 2D reformatted images were created from the source data  Radiation dose length product (DLP) for this visit:  586 mGy-cm   This examination, like all CT scans performed in the [facility], was performed utilizing techniques to minimize radiation dose exposure, including the use of iterative   reconstruction and automated exposure control  IV Contrast:  85 mL of iohexol (OMNIPAQUE)    FINDINGS:    LUNGS:  Lungs are clear  There is no tracheal or endobronchial lesion  PLEURA:  Unremarkable  HEART/GREAT VESSELS: Heart is unremarkable for patient's age  No thoracic aortic aneurysm  MEDIASTINUM AND JESSICA:  Unremarkable  CHEST WALL AND LOWER NECK:  Unremarkable  VISUALIZED STRUCTURES IN THE UPPER ABDOMEN:  Unremarkable  OSSEOUS STRUCTURES:  No acute fracture or destructive osseous lesion  Impression: No evidence of acute intrathoracic pathology  Workstation performed: [id number]      LABS:  Lab data are reviewed and documented in HemOnc history         Lab Results   Component Value Date    HGB 6 4 (LL) 04/21/2023    HCT 18 1 (L) 04/21/2023    MCV 91 04/21/2023    PLT 61 (L) 04/21/2023    WBC 87 50 (HH) 04/21/2023    NRBC 1 03/27/2023    BANDSPCT 41 (H) 04/18/2023    ATYLMPCT 3 (H) 04/16/2023     Lab Results Component Value Date    K 3 2 (L) 04/21/2023     04/21/2023    CO2 28 04/21/2023    BUN 10 04/21/2023    CREATININE 0 95 04/21/2023    GLUF 97 04/18/2023    CALCIUM 8 6 04/21/2023    CORRECTEDCA 9 0 03/31/2023    AST 26 04/21/2023    ALT 17 04/21/2023    ALKPHOS 197 (H) 04/21/2023    EGFR 90 04/21/2023       No results found for: IRON, TIBC, FERRITIN    No results found for: [id number]    Recent Labs     04/21/23  0800   WBC 87 50*       By:  Jamie Terry MD, 4/22/2023, 9:51 AM

## 2023-04-25 LAB
ANISOCYTOSIS BLD QL SMEAR: PRESENT
LYMPHOCYTES # BLD AUTO: 4 % (ref 14–44)
METAMYELOCYTES NFR BLD MANUAL: 8 % (ref 0–1)
MONOCYTES NFR BLD: 3 % (ref 4–12)
MYELOCYTES NFR BLD MANUAL: 8 % (ref 0–1)
NEUTS BAND NFR BLD MANUAL: 12 % (ref 0–8)
NEUTS SEG NFR BLD AUTO: 60 % (ref 43–75)
PATHOLOGY REVIEW: YES
PLATELET BLD QL SMEAR: ABNORMAL
PROMYELOCYTES NFR BLD MANUAL: 3 % (ref 0–0)
TOTAL CELLS COUNTED SPEC: 100
VARIANT LYMPHS # BLD AUTO: 2 %

## 2023-04-26 ENCOUNTER — APPOINTMENT (OUTPATIENT)
Dept: LAB | Facility: HOSPITAL | Age: 53
End: 2023-04-26

## 2023-04-26 DIAGNOSIS — C62.90 SEMINOMA (HCC): ICD-10-CM

## 2023-04-26 DIAGNOSIS — E87.6 HYPOKALEMIA: Primary | ICD-10-CM

## 2023-04-26 DIAGNOSIS — D64.9 NORMOCYTIC ANEMIA: Primary | ICD-10-CM

## 2023-04-26 DIAGNOSIS — E87.6 HYPOKALEMIA: ICD-10-CM

## 2023-04-26 DIAGNOSIS — D64.9 NORMOCYTIC ANEMIA: ICD-10-CM

## 2023-04-26 DIAGNOSIS — E83.42 HYPOMAGNESEMIA: ICD-10-CM

## 2023-04-26 LAB
ALBUMIN SERPL BCP-MCNC: 3.6 G/DL (ref 3.5–5)
ALP SERPL-CCNC: 134 U/L (ref 34–104)
ALT SERPL W P-5'-P-CCNC: 14 U/L (ref 7–52)
ANION GAP SERPL CALCULATED.3IONS-SCNC: 7 MMOL/L (ref 4–13)
ANISOCYTOSIS BLD QL SMEAR: PRESENT
AST SERPL W P-5'-P-CCNC: 16 U/L (ref 13–39)
BASOPHILS # BLD MANUAL: 0.3 THOUSAND/UL (ref 0–0.1)
BASOPHILS NFR MAR MANUAL: 1 % (ref 0–1)
BILIRUB SERPL-MCNC: 0.52 MG/DL (ref 0.2–1)
BUN SERPL-MCNC: 17 MG/DL (ref 5–25)
CALCIUM SERPL-MCNC: 8.5 MG/DL (ref 8.4–10.2)
CHLORIDE SERPL-SCNC: 100 MMOL/L (ref 96–108)
CO2 SERPL-SCNC: 31 MMOL/L (ref 21–32)
CREAT SERPL-MCNC: 0.99 MG/DL (ref 0.6–1.3)
EOSINOPHIL # BLD MANUAL: 0 THOUSAND/UL (ref 0–0.4)
EOSINOPHIL NFR BLD MANUAL: 0 % (ref 0–6)
ERYTHROCYTE [DISTWIDTH] IN BLOOD BY AUTOMATED COUNT: 19 % (ref 11.6–15.1)
GFR SERPL CREATININE-BSD FRML MDRD: 85 ML/MIN/1.73SQ M
GLUCOSE P FAST SERPL-MCNC: 105 MG/DL (ref 65–99)
HCT VFR BLD AUTO: 21.5 % (ref 36.5–49.3)
HGB BLD-MCNC: 7.2 G/DL (ref 12–17)
LYMPHOCYTES # BLD AUTO: 2.1 THOUSAND/UL (ref 0.6–4.47)
LYMPHOCYTES # BLD AUTO: 7 % (ref 14–44)
MAGNESIUM SERPL-MCNC: 1.2 MG/DL (ref 1.9–2.7)
MCH RBC QN AUTO: 32 PG (ref 26.8–34.3)
MCHC RBC AUTO-ENTMCNC: 33.5 G/DL (ref 31.4–37.4)
MCV RBC AUTO: 96 FL (ref 82–98)
METAMYELOCYTES NFR BLD MANUAL: 4 % (ref 0–1)
MONOCYTES # BLD AUTO: 3 THOUSAND/UL (ref 0–1.22)
MONOCYTES NFR BLD: 10 % (ref 4–12)
MYELOCYTES NFR BLD MANUAL: 4 % (ref 0–1)
NEUTROPHILS # BLD MANUAL: 22.16 THOUSAND/UL (ref 1.85–7.62)
NEUTS BAND NFR BLD MANUAL: 1 % (ref 0–8)
NEUTS SEG NFR BLD AUTO: 73 % (ref 43–75)
PLATELET # BLD AUTO: 127 THOUSANDS/UL (ref 149–390)
PLATELET BLD QL SMEAR: ABNORMAL
PMV BLD AUTO: 9.4 FL (ref 8.9–12.7)
POLYCHROMASIA BLD QL SMEAR: PRESENT
POTASSIUM SERPL-SCNC: 3.4 MMOL/L (ref 3.5–5.3)
PROT SERPL-MCNC: 6.3 G/DL (ref 6.4–8.4)
RBC # BLD AUTO: 2.25 MILLION/UL (ref 3.88–5.62)
SODIUM SERPL-SCNC: 138 MMOL/L (ref 135–147)
WBC # BLD AUTO: 29.95 THOUSAND/UL (ref 4.31–10.16)

## 2023-04-26 RX ORDER — MAGNESIUM SULFATE HEPTAHYDRATE 40 MG/ML
4 INJECTION, SOLUTION INTRAVENOUS ONCE
Status: CANCELLED | OUTPATIENT
Start: 2023-04-27

## 2023-04-26 RX ORDER — POTASSIUM CHLORIDE 20 MEQ/1
20 TABLET, EXTENDED RELEASE ORAL DAILY
Qty: 5 TABLET | Refills: 0 | Status: SHIPPED | OUTPATIENT
Start: 2023-04-26 | End: 2023-05-01

## 2023-04-26 RX ORDER — MAGNESIUM SULFATE HEPTAHYDRATE 40 MG/ML
4 INJECTION, SOLUTION INTRAVENOUS ONCE
Status: CANCELLED | OUTPATIENT
Start: 2023-04-26

## 2023-04-26 NOTE — PROGRESS NOTES
Just PO K 20 meq x 5 days and IV Mag 4 grams         Patient to have repeat labs done on Monday 5/1/23 and Thursday 5/4

## 2023-04-27 ENCOUNTER — HOSPITAL ENCOUNTER (OUTPATIENT)
Dept: INFUSION CENTER | Facility: CLINIC | Age: 53
Discharge: HOME | End: 2023-04-27

## 2023-04-27 DIAGNOSIS — E83.42 HYPOMAGNESEMIA: Primary | ICD-10-CM

## 2023-04-27 DIAGNOSIS — C62.90 SEMINOMA (HCC): ICD-10-CM

## 2023-04-27 RX ORDER — MAGNESIUM SULFATE HEPTAHYDRATE 40 MG/ML
4 INJECTION, SOLUTION INTRAVENOUS ONCE
Status: COMPLETED | OUTPATIENT
Start: 2023-04-27 | End: 2023-04-27

## 2023-04-27 RX ORDER — MAGNESIUM SULFATE HEPTAHYDRATE 40 MG/ML
4 INJECTION, SOLUTION INTRAVENOUS ONCE
Status: CANCELLED | OUTPATIENT
Start: 2023-04-27

## 2023-04-27 RX ADMIN — MAGNESIUM SULFATE HEPTAHYDRATE 4 G: 40 INJECTION, SOLUTION INTRAVENOUS at 10:37

## 2023-04-27 NOTE — PROGRESS NOTES
Patient arrived to unit without complaint  Patient tolerated Magnesium infusion without incident  AVS declined and patient aware of next appointment  Patient left in stable condition

## 2023-05-01 ENCOUNTER — DOCUMENTATION (OUTPATIENT)
Dept: HEMATOLOGY ONCOLOGY | Facility: CLINIC | Age: 53
End: 2023-05-01

## 2023-05-01 ENCOUNTER — APPOINTMENT (OUTPATIENT)
Dept: LAB | Facility: HOSPITAL | Age: 53
End: 2023-05-01

## 2023-05-01 ENCOUNTER — HOSPITAL ENCOUNTER (OUTPATIENT)
Dept: CT IMAGING | Facility: HOSPITAL | Age: 53
Discharge: HOME | End: 2023-05-01
Attending: INTERNAL MEDICINE

## 2023-05-01 DIAGNOSIS — D64.9 NORMOCYTIC ANEMIA: ICD-10-CM

## 2023-05-01 DIAGNOSIS — E83.42 HYPOMAGNESEMIA: ICD-10-CM

## 2023-05-01 DIAGNOSIS — E87.6 HYPOKALEMIA: Primary | ICD-10-CM

## 2023-05-01 DIAGNOSIS — C62.90 SEMINOMA (HCC): ICD-10-CM

## 2023-05-01 DIAGNOSIS — C62.90 SEMINOMA (HCC): Primary | ICD-10-CM

## 2023-05-01 DIAGNOSIS — E87.6 HYPOKALEMIA: ICD-10-CM

## 2023-05-01 LAB
ALBUMIN SERPL BCP-MCNC: 3.6 G/DL (ref 3.5–5)
ALP SERPL-CCNC: 89 U/L (ref 34–104)
ALT SERPL W P-5'-P-CCNC: 12 U/L (ref 7–52)
ANION GAP SERPL CALCULATED.3IONS-SCNC: 5 MMOL/L (ref 4–13)
AST SERPL W P-5'-P-CCNC: 14 U/L (ref 13–39)
BASOPHILS # BLD AUTO: 0.07 THOUSANDS/ΜL (ref 0–0.1)
BASOPHILS NFR BLD AUTO: 1 % (ref 0–1)
BILIRUB SERPL-MCNC: 0.44 MG/DL (ref 0.2–1)
BUN SERPL-MCNC: 20 MG/DL (ref 5–25)
CALCIUM SERPL-MCNC: 8.8 MG/DL (ref 8.4–10.2)
CHLORIDE SERPL-SCNC: 104 MMOL/L (ref 96–108)
CO2 SERPL-SCNC: 26 MMOL/L (ref 21–32)
CREAT SERPL-MCNC: 0.98 MG/DL (ref 0.6–1.3)
EOSINOPHIL # BLD AUTO: 0.01 THOUSAND/ΜL (ref 0–0.61)
EOSINOPHIL NFR BLD AUTO: 0 % (ref 0–6)
ERYTHROCYTE [DISTWIDTH] IN BLOOD BY AUTOMATED COUNT: 23.2 % (ref 11.6–15.1)
GFR SERPL CREATININE-BSD FRML MDRD: 87 ML/MIN/1.73SQ M
GLUCOSE P FAST SERPL-MCNC: 96 MG/DL (ref 65–99)
HCT VFR BLD AUTO: 21.4 % (ref 36.5–49.3)
HGB BLD-MCNC: 7 G/DL (ref 12–17)
IMM GRANULOCYTES # BLD AUTO: 0.12 THOUSAND/UL (ref 0–0.2)
IMM GRANULOCYTES NFR BLD AUTO: 2 % (ref 0–2)
LYMPHOCYTES # BLD AUTO: 1.09 THOUSANDS/ΜL (ref 0.6–4.47)
LYMPHOCYTES NFR BLD AUTO: 15 % (ref 14–44)
MAGNESIUM SERPL-MCNC: 1.6 MG/DL (ref 1.9–2.7)
MCH RBC QN AUTO: 33.5 PG (ref 26.8–34.3)
MCHC RBC AUTO-ENTMCNC: 32.7 G/DL (ref 31.4–37.4)
MCV RBC AUTO: 102 FL (ref 82–98)
MONOCYTES # BLD AUTO: 1.12 THOUSAND/ΜL (ref 0.17–1.22)
MONOCYTES NFR BLD AUTO: 15 % (ref 4–12)
NEUTROPHILS # BLD AUTO: 5.02 THOUSANDS/ΜL (ref 1.85–7.62)
NEUTS SEG NFR BLD AUTO: 67 % (ref 43–75)
NRBC BLD AUTO-RTO: 0 /100 WBCS
PLATELET # BLD AUTO: 108 THOUSANDS/UL (ref 149–390)
PMV BLD AUTO: 9.6 FL (ref 8.9–12.7)
POTASSIUM SERPL-SCNC: 4.5 MMOL/L (ref 3.5–5.3)
PROT SERPL-MCNC: 6.2 G/DL (ref 6.4–8.4)
RBC # BLD AUTO: 2.09 MILLION/UL (ref 3.88–5.62)
SODIUM SERPL-SCNC: 135 MMOL/L (ref 135–147)
WBC # BLD AUTO: 7.43 THOUSAND/UL (ref 4.31–10.16)

## 2023-05-01 RX ADMIN — IOHEXOL 100 ML: 350 INJECTION, SOLUTION INTRAVENOUS at 07:32

## 2023-05-01 NOTE — PROGRESS NOTES
In-basket message received from Shonna Mukherjee, RN to add patient to  oncology [facility] on 5/2/2023  Chart reviewed and prep completed

## 2023-05-02 ENCOUNTER — DOCUMENTATION (OUTPATIENT)
Dept: HEMATOLOGY ONCOLOGY | Facility: CLINIC | Age: 53
End: 2023-05-02

## 2023-05-02 ENCOUNTER — OFFICE VISIT (OUTPATIENT)
Dept: HEMATOLOGY ONCOLOGY | Facility: CLINIC | Age: 53
End: 2023-05-02

## 2023-05-02 VITALS
BODY MASS INDEX: 35.25 KG/M2 | HEIGHT: 75 IN | DIASTOLIC BLOOD PRESSURE: 64 MMHG | WEIGHT: 283.5 LBS | OXYGEN SATURATION: 97 % | RESPIRATION RATE: 18 BRPM | SYSTOLIC BLOOD PRESSURE: 132 MMHG | TEMPERATURE: 98.5 F | HEART RATE: 95 BPM

## 2023-05-02 DIAGNOSIS — E83.42 HYPOMAGNESEMIA: ICD-10-CM

## 2023-05-02 DIAGNOSIS — E87.6 HYPOKALEMIA: ICD-10-CM

## 2023-05-02 DIAGNOSIS — C62.90 SEMINOMA (HCC): Primary | ICD-10-CM

## 2023-05-02 NOTE — PROGRESS NOTES
Oncology Navigator Note: Multidisciplinary Urology Case Review 5/2/2023        Diagnosis: Watson Bates is a 45 y o  male who was presented at the Urology Oncology Multidisciplinary Tumor Conference today  Sheila Jean-Baptiste presented in 12/2022 with enlarging left supraclavicular and neck adenopathy  CT scan showed enlarged retroperitoneal adenopathy  Left LN biopsy with Dr Nury Sterling and pathology showed seminoma  1/18/2023   HCG 13    AFP 5 2    He completed 4 cycles of BEP on 4/17/23  Post treatment CT scan showed improvement in retroperitoneal adenopathy, retrocaval LN 9mm-previously 3cm and left para aortic LN 2cm previously 7 5cm    CT Scans reviewed    Recommendations of Group:  PET scan 3-4 weeks after completion of chemo  If positive, proceed with resection  Upcoming Appointments:    Future Appointments   Date Time Provider Mario Townsend   5/2/2023  1:00 PM Juanita Christianson MD [facility]   5/3/2023  2:15 PM Florence Padilla MD [facility]   6/2/2023  2:00 PM [facility]            Patient was discussed at the Multidisciplinary Urology Case review on 5/2/2023      NCCN guidelines were available for review  The final treatment plan will be left to the discretion of the patient and the treating physician  DISCLAIMERS:  TO THE TREATING PHYSICIAN:  This conference is a meeting of clinicians from various specialty areas who evaluate and discuss patients for whom a multidisciplinary treatment approach is being considered  Please note that the above opinion was a consensus of the conference attendees and is intended only to assist in quality care of the discussed patient  The responsibility for follow up on the input given during the conference, along with any final decisions regarding plan of care, is that of the patient and the patient's provider   Accordingly, appointments have only been recommended based on this information and have NOT been scheduled unless otherwise noted  TO THE PATIENT:  This summary is a brief record of major aspects of your cancer treatment  You may choose to share a copy with any of your doctors or nurses  However, this is not a detailed or comprehensive record of your care

## 2023-05-03 ENCOUNTER — PATIENT OUTREACH (OUTPATIENT)
Dept: HEMATOLOGY ONCOLOGY | Facility: CLINIC | Age: 53
End: 2023-05-03

## 2023-05-03 ENCOUNTER — OFFICE VISIT (OUTPATIENT)
Dept: UROLOGY | Facility: AMBULATORY SURGERY CENTER | Age: 53
End: 2023-05-03

## 2023-05-03 ENCOUNTER — APPOINTMENT (OUTPATIENT)
Dept: LAB | Facility: HOSPITAL | Age: 53
End: 2023-05-03

## 2023-05-03 VITALS
OXYGEN SATURATION: 98 % | BODY MASS INDEX: 35.19 KG/M2 | HEIGHT: 75 IN | WEIGHT: 283 LBS | HEART RATE: 79 BPM | SYSTOLIC BLOOD PRESSURE: 122 MMHG | DIASTOLIC BLOOD PRESSURE: 78 MMHG

## 2023-05-03 DIAGNOSIS — C62.90 SEMINOMA (HCC): ICD-10-CM

## 2023-05-03 DIAGNOSIS — C62.90 SEMINOMA (HCC): Primary | ICD-10-CM

## 2023-05-03 LAB
AFP-TM SERPL-MCNC: 8.1 NG/ML (ref 0.5–8)
ALBUMIN SERPL BCP-MCNC: 3.9 G/DL (ref 3.5–5)
ALP SERPL-CCNC: 70 U/L (ref 34–104)
ALT SERPL W P-5'-P-CCNC: 13 U/L (ref 7–52)
ANION GAP SERPL CALCULATED.3IONS-SCNC: 7 MMOL/L (ref 4–13)
AST SERPL W P-5'-P-CCNC: 14 U/L (ref 13–39)
BILIRUB SERPL-MCNC: 0.59 MG/DL (ref 0.2–1)
BUN SERPL-MCNC: 15 MG/DL (ref 5–25)
CALCIUM SERPL-MCNC: 9.2 MG/DL (ref 8.4–10.2)
CHLORIDE SERPL-SCNC: 103 MMOL/L (ref 96–108)
CO2 SERPL-SCNC: 27 MMOL/L (ref 21–32)
CREAT SERPL-MCNC: 0.99 MG/DL (ref 0.6–1.3)
GFR SERPL CREATININE-BSD FRML MDRD: 85 ML/MIN/1.73SQ M
GLUCOSE SERPL-MCNC: 86 MG/DL (ref 65–140)
HCG-TM SERPL-SCNC: <1 MLU/ML
LDH SERPL-CCNC: 178 U/L (ref 140–271)
MAGNESIUM SERPL-MCNC: 1.6 MG/DL (ref 1.9–2.7)
POTASSIUM SERPL-SCNC: 4.1 MMOL/L (ref 3.5–5.3)
PROT SERPL-MCNC: 6.8 G/DL (ref 6.4–8.4)
SODIUM SERPL-SCNC: 137 MMOL/L (ref 135–147)

## 2023-05-03 RX ORDER — SODIUM CHLORIDE 9 MG/ML
125 INJECTION, SOLUTION INTRAVENOUS CONTINUOUS
OUTPATIENT
Start: 2023-05-03

## 2023-05-03 NOTE — PROGRESS NOTES
Incoming call from pt's wife  She wanted to clarify stage and reviewed Dr Kate Flowers note and NCCN staging and confirmed IIIc  Will have it corrected in the note  Informed her that message sent to Dr Evangelist Lara to clarify timing of PET scan  Pt had labs drawn today and awaiting results

## 2023-05-03 NOTE — PROGRESS NOTES
5/3/2023    Jenaro Baugh  1970  [id number]        Assessment  Stage IIIC seminoma, status post 4 rounds of BEP      Discussion  Today I discussed with Angy Dewey and his wife along with our nurse navigator, the findings of the urology tumor board  We reviewed that his largest retroperitoneal lymph node that initially measured 7 cm shrunk to just below 3 cm  We reviewed that per NCCN guidelines a CT PET scan should be performed to see if this lesion is metabolically active  If so he will likely require retroperitoneal lymph node dissection which I explained would be performed at [facility]  In the interim he has completed 4 cycles of BEP  An ultrasound from January 2023 shows hypoechoic lesions in the left testicle consistent with a burned-out testicular tumor  I recommend a left radical inguinal orchiectomy  Risk of the procedure including, but not limited to, bleeding, infection, disease recurrence, need for additional surgery were discussed and reviewed  Informed consent was obtained  We will work with our nurse navigator to obtain the PET scan in a timely fashion  History of Present Illness  45 y o  male with a history of a palpable lymph node in his left neck  This prompted a biopsy which revealed seminoma  The patient then was staged with a CT scan after tumor markers revealed an elevated LDH level  AFP and hCG were normal   He received 4 rounds of BEP based on retroperitoneal adenopathy measuring up to 7 cm  Fortunately he has completed his chemotherapy and a follow-up CT scan shows significant shrinkage of the retroperitoneal nodes  His case was reviewed at urology tumor board as the 7 cm lymph node now is just under 3 cm which is the cutoff for a PET scan  He was seen yesterday by medical oncology and a PET scan has been ordered            AUA Symptom Score  AUA SYMPTOM SCORE    Flowsheet Row Most Recent Value   AUA SYMPTOM SCORE    How often have you had a sensation of not emptying your bladder completely after you finished urinating? 0 (P)     How often have you had to urinate again less than two hours after you finished urinating? 1 (P)     How often have you found you stopped and started again several times when you urinate? 1 (P)     How often have you found it difficult to postpone urination? 0 (P)     How often have you had a weak urinary stream? 1 (P)     How often have you had to push or strain to begin urination? 0 (P)     How many times did you most typically get up to urinate from the time you went to bed at night until the time you got up in the morning? 1 (P)     Quality of Life: If you were to spend the rest of your life with your urinary condition just the way it is now, how would you feel about that? 2 (P)     AUA SYMPTOM SCORE 4 (P)           Review of Systems  Review of Systems   Constitutional: Negative  HENT: Negative  Eyes: Negative  Respiratory: Negative  Cardiovascular: Negative  Gastrointestinal: Negative  Endocrine: Negative  Genitourinary:        Per HPI   Musculoskeletal: Negative  Skin: Negative  Allergic/Immunologic: Negative  Neurological: Negative  Hematological: Negative  Psychiatric/Behavioral: Negative            Past Medical History  Past Medical History:   Diagnosis Date    Allergic 1/1/2000    seasonal allergies    Impacted cerumen     Lymphadenopathy     Obesity, unspecified     Seasonal allergies     Seminoma of testis [facility] [address]     Testicular cancer ([facility] [address] ) 01/17/2023    Torn meniscus     right knee- surgical repair today 7/19/2021    Wears glasses        Past Social History  Past Surgical History:   Procedure Laterality Date    FACIAL/NECK BIOPSY Left 1/11/2023    Procedure: EXCISION OF LEFT NECK LYMPH NODE WITH LYMPHOMA PROTOCOL;  Surgeon: Luzma Manzano MD;  [facility];  Service: Surgical Oncology    FL GUIDED CENTRAL VENOUS ACCESS DEVICE INSERTION  1/23/2023    HEMORROIDECTOMY      KNEE SURGERY  7/19/2021    KS ARTHRS KNE SURG W/MENISCECTOMY MED/LAT W/SHVG Right 07/19/2021    Procedure: ARTHROSCOPY KNEE, partial lateral meniscectomy;  Surgeon: Jean Diana MD;  [facility];  Service: Orthopedics    TUNNELED VENOUS PORT PLACEMENT N/A 1/23/2023    Procedure: INSERTION VENOUS PORT (PORT-A-CATH); Surgeon: Christine Nogueira MD;  [facility];  Service: Surgical Oncology       Past Family History  Family History   Problem Relation Age of Onset    Cancer Mother         Breast Cancer    Breast cancer Mother     Breast cancer additional onset Mother     Cancer Father         Skin carcenoma    Skin cancer Father     Breast cancer additional onset Sister    Anyi Rogers Cancer Sister         Breast Cancer and Skin carcenoma    Skin cancer Brother        Past Social history  Social History     Socioeconomic History    Marital status: /Civil Union     Spouse name: Not on file    Number of children: Not on file    Years of education: Not on file    Highest education level: Not on file   Occupational History    Not on file   Tobacco Use    Smoking status: Never     Passive exposure: Past    Smokeless tobacco: Never    Tobacco comments:     During childhood, both parents smoked  Vaping Use    Vaping Use: Never used   Substance and Sexual Activity    Alcohol use: Not Currently     Alcohol/week: 3 0 standard drinks     Types: 1 Glasses of wine, 1 Cans of beer, 1 Shots of liquor per week     Comment: 1-2 weekly    Drug use: Never    Sexual activity: Yes     Partners: Female   Other Topics Concern    Not on file   Social History Narrative    Not on file     Social Determinants of Health     Financial Resource Strain: Not on file   Food Insecurity: No Food Insecurity    Worried About Running Out of Food in the Last Year: Never true    Kenny of Food in the Last Year: Never true   Transportation Needs: No Transportation Needs    Lack of Transportation (Medical):  No    "Lack of Transportation (Non-Medical): No   Physical Activity: Not on file   Stress: Not on file   Social Connections: Not on file   Intimate Partner Violence: Not on file   Housing Stability: Low Risk     Unable to Pay for Housing in the Last Year: No    Number of Places Lived in the Last Year: 1    Unstable Housing in the Last Year: No       Current Medications  Current Outpatient Medications   Medication Sig Dispense Refill    diphenoxylate-atropine (LOMOTIL) 2 5-0 025 mg per tablet Take 1 tablet by mouth 4 (four) times a day as needed for diarrhea To alternate with Imodium 30 tablet 0    loratadine (CLARITIN) 10 mg tablet Take 10 mg by mouth daily      omeprazole (PriLOSEC) 20 mg delayed release capsule Take 20 mg by mouth 2 (two) times a day      ondansetron (ZOFRAN-ODT) 8 mg disintegrating tablet Take 1 tablet (8 mg total) by mouth every 8 (eight) hours as needed for nausea or vomiting 90 tablet 2    triamcinolone (KENALOG) 0 1 % cream       potassium chloride (K-DUR,KLOR-CON) 20 mEq tablet Take 1 tablet (20 mEq total) by mouth daily for 5 days 5 tablet 0     No current facility-administered medications for this visit  Allergies  Allergies   Allergen Reactions    Oyster Shell - Food Allergy Vomiting       Past Medical History, Social History, Family History, medications and allergies were reviewed  Vitals  Vitals:    05/03/23 1405   BP: 122/78   BP Location: Left arm   Patient Position: Sitting   Cuff Size: Adult   Pulse: 79   SpO2: 98%   Weight: 128 kg (283 lb)   Height: 6' 3\" (1 905 m)       Physical Exam  Physical Exam  On examination he is in no acute distress    Gait normal   Affect normal    Results  Lab Results   Component Value Date    PSA 0 6 12/28/2022    PSA 0 6 05/07/2021     Lab Results   Component Value Date    CALCIUM 8 8 05/01/2023    K 4 5 05/01/2023    CO2 26 05/01/2023     05/01/2023    BUN 20 05/01/2023    CREATININE 0 98 05/01/2023     Lab Results   Component Value " Date    WBC 7 43 05/01/2023    HGB 7 0 (L) 05/01/2023    HCT 21 4 (L) 05/01/2023     (H) 05/01/2023     (L) 05/01/2023         Office Urine Dip  No results found for this or any previous visit (from the past 1 hour(s)) ]

## 2023-05-04 ENCOUNTER — PATIENT OUTREACH (OUTPATIENT)
Dept: HEMATOLOGY ONCOLOGY | Facility: CLINIC | Age: 53
End: 2023-05-04

## 2023-05-04 NOTE — PROGRESS NOTES
Received voicemail from pt's wife stating she had some questions and requesting call back   Returned call to pt's wife, Glenn Willis  Informed her I reviewed PET scheduling with Dr Rhianna Jacob and he would like to keep it as scheduled for June  Reviewed tumor marker results and advised that Dr Rhianna Jacob is aware of results and next steps will be based on PET scan and although AFP is slightly elevated it is high normal  And everything else is normal per Dr Rhianna Jacob    Provided support and encouraged to call with any questions/concerns

## 2023-05-05 ENCOUNTER — TELEPHONE (OUTPATIENT)
Dept: HEMATOLOGY ONCOLOGY | Facility: CLINIC | Age: 53
End: 2023-05-05

## 2023-05-05 ENCOUNTER — TELEPHONE (OUTPATIENT)
Dept: UROLOGY | Facility: AMBULATORY SURGERY CENTER | Age: 53
End: 2023-05-05

## 2023-05-05 ENCOUNTER — PATIENT OUTREACH (OUTPATIENT)
Dept: HEMATOLOGY ONCOLOGY | Facility: CLINIC | Age: 53
End: 2023-05-05

## 2023-05-05 NOTE — PROGRESS NOTES
End Of Treatment Assessment        Are you having any lingering side effects from your treatment?  - Nausea, taking Zofran that helps    Are you interested in any support group information?  - No    Do you know who to call if you have any questions or concerns in the future? - Yes    Are you aware of your upcoming Med Onc appointment? - Yes, 6/19/23    Is there anything else I can do for you?  - No      Outreach made to wife  I spoke with her and went over End of Treatment assessment questions  Patients wife did ask if the 6/27/23 Med Onc appt could be canceled as the new appt was scheduled for 6/19/23  I made her aware that I will send message to Med Onc RN to address  She voiced understanding  She does not have any other questions or concerns at this time but knows I remain available if any arise

## 2023-05-05 NOTE — TELEPHONE ENCOUNTER
Pietro Rodas, This is [employer] calling  I'm just wanted to, we saw your message in my chart that Doctor Beryle Flowers is going to be out the last half of June into July  And we're wondering if there's any possible way that Tana Thomasfast could have his PET scan and a follow up appointment with Doctor Beryle Flowers by any and by any chance before he goes away  I'm sure you can understand our anxiety over this and just want to make sure that we're, you know, because you and Doctor Beryle Flowers are completely familiar with Tana Chaney 's case and have been treating him  We just wanted to work directly with the two, continued work directly with the two of you, if that's possible  If you could please let us know  We just want to try to get that PET scan, even if it has to be moved up like a little bit so that we could get an appointment with Doctor Beryle Flowers  So we're just not waiting for weeks on these results  It's just been a long haul and we just want to get to the other side of this  If you could please let us know  Our number [phone]  Thank you so much  I appreciate all of your time   Sree

## 2023-05-05 NOTE — TELEPHONE ENCOUNTER
Returned telephone call out to patient spouse, reviewed Dr Ned Miranda able to see patient on 6/19/23 at 3p

## 2023-05-05 NOTE — TELEPHONE ENCOUNTER
Patient Call    Who are you speaking with? Spouse    If it is not the patient, are they listed on an active communication consent form? Yes   What is the reason for this call? I called the patient regarding rescheduling his appointment on 6/26/23 with Dr Cherelle Narayanan  The patient's spouse, Page Shonda, states that [facility] for a follow up  Candis requested I send a message to see if the patient can be seen the week before so they are able to get the results of the patient's scan  Does this require a call back? Yes   If a call back is required, please list best call back number [phone]   If a call back is required, advise that a message will be forwarded to their care team and someone will return their call as soon as possible  Did you relay this information to the patient?  Yes

## 2023-05-05 NOTE — TELEPHONE ENCOUNTER
I called pt and spoke with his wife  I confirmed scheduling his procedure with Dr Nikhil Cano at the [facility] on 7/6/2023  I verbally went over all of pt 's pre op instructions and prep information with her  She confirmed that she will be driving pt on the day or surgery  Per Candis's request I will be mailing pt a copy of his surgical packet and confirmed that I will let them know if anything opens up any sooner

## 2023-05-17 ENCOUNTER — TELEPHONE (OUTPATIENT)
Dept: HEMATOLOGY ONCOLOGY | Facility: CLINIC | Age: 53
End: 2023-05-17

## 2023-05-17 NOTE — TELEPHONE ENCOUNTER
Patient Call    Who are you speaking with? Patient    If it is not the patient, are they listed on an active communication consent form? N/A   What is the reason for this call? Wants to get a sooner appt for the follow up they have for their PET Scan  They advised this is the second time it was rescheduled  Wants someone to call and help on this   Does this require a call back? Yes   If a call back is required, please list best call back number [phone]   If a call back is required, advise that a message will be forwarded to their care team and someone will return their call as soon as possible  Did you relay this information to the patient?  Yes

## 2023-05-23 ENCOUNTER — OFFICE VISIT (OUTPATIENT)
Dept: UROLOGY | Facility: AMBULATORY SURGERY CENTER | Age: 53
End: 2023-05-23

## 2023-05-23 VITALS
OXYGEN SATURATION: 95 % | SYSTOLIC BLOOD PRESSURE: 112 MMHG | HEART RATE: 71 BPM | DIASTOLIC BLOOD PRESSURE: 80 MMHG | WEIGHT: 287 LBS | BODY MASS INDEX: 35.87 KG/M2

## 2023-05-23 DIAGNOSIS — C62.90 SEMINOMA (HCC): Primary | ICD-10-CM

## 2023-05-23 RX ORDER — CHOLECALCIFEROL (VITAMIN D3) 1250 MCG
CAPSULE ORAL DAILY
COMMUNITY

## 2023-05-23 RX ORDER — MULTIVIT,TX WITH IRON,MINERALS
2 TABLET, EXTENDED RELEASE ORAL DAILY
COMMUNITY

## 2023-05-23 NOTE — LETTER
May 23, 2023     Erlin Munroe, [address] [facility],[address]  [address] [place] [address]    Patient: Cindi Zazueta   YOB: 1970   Date of Visit: 5/23/2023       Dear Dr Tunde Ya: Thank you for referring Cindi Zazueta to me for evaluation  Below are my notes for this consultation  If you have questions, please do not hesitate to call me  I look forward to following your patient along with you  Sincerely,        Carrington Verde MD        CC: MD Carrington Cole MD  5/23/2023  8:56 AM  Sign when Signing Visit  5/23/2023    Cindi Zazueta  1970  [id number]        Assessment  Advanced pure seminoma status post chemotherapy      Discussion  I had a lengthy discussion with Ruben Jennifer in the office today regarding his advanced pure seminoma detected from an enlarged supraclavicular left-sided lymph node  He has since received 4 rounds of chemotherapy  He had a 7 cm retroperitoneal lymph node which is now decreased to less than 3 cm  His case was reviewed at urology multidisciplinary tumor board and the plan is for left orchiectomy secondary to the ultrasound findings and template of lymph nodes enlarged on CT scan  In the interim a CT PET scan is pending  Risk of the left radical inguinal orchiectomy were discussed and reviewed  Risks including, but not limited to, bleeding, infection, disease recurrence, infertility, and pain were discussed and reviewed  Informed consent obtained  History of Present Illness  45 y o  male with a history of an enlarged left supraclavicular lymph node identified in early 2023  An excisional biopsy revealed pure seminoma  This prompted multiple rounds of chemotherapy  A CT scan prior to the chemotherapy showed an enlarged 7 cm retroperitoneal lymph node  Tumor markers are all negative  After chemotherapy a repeat CT scan shows the mass to be under 3 cm    This case was reviewed at multidisciplinary urology tumor board and the recommendation was for a CT PET scan which is pending at this time  During his work-up his examination of his testicles was benign, however, a suspected burned-out left testicular tumor was hypothesized based on the template of lymph nodes identified on the CT scan  An ultrasound ultimately showed a lesion within the left testicle although again this remains nonpalpable  AUA Symptom Score  AUA SYMPTOM SCORE    Flowsheet Row Most Recent Value   AUA SYMPTOM SCORE    How often have you had a sensation of not emptying your bladder completely after you finished urinating? 0 (P)     How often have you had to urinate again less than two hours after you finished urinating? 1 (P)     How often have you found you stopped and started again several times when you urinate? 0 (P)     How often have you found it difficult to postpone urination? 0 (P)     How often have you had a weak urinary stream? 0 (P)     How often have you had to push or strain to begin urination? 0 (P)     How many times did you most typically get up to urinate from the time you went to bed at night until the time you got up in the morning? 1 (P)     Quality of Life: If you were to spend the rest of your life with your urinary condition just the way it is now, how would you feel about that? 2 (P)     AUA SYMPTOM SCORE 2 (P)           Review of Systems  Review of Systems   Constitutional: Negative  HENT: Negative  Eyes: Negative  Respiratory: Negative  Cardiovascular: Negative  Gastrointestinal: Negative  Endocrine: Negative  Genitourinary:        Per HPI   Musculoskeletal: Negative  Skin: Negative  Allergic/Immunologic: Negative  Neurological: Negative  Hematological: Negative  Psychiatric/Behavioral: Negative            Past Medical History  Past Medical History:   Diagnosis Date   • Allergic 1/1/2000    seasonal allergies   • Impacted cerumen    • Lymphadenopathy    • Obesity, unspecified    • Seasonal allergies • Seminoma of testis [facility])    • Testicular cancer ([facility] [address] ) 01/17/2023   • Torn meniscus     right knee- surgical repair today 7/19/2021   • Wears glasses        Past Social History  Past Surgical History:   Procedure Laterality Date   • FACIAL/NECK BIOPSY Left 1/11/2023    Procedure: EXCISION OF LEFT NECK LYMPH NODE WITH LYMPHOMA PROTOCOL;  Surgeon: Edita Ortiz MD;  [facility];  Service: Surgical Oncology   • FL GUIDED CENTRAL VENOUS ACCESS DEVICE INSERTION  1/23/2023   • HEMORROIDECTOMY     • KNEE SURGERY  7/19/2021   • FL ARTHRS KNE SURG W/MENISCECTOMY MED/LAT W/SHVG Right 07/19/2021    Procedure: ARTHROSCOPY KNEE, partial lateral meniscectomy;  Surgeon: Paz Escamilla MD;  [facility]: [facility];  Service: Orthopedics   • TUNNELED VENOUS PORT PLACEMENT N/A 1/23/2023    Procedure: INSERTION VENOUS PORT (PORT-A-CATH); Surgeon: Edita Ortiz MD;  [facility];  Service: Surgical Oncology       Past Family History  Family History   Problem Relation Age of Onset   • Cancer Mother         Breast Cancer   • Breast cancer Mother    • Breast cancer additional onset Mother    • Cancer Father         Skin carcenoma   • Skin cancer Father    • Breast cancer additional onset Sister    • Cancer Sister         Breast Cancer and Skin carcenoma   • Skin cancer Brother        Past Social history  Social History     Socioeconomic History   • Marital status: /Civil Union     Spouse name: Not on file   • Number of children: Not on file   • Years of education: Not on file   • Highest education level: Not on file   Occupational History   • Not on file   Tobacco Use   • Smoking status: Never     Passive exposure: Past   • Smokeless tobacco: Never   • Tobacco comments:     During childhood, both parents smoked     Vaping Use   • Vaping Use: Never used   Substance and Sexual Activity   • Alcohol use: Not Currently     Alcohol/week: 3 0 standard drinks     Types: 1 Glasses of wine, 1 Cans of beer, 1 Shots of liquor per week     Comment: 1-2 weekly   • Drug use: Never   • Sexual activity: Yes     Partners: Female   Other Topics Concern   • Not on file   Social History Narrative   • Not on file     Social Determinants of Health     Financial Resource Strain: Not on file   Food Insecurity: No Food Insecurity   • Worried About Running Out of Food in the Last Year: Never true   • Ran Out of Food in the Last Year: Never true   Transportation Needs: No Transportation Needs   • Lack of Transportation (Medical): No   • Lack of Transportation (Non-Medical): No   Physical Activity: Not on file   Stress: Not on file   Social Connections: Not on file   Intimate Partner Violence: Not on file   Housing Stability: Low Risk    • Unable to Pay for Housing in the Last Year: No   • Number of Places Lived in the Last Year: 1   • Unstable Housing in the Last Year: No       Current Medications  Current Outpatient Medications   Medication Sig Dispense Refill   • Cholecalciferol (Vitamin D3) 1 25 MG (54688 UT) CAPS Take by mouth in the morning     • diphenoxylate-atropine (LOMOTIL) 2 5-0 025 mg per tablet Take 1 tablet by mouth 4 (four) times a day as needed for diarrhea To alternate with Imodium 30 tablet 0   • loratadine (CLARITIN) 10 mg tablet Take 10 mg by mouth daily     • Magnesium Gluconate 250 MG TABS Take 2 tablets by mouth in the morning     • omeprazole (PriLOSEC) 20 mg delayed release capsule Take 20 mg by mouth 2 (two) times a day     • ondansetron (ZOFRAN-ODT) 8 mg disintegrating tablet Take 1 tablet (8 mg total) by mouth every 8 (eight) hours as needed for nausea or vomiting 90 tablet 2   • triamcinolone (KENALOG) 0 1 % cream if needed     • potassium chloride (K-DUR,KLOR-CON) 20 mEq tablet Take 1 tablet (20 mEq total) by mouth daily for 5 days 5 tablet 0     No current facility-administered medications for this visit         Allergies  Allergies   Allergen Reactions   • Violetta Espinoza - Food Allergy Vomiting       Past Medical History, Social History, Family History, medications and allergies were reviewed  Vitals  Vitals:    05/23/23 0821   BP: 112/80   BP Location: Left arm   Patient Position: Sitting   Cuff Size: Large   Pulse: 71   SpO2: 95%   Weight: 130 kg (287 lb)       Physical Exam  Physical Exam  On examination he is in no acute distress  Cardiac is regular  Respiratory no distress  Gait normal   Affect normal   Alopecia noted        Results  Lab Results   Component Value Date    PSA 0 6 12/28/2022    PSA 0 6 05/07/2021     Lab Results   Component Value Date    CALCIUM 9 2 05/03/2023    K 4 1 05/03/2023    CO2 27 05/03/2023     05/03/2023    BUN 15 05/03/2023    CREATININE 0 99 05/03/2023     Lab Results   Component Value Date    WBC 7 43 05/01/2023    HGB 7 0 (L) 05/01/2023    HCT 21 4 (L) 05/01/2023     (H) 05/01/2023     (L) 05/01/2023         Office Urine Dip  No results found for this or any previous visit (from the past 1 hour(s)) ]

## 2023-05-23 NOTE — H&P (VIEW-ONLY)
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  He has since received 4 rounds of chemotherapy  He had a 7 cm retroperitoneal lymph node which is now decreased to less than 3 cm  His case was reviewed at urology multidisciplinary tumor board and the plan is for left orchiectomy secondary to the ultrasound findings and template of lymph nodes enlarged on CT scan  In the interim a CT PET scan is pending  Risk of the left radical inguinal orchiectomy were discussed and reviewed  Risks including, but not limited to, bleeding, infection, disease recurrence, infertility, and pain were discussed and reviewed  Informed consent obtained  History of Present Illness  45 y o  male with a history of an enlarged left supraclavicular lymph node identified in early 2023  An excisional biopsy revealed pure seminoma  This prompted multiple rounds of chemotherapy  A CT scan prior to the chemotherapy showed an enlarged 7 cm retroperitoneal lymph node  Tumor markers are all negative  After chemotherapy a repeat CT scan shows the mass to be under 3 cm  This case was reviewed at multidisciplinary urology tumor board and the recommendation was for a CT PET scan which is pending at this time  During his work-up his examination of his testicles was benign, however, a suspected burned-out left testicular tumor was hypothesized based on the template of lymph nodes identified on the CT scan  An ultrasound ultimately showed a lesion within the left testicle although again this remains nonpalpable          AUA Symptom Score  AUA SYMPTOM SCORE    Flowsheet Row Most Recent Value   AUA SYMPTOM SCORE    How often have you had a sensation of not emptying your bladder completely after you finished urinating? 0 (P)     How often have you had to urinate again less than two hours after you finished urinating? 1 (P)     How often have you found you stopped and started again several times when you urinate? 0 (P)     How often have you found it difficult to postpone urination? 0 (P)     How often have you had a weak urinary stream? 0 (P)     How often have you had to push or strain to begin urination? 0 (P)     How many times did you most typically get up to urinate from the time you went to bed at night until the time you got up in the morning? 1 (P)     Quality of Life: If you were to spend the rest of your life with your urinary condition just the way it is now, how would you feel about that? 2 (P)     AUA SYMPTOM SCORE 2 (P)           Review of Systems  Review of Systems   Constitutional: Negative  HENT: Negative  Eyes: Negative  Respiratory: Negative  Cardiovascular: Negative  Gastrointestinal: Negative  Endocrine: Negative  Genitourinary:        Per HPI   Musculoskeletal: Negative  Skin: Negative  Allergic/Immunologic: Negative  Neurological: Negative  Hematological: Negative  Psychiatric/Behavioral: Negative            Past Medical History  Past Medical History:   Diagnosis Date   • Allergic 1/1/2000    seasonal allergies   • Impacted cerumen    • Lymphadenopathy    • Obesity, unspecified    • Seasonal allergies    • Seminoma of testis ([facility] [address] )    • Testicular cancer ([facility] [address] ) 01/17/2023   • Torn meniscus     right knee- surgical repair today 7/19/2021   • Wears glasses        Past Social History  Past Surgical History:   Procedure Laterality Date   • FACIAL/NECK BIOPSY Left 1/11/2023    Procedure: EXCISION OF LEFT NECK LYMPH NODE WITH LYMPHOMA PROTOCOL;  Surgeon: Donovan Perea MD;  [facility];  Service: Surgical Oncology   • FL GUIDED CENTRAL VENOUS ACCESS DEVICE INSERTION  1/23/2023   • HEMORROIDECTOMY     • KNEE SURGERY  7/19/2021   • NH FABIAN PARSON SURG W/MENISCECTOMY MED/LAT W/SHVG Right 07/19/2021    Procedure: ARTHROSCOPY KNEE, partial lateral meniscectomy;  Surgeon: Syl Calvo MD;  [facility];  Service: Orthopedics   • TUNNELED VENOUS PORT PLACEMENT N/A 1/23/2023    Procedure: INSERTION VENOUS PORT (PORT-A-CATH); Surgeon: Char Gonzalez MD;  [facility];  Service: Surgical Oncology       Past Family History  Family History   Problem Relation Age of Onset   • Cancer Mother         Breast Cancer   • Breast cancer Mother    • Breast cancer additional onset Mother    • Cancer Father         Skin carcenoma   • Skin cancer Father    • Breast cancer additional onset Sister    • Cancer Sister         Breast Cancer and Skin carcenoma   • Skin cancer Brother        Past Social history  Social History     Socioeconomic History   • Marital status: /Civil Union     Spouse name: Not on file   • Number of children: Not on file   • Years of education: Not on file   • Highest education level: Not on file   Occupational History   • Not on file   Tobacco Use   • Smoking status: Never     Passive exposure: Past   • Smokeless tobacco: Never   • Tobacco comments:     During childhood, both parents smoked  Vaping Use   • Vaping Use: Never used   Substance and Sexual Activity   • Alcohol use: Not Currently     Alcohol/week: 3 0 standard drinks     Types: 1 Glasses of wine, 1 Cans of beer, 1 Shots of liquor per week     Comment: 1-2 weekly   • Drug use: Never   • Sexual activity: Yes     Partners: Female   Other Topics Concern   • Not on file   Social History Narrative   • Not on file     Social Determinants of Health     Financial Resource Strain: Not on file   Food Insecurity: No Food Insecurity   • Worried About Running Out of Food in the Last Year: Never true   • Ran Out of Food in the Last Year: Never true   Transportation Needs: No Transportation Needs   • Lack of Transportation (Medical): No   • Lack of Transportation (Non-Medical):  No Physical Activity: Not on file   Stress: Not on file   Social Connections: Not on file   Intimate Partner Violence: Not on file   Housing Stability: Low Risk    • Unable to Pay for Housing in the Last Year: No   • Number of Places Lived in the Last Year: 1   • Unstable Housing in the Last Year: No       Current Medications  Current Outpatient Medications   Medication Sig Dispense Refill   • Cholecalciferol (Vitamin D3) 1 25 MG (73736 UT) CAPS Take by mouth in the morning     • diphenoxylate-atropine (LOMOTIL) 2 5-0 025 mg per tablet Take 1 tablet by mouth 4 (four) times a day as needed for diarrhea To alternate with Imodium 30 tablet 0   • loratadine (CLARITIN) 10 mg tablet Take 10 mg by mouth daily     • Magnesium Gluconate 250 MG TABS Take 2 tablets by mouth in the morning     • omeprazole (PriLOSEC) 20 mg delayed release capsule Take 20 mg by mouth 2 (two) times a day     • ondansetron (ZOFRAN-ODT) 8 mg disintegrating tablet Take 1 tablet (8 mg total) by mouth every 8 (eight) hours as needed for nausea or vomiting 90 tablet 2   • triamcinolone (KENALOG) 0 1 % cream if needed     • potassium chloride (K-DUR,KLOR-CON) 20 mEq tablet Take 1 tablet (20 mEq total) by mouth daily for 5 days 5 tablet 0     No current facility-administered medications for this visit  Allergies  Allergies   Allergen Reactions   • Mina Aguilera - Food Allergy Vomiting       Past Medical History, Social History, Family History, medications and allergies were reviewed  Vitals  Vitals:    05/23/23 0821   BP: 112/80   BP Location: Left arm   Patient Position: Sitting   Cuff Size: Large   Pulse: 71   SpO2: 95%   Weight: 130 kg (287 lb)       Physical Exam  Physical Exam  On examination he is in no acute distress  Cardiac is regular  Respiratory no distress  Gait normal   Affect normal   Alopecia noted        Results  Lab Results   Component Value Date    PSA 0 6 12/28/2022    PSA 0 6 05/07/2021     Lab Results   Component Value Date CALCIUM 9 2 05/03/2023    K 4 1 05/03/2023    CO2 27 05/03/2023     05/03/2023    BUN 15 05/03/2023    CREATININE 0 99 05/03/2023     Lab Results   Component Value Date    WBC 7 43 05/01/2023    HGB 7 0 (L) 05/01/2023    HCT 21 4 (L) 05/01/2023     (H) 05/01/2023     (L) 05/01/2023         Office Urine Dip  No results found for this or any previous visit (from the past 1 hour(s)) ]

## 2023-05-23 NOTE — PROGRESS NOTES
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  He has since received 4 rounds of chemotherapy  He had a 7 cm retroperitoneal lymph node which is now decreased to less than 3 cm  His case was reviewed at urology multidisciplinary tumor board and the plan is for left orchiectomy secondary to the ultrasound findings and template of lymph nodes enlarged on CT scan  In the interim a CT PET scan is pending  Risk of the left radical inguinal orchiectomy were discussed and reviewed  Risks including, but not limited to, bleeding, infection, disease recurrence, infertility, and pain were discussed and reviewed  Informed consent obtained  History of Present Illness  45 y o  male with a history of an enlarged left supraclavicular lymph node identified in early 2023  An excisional biopsy revealed pure seminoma  This prompted multiple rounds of chemotherapy  A CT scan prior to the chemotherapy showed an enlarged 7 cm retroperitoneal lymph node  Tumor markers are all negative  After chemotherapy a repeat CT scan shows the mass to be under 3 cm  This case was reviewed at multidisciplinary urology tumor board and the recommendation was for a CT PET scan which is pending at this time  During his work-up his examination of his testicles was benign, however, a suspected burned-out left testicular tumor was hypothesized based on the template of lymph nodes identified on the CT scan  An ultrasound ultimately showed a lesion within the left testicle although again this remains nonpalpable          AUA Symptom Score  AUA SYMPTOM SCORE    Flowsheet Row Most Recent Value   AUA SYMPTOM SCORE    How often have you had a sensation of not emptying your bladder completely after you finished urinating? 0 (P)     How often have you had to urinate again less than two hours after you finished urinating? 1 (P)     How often have you found you stopped and started again several times when you urinate? 0 (P)     How often have you found it difficult to postpone urination? 0 (P)     How often have you had a weak urinary stream? 0 (P)     How often have you had to push or strain to begin urination? 0 (P)     How many times did you most typically get up to urinate from the time you went to bed at night until the time you got up in the morning? 1 (P)     Quality of Life: If you were to spend the rest of your life with your urinary condition just the way it is now, how would you feel about that? 2 (P)     AUA SYMPTOM SCORE 2 (P)           Review of Systems  Review of Systems   Constitutional: Negative  HENT: Negative  Eyes: Negative  Respiratory: Negative  Cardiovascular: Negative  Gastrointestinal: Negative  Endocrine: Negative  Genitourinary:        Per HPI   Musculoskeletal: Negative  Skin: Negative  Allergic/Immunologic: Negative  Neurological: Negative  Hematological: Negative  Psychiatric/Behavioral: Negative            Past Medical History  Past Medical History:   Diagnosis Date   • Allergic 1/1/2000    seasonal allergies   • Impacted cerumen    • Lymphadenopathy    • Obesity, unspecified    • Seasonal allergies    • Seminoma of testis ([facility] [address] )    • Testicular cancer ([facility] [address] ) 01/17/2023   • Torn meniscus     right knee- surgical repair today 7/19/2021   • Wears glasses        Past Social History  Past Surgical History:   Procedure Laterality Date   • FACIAL/NECK BIOPSY Left 1/11/2023    Procedure: EXCISION OF LEFT NECK LYMPH NODE WITH LYMPHOMA PROTOCOL;  Surgeon: Zev Quiles MD;  [facility];  Service: Surgical Oncology   • FL GUIDED CENTRAL VENOUS ACCESS DEVICE INSERTION  1/23/2023   • HEMORROIDECTOMY     • KNEE SURGERY  7/19/2021   • ID ARTHGREG PARSON SURG W/MENISCECTOMY MED/LAT W/SHVG Right 07/19/2021    Procedure: ARTHROSCOPY KNEE, partial lateral meniscectomy;  Surgeon: Evangelist Rich MD;  [facility];  Service: Orthopedics   • TUNNELED VENOUS PORT PLACEMENT N/A 1/23/2023    Procedure: INSERTION VENOUS PORT (PORT-A-CATH); Surgeon: Pop Mendez MD;  [facility];  Service: Surgical Oncology       Past Family History  Family History   Problem Relation Age of Onset   • Cancer Mother         Breast Cancer   • Breast cancer Mother    • Breast cancer additional onset Mother    • Cancer Father         Skin carcenoma   • Skin cancer Father    • Breast cancer additional onset Sister    • Cancer Sister         Breast Cancer and Skin carcenoma   • Skin cancer Brother        Past Social history  Social History     Socioeconomic History   • Marital status: /Civil Union     Spouse name: Not on file   • Number of children: Not on file   • Years of education: Not on file   • Highest education level: Not on file   Occupational History   • Not on file   Tobacco Use   • Smoking status: Never     Passive exposure: Past   • Smokeless tobacco: Never   • Tobacco comments:     During childhood, both parents smoked  Vaping Use   • Vaping Use: Never used   Substance and Sexual Activity   • Alcohol use: Not Currently     Alcohol/week: 3 0 standard drinks     Types: 1 Glasses of wine, 1 Cans of beer, 1 Shots of liquor per week     Comment: 1-2 weekly   • Drug use: Never   • Sexual activity: Yes     Partners: Female   Other Topics Concern   • Not on file   Social History Narrative   • Not on file     Social Determinants of Health     Financial Resource Strain: Not on file   Food Insecurity: No Food Insecurity   • Worried About Running Out of Food in the Last Year: Never true   • Ran Out of Food in the Last Year: Never true   Transportation Needs: No Transportation Needs   • Lack of Transportation (Medical): No   • Lack of Transportation (Non-Medical):  No Physical Activity: Not on file   Stress: Not on file   Social Connections: Not on file   Intimate Partner Violence: Not on file   Housing Stability: Low Risk    • Unable to Pay for Housing in the Last Year: No   • Number of Places Lived in the Last Year: 1   • Unstable Housing in the Last Year: No       Current Medications  Current Outpatient Medications   Medication Sig Dispense Refill   • Cholecalciferol (Vitamin D3) 1 25 MG (87883 UT) CAPS Take by mouth in the morning     • diphenoxylate-atropine (LOMOTIL) 2 5-0 025 mg per tablet Take 1 tablet by mouth 4 (four) times a day as needed for diarrhea To alternate with Imodium 30 tablet 0   • loratadine (CLARITIN) 10 mg tablet Take 10 mg by mouth daily     • Magnesium Gluconate 250 MG TABS Take 2 tablets by mouth in the morning     • omeprazole (PriLOSEC) 20 mg delayed release capsule Take 20 mg by mouth 2 (two) times a day     • ondansetron (ZOFRAN-ODT) 8 mg disintegrating tablet Take 1 tablet (8 mg total) by mouth every 8 (eight) hours as needed for nausea or vomiting 90 tablet 2   • triamcinolone (KENALOG) 0 1 % cream if needed     • potassium chloride (K-DUR,KLOR-CON) 20 mEq tablet Take 1 tablet (20 mEq total) by mouth daily for 5 days 5 tablet 0     No current facility-administered medications for this visit  Allergies  Allergies   Allergen Reactions   • Cynda Cedeno - Food Allergy Vomiting       Past Medical History, Social History, Family History, medications and allergies were reviewed  Vitals  Vitals:    05/23/23 0821   BP: 112/80   BP Location: Left arm   Patient Position: Sitting   Cuff Size: Large   Pulse: 71   SpO2: 95%   Weight: 130 kg (287 lb)       Physical Exam  Physical Exam  On examination he is in no acute distress  Cardiac is regular  Respiratory no distress  Gait normal   Affect normal   Alopecia noted        Results  Lab Results   Component Value Date    PSA 0 6 12/28/2022    PSA 0 6 05/07/2021     Lab Results   Component Value Date CALCIUM 9 2 05/03/2023    K 4 1 05/03/2023    CO2 27 05/03/2023     05/03/2023    BUN 15 05/03/2023    CREATININE 0 99 05/03/2023     Lab Results   Component Value Date    WBC 7 43 05/01/2023    HGB 7 0 (L) 05/01/2023    HCT 21 4 (L) 05/01/2023     (H) 05/01/2023     (L) 05/01/2023         Office Urine Dip  No results found for this or any previous visit (from the past 1 hour(s)) ]

## 2023-05-23 NOTE — LETTER
May 23, 2023     Nancy Estrada, [address] [facility],[address]  [address] [place] [address]    Patient: Libertad Shearer   YOB: 1970   Date of Visit: 5/23/2023       Dear Dr Shan King: Thank you for referring Libertad Shearer to me for evaluation  Below are my notes for this consultation  If you have questions, please do not hesitate to call me  I look forward to following your patient along with you  Sincerely,        Aravind Roy MD        CC: Dorothe Boeck, RN Jori Burrows, MD Gayland Hatter, MD  5/23/2023  8:56 AM  Sign when Signing Visit  5/23/2023    Libertad Shearer  1970  [id number]        Assessment  Advanced pure seminoma status post chemotherapy      Discussion  I had a lengthy discussion with sAhleigh Mendozaabbe in the office today regarding his advanced pure seminoma detected from an enlarged supraclavicular left-sided lymph node  He has since received 4 rounds of chemotherapy  He had a 7 cm retroperitoneal lymph node which is now decreased to less than 3 cm  His case was reviewed at urology multidisciplinary tumor board and the plan is for left orchiectomy secondary to the ultrasound findings and template of lymph nodes enlarged on CT scan  In the interim a CT PET scan is pending  Risk of the left radical inguinal orchiectomy were discussed and reviewed  Risks including, but not limited to, bleeding, infection, disease recurrence, infertility, and pain were discussed and reviewed  Informed consent obtained  History of Present Illness  45 y o  male with a history of an enlarged left supraclavicular lymph node identified in early 2023  An excisional biopsy revealed pure seminoma  This prompted multiple rounds of chemotherapy  A CT scan prior to the chemotherapy showed an enlarged 7 cm retroperitoneal lymph node  Tumor markers are all negative  After chemotherapy a repeat CT scan shows the mass to be under 3 cm    This case was reviewed at multidisciplinary urology tumor board and the recommendation was for a CT PET scan which is pending at this time  During his work-up his examination of his testicles was benign, however, a suspected burned-out left testicular tumor was hypothesized based on the template of lymph nodes identified on the CT scan  An ultrasound ultimately showed a lesion within the left testicle although again this remains nonpalpable  AUA Symptom Score  AUA SYMPTOM SCORE    Flowsheet Row Most Recent Value   AUA SYMPTOM SCORE    How often have you had a sensation of not emptying your bladder completely after you finished urinating? 0 (P)     How often have you had to urinate again less than two hours after you finished urinating? 1 (P)     How often have you found you stopped and started again several times when you urinate? 0 (P)     How often have you found it difficult to postpone urination? 0 (P)     How often have you had a weak urinary stream? 0 (P)     How often have you had to push or strain to begin urination? 0 (P)     How many times did you most typically get up to urinate from the time you went to bed at night until the time you got up in the morning? 1 (P)     Quality of Life: If you were to spend the rest of your life with your urinary condition just the way it is now, how would you feel about that? 2 (P)     AUA SYMPTOM SCORE 2 (P)           Review of Systems  Review of Systems   Constitutional: Negative  HENT: Negative  Eyes: Negative  Respiratory: Negative  Cardiovascular: Negative  Gastrointestinal: Negative  Endocrine: Negative  Genitourinary:        Per HPI   Musculoskeletal: Negative  Skin: Negative  Allergic/Immunologic: Negative  Neurological: Negative  Hematological: Negative  Psychiatric/Behavioral: Negative            Past Medical History  Past Medical History:   Diagnosis Date   • Allergic 1/1/2000    seasonal allergies   • Impacted cerumen    • Lymphadenopathy    • Obesity, unspecified    • Seasonal allergies    • Seminoma of testis [facility])    • Testicular cancer ([facility] [address] ) 01/17/2023   • Torn meniscus     right knee- surgical repair today 7/19/2021   • Wears glasses        Past Social History  Past Surgical History:   Procedure Laterality Date   • FACIAL/NECK BIOPSY Left 1/11/2023    Procedure: EXCISION OF LEFT NECK LYMPH NODE WITH LYMPHOMA PROTOCOL;  Surgeon: Esthela Kendrick MD;  [facility];  Service: Surgical Oncology   • FL GUIDED CENTRAL VENOUS ACCESS DEVICE INSERTION  1/23/2023   • HEMORROIDECTOMY     • KNEE SURGERY  7/19/2021   • NH ARTHRS KNE SURG W/MENISCECTOMY MED/LAT W/SHVG Right 07/19/2021    Procedure: ARTHROSCOPY KNEE, partial lateral meniscectomy;  Surgeon: Vamsi Cano MD;  [facility];  Service: Orthopedics   • TUNNELED VENOUS PORT PLACEMENT N/A 1/23/2023    Procedure: INSERTION VENOUS PORT (PORT-A-CATH); Surgeon: Esthela Kendrick MD;  [facility];  Service: Surgical Oncology       Past Family History  Family History   Problem Relation Age of Onset   • Cancer Mother         Breast Cancer   • Breast cancer Mother    • Breast cancer additional onset Mother    • Cancer Father         Skin carcenoma   • Skin cancer Father    • Breast cancer additional onset Sister    • Cancer Sister         Breast Cancer and Skin carcenoma   • Skin cancer Brother        Past Social history  Social History     Socioeconomic History   • Marital status: /Civil Union     Spouse name: Not on file   • Number of children: Not on file   • Years of education: Not on file   • Highest education level: Not on file   Occupational History   • Not on file   Tobacco Use   • Smoking status: Never     Passive exposure: Past   • Smokeless tobacco: Never   • Tobacco comments:     During childhood, both parents smoked     Vaping Use   • Vaping Use: Never used   Substance and Sexual Activity   • Alcohol use: Not Currently     Alcohol/week: 3 0 standard drinks     Types: 1 Glasses of wine, 1 Cans of beer, 1 Shots of liquor per week     Comment: 1-2 weekly   • Drug use: Never   • Sexual activity: Yes     Partners: Female   Other Topics Concern   • Not on file   Social History Narrative   • Not on file     Social Determinants of Health     Financial Resource Strain: Not on file   Food Insecurity: No Food Insecurity   • Worried About Running Out of Food in the Last Year: Never true   • Ran Out of Food in the Last Year: Never true   Transportation Needs: No Transportation Needs   • Lack of Transportation (Medical): No   • Lack of Transportation (Non-Medical): No   Physical Activity: Not on file   Stress: Not on file   Social Connections: Not on file   Intimate Partner Violence: Not on file   Housing Stability: Low Risk    • Unable to Pay for Housing in the Last Year: No   • Number of Places Lived in the Last Year: 1   • Unstable Housing in the Last Year: No       Current Medications  Current Outpatient Medications   Medication Sig Dispense Refill   • Cholecalciferol (Vitamin D3) 1 25 MG (27666 UT) CAPS Take by mouth in the morning     • diphenoxylate-atropine (LOMOTIL) 2 5-0 025 mg per tablet Take 1 tablet by mouth 4 (four) times a day as needed for diarrhea To alternate with Imodium 30 tablet 0   • loratadine (CLARITIN) 10 mg tablet Take 10 mg by mouth daily     • Magnesium Gluconate 250 MG TABS Take 2 tablets by mouth in the morning     • omeprazole (PriLOSEC) 20 mg delayed release capsule Take 20 mg by mouth 2 (two) times a day     • ondansetron (ZOFRAN-ODT) 8 mg disintegrating tablet Take 1 tablet (8 mg total) by mouth every 8 (eight) hours as needed for nausea or vomiting 90 tablet 2   • triamcinolone (KENALOG) 0 1 % cream if needed     • potassium chloride (K-DUR,KLOR-CON) 20 mEq tablet Take 1 tablet (20 mEq total) by mouth daily for 5 days 5 tablet 0     No current facility-administered medications for this visit         Allergies  Allergies   Allergen Reactions   • Lana Lemonase - Food Allergy Vomiting       Past Medical History, Social History, Family History, medications and allergies were reviewed  Vitals  Vitals:    05/23/23 0821   BP: 112/80   BP Location: Left arm   Patient Position: Sitting   Cuff Size: Large   Pulse: 71   SpO2: 95%   Weight: 130 kg (287 lb)       Physical Exam  Physical Exam  On examination he is in no acute distress  Cardiac is regular  Respiratory no distress  Gait normal   Affect normal   Alopecia noted        Results  Lab Results   Component Value Date    PSA 0 6 12/28/2022    PSA 0 6 05/07/2021     Lab Results   Component Value Date    CALCIUM 9 2 05/03/2023    K 4 1 05/03/2023    CO2 27 05/03/2023     05/03/2023    BUN 15 05/03/2023    CREATININE 0 99 05/03/2023     Lab Results   Component Value Date    WBC 7 43 05/01/2023    HGB 7 0 (L) 05/01/2023    HCT 21 4 (L) 05/01/2023     (H) 05/01/2023     (L) 05/01/2023         Office Urine Dip  No results found for this or any previous visit (from the past 1 hour(s)) ]

## 2023-05-26 ENCOUNTER — TELEPHONE (OUTPATIENT)
Dept: HEMATOLOGY ONCOLOGY | Facility: CLINIC | Age: 53
End: 2023-05-26

## 2023-05-26 NOTE — TELEPHONE ENCOUNTER
Appointment Change  Cancel, Reschedule, Change to Virtual      Who are you speaking with? Spouse   If it is not the patient, are they listed on an active communication consent form? Yes   Which provider is the appointment scheduled with? Dr Toney Rodriguez   When is the appointment scheduled? Please list date and time  06/19/23 3PM   At which location is the appointment scheduled to take place? [place]   Was the appointment rescheduled or changed from an in person visit to a virtual visit? If so, please list the details of the change  Colonel Renaldo Calero states that Dr Toney Rodriguez will be calling patient to review results instead of doing an appointment   What is the reason for the appointment change? Provider out of office   Was [facility] transport scheduled for this visit? No   Does [facility] transport need to be scheduled for the new visit (if applicable) N/A   Does the patient need an infusion appointment rescheduled? No   Does the patient have an infusion appointment scheduled? If so, when? Yes, 06/02/23 cath maintenance    Is the patient undergoing chemotherapy? No   Was the no-show policy reviewed for appointments being changed with less then 24 hours of notice?  N/A

## 2023-05-31 ENCOUNTER — DOCUMENTATION (OUTPATIENT)
Dept: HEMATOLOGY ONCOLOGY | Facility: CLINIC | Age: 53
End: 2023-05-31

## 2023-05-31 ENCOUNTER — TELEPHONE (OUTPATIENT)
Dept: HEMATOLOGY ONCOLOGY | Facility: CLINIC | Age: 53
End: 2023-05-31

## 2023-05-31 NOTE — PROGRESS NOTES
Received Paperwork   What type of form FMLA   Scanned blank form into patient's Epic chart YES   Method received form SON DROPPED OFF FORMS   Provider responsible for form [facility]   Informed patient our office turn around time for completing patient forms is 5-7 business days   YES     Comments PLEASE CALL WHEN FINISHED

## 2023-05-31 NOTE — TELEPHONE ENCOUNTER
Received vm: Hi, my name is Emmanuel Yuen  My  is Tree Schultz  He's a patient of Doctor Mary Bennett  My son, Mone Jameson is dropping off paperwork to extend his FMLA because he has surgery coming up  So I just wanted to make someone aware that he would be dropping that off today at the office  I was going to let this know but I'm not sure if she may be off today  My phone number for questions is [phone]  Again, my name is Emmanuel Yuen calling on behalf of Tree Schultz  Thank you Bye  Returned call to [place]  Confirmed receipt of FMLA paper work and confirmed someone would reach out when completed  Candis aware and appreciative

## 2023-06-02 ENCOUNTER — TELEPHONE (OUTPATIENT)
Dept: HEMATOLOGY ONCOLOGY | Facility: CLINIC | Age: 53
End: 2023-06-02

## 2023-06-02 ENCOUNTER — HOSPITAL ENCOUNTER (OUTPATIENT)
Dept: INFUSION CENTER | Facility: CLINIC | Age: 53
End: 2023-06-02
Payer: COMMERCIAL

## 2023-06-02 DIAGNOSIS — C62.90 SEMINOMA (HCC): Primary | ICD-10-CM

## 2023-06-02 DIAGNOSIS — Z45.2 ENCOUNTER FOR CARE RELATED TO PORT-A-CATH: ICD-10-CM

## 2023-06-02 DIAGNOSIS — E87.6 HYPOKALEMIA: ICD-10-CM

## 2023-06-02 DIAGNOSIS — D64.9 NORMOCYTIC ANEMIA: ICD-10-CM

## 2023-06-02 DIAGNOSIS — E83.42 HYPOMAGNESEMIA: ICD-10-CM

## 2023-06-02 LAB
ALBUMIN SERPL BCP-MCNC: 4 G/DL (ref 3.5–5)
ALP SERPL-CCNC: 44 U/L (ref 34–104)
ALT SERPL W P-5'-P-CCNC: 21 U/L (ref 7–52)
ANION GAP SERPL CALCULATED.3IONS-SCNC: 4 MMOL/L (ref 4–13)
AST SERPL W P-5'-P-CCNC: 18 U/L (ref 13–39)
BASOPHILS # BLD AUTO: 0.05 THOUSANDS/ÂΜL (ref 0–0.1)
BASOPHILS NFR BLD AUTO: 1 % (ref 0–1)
BILIRUB SERPL-MCNC: 0.51 MG/DL (ref 0.2–1)
BUN SERPL-MCNC: 22 MG/DL (ref 5–25)
CALCIUM SERPL-MCNC: 9.4 MG/DL (ref 8.4–10.2)
CHLORIDE SERPL-SCNC: 105 MMOL/L (ref 96–108)
CO2 SERPL-SCNC: 28 MMOL/L (ref 21–32)
CREAT SERPL-MCNC: 0.93 MG/DL (ref 0.6–1.3)
EOSINOPHIL # BLD AUTO: 0.18 THOUSAND/ÂΜL (ref 0–0.61)
EOSINOPHIL NFR BLD AUTO: 3 % (ref 0–6)
ERYTHROCYTE [DISTWIDTH] IN BLOOD BY AUTOMATED COUNT: 14.1 % (ref 11.6–15.1)
GFR SERPL CREATININE-BSD FRML MDRD: 92 ML/MIN/1.73SQ M
GLUCOSE SERPL-MCNC: 115 MG/DL (ref 65–140)
HCT VFR BLD AUTO: 33 % (ref 36.5–49.3)
HGB BLD-MCNC: 11.3 G/DL (ref 12–17)
IMM GRANULOCYTES # BLD AUTO: 0.02 THOUSAND/UL (ref 0–0.2)
IMM GRANULOCYTES NFR BLD AUTO: 0 % (ref 0–2)
LYMPHOCYTES # BLD AUTO: 1.02 THOUSANDS/ÂΜL (ref 0.6–4.47)
LYMPHOCYTES NFR BLD AUTO: 19 % (ref 14–44)
MAGNESIUM SERPL-MCNC: 1.7 MG/DL (ref 1.9–2.7)
MCH RBC QN AUTO: 35.2 PG (ref 26.8–34.3)
MCHC RBC AUTO-ENTMCNC: 34.2 G/DL (ref 31.4–37.4)
MCV RBC AUTO: 103 FL (ref 82–98)
MONOCYTES # BLD AUTO: 0.63 THOUSAND/ÂΜL (ref 0.17–1.22)
MONOCYTES NFR BLD AUTO: 12 % (ref 4–12)
NEUTROPHILS # BLD AUTO: 3.58 THOUSANDS/ÂΜL (ref 1.85–7.62)
NEUTS SEG NFR BLD AUTO: 65 % (ref 43–75)
NRBC BLD AUTO-RTO: 0 /100 WBCS
PLATELET # BLD AUTO: 162 THOUSANDS/UL (ref 149–390)
PMV BLD AUTO: 9.1 FL (ref 8.9–12.7)
POTASSIUM SERPL-SCNC: 4 MMOL/L (ref 3.5–5.3)
PROT SERPL-MCNC: 6.9 G/DL (ref 6.4–8.4)
RBC # BLD AUTO: 3.21 MILLION/UL (ref 3.88–5.62)
SODIUM SERPL-SCNC: 137 MMOL/L (ref 135–147)
WBC # BLD AUTO: 5.48 THOUSAND/UL (ref 4.31–10.16)

## 2023-06-02 PROCEDURE — 83735 ASSAY OF MAGNESIUM: CPT | Performed by: INTERNAL MEDICINE

## 2023-06-02 PROCEDURE — 85025 COMPLETE CBC W/AUTO DIFF WBC: CPT | Performed by: INTERNAL MEDICINE

## 2023-06-02 PROCEDURE — 80053 COMPREHEN METABOLIC PANEL: CPT | Performed by: INTERNAL MEDICINE

## 2023-06-02 NOTE — TELEPHONE ENCOUNTER
Received vm: Hi, this message is for Monalisa   This is Lucien Chew calling  I know we spoke about Merrick Middleton paperwork  He just received a message from [facility] that his FMLA goes through That paperwork was not received and I know you had said that Doctor Darrel Mott, [address] would be working on it  So I just wanted to check in just because he has a deadline to have this paperwork submitted and I just wouldn't want to miss that deadline  If you could please give me a call back at [phone] at your earliest convenience  Thank you so much  I appreciate your time  Bye  Returned call to [place]  Reviewed that Dr Cynthia Andrade is out of office, and returning to hospital rounds starting June 10th  Will have FMLA paper work signed when Dr Cynthia Andrade returns, with goal of June 12th  Will return call to [place] when forms completed  Reviewed upcoming blood work orders  Patient to have labs drawn today, in preparation for upcoming PET scan on 6/7  No additional questions at this time

## 2023-06-07 ENCOUNTER — TELEPHONE (OUTPATIENT)
Dept: HEMATOLOGY ONCOLOGY | Facility: CLINIC | Age: 53
End: 2023-06-07

## 2023-06-07 ENCOUNTER — PATIENT OUTREACH (OUTPATIENT)
Dept: HEMATOLOGY ONCOLOGY | Facility: CLINIC | Age: 53
End: 2023-06-07

## 2023-06-07 ENCOUNTER — HOSPITAL ENCOUNTER (OUTPATIENT)
Dept: NUCLEAR MEDICINE | Facility: HOSPITAL | Age: 53
Discharge: HOME | End: 2023-06-07
Attending: INTERNAL MEDICINE
Payer: COMMERCIAL

## 2023-06-07 DIAGNOSIS — C62.90 SEMINOMA (HCC): ICD-10-CM

## 2023-06-07 LAB — GLUCOSE SERPL-MCNC: 90 MG/DL (ref 65–140)

## 2023-06-07 PROCEDURE — A9552 F18 FDG: HCPCS

## 2023-06-07 PROCEDURE — 78815 PET IMAGE W/CT SKULL-THIGH: CPT

## 2023-06-07 PROCEDURE — 82948 REAGENT STRIP/BLOOD GLUCOSE: CPT

## 2023-06-07 PROCEDURE — G1004 CDSM NDSC: HCPCS

## 2023-06-07 NOTE — TELEPHONE ENCOUNTER
Received VM: Hi, I'm Smita Hamilton  This is Marek Mitchell calling on Cheryl Ratliff's wife  Just wanted to let you know that Cheryl Ríos did have his scan today and we are anxiously awaiting the results  Doctor Rachel Sainz did say that he would give us a call today with those results  So just wanted to relay that the scan was done and we're anxiously awaiting to a call from him  Thank you so much  My number is [phone]  Have a good day  Returned call to [place]  Aware Dr Shawna Early is on vacation and will return call when available

## 2023-06-07 NOTE — PROGRESS NOTES
Received voicemail from pt's wife:  Bakari Lester, this is Rosa Batters calling on behalf of Tiffany Perez  Just wanted to let you know Tiffany Perez had his PET scan today and we're supposed to receive a call from Doctor Milly Armando today  Even though he's out of office, we're anxiously awaiting those results  So I just wanted to touch base with you  I know I've called the his office line and I believe Alexsander Jones is out of the office as well  So I just wanted to touch base to see just to make sure that happens, we're able to speak to him and arrange justly awaiting the results  So anything you could do to help, I'd appreciate  My number is [phone]  Thank you  Bye  Returned call and left voicemail stating I would send reminder message requesting call from Dr Lo Poon to review PET scan results

## 2023-06-08 ENCOUNTER — TELEPHONE (OUTPATIENT)
Dept: HEMATOLOGY ONCOLOGY | Facility: CLINIC | Age: 53
End: 2023-06-08

## 2023-06-08 NOTE — TELEPHONE ENCOUNTER
Received vm; Hi Don, this is [employer] calling  I just have a question  I have to submit some an extension of my intermittent FMLA time along with J[employer] [place] to be completed and signed  And I was just wondering if I could have a direct fax number so that it could go directly you with [employer] documentation so we could have that all together  My number is [phone]  Thank you so much and have a great day  Returned call  Provided hopeline fax number    No additional questions

## 2023-06-08 NOTE — TELEPHONE ENCOUNTER
Appointment Confirmation   Who are you speaking with? Spouse   If it is not the patient, are they listed on an active communication consent form? Yes   Which provider is the appointment scheduled with? Dr Elizabeth Ronquillo   When is the appointment scheduled? Please list date and time 7/5/23 1020   At which location is the appointment scheduled to take place? [place]   Did caller verbalize understanding of appointment details?  Yes

## 2023-06-12 ENCOUNTER — TELEPHONE (OUTPATIENT)
Dept: UROLOGY | Facility: CLINIC | Age: 53
End: 2023-06-12

## 2023-06-12 NOTE — TELEPHONE ENCOUNTER
Pt's wife called and stated she will drop off FMLA paper  So that it can be filled out  She will drop it off tomorrow to be faxed to [facility]

## 2023-06-13 RX ORDER — ACETAMINOPHEN 325 MG/1
650 TABLET ORAL EVERY 6 HOURS PRN
COMMUNITY

## 2023-06-13 NOTE — PRE-PROCEDURE INSTRUCTIONS
Pre-Surgery Instructions:   Medication Instructions   • acetaminophen (TYLENOL) 325 mg tablet Uses PRN- OK to take day of surgery   • Cholecalciferol (Vitamin D3) 1 25 MG (96089 UT) CAPS Hold day of surgery  • loratadine (CLARITIN) 10 mg tablet Take day of surgery  • Magnesium Gluconate 250 MG TABS Hold day of surgery  • ondansetron (ZOFRAN-ODT) 8 mg disintegrating tablet Uses PRN- OK to take day of surgery    Medication instructions for day surgery reviewed  Please use only a sip of water to take your instructed medications  Avoid all over the counter vitamins, supplements and NSAIDS for one week prior to surgery per anesthesia guidelines  Tylenol is ok to take as needed  You will receive a call one business day prior to surgery with an arrival time and hospital directions  If your surgery is scheduled on a Monday, the hospital will be calling you on the Friday prior to your surgery  If you have not heard from anyone by 8pm, please call the hospital supervisor through the hospital  at [phone]  Aquilino Ngo [phone])  Do not eat or drink anything after midnight the night before your surgery, including candy, mints, lifesavers, or chewing gum  Do not drink alcohol 24hrs before your surgery  Try not to smoke at least 24hrs before your surgery  Follow the pre surgery showering instructions as listed in the [facility] Surgical Experience Booklet” or otherwise provided by your surgeon's office  Do not shave the surgical area 24 hours before surgery  Do not apply any lotions, creams, including makeup, cologne, deodorant, or perfumes after showering on the day of your surgery  No contact lenses, eye make-up, or artificial eyelashes  Remove nail polish, including gel polish, and any artificial, gel, or acrylic nails if possible  Remove all jewelry including rings and body piercing jewelry  Wear causal clothing that is easy to take on and off  Consider your type of surgery      Keep any valuables, jewelry, piercings at home  Please bring any specially ordered equipment (sling, braces) if indicated  Arrange for a responsible person to drive you to and from the hospital on the day of your surgery  Visitor Guidelines discussed  Call the surgeon's office with any new illnesses, exposures, or additional questions prior to surgery  Please reference your [facility] Surgical Experience Booklet” for additional information to prepare for your upcoming surgery

## 2023-06-16 ENCOUNTER — HOSPITAL ENCOUNTER (OUTPATIENT)
Facility: AMBULARY SURGERY CENTER | Age: 53
Setting detail: OUTPATIENT SURGERY
Discharge: HOME | End: 2023-06-16
Attending: UROLOGY | Admitting: UROLOGY
Payer: COMMERCIAL

## 2023-06-16 ENCOUNTER — ANESTHESIA (OUTPATIENT)
Dept: PERIOP | Facility: AMBULARY SURGERY CENTER | Age: 53
End: 2023-06-16
Payer: COMMERCIAL

## 2023-06-16 ENCOUNTER — ANESTHESIA EVENT (OUTPATIENT)
Dept: PERIOP | Facility: AMBULARY SURGERY CENTER | Age: 53
End: 2023-06-16
Payer: COMMERCIAL

## 2023-06-16 VITALS
HEART RATE: 82 BPM | DIASTOLIC BLOOD PRESSURE: 72 MMHG | RESPIRATION RATE: 18 BRPM | BODY MASS INDEX: 35.75 KG/M2 | TEMPERATURE: 97.2 F | SYSTOLIC BLOOD PRESSURE: 116 MMHG | OXYGEN SATURATION: 94 % | WEIGHT: 286 LBS

## 2023-06-16 DIAGNOSIS — C62.90 SEMINOMA (HCC): ICD-10-CM

## 2023-06-16 PROCEDURE — 88342 IMHCHEM/IMCYTCHM 1ST ANTB: CPT | Performed by: PATHOLOGY

## 2023-06-16 PROCEDURE — 88309 TISSUE EXAM BY PATHOLOGIST: CPT | Performed by: PATHOLOGY

## 2023-06-16 PROCEDURE — 88302 TISSUE EXAM BY PATHOLOGIST: CPT | Performed by: PATHOLOGY

## 2023-06-16 PROCEDURE — 88305 TISSUE EXAM BY PATHOLOGIST: CPT | Performed by: PATHOLOGY

## 2023-06-16 PROCEDURE — 54530 REMOVAL OF TESTIS: CPT | Performed by: UROLOGY

## 2023-06-16 RX ORDER — PROPOFOL 10 MG/ML
INJECTION, EMULSION INTRAVENOUS AS NEEDED
Status: DISCONTINUED | OUTPATIENT
Start: 2023-06-16 | End: 2023-06-16

## 2023-06-16 RX ORDER — HYDROMORPHONE HCL/PF 1 MG/ML
0.5 SYRINGE (ML) INJECTION
Status: DISCONTINUED | OUTPATIENT
Start: 2023-06-16 | End: 2023-06-16 | Stop reason: HOSPADM

## 2023-06-16 RX ORDER — FENTANYL CITRATE 50 UG/ML
INJECTION, SOLUTION INTRAMUSCULAR; INTRAVENOUS AS NEEDED
Status: DISCONTINUED | OUTPATIENT
Start: 2023-06-16 | End: 2023-06-16

## 2023-06-16 RX ORDER — PROMETHAZINE HYDROCHLORIDE 25 MG/ML
12.5 INJECTION, SOLUTION INTRAMUSCULAR; INTRAVENOUS ONCE AS NEEDED
Status: DISCONTINUED | OUTPATIENT
Start: 2023-06-16 | End: 2023-06-16 | Stop reason: HOSPADM

## 2023-06-16 RX ORDER — CEFAZOLIN SODIUM 1 G/50ML
1000 SOLUTION INTRAVENOUS ONCE
Status: DISCONTINUED | OUTPATIENT
Start: 2023-06-16 | End: 2023-06-16 | Stop reason: HOSPADM

## 2023-06-16 RX ORDER — HYDROCODONE BITARTRATE AND ACETAMINOPHEN 5; 325 MG/1; MG/1
2 TABLET ORAL EVERY 6 HOURS PRN
Qty: 8 TABLET | Refills: 0 | Status: SHIPPED | OUTPATIENT
Start: 2023-06-16 | End: 2023-06-26

## 2023-06-16 RX ORDER — MAGNESIUM HYDROXIDE 1200 MG/15ML
LIQUID ORAL AS NEEDED
Status: DISCONTINUED | OUTPATIENT
Start: 2023-06-16 | End: 2023-06-16 | Stop reason: HOSPADM

## 2023-06-16 RX ORDER — ROCURONIUM BROMIDE 10 MG/ML
INJECTION, SOLUTION INTRAVENOUS AS NEEDED
Status: DISCONTINUED | OUTPATIENT
Start: 2023-06-16 | End: 2023-06-16

## 2023-06-16 RX ORDER — SUCCINYLCHOLINE/SOD CL,ISO/PF 100 MG/5ML
SYRINGE (ML) INTRAVENOUS AS NEEDED
Status: DISCONTINUED | OUTPATIENT
Start: 2023-06-16 | End: 2023-06-16

## 2023-06-16 RX ORDER — FENTANYL CITRATE/PF 50 MCG/ML
25 SYRINGE (ML) INJECTION
Status: DISCONTINUED | OUTPATIENT
Start: 2023-06-16 | End: 2023-06-16 | Stop reason: HOSPADM

## 2023-06-16 RX ORDER — SODIUM CHLORIDE 9 MG/ML
125 INJECTION, SOLUTION INTRAVENOUS CONTINUOUS
Status: DISCONTINUED | OUTPATIENT
Start: 2023-06-16 | End: 2023-06-16 | Stop reason: HOSPADM

## 2023-06-16 RX ORDER — HYDROCODONE BITARTRATE AND ACETAMINOPHEN 5; 325 MG/1; MG/1
2 TABLET ORAL EVERY 4 HOURS PRN
Status: DISCONTINUED | OUTPATIENT
Start: 2023-06-16 | End: 2023-06-16 | Stop reason: HOSPADM

## 2023-06-16 RX ORDER — LIDOCAINE HYDROCHLORIDE 10 MG/ML
INJECTION, SOLUTION EPIDURAL; INFILTRATION; INTRACAUDAL; PERINEURAL AS NEEDED
Status: DISCONTINUED | OUTPATIENT
Start: 2023-06-16 | End: 2023-06-16

## 2023-06-16 RX ORDER — KETOROLAC TROMETHAMINE 30 MG/ML
INJECTION, SOLUTION INTRAMUSCULAR; INTRAVENOUS AS NEEDED
Status: DISCONTINUED | OUTPATIENT
Start: 2023-06-16 | End: 2023-06-16

## 2023-06-16 RX ORDER — ONDANSETRON 2 MG/ML
4 INJECTION INTRAMUSCULAR; INTRAVENOUS ONCE AS NEEDED
Status: DISCONTINUED | OUTPATIENT
Start: 2023-06-16 | End: 2023-06-16 | Stop reason: HOSPADM

## 2023-06-16 RX ORDER — BUPIVACAINE HYDROCHLORIDE 5 MG/ML
INJECTION, SOLUTION PERINEURAL AS NEEDED
Status: DISCONTINUED | OUTPATIENT
Start: 2023-06-16 | End: 2023-06-16 | Stop reason: HOSPADM

## 2023-06-16 RX ORDER — CEFAZOLIN SODIUM 2 G/50ML
2000 SOLUTION INTRAVENOUS ONCE
Status: COMPLETED | OUTPATIENT
Start: 2023-06-16 | End: 2023-06-16

## 2023-06-16 RX ORDER — HYDROMORPHONE HCL/PF 1 MG/ML
SYRINGE (ML) INJECTION AS NEEDED
Status: DISCONTINUED | OUTPATIENT
Start: 2023-06-16 | End: 2023-06-16

## 2023-06-16 RX ORDER — ALBUTEROL SULFATE 2.5 MG/3ML
2.5 SOLUTION RESPIRATORY (INHALATION) ONCE AS NEEDED
Status: DISCONTINUED | OUTPATIENT
Start: 2023-06-16 | End: 2023-06-16 | Stop reason: HOSPADM

## 2023-06-16 RX ORDER — MEPERIDINE HYDROCHLORIDE 25 MG/ML
12.5 INJECTION INTRAMUSCULAR; INTRAVENOUS; SUBCUTANEOUS
Status: DISCONTINUED | OUTPATIENT
Start: 2023-06-16 | End: 2023-06-16 | Stop reason: HOSPADM

## 2023-06-16 RX ORDER — ONDANSETRON 2 MG/ML
INJECTION INTRAMUSCULAR; INTRAVENOUS AS NEEDED
Status: DISCONTINUED | OUTPATIENT
Start: 2023-06-16 | End: 2023-06-16

## 2023-06-16 RX ORDER — MIDAZOLAM HYDROCHLORIDE 2 MG/2ML
INJECTION, SOLUTION INTRAMUSCULAR; INTRAVENOUS AS NEEDED
Status: DISCONTINUED | OUTPATIENT
Start: 2023-06-16 | End: 2023-06-16

## 2023-06-16 RX ORDER — SODIUM CHLORIDE, SODIUM LACTATE, POTASSIUM CHLORIDE, CALCIUM CHLORIDE 600; 310; 30; 20 MG/100ML; MG/100ML; MG/100ML; MG/100ML
INJECTION, SOLUTION INTRAVENOUS CONTINUOUS PRN
Status: DISCONTINUED | OUTPATIENT
Start: 2023-06-16 | End: 2023-06-16

## 2023-06-16 RX ADMIN — SODIUM CHLORIDE, SODIUM LACTATE, POTASSIUM CHLORIDE, AND CALCIUM CHLORIDE: .6; .31; .03; .02 INJECTION, SOLUTION INTRAVENOUS at 13:11

## 2023-06-16 RX ADMIN — FENTANYL CITRATE 50 MCG: 50 INJECTION INTRAMUSCULAR; INTRAVENOUS at 13:14

## 2023-06-16 RX ADMIN — CEFAZOLIN SODIUM 3000 MG: 2 SOLUTION INTRAVENOUS at 13:12

## 2023-06-16 RX ADMIN — Medication 100 MG: at 14:33

## 2023-06-16 RX ADMIN — KETOROLAC TROMETHAMINE 15 MG: 30 INJECTION, SOLUTION INTRAMUSCULAR at 15:07

## 2023-06-16 RX ADMIN — MIDAZOLAM 2 MG: 1 INJECTION INTRAMUSCULAR; INTRAVENOUS at 13:07

## 2023-06-16 RX ADMIN — FENTANYL CITRATE 25 MCG: 50 INJECTION INTRAMUSCULAR; INTRAVENOUS at 13:42

## 2023-06-16 RX ADMIN — ONDANSETRON 4 MG: 2 INJECTION INTRAMUSCULAR; INTRAVENOUS at 15:06

## 2023-06-16 RX ADMIN — HYDROMORPHONE HYDROCHLORIDE 0.5 MG: 1 INJECTION, SOLUTION INTRAMUSCULAR; INTRAVENOUS; SUBCUTANEOUS at 13:35

## 2023-06-16 RX ADMIN — FENTANYL CITRATE 50 MCG: 50 INJECTION INTRAMUSCULAR; INTRAVENOUS at 13:20

## 2023-06-16 RX ADMIN — FENTANYL CITRATE 50 MCG: 50 INJECTION INTRAMUSCULAR; INTRAVENOUS at 14:33

## 2023-06-16 RX ADMIN — FENTANYL CITRATE 25 MCG: 50 INJECTION INTRAMUSCULAR; INTRAVENOUS at 14:18

## 2023-06-16 RX ADMIN — LIDOCAINE HYDROCHLORIDE 50 MG: 10 INJECTION, SOLUTION EPIDURAL; INFILTRATION; INTRACAUDAL; PERINEURAL at 13:11

## 2023-06-16 RX ADMIN — ROCURONIUM BROMIDE 50 MG: 10 INJECTION, SOLUTION INTRAVENOUS at 14:33

## 2023-06-16 RX ADMIN — SUGAMMADEX 200 MG: 100 INJECTION, SOLUTION INTRAVENOUS at 15:07

## 2023-06-16 RX ADMIN — PROPOFOL 200 MG: 10 INJECTION, EMULSION INTRAVENOUS at 13:11

## 2023-06-16 RX ADMIN — HYDROMORPHONE HYDROCHLORIDE 0.5 MG: 1 INJECTION, SOLUTION INTRAMUSCULAR; INTRAVENOUS; SUBCUTANEOUS at 13:28

## 2023-06-16 NOTE — ANESTHESIA PREPROCEDURE EVALUATION
Procedure:  ORCHIECTOMY INGUINAL (Left: Groin)    Relevant Problems   HEMATOLOGY   (+) Normocytic anemia      NEURO/PSYCH   (+) Headache        Physical Exam    Airway    Mallampati score: I  TM Distance: >3 FB  Neck ROM: full     Dental       Cardiovascular  Cardiovascular exam normal    Pulmonary  Pulmonary exam normal     Other Findings        Anesthesia Plan  ASA Score- 2     Anesthesia Type- general with ASA Monitors  Additional Monitors:   Airway Plan: LMA  Plan Factors-Exercise tolerance (METS): >4 METS  Chart reviewed  EKG reviewed  Imaging results reviewed  Existing labs reviewed  Patient summary reviewed  Patient is not a current smoker  Patient did not smoke on day of surgery  Obstructive sleep apnea risk education given perioperatively  Induction- intravenous  Postoperative Plan- Plan for postoperative opioid use  Planned trial extubation    Informed Consent- Anesthetic plan and risks discussed with patient  I personally reviewed this patient with the CRNA  Discussed and agreed on the Anesthesia Plan with the CRNA  Linda Gutierrez

## 2023-06-16 NOTE — OP NOTE
OPERATIVE REPORT  PATIENT NAME: Melissa Tiwari    :  1970  MRN: [id number]  Pt Location: [facility] OR [facility] 05    SURGERY DATE: 2023    Surgeon(s) and Role:     * Cullen Hinojosa MD - Primary     * T Irene Whitman MD - Co-surgeon    Preop Diagnosis:  Seminoma ([address] ) [C62 90]    Post-Op Diagnosis Codes:     * Seminoma ([address] ) [C62 90]    Procedure(s):  Left - RADICAL ORCHIECTOMY INGUINAL; EXCISION CORD LIPOMA    Specimen(s):  ID Type Source Tests Collected by Time Destination   1 : left testicle Tissue Testis, Left TISSUE EXAM Cullen Hinojosa MD 2023 1416    2 : cord lipoma Tissue Soft Tissue, Other TISSUE EXAM Cullen Hinojosa MD 2023 1442    3 : spermatic cord long marks proximal Tissue Soft Tissue, Other TISSUE EXAM Cullen Hinojosa MD 2023 1447        Estimated Blood Loss:   Minimal    Drains:  None    Anesthesia Type:   General    Operative Indications:  Seminoma ([address] ) [C62 90]      Operative Findings:  Left radical inguinal orchiectomy  Large cord lipoma identified and resected with the assistance of Dr Andreea Bui    Complications:   None    Procedure and Technique:  Gabrielle Adamson is a 77-year-old male diagnosed with seminoma after he had a biopsy of a left neck mass performed  Staging revealed significant retroperitoneal adenopathy  He received 4 cycles of chemotherapy  He has significantly reduced retroperitoneal disease from 7 cm down to 2 cm on PET scan  He never had a palpable mass in his left testicle  Ultrasound showed a hypoechoic lesion within the left testicle suspicious for burned-out neoplasm  In addition his retroperitoneal adenopathy was most consistent with left-sided testicular disease  Risk and benefits of left radical inguinal orchiectomy were discussed and reviewed informed consent obtained  The patient was brought to the operating room on 2023    After the smooth induction of general LMA converted to general endotracheal anesthesia, the patient was placed supine  His genitalia prepped and draped in sterile fashion  Intravenous antibiotics were administered  A timeout was performed with all members the operative team confirming the patient's identity, procedure to be performed, laterality of the case  Half percent plain Marcaine was utilized to anesthetize the left inguinal region over the area of the external inguinal ring  #15 blade was used for the skin incision  Electrocautery was utilized to dissect through the subcutaneous tissue and fascia until the fibers of the external oblique musculature were identified  This was quite deep secondary to the patient's body habitus  Large Arriaga retractors were utilized for exposure  The cord was difficult to identify secondary to the fatty tissue so I simply everted the testicle and brought this into the surgical field  I worked distally from the attachments of the testicle to the dermis until the testicle itself was free and then I mobilized the spermatic cord  As I was doing this I felt that there was a large inguinal hernia appreciated within the cord which could be reduced but then came back with ventilation  I first took the cord just distal to where I thought there was a hernia sac  The cord was split into 2 packets and taken with Winnie clamps, 2-0 silk suture ligatures, and 0 silk ties  At this point Dr Orta, general surgery, was readily available in the adjacent room completing a colonoscopy and ask for his assistance in the operating room  Upon arrival to the operating room the cord was exposed  The left testicle had already been passed off the field as specimen  The large hernia sac versus lipoma was identified  With his assistance we were able to separate the 2 and in fact this was a large cord lipoma  A clamp was placed proximally on the lipoma and it was excised and oversewn with a Vicryl stitch    This now presented the cord structures which were dissected fully to the level of the internal inguinal ring after fibers of the external oblique fascia were opened with electrocautery  At this point the cord was then taken as proximal as possible at the level of the internal ring  The cord was taken with a Winnie clamp, 2-0 silk suture ligatures and a 0 silk tie  The sutures were left long for identification in case RPL ND is required  An additional portion of the pampiniform plexus was removed as well adjacent to the cord  At this point hemostasis was excellent  The deep inguinal ring was closed with a figure-of-eight suture and the fascia the external oblique was reapproximated with a running 2-0 Vicryl suture  All layers were irrigated  Hemostasis was excellent  The subcutaneous tissue was closed with a running 3-0 Vicryl and the skin was closed with 4-0 Monocryl and skin glue  Overall the patient tolerated the procedure well and there were no complications  Patient was extubated in the operating room and transferred the PACU in stable condition at the conclusion of the case      Patient Disposition:  PACU stable and extubated        SIGNATURE: Trevon Mason MD  DATE: June 16, 2023  TIME: 3:24 PM

## 2023-06-16 NOTE — CONSULTS
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  He underwent chemotherapy with good response  He is being brought to the operating room by Dr Rhona Quintero for orchiectomy of the left seminoma  During the process he was noted to have a card that is very thick suspected to have a hernia  An intraoperative consult was requested by Dr Rhona Quintero  I have assisted in exploration of this area by opening the inguinal canal and  spermatic cord contents  There is a large lipoma of the cord was found  This was transfixion ligated at the deep inguinal ring and transected  Additional cord was high ligated by Dr Rhona Quintero at the deep inguinal ring  The deep inguinal ring was closed using multiple figure-of-eight Vicryl sutures approximating the external oblique and transversus abdominis fibers to the incurred portion of the inguinal ligament  At the completion of the procedure deep inguinal ring was completely closed  No complications noted  Blood loss was minimal   Case was continued on by Dr Rhona Quintero for the closure of the wounds

## 2023-06-16 NOTE — INTERVAL H&P NOTE
H&P reviewed  After examining the patient I find no changes in the patients condition since the H&P had been written  Vitals:    06/16/23 1143   BP: 130/79   Pulse: 66   Resp: 18   Temp: 97 9 °F (36 6 °C)   SpO2: 96%     Cheryl Ríos is a 53-year-old male who presented with a left neck lymph node  This was removed with an excisional biopsy and pathology was consistent with seminoma  He then had a CT scan which showed retroperitoneal adenopathy measuring up to 7 cm  He received 4 rounds of chemotherapy and follow-up imaging shows drastic reduction in the size and volume of the adenopathy  He still has a retroperitoneal node that remains to be about 2 cm but with minimal metabolic activity  His case was reviewed at urology tumor board as he received chemotherapy immediately once his diagnosis was confirmed prior to ever having an orchiectomy  He never felt a palpable mass  When I examined him in the office I never felt a palpable mass, however an ultrasound showed hypoechoic lesions within the left testicle that were most concerning for a possible burned-out testicular tumor  In addition the location of his retroperitoneal adenopathy was most consistent with a left-sided testicular lesion  He presents today to undergo a radical left inguinal orchiectomy  He completed chemotherapy approximately 6 weeks ago  Risk of the procedure including, but not limited to bleeding, infection, hematoma, disease recurrence, need for additional treatment, surgery, or chemotherapy were discussed and reviewed  Informed consent obtained

## 2023-06-19 ENCOUNTER — DOCUMENTATION (OUTPATIENT)
Dept: HEMATOLOGY ONCOLOGY | Facility: CLINIC | Age: 53
End: 2023-06-19

## 2023-06-19 ENCOUNTER — TELEPHONE (OUTPATIENT)
Dept: UROLOGY | Facility: AMBULATORY SURGERY CENTER | Age: 53
End: 2023-06-19

## 2023-06-19 NOTE — TELEPHONE ENCOUNTER
----- Message from Paulina Platt MD sent at 6/16/2023  3:36 PM EDT -----  Royl Rosalee needs follow-up with me in approximately 3 to 4 weeks to review pathology  Thank you    FT

## 2023-06-19 NOTE — PROGRESS NOTES
In-basket message received from Dr Michael Fox to add patient to urology [facility] on 6/20/2023  Chart reviewed and prep completed

## 2023-06-19 NOTE — TELEPHONE ENCOUNTER
Post Op Note    Marty Espinosa is a 45 y o  male s/p Left - RADICAL ORCHIECTOMY INGUINAL; EXCISION CORD LIPOMA performed 6/16/2023  Marty Espinosa is a patient of Dr Valerie Motley and is seen at the [place] office  LM for patient seeing how he is doing after his procedure  Advised to call back with any questions or concerns

## 2023-06-20 ENCOUNTER — DOCUMENTATION (OUTPATIENT)
Dept: HEMATOLOGY ONCOLOGY | Facility: CLINIC | Age: 53
End: 2023-06-20

## 2023-06-20 ENCOUNTER — PATIENT OUTREACH (OUTPATIENT)
Dept: HEMATOLOGY ONCOLOGY | Facility: CLINIC | Age: 53
End: 2023-06-20

## 2023-06-20 NOTE — PROGRESS NOTES
Outreach to pt's wife to discuss recommendations after Urology Case Review  Advised that PET scan was reviewed and the consensus was continued observation per NCCN guidelines  She was very appreciative of update and will review with pt  She states pt is doing really well    Encouraged call back with any questions/concerns

## 2023-06-20 NOTE — PROGRESS NOTES
Oncology Navigator Note: Multidisciplinary Urology Case Review 6/20/2023        Diagnosis: Radha Harrell is a 45 y o  male who was presented at the Urology Oncology Multidisciplinary Tumor Conference today  Edin King presented in 12/2022 with enlarging left supraclavicular and neck adenopathy  CT scan showed enlarged retroperitoneal adenopathy  Left LN biopsy with Dr Stacey Arizmendi and pathology showed seminoma  1/18/2023   HCG 13    AFP 5 2     He completed 4 cycles of BEP on 4/17/23  Post treatment CT scan showed improvement in retroperitoneal adenopathy, retrocaval LN 9mm-previously 3cm and left para aortic LN 2cm previously 7 5cm  Pt presented on 5/2/23    5/3/2023 Tumor Markers  HCG <1  AFP 8 1      Recommendations from previous Tumor Board on 5/2/2023: PET scan 3-4 weeks after completion of chemo  If positive, proceed with resection      Pet scan completed and reviewed today  Enlarged Retroperitoneal LN still present but with progressive decrease in size and minimal uptake per Radiology Review        Recommendations of Group:  Surveillance per NCCN guidelines      Upcoming Appointments:    Future Appointments   Date Time Provider Mario Townsend   6/27/2023  8:00 AM Tyler العراقي MD SURG ONC [facility]   6/27/2023  8:00 AM SURGICAL ONCOLOGY PROCEDURE ROOM BE SURG ONC [facility]   6/30/2023  8:30 AM Radha Lou MD CTR UR [facility]   6/30/2023  2:00 PM AL INF INJ CHAIR 15 AL Infusion AL [facility]   7/5/2023 10:20 AM Malcolm Tobar MD HEM ONC [facility]            Patient was discussed at the Multidisciplinary Urology Case review on 6/20/2023      NCCN guidelines were available for review  The final treatment plan will be left to the discretion of the patient and the treating physician       DISCLAIMERS:  TO THE TREATING PHYSICIAN:  This conference is a meeting of clinicians from various specialty areas who evaluate and discuss patients for whom a multidisciplinary treatment approach is being considered  Please note that the above opinion was a consensus of the conference attendees and is intended only to assist in quality care of the discussed patient  The responsibility for follow up on the input given during the conference, along with any final decisions regarding plan of care, is that of the patient and the patient's provider  Accordingly, appointments have only been recommended based on this information and have NOT been scheduled unless otherwise noted  TO THE PATIENT:  This summary is a brief record of major aspects of your cancer treatment  You may choose to share a copy with any of your doctors or nurses  However, this is not a detailed or comprehensive record of your care

## 2023-06-21 PROCEDURE — 88302 TISSUE EXAM BY PATHOLOGIST: CPT | Performed by: PATHOLOGY

## 2023-06-21 PROCEDURE — 88342 IMHCHEM/IMCYTCHM 1ST ANTB: CPT | Performed by: PATHOLOGY

## 2023-06-21 PROCEDURE — 88309 TISSUE EXAM BY PATHOLOGIST: CPT | Performed by: PATHOLOGY

## 2023-06-21 PROCEDURE — 88305 TISSUE EXAM BY PATHOLOGIST: CPT | Performed by: PATHOLOGY

## 2023-06-27 ENCOUNTER — PROCEDURE VISIT (OUTPATIENT)
Dept: SURGICAL ONCOLOGY | Facility: CLINIC | Age: 53
End: 2023-06-27
Payer: COMMERCIAL

## 2023-06-27 VITALS
SYSTOLIC BLOOD PRESSURE: 138 MMHG | HEART RATE: 67 BPM | WEIGHT: 290 LBS | HEIGHT: 75 IN | DIASTOLIC BLOOD PRESSURE: 82 MMHG | BODY MASS INDEX: 36.06 KG/M2 | OXYGEN SATURATION: 98 % | TEMPERATURE: 97.8 F

## 2023-06-27 DIAGNOSIS — C62.90 SEMINOMA (HCC): ICD-10-CM

## 2023-06-27 DIAGNOSIS — Z45.2 ENCOUNTER FOR CARE RELATED TO PORT-A-CATH: Primary | ICD-10-CM

## 2023-06-27 PROCEDURE — 36590 REMOVAL TUNNELED CV CATH: CPT | Performed by: SURGERY

## 2023-06-27 NOTE — LETTER
June 27, 2023     [facility], [address] [facility]  [address] [place] [address]    Patient: Radha Harrell   YOB: 1970   Date of Visit: 6/27/2023       Dear Dr Sydney Agosto: Thank you for referring Radha Harrell to me for evaluation  Below are my notes for this consultation  If you have questions, please do not hesitate to call me  I look forward to following your patient along with you  Sincerely,        Tyler العراقي MD        CC: MD Radha Sierra MD Ledora Daubs, MD  6/27/2023  8:39 AM  Sign when Signing Visit               Surgical Oncology Follow Up       [address] [facility] [id number] [phone]    Radha Harrell  1970  [id number]  [address] [facility] [address] [place] [address]  [phone]    Diagnoses and all orders for this visit:    Encounter for care related to Port-a-Cath    Seminoma [facility])        Chief Complaint   Patient presents with   • Procedure       No follow-ups on file        Oncology History   Seminoma ([facility] [address] )   1/11/2023 -  Cancer Staged    Staging form: Testis, AJCC 8th Edition  - Clinical stage from 1/11/2023: cT4, cN3, pM1a, S2 - Signed by Malcolm Tobar MD on 1/20/2023  Stage prefix: Initial diagnosis  Lactate dehydrogenase (LDH) (U/L): 699  Human chorionic gonadotropin (hCG) (mIU/mL): 13  Alpha fetoprotein (AFP) (ng/mL): 5 2  Laterality: Left  Sites of metastasis: Distant lymph nodes, NOS, Retroperitoneal lymph node, NOS  Source of metastatic specimen: Supraclavicular Lymph Nodes  Staged by: Managing physician       1/19/2023 Initial Diagnosis    Seminoma ([facility] [address] )     1/30/2023 - 4/17/2023 Chemotherapy    pegfilgrastim (Daphine Cleveland), 6 mg, Subcutaneous, Once, 4 of 4 cycles  Administration: 6 mg (2/6/2023), 6 mg (2/27/2023), 6 mg (3/20/2023), 6 mg (3/6/2023), 6 mg (4/17/2023), 6 mg (4/10/2023)  bleomycin (BLENOXANE) IVPB, 30 Units, Intravenous, Once, 4 of 4 cycles  Administration: 30 Units (1/30/2023), 30 Units (2/6/2023), 30 Units (2/13/2023), 30 Units (2/20/2023), 30 Units (2/27/2023), 30 Units (3/6/2023), 30 Units (3/13/2023), 30 Units (3/20/2023), 30 Units (4/3/2023), 30 Units (4/10/2023), 30 Units (4/17/2023)  CISplatin (PLATINOL) infusion, 20 mg/m2 = 52 2 mg, Intravenous, Once, 4 of 4 cycles  Administration: 52 2 mg (1/30/2023), 52 2 mg (1/31/2023), 52 2 mg (2/1/2023), 52 2 mg (2/2/2023), 52 2 mg (2/3/2023), 52 2 mg (2/20/2023), 52 2 mg (2/21/2023), 52 2 mg (2/22/2023), 52 2 mg (2/23/2023), 52 2 mg (2/24/2023), 52 2 mg (3/13/2023), 52 2 mg (3/14/2023), 52 2 mg (3/15/2023), 52 2 mg (3/16/2023), 52 2 mg (3/17/2023), 52 2 mg (4/3/2023), 52 2 mg (4/4/2023), 52 2 mg (4/5/2023), 52 2 mg (4/6/2023), 52 2 mg (4/7/2023)  fosaprepitant (EMEND) IVPB, 150 mg, Intravenous, Once, 4 of 4 cycles  Administration: 150 mg (1/30/2023), 150 mg (2/20/2023), 150 mg (3/13/2023), 150 mg (4/3/2023), 150 mg (2/24/2023), 150 mg (4/10/2023)             History of Present Illness: Patient returns for port removal     Review of Systems  Complete ROS Surg Onc:   Complete ROS Surg Onc:   Constitutional: The patient denies new or recent history of general fatigue, no recent weight loss, no change in appetite  Eyes: No complaints of visual problems, no scleral icterus  ENT: no complaints of ear pain, no hoarseness, no difficulty swallowing,  no tinnitus and no new masses in head, oral cavity, or neck  Cardiovascular: No complaints of chest pain, no palpitations, no ankle edema  Respiratory: No complaints of shortness of breath, no cough  Gastrointestinal: No complaints of jaundice, no bloody stools, no pale stools  Genitourinary: No complaints of dysuria, no hematuria, no nocturia, no frequent urination, no urethral discharge     Musculoskeletal: No complaints of weakness, paralysis, joint stiffness or arthralgias  Integumentary: No complaints of rash, no new lesions  Neurological: No complaints of convulsions, no seizures, no dizziness  Hematologic/Lymphatic: No complaints of easy bruising  Endocrine:  No hot or cold intolerance  No polydipsia, polyphagia, or polyuria  Allergy/immunology:  No environmental allergies  No food allergies  Not immunocompromised  Skin:  No pallor or rash  No wound          Patient Active Problem List   Diagnosis   • Impacted cerumen   • Lymphadenopathy   • Obesity, Class II, BMI 35-39 9   • Seminoma (HCC)   • Tachycardia   • Hypokalemia   • Headache   • Chemotherapy-induced neutropenia (HCC)   • Hypomagnesemia   • Hypervolemia   • Normocytic anemia   • Nausea   • Intractable nausea and vomiting   • Encounter for care related to Port-a-Cath     Past Medical History:   Diagnosis Date   • Allergic 1/1/2000    seasonal allergies   • History of transfusion    • Impacted cerumen    • Lymphadenopathy    • Obesity, unspecified    • Seasonal allergies    • Seminoma of testis ([facility] [address] )    • Testicular cancer ([facility] [address] ) 01/17/2023   • Torn meniscus     right knee- surgical repair today 7/19/2021   • Wears glasses      Past Surgical History:   Procedure Laterality Date   • FACIAL/NECK BIOPSY Left 1/11/2023    Procedure: EXCISION OF LEFT NECK LYMPH NODE WITH LYMPHOMA PROTOCOL;  Surgeon: Chantel Braga MD;  Location: [facility];  Service: Surgical Oncology   • FL GUIDED CENTRAL VENOUS ACCESS DEVICE INSERTION  1/23/2023   • HEMORROIDECTOMY     • KNEE SURGERY  7/19/2021   • KS ARTHRS KNE SURG W/MENISCECTOMY MED/LAT W/SHVG Right 07/19/2021    Procedure: ARTHROSCOPY KNEE, partial lateral meniscectomy;  Surgeon: Bryant Flores MD;  Location: [facility];  Service: Orthopedics   • KS ORCHIECTOMY RADICAL TUMOR INGUINAL APPROACH Left 6/16/2023    Procedure: RADICAL ORCHIECTOMY INGUINAL; EXCISION CORD LIPOMA;  Surgeon: Shaun Howard MD;  [facility];  Service: Urology   • TUNNELED VENOUS PORT PLACEMENT N/A 1/23/2023    Procedure: INSERTION VENOUS PORT (PORT-A-CATH); Surgeon: Maye Castillo MD;  Location: [facility];  Service: Surgical Oncology     Family History   Problem Relation Age of Onset   • Cancer Mother         Breast Cancer   • Breast cancer Mother    • Breast cancer additional onset Mother    • Cancer Father         Skin carcenoma   • Skin cancer Father    • Breast cancer additional onset Sister    • Cancer Sister         Breast Cancer and Skin carcenoma   • Skin cancer Brother      Social History     Socioeconomic History   • Marital status: /Civil Union     Spouse name: Not on file   • Number of children: Not on file   • Years of education: Not on file   • Highest education level: Not on file   Occupational History   • Not on file   Tobacco Use   • Smoking status: Never     Passive exposure: Past   • Smokeless tobacco: Never   • Tobacco comments:     During childhood, both parents smoked  Vaping Use   • Vaping Use: Never used   Substance and Sexual Activity   • Alcohol use: Not Currently     Alcohol/week: 3 0 standard drinks of alcohol     Types: 1 Glasses of wine, 1 Cans of beer, 1 Shots of liquor per week     Comment: 1-2 weekly   • Drug use: Never   • Sexual activity: Yes     Partners: Female   Other Topics Concern   • Not on file   Social History Narrative   • Not on file     Social Determinants of Health     Financial Resource Strain: Not on file   Food Insecurity: No Food Insecurity (2/13/2023)    Hunger Vital Sign    • Worried About Running Out of Food in the Last Year: Never true    • Ran Out of Food in the Last Year: Never true   Transportation Needs: No Transportation Needs (2/13/2023)    PRAPARE - Transportation    • Lack of Transportation (Medical): No    • Lack of Transportation (Non-Medical):  No   Physical Activity: Not on file   Stress: Not on file   Social Connections: Not on file   Intimate Partner Violence: Not on file   Housing Stability: Low Risk  (2/13/2023)    Housing Stability Vital Sign    • Unable to Pay for Housing in the Last Year: No    • Number of Places Lived in the Last Year: 1    • Unstable Housing in the Last Year: No       Current Outpatient Medications:   •  acetaminophen (TYLENOL) 325 mg tablet, Take 650 mg by mouth every 6 (six) hours as needed for mild pain, Disp: , Rfl:   •  Cholecalciferol (Vitamin D3) 1 25 MG (97699 UT) CAPS, Take by mouth in the morning, Disp: , Rfl:   •  loratadine (CLARITIN) 10 mg tablet, Take 10 mg by mouth daily, Disp: , Rfl:   •  Magnesium Gluconate 250 MG TABS, Take 2 tablets by mouth in the morning, Disp: , Rfl:   •  ondansetron (ZOFRAN-ODT) 8 mg disintegrating tablet, Take 1 tablet (8 mg total) by mouth every 8 (eight) hours as needed for nausea or vomiting, Disp: 90 tablet, Rfl: 2  •  diphenoxylate-atropine (LOMOTIL) 2 5-0 025 mg per tablet, Take 1 tablet by mouth 4 (four) times a day as needed for diarrhea To alternate with Imodium (Patient not taking: Reported on 6/27/2023), Disp: 30 tablet, Rfl: 0  •  omeprazole (PriLOSEC) 20 mg delayed release capsule, Take 20 mg by mouth 2 (two) times a day (Patient not taking: Reported on 6/27/2023), Disp: , Rfl:   •  potassium chloride (K-DUR,KLOR-CON) 20 mEq tablet, Take 1 tablet (20 mEq total) by mouth daily for 5 days, Disp: 5 tablet, Rfl: 0  •  triamcinolone (KENALOG) 0 1 % cream, if needed (Patient not taking: Reported on 6/27/2023), Disp: , Rfl:   Allergies   Allergen Reactions   • Oyster Shell - Food Allergy Vomiting     Vitals:    06/27/23 0755   BP: 138/82   Pulse: 67   Temp: 97 8 °F (36 6 °C)   SpO2: 98%       Physical Exam  Constitutional: General appearance: The Patient is well-developed and well-nourished who appears the stated age in no acute distress  Patient is pleasant and talkative  HEENT:  Normocephalic  Sclerae are anicteric  Mucous membranes are moist      Extremities: There is no clubbing or cyanosis  There is no edema  Symmetric  Neuro: Grossly nonfocal  Gait is normal      Skin: Warm, anicteric  Psych:  Patient is pleasant and talkative  Breasts:        Pathology:  [unfilled]    Labs:      Imaging  NM PET CT skull base to mid thigh    Result Date: 6/7/2023  Narrative: PET/CT SCAN INDICATION: C62 90: Malignant neoplasm of unspecified testis, unspecified whether descended or undescended metastatic seminoma restaging, T4, N3, M1, left supraclavicular metastasis MODIFIER: PI COMPARISON: CT chest April 19, 2023, CT abdomen and pelvis May 1, 2023, CT soft tissue neck January 2023 CELL TYPE: Germ cell tumor/seminoma TECHNIQUE:   10 6 mCi F-18-FDG administered IV  Multiplanar attenuation corrected and non attenuation corrected PET images are available for interpretation, and contiguous, low dose, axial CT sections were obtained from the vertex through the femurs  Intravenous contrast material was not utilized  This examination, like all CT scans performed in the [facility], was performed utilizing techniques to minimize radiation dose exposure, including the use of iterative reconstruction and automated exposure control  Fasting serum glucose: 90 mg/dl FINDINGS: Mediastinal blood flow SUV max 2 2, hepatic parenchyma SUV max 3 6 VISUALIZED BRAIN: No acute abnormalities are seen  HEAD/NECK: There is a physiologic distribution of FDG  Minimal oropharyngeal metabolic activity is demonstrated with calcifications on the left very likely postinflammatory  No FDG avid cervical adenopathy is seen  CT images: Unremarkable  CHEST: Multiple very small bilateral axillary nodes are demonstrated with SUV max up to 3 image 93 in the left axilla  Morphology of the nodes is normal  CT images: Unremarkable  ABDOMEN: Left para-aortic node has an SUV max of 2 7 on image 169, below hepatic activity  Left common iliac node on image 183 has SUV max of 3   CT images: Left para-aortic node measures 2 2 x 1 9 cm on image 170, compared to 2 7 x 2 5 cm on the prior CT, with 0 9 x 0 9 cm left common iliac node on image 182 also smaller compared to 1 4 cm previously  Mildly hypermetabolic left external iliac node measures 0 6 x 0 6 cm  PELVIS: Left external iliac node with SUV max of 4 9 is demonstrated image 205  Multiple additional small inguinal nodes are demonstrated with very minimal metabolic activity    CT images: Mildly hypermetabolic left external iliac node measures 0 6 x 0 6 cm  OSSEOUS STRUCTURES: No FDG avid lesions are seen  CT images: No significant findings  Impression: 1  Very minimal residual activity in association with lymph nodes as described, mostly below background with a small left external iliac node which shows slightly above background metabolic activity  Overall findings suggest a good response to therapy  2  Remaining adenopathy appears further decreased compared to the CT abdomen and pelvis of May 2023  Workstation performed: [id number]     I reviewed the above laboratory and imaging data  Procedure:  Under sterile conditions and local anesthesia, the previous port pocket was opened  The capsule was incised  The port, catheter and sutures were excised in total   Wound was copiously irrigated  There was excellent hemostasis  Incision was approximated with interrupted 3 0 Vicryl suture subcutaneously and a running 4 Monocryl suture in a subcuticular fashion  Steri-Strips and sterile dressings were applied  He tolerated this well  Discussion/Summary: 57-year-old male who has come chemotherapy for seminoma  He no longer needs his port  Risks of port removal were explained including bleeding, infection, need for further surgery and wound complications  Informed consent was obtained  He tolerated this well  He was instructed on signs and symptoms of infection  I will see him back in the future should the need arise  He is agreeable to this  All his questions were answered

## 2023-06-27 NOTE — PROGRESS NOTES
Surgical Oncology Follow Up       [address] [facility] [address]  [facility] [address]  [phone]    Chales Minus  1970  [id number]  [address] [facility] [address]  [facility] [id number] [phone]    Diagnoses and all orders for this visit:    Encounter for care related to Port-a-Cath    Seminoma [facility])        Chief Complaint   Patient presents with   • Procedure       No follow-ups on file        Oncology History   Seminoma ([email or web address] )   1/11/2023 -  Cancer Staged    Staging form: Testis, AJCC 8th Edition  - Clinical stage from 1/11/2023: cT4, cN3, pM1a, S2 - Signed by Jihan Pathak MD on 1/20/2023  Stage prefix: Initial diagnosis  Lactate dehydrogenase (LDH) (U/L): 699  Human chorionic gonadotropin (hCG) (mIU/mL): 13  Alpha fetoprotein (AFP) (ng/mL): 5 2  Laterality: Left  Sites of metastasis: Distant lymph nodes, NOS, Retroperitoneal lymph node, NOS  Source of metastatic specimen: Supraclavicular Lymph Nodes  Staged by: Managing physician       1/19/2023 Initial Diagnosis    Seminoma ([email or web address] )     1/30/2023 - 4/17/2023 Chemotherapy    pegfilgrastim (Sallyanne Fruit), 6 mg, Subcutaneous, Once, 4 of 4 cycles  Administration: 6 mg (2/6/2023), 6 mg (2/27/2023), 6 mg (3/20/2023), 6 mg (3/6/2023), 6 mg (4/17/2023), 6 mg (4/10/2023)  bleomycin (BLENOXANE) IVPB, 30 Units, Intravenous, Once, 4 of 4 cycles  Administration: 30 Units (1/30/2023), 30 Units (2/6/2023), 30 Units (2/13/2023), 30 Units (2/20/2023), 30 Units (2/27/2023), 30 Units (3/6/2023), 30 Units (3/13/2023), 30 Units (3/20/2023), 30 Units (4/3/2023), 30 Units (4/10/2023), 30 Units (4/17/2023)  CISplatin (PLATINOL) infusion, 20 mg/m2 = 52 2 mg, Intravenous, Once, 4 of 4 cycles  Administration: 52 2 mg (1/30/2023), 52 2 mg (1/31/2023), 52 2 mg (2/1/2023), 52 2 mg (2/2/2023), 52 2 mg (2/3/2023), 52 2 mg (2/20/2023), 52 2 mg (2/21/2023), 52 2 mg (2/22/2023), 52 2 mg (2/23/2023), 52 2 mg (2/24/2023), 52 2 mg (3/13/2023), 52 2 mg (3/14/2023), 52 2 mg (3/15/2023), 52 2 mg (3/16/2023), 52 2 mg (3/17/2023), 52 2 mg (4/3/2023), 52 2 mg (4/4/2023), 52 2 mg (4/5/2023), 52 2 mg (4/6/2023), 52 2 mg (4/7/2023)  fosaprepitant (EMEND) IVPB, 150 mg, Intravenous, Once, 4 of 4 cycles  Administration: 150 mg (1/30/2023), 150 mg (2/20/2023), 150 mg (3/13/2023), 150 mg (4/3/2023), 150 mg (2/24/2023), 150 mg (4/10/2023)             History of Present Illness: Patient returns for port removal     Review of Systems  Complete ROS Surg Onc:   Complete ROS Surg Onc:   Constitutional: The patient denies new or recent history of general fatigue, no recent weight loss, no change in appetite  Eyes: No complaints of visual problems, no scleral icterus  ENT: no complaints of ear pain, no hoarseness, no difficulty swallowing,  no tinnitus and no new masses in head, oral cavity, or neck  Cardiovascular: No complaints of chest pain, no palpitations, no ankle edema  Respiratory: No complaints of shortness of breath, no cough  Gastrointestinal: No complaints of jaundice, no bloody stools, no pale stools  Genitourinary: No complaints of dysuria, no hematuria, no nocturia, no frequent urination, no urethral discharge  Musculoskeletal: No complaints of weakness, paralysis, joint stiffness or arthralgias  Integumentary: No complaints of rash, no new lesions  Neurological: No complaints of convulsions, no seizures, no dizziness  Hematologic/Lymphatic: No complaints of easy bruising  Endocrine:  No hot or cold intolerance  No polydipsia, polyphagia, or polyuria  Allergy/immunology:  No environmental allergies  No food allergies  Not immunocompromised  Skin:  No pallor or rash  No wound          Patient Active Problem List   Diagnosis   • Impacted cerumen   • Lymphadenopathy   • Obesity, Class II, BMI 35-39 9   • Seminoma (HCC)   • Tachycardia   • Hypokalemia   • Headache   • Chemotherapy-induced neutropenia (HCC)   • Hypomagnesemia   • Hypervolemia   • Normocytic anemia   • Nausea   • Intractable nausea and vomiting   • Encounter for care related to Port-a-Cath     Past Medical History:   Diagnosis Date   • Allergic 1/1/2000    seasonal allergies   • History of transfusion    • Impacted cerumen    • Lymphadenopathy    • Obesity, unspecified    • Seasonal allergies    • Seminoma of testis ([facility] [address] )    • Testicular cancer ([facility] [address] ) 01/17/2023   • Torn meniscus     right knee- surgical repair today 7/19/2021   • Wears glasses      Past Surgical History:   Procedure Laterality Date   • FACIAL/NECK BIOPSY Left 1/11/2023    Procedure: EXCISION OF LEFT NECK LYMPH NODE WITH LYMPHOMA PROTOCOL;  Surgeon: Karol Cheung MD;  Location: [facility];  Service: Surgical Oncology   • FL GUIDED CENTRAL VENOUS ACCESS DEVICE INSERTION  1/23/2023   • HEMORROIDECTOMY     • KNEE SURGERY  7/19/2021   • PA ARTHRS KNE SURG W/MENISCECTOMY MED/LAT W/SHVG Right 07/19/2021    Procedure: ARTHROSCOPY KNEE, partial lateral meniscectomy;  Surgeon: Becki Cain MD;  Location: [facility];  Service: Orthopedics   • PA ORCHIECTOMY RADICAL TUMOR INGUINAL APPROACH Left 6/16/2023    Procedure: RADICAL ORCHIECTOMY INGUINAL; EXCISION CORD LIPOMA;  Surgeon: Braxton Novoa MD;  [facility];  Service: Urology   • TUNNELED VENOUS PORT PLACEMENT N/A 1/23/2023    Procedure: INSERTION VENOUS PORT (PORT-A-CATH);   Surgeon: Karol Cheung MD;  Location: [facility];  Service: Surgical Oncology     Family History   Problem Relation Age of Onset   • Cancer Mother         Breast Cancer   • Breast cancer Mother    • Breast cancer additional onset Mother    • Cancer Father         Skin carcenoma   • Skin cancer Father    • Breast cancer additional onset Sister    • Cancer Sister         Breast Cancer and Skin carcenoma   • Skin cancer Brother      Social History     Socioeconomic History   • Marital status: /Civil Union     Spouse name: Not on file   • Number of children: Not on file   • Years of education: Not on file   • Highest education level: Not on file   Occupational History   • Not on file   Tobacco Use   • Smoking status: Never     Passive exposure: Past   • Smokeless tobacco: Never   • Tobacco comments:     During childhood, both parents smoked  Vaping Use   • Vaping Use: Never used   Substance and Sexual Activity   • Alcohol use: Not Currently     Alcohol/week: 3 0 standard drinks of alcohol     Types: 1 Glasses of wine, 1 Cans of beer, 1 Shots of liquor per week     Comment: 1-2 weekly   • Drug use: Never   • Sexual activity: Yes     Partners: Female   Other Topics Concern   • Not on file   Social History Narrative   • Not on file     Social Determinants of Health     Financial Resource Strain: Not on file   Food Insecurity: No Food Insecurity (2/13/2023)    Hunger Vital Sign    • Worried About Running Out of Food in the Last Year: Never true    • Ran Out of Food in the Last Year: Never true   Transportation Needs: No Transportation Needs (2/13/2023)    PRAPARE - Transportation    • Lack of Transportation (Medical): No    • Lack of Transportation (Non-Medical):  No   Physical Activity: Not on file   Stress: Not on file   Social Connections: Not on file   Intimate Partner Violence: Not on file   Housing Stability: Low Risk  (2/13/2023)    Housing Stability Vital Sign    • Unable to Pay for Housing in the Last Year: No    • Number of Places Lived in the Last Year: 1    • Unstable Housing in the Last Year: No       Current Outpatient Medications:   •  acetaminophen (TYLENOL) 325 mg tablet, Take 650 mg by mouth every 6 (six) hours as needed for mild pain, Disp: , Rfl:   •  Cholecalciferol (Vitamin D3) 1 25 MG (25052 UT) CAPS, Take by mouth in the morning, Disp: , Rfl:   •  loratadine (CLARITIN) 10 mg tablet, Take 10 mg by mouth daily, Disp: , Rfl:   •  Magnesium Gluconate 250 MG TABS, Take 2 tablets by mouth in the morning, Disp: , Rfl:   •  ondansetron (ZOFRAN-ODT) 8 mg disintegrating tablet, Take 1 tablet (8 mg total) by mouth every 8 (eight) hours as needed for nausea or vomiting, Disp: 90 tablet, Rfl: 2  •  diphenoxylate-atropine (LOMOTIL) 2 5-0 025 mg per tablet, Take 1 tablet by mouth 4 (four) times a day as needed for diarrhea To alternate with Imodium (Patient not taking: Reported on 6/27/2023), Disp: 30 tablet, Rfl: 0  •  omeprazole (PriLOSEC) 20 mg delayed release capsule, Take 20 mg by mouth 2 (two) times a day (Patient not taking: Reported on 6/27/2023), Disp: , Rfl:   •  potassium chloride (K-DUR,KLOR-CON) 20 mEq tablet, Take 1 tablet (20 mEq total) by mouth daily for 5 days, Disp: 5 tablet, Rfl: 0  •  triamcinolone (KENALOG) 0 1 % cream, if needed (Patient not taking: Reported on 6/27/2023), Disp: , Rfl:   Allergies   Allergen Reactions   • Oyster Shell - Food Allergy Vomiting     Vitals:    06/27/23 0755   BP: 138/82   Pulse: 67   Temp: 97 8 °F (36 6 °C)   SpO2: 98%       Physical Exam  Constitutional: General appearance: The Patient is well-developed and well-nourished who appears the stated age in no acute distress  Patient is pleasant and talkative  HEENT:  Normocephalic  Sclerae are anicteric  Mucous membranes are moist      Extremities: There is no clubbing or cyanosis  There is no edema  Symmetric  Neuro: Grossly nonfocal  Gait is normal      Skin: Warm, anicteric  Psych:  Patient is pleasant and talkative    Breasts:        Pathology:  [unfilled]    Labs:      Imaging  NM PET CT skull base to mid thigh    Result Date: 6/7/2023  Narrative: PET/CT SCAN INDICATION: C62 90: Malignant neoplasm of unspecified testis, unspecified whether descended or undescended metastatic seminoma restaging, T4, N3, M1, left supraclavicular metastasis MODIFIER: PI COMPARISON: CT chest April 19, 2023, CT abdomen and pelvis May 1, 2023, CT soft tissue neck January 2023 CELL TYPE: Germ cell tumor/seminoma TECHNIQUE:   10 6 mCi F-18-FDG administered IV  Multiplanar attenuation corrected and non attenuation corrected PET images are available for interpretation, and contiguous, low dose, axial CT sections were obtained from the vertex through the femurs  Intravenous contrast material was not utilized  This examination, like all [facility], was performed utilizing techniques to minimize radiation dose exposure, including the use of iterative reconstruction and automated exposure control  Fasting serum glucose: 90 mg/dl FINDINGS: Mediastinal blood flow SUV max 2 2, hepatic parenchyma SUV max 3 6 VISUALIZED BRAIN: No acute abnormalities are seen  HEAD/NECK: There is a physiologic distribution of FDG  Minimal oropharyngeal metabolic activity is demonstrated with calcifications on the left very likely postinflammatory  No FDG avid cervical adenopathy is seen  CT images: Unremarkable  CHEST: Multiple very small bilateral axillary nodes are demonstrated with SUV max up to 3 image 93 in the left axilla  Morphology of the nodes is normal  CT images: Unremarkable  ABDOMEN: Left para-aortic node has an SUV max of 2 7 on image 169, below hepatic activity  Left common iliac node on image 183 has SUV max of 3  CT images: Left para-aortic node measures 2 2 x 1 9 cm on image 170, compared to 2 7 x 2 5 cm on the prior CT, with 0 9 x 0 9 cm left common iliac node on image 182 also smaller compared to 1 4 cm previously  Mildly hypermetabolic left external iliac node measures 0 6 x 0 6 cm  PELVIS: Left external iliac node with SUV max of 4 9 is demonstrated image 205  Multiple additional small inguinal nodes are demonstrated with very minimal metabolic activity    CT images: Mildly hypermetabolic left external iliac node measures 0 6 x 0 6 cm  OSSEOUS STRUCTURES: No FDG avid lesions are seen  CT images: No significant findings  Impression: 1   Very minimal residual activity in association with lymph nodes as described, mostly below background with a small left external iliac node which shows slightly above background metabolic activity  Overall findings suggest a good response to therapy  2  Remaining adenopathy appears further decreased compared to the CT abdomen and pelvis of May 2023  Workstation performed: [id number]     I reviewed the above laboratory and imaging data  Procedure:  Under sterile conditions and local anesthesia, the previous port pocket was opened  The capsule was incised  The port, catheter and sutures were excised in total   Wound was copiously irrigated  There was excellent hemostasis  Incision was approximated with interrupted 3 0 Vicryl suture subcutaneously and a running 4 Monocryl suture in a subcuticular fashion  Steri-Strips and sterile dressings were applied  He tolerated this well  Discussion/Summary: 57-year-old male who has come chemotherapy for seminoma  He no longer needs his port  Risks of port removal were explained including bleeding, infection, need for further surgery and wound complications  Informed consent was obtained  He tolerated this well  He was instructed on signs and symptoms of infection  I will see him back in the future should the need arise  He is agreeable to this  All his questions were answered

## 2023-06-28 ENCOUNTER — OFFICE VISIT (OUTPATIENT)
Dept: FAMILY MEDICINE CLINIC | Facility: CLINIC | Age: 53
End: 2023-06-28
Payer: COMMERCIAL

## 2023-06-28 ENCOUNTER — TELEPHONE (OUTPATIENT)
Dept: OTHER | Facility: OTHER | Age: 53
End: 2023-06-28

## 2023-06-28 VITALS
HEART RATE: 68 BPM | WEIGHT: 288 LBS | TEMPERATURE: 97.2 F | BODY MASS INDEX: 35.81 KG/M2 | OXYGEN SATURATION: 97 % | DIASTOLIC BLOOD PRESSURE: 85 MMHG | SYSTOLIC BLOOD PRESSURE: 136 MMHG | HEIGHT: 75 IN

## 2023-06-28 DIAGNOSIS — L23.9 ALLERGIC DERMATITIS: Primary | ICD-10-CM

## 2023-06-28 PROCEDURE — 3725F SCREEN DEPRESSION PERFORMED: CPT | Performed by: FAMILY MEDICINE

## 2023-06-28 PROCEDURE — 99213 OFFICE O/P EST LOW 20 MIN: CPT | Performed by: FAMILY MEDICINE

## 2023-06-28 NOTE — TELEPHONE ENCOUNTER
Patient's wife reports that patient has been experiencing hives since 6/7/23 and she would like a call back to advise

## 2023-06-28 NOTE — TELEPHONE ENCOUNTER
My chart to patient to have photos sent  Patient has appointment with Dr Emmanuel Morrison on 7/5/23

## 2023-06-30 ENCOUNTER — OFFICE VISIT (OUTPATIENT)
Dept: UROLOGY | Facility: AMBULATORY SURGERY CENTER | Age: 53
End: 2023-06-30

## 2023-06-30 VITALS
SYSTOLIC BLOOD PRESSURE: 132 MMHG | DIASTOLIC BLOOD PRESSURE: 78 MMHG | WEIGHT: 288 LBS | BODY MASS INDEX: 35.81 KG/M2 | HEIGHT: 75 IN | HEART RATE: 72 BPM | OXYGEN SATURATION: 96 %

## 2023-06-30 DIAGNOSIS — C62.90 SEMINOMA (HCC): Primary | ICD-10-CM

## 2023-06-30 PROCEDURE — 99024 POSTOP FOLLOW-UP VISIT: CPT | Performed by: UROLOGY

## 2023-06-30 NOTE — LETTER
June 30, 2023     Laura Wiggins, [address]  [address] [place] [address]    Patient: Annika Fry   YOB: 1970   Date of Visit: 6/30/2023       Dear Dr Jamie Moss: Thank you for referring Annika Fry to me for evaluation  Below are my notes for this consultation  If you have questions, please do not hesitate to call me  I look forward to following your patient along with you  Sincerely,        Russ Cavazos MD        CC: MD Elli Fox MD Alayne Gaskins, KALEY Cavazos MD  6/30/2023  9:37 AM  Sign when Signing Visit  Tegan Salinas is a 57-year-old male who presented with a left neck mass  This was biopsied by Dr Dina Macario and confirmed to be seminoma  He never had a palpable tumor in his testicle  Ultrasound showed a hypoechoic lesion in the left testicle  The patient had a staging CT which showed a 7 cm retroperitoneal lymph node adjacent to the aorta  This was most consistent with left-sided disease  He immediately received 4 cycles of BEP  His case has been presented at urology multidisciplinary tumor board twice  His post chemotherapy CT scan shows that the 7 cm retroperitoneal mass is decreased and now 2 cm  Per NCCN guidelines we are continuing to monitor this  Most recently he underwent a left radical inguinal orchiectomy  The case was challenging based on his body habitus and by my concern for either a large inguinal hernia or a large cord lipoma  General surgery was available at that time for intraoperative consultation and this was a cord lipoma  All pathology including the cord, lipoma, and the left testicle are negative for residual malignancy  On examination his incision is healing well    Impression: 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

## 2023-06-30 NOTE — PROGRESS NOTES
Leslie Daniel is a 38-year-old male who presented with a left neck mass  This was biopsied by Dr Bettie Shelby and confirmed to be seminoma  He never had a palpable tumor in his testicle  Ultrasound showed a hypoechoic lesion in the left testicle  The patient had a staging CT which showed a 7 cm retroperitoneal lymph node adjacent to the aorta  This was most consistent with left-sided disease  He immediately received 4 cycles of BEP  His case has been presented at urology multidisciplinary tumor board twice  His post chemotherapy CT scan shows that the 7 cm retroperitoneal mass is decreased and now 2 cm  Per NCCN guidelines we are continuing to monitor this  Most recently he underwent a left radical inguinal orchiectomy  The case was challenging based on his body habitus and by my concern for either a large inguinal hernia or a large cord lipoma  General surgery was available at that time for intraoperative consultation and this was a cord lipoma  All pathology including the cord, lipoma, and the left testicle are negative for residual malignancy  On examination his incision is healing well    Impression: 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

## 2023-07-03 NOTE — PROGRESS NOTES
Assessment/Plan: Patient has triamcinolone cream at home and will continue to use it. Recommend return to office for recheck if no improvement or worsening symptoms. He continues to follow with oncology group as well. 1. Allergic dermatitis          Subjective:      Patient ID: Bárbara Evans is a 45 y.o. male. Patient here today with itchy rash to the lower abdomen and arms with onset over the last 1 week. Rash             The following portions of the patient's history were reviewed and updated as appropriate: allergies, current medications, past family history, past medical history, past social history, past surgical history, and problem list.    Review of Systems   Constitutional: Negative. HENT: Negative. Eyes: Negative. Respiratory: Negative. Cardiovascular: Negative. Gastrointestinal: Negative. Endocrine: Negative. Genitourinary: Negative. Musculoskeletal: Negative. Skin: Positive for rash. Allergic/Immunologic: Negative. Neurological: Negative. Hematological: Negative. Psychiatric/Behavioral: Negative. Objective:      /85 (BP Location: Left arm, Patient Position: Sitting, Cuff Size: Standard)   Pulse 68   Temp (!) 97.2 °F (36.2 °C) (Tympanic)   Ht 6' 3" (1.905 m)   Wt 131 kg (288 lb)   SpO2 97%   BMI 36.00 kg/m²          Physical Exam  Vitals reviewed. Constitutional:       Appearance: He is well-developed. HENT:      Head: Normocephalic and atraumatic. Right Ear: External ear normal. Tympanic membrane is not erythematous or bulging. Left Ear: External ear normal. Tympanic membrane is not erythematous or bulging. Nose: Nose normal.      Mouth/Throat:      Mouth: No oral lesions. Pharynx: No oropharyngeal exudate. Eyes:      General: No scleral icterus. Right eye: No discharge. Left eye: No discharge. Conjunctiva/sclera: Conjunctivae normal.   Neck:      Thyroid: No thyromegaly.    Cardiovascular: Rate and Rhythm: Normal rate and regular rhythm. Heart sounds: Normal heart sounds. No murmur heard. No friction rub. No gallop. Pulmonary:      Effort: Pulmonary effort is normal. No respiratory distress. Breath sounds: No wheezing or rales. Chest:      Chest wall: No tenderness. Abdominal:      General: Bowel sounds are normal. There is no distension. Palpations: Abdomen is soft. There is no mass. Tenderness: There is no abdominal tenderness. There is no guarding or rebound. Musculoskeletal:         General: No tenderness or deformity. Normal range of motion. Cervical back: Normal range of motion and neck supple. Lymphadenopathy:      Cervical: No cervical adenopathy. Skin:     General: Skin is warm and dry. Coloration: Skin is not pale. Findings: Rash (Maculopapular rash to the bilateral arms and lower abdomen along beltline) present. No erythema. Neurological:      Mental Status: He is alert and oriented to person, place, and time. Cranial Nerves: No cranial nerve deficit. Motor: No abnormal muscle tone. Coordination: Coordination normal.      Deep Tendon Reflexes: Reflexes are normal and symmetric.    Psychiatric:         Behavior: Behavior normal.

## 2023-07-05 ENCOUNTER — OFFICE VISIT (OUTPATIENT)
Dept: HEMATOLOGY ONCOLOGY | Facility: CLINIC | Age: 53
End: 2023-07-05
Payer: COMMERCIAL

## 2023-07-05 VITALS
BODY MASS INDEX: 35.68 KG/M2 | OXYGEN SATURATION: 98 % | WEIGHT: 287 LBS | DIASTOLIC BLOOD PRESSURE: 66 MMHG | SYSTOLIC BLOOD PRESSURE: 126 MMHG | RESPIRATION RATE: 16 BRPM | HEART RATE: 80 BPM | TEMPERATURE: 98.3 F | HEIGHT: 75 IN

## 2023-07-05 DIAGNOSIS — C62.90 SEMINOMA (HCC): Primary | ICD-10-CM

## 2023-07-05 PROCEDURE — 99214 OFFICE O/P EST MOD 30 MIN: CPT | Performed by: INTERNAL MEDICINE

## 2023-07-05 NOTE — PROGRESS NOTES
Hematology/Oncology Outpatient Office Note    Date of Service: 2023    [facility] HEMATOLOGY ONCOLOGY SPECIALISTS [facility]  [phone]    Reason for Consultation:   Chief Complaint   Patient presents with   • Follow-up       Cancer Stage at diagnosis: IIIC    Referral Physician: No ref. provider found    Primary Care Physician:  Rodney Teague DO     Nickname: Alvarez Chan    Spouse: Candis Berry ECO    Today's ECO     Goals and Barriers:  Current Goal: Minimize effects of disease burden, extend life. Barriers to accomplishing this: None    Patient's Capacity to Self Care:  Patient is able to self care    Code Status: not addressed today    Advanced directives: not addressed today    ASSESSMENT & PLAN      Diagnosis ICD-10-CM Associated Orders   1. Seminoma [facility])  C62.90             This is a 45 y.o. 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 Baseline PFT: WNL    C3D15 bleomycin omitted due to severe pancytopenia requiring 2U PRBC.    2023  tumor board: get PET/CT, if any growth, would do dissection, Otherwise, start surveillance thereafter  2023:  tumor board recommendation to pursue surveillance    5/3/2023: , HCG <1 WNL, AFP slightly high for first time at 8.1    Variant 1  NTHL1  c.787C>T (p.R263C); heterozygous; uncertain significance   Variant 2  SDHC c.15G>T (p.L5F); heterozygous; uncertain sig    Discussion of decision making  • Oncology history updated, accordingly, during this visit  • Goals of care/patient communication  o I discussed with the patient the clinical course leading up to their cancer diagnosis.  I reviewed relevant office notes, imaging reports and pathology result as well.  o I told the patient that this is a case of potentially curable disease and what this means. We discussed that the goal of anti-cancer therapy is to provide best quality of life, extend overall survival, and progression free survival as shown in clinical trials. We also discussed that there might be a point when the cancer will no longer respond to this anti-neoplastic therapy.   o I explained the risks/benefits of the proposed cancer therapy: Bleomycin 30 units IV, Etoposide 100mg/m2, cisplatin 20mg/m2, and after discussion including understanding risks of possible life-threatening complications and therapy-related malignancy development, informed consent for blood products and treatment has been signed and obtained. • TNM/Staging At Diagnosis  o [id number] S2  Cancer Staging   IIIC, poor risk (non pulmonary visceral mets)  • Disease Features/Tumor Markers/Genetics  o Tumor Marker: 1/18/2023:  (3x ULN), AFP (5.2, WNL), HCG (13, slightly high)  - Post chemo tumor markers pending*  o Notable Path Features:   1/11/2023: Lymph node, left neck, excision: Germ cell tumor, compatible with metastatic seminoma. Background nonnecrotizing granulomatous inflammation. • Treatment: BEP  • Other Supportive care:  • Treatment Team Members  o Surgeon: Dr. Yaniv Hicks  o Rad Onc  o Palliative  • Labs: 12/28/2022: creat 0.96, T bili 0.63, WBC 6.49k, Hgb 15.9, plt 244k  • Diagnostics   1/3/2023 CT soft tissue neck: Moderate left-sided adenopathy primarily within the left neck, posterior jugular chain levels 3 through 5 with a prominent node present in the upper mediastinum  1/3/2023 CT Chest w/c: Left supraclavicular, superior mediastinal, posterior mediastinal (15mm)/retrocrural (2.2 cm)and suspected partially imaged left abdominal adenopathy  1/4/2023: CT Abd/pelv w/c: Bulky retroperitoneal lymphadenopathy consistent with lymphoma (left para-aortic region extending into the left upper quadrant measures 8.2 x 10.9 cm. Aortocaval lymphadenopathy measures 3.7 x 5.2 cm.   More inferior left para-aortic lymphadenopathy at the bifurcation measures 5.0 x 5.9 cm.)  1/18/2023: Testicular U/S shows a left testicular lesion (1.5 x 1.7 x 0.9 cm) and the RP adenopathy is more to the Left of the aorta  4/19/2023 CT Chest w/c: No evidence of acute intrathoracic pathology  5/1/2023 CT CAP w/c: Interval marked improvement of retroperitoneal adenopathy.   Mild splenomegaly. Image 93, retrocaval, 9 mm previously 3 cm. Image 96, left para-aortic, 2 cm previously 7.5 cm  6/7/2023 PET/CT: essential CR,  L inguina LN SUV 5 (reduced)    Discussion of decision making    I personally reviewed the following lab results, the image studies, pathology, other specialty/physicians consult notes and recommendations, and outside medical records from [facility]. I had a lengthy discussion with the patient and shared the work-up findings. We discussed the diagnosis and management plan as below. I spent 34 minutes reviewing the records (labs, clinician notes, outside records, medical history, ordering medicine/tests/procedures, interpreting the imaging/labs previously done) and coordination of care as well as direct time with the patient today, of which greater than 50% of the time was spent in counseling and coordination of care with the patient/family. · Plan/Labs  · F/u Urology for post-orchiectomy urologic surveillance  · Start surveillance with CT Abd/pelv w/c in 4 months CXR in 2 months) from that point  · AFP, LDH, HCG ordered q8 weeks standing until 7/2024    Surveillance for clinical stage III seminoma includes history and physical every 2 months for the first year following completion of chemotherapy which would be until April 2024 along with every 4-month CT scan of the abdomen, pelvis along with every 2-month chest x-ray. (until 6/2024)    Year 2 surveillance consists of every 3-month history and physical and serology markers, every 6-month CT abdomen, pelvis, and chest x-ray every 3 months.     Years 3-4 surveillance consists of history and physical and markers every 6 months, annual chest x-ray and CT scan  Year 5 surveillance consists of annual chest x-ray and history and physical and markers with as needed CT scan      Follow Up: q2 months with H&P, serology markers     All questions were answered to the patient's satisfaction during this encounter. The patient knows the contact information for our office and knows to reach out for any relevant concerns related to this encounter. They are to call for any temperature 100.4 or higher, new symptoms including but not restricted to shaking chills, decreased appetite, nausea, vomiting, diarrhea, increased fatigue, shortness of breath or chest pain, confusion, and not feeling the strength to come to the clinic. For all other listed problems and medical diagnosis in their chart - they are managed by PCP and/or other specialists, which the patient acknowledges. Thank you very much for your consultation and making us a part of this patient's care. We are continuing to follow closely with you. Please do not hesitate to reach out to me with any additional questions or concerns. Heidar J. Nehemiah Kussmaul, MD  Hematology & Medical Oncology Staff Physician             Disclaimer: This document was prepared using [facility] Direct technology. If a word or phrase is confusing, or does not make sense, this is likely due to recognition error which was not discovered during this clinician's review. If you believe an error has occurred, please contact me through [facility] line for rashida? cation.       ONCOLOGY HISTORY OF PRESENT ILLNESS        Oncology History   Seminoma ([address])   1/11/2023 -  Cancer Staged    Staging form: Testis, AJCC 8th Edition  - Clinical stage from 1/11/2023: cT4, cN3, pM1a, S2 - Signed by Wallace Mccann MD on 1/20/2023  Stage prefix: Initial diagnosis  Lactate dehydrogenase (LDH) (U/L): 699  Human chorionic gonadotropin (hCG) (mIU/mL): 13  Alpha fetoprotein (AFP) (ng/mL): 5.2  Laterality: Left  Sites of metastasis: Distant lymph nodes, NOS, Retroperitoneal lymph node, NOS  Source of metastatic specimen: Supraclavicular Lymph Nodes  Staged by: Managing physician       1/19/2023 Initial Diagnosis    Seminoma ([address])     1/30/2023 - 4/17/2023 Chemotherapy    pegfilgrastim (Donna Barges), 6 mg, Subcutaneous, Once, 4 of 4 cycles  Administration: 6 mg (2/6/2023), 6 mg (2/27/2023), 6 mg (3/20/2023), 6 mg (3/6/2023), 6 mg (4/17/2023), 6 mg (4/10/2023)  bleomycin (BLENOXANE) IVPB, 30 Units, Intravenous, Once, 4 of 4 cycles  Administration: 30 Units (1/30/2023), 30 Units (2/6/2023), 30 Units (2/13/2023), 30 Units (2/20/2023), 30 Units (2/27/2023), 30 Units (3/6/2023), 30 Units (3/13/2023), 30 Units (3/20/2023), 30 Units (4/3/2023), 30 Units (4/10/2023), 30 Units (4/17/2023)  CISplatin (PLATINOL) infusion, 20 mg/m2 = 52.2 mg, Intravenous, Once, 4 of 4 cycles  Administration: 52.2 mg (1/30/2023), 52.2 mg (1/31/2023), 52.2 mg (2/1/2023), 52.2 mg (2/2/2023), 52.2 mg (2/3/2023), 52.2 mg (2/20/2023), 52.2 mg (2/21/2023), 52.2 mg (2/22/2023), 52.2 mg (2/23/2023), 52.2 mg (2/24/2023), 52.2 mg (3/13/2023), 52.2 mg (3/14/2023), 52.2 mg (3/15/2023), 52.2 mg (3/16/2023), 52.2 mg (3/17/2023), 52.2 mg (4/3/2023), 52.2 mg (4/4/2023), 52.2 mg (4/5/2023), 52.2 mg (4/6/2023), 52.2 mg (4/7/2023)  fosaprepitant (EMEND) IVPB, 150 mg, Intravenous, Once, 4 of 4 cycles  Administration: 150 mg (1/30/2023), 150 mg (2/20/2023), 150 mg (3/13/2023), 150 mg (4/3/2023), 150 mg (2/24/2023), 150 mg (4/10/2023)       Lots of IV Mag and several PRBC required during chemo  C3D15 deferred due to plt <10k, Hgb <8    SUBJECTIVE  (INTERVAL HISTORY)      Clotting History None   Bleeding History None   Cancer History Seminoma   Family Cancer History Mom/sister (breast), same sister (melanoma), father (basal cell skin cancer)   H/O Blood/Plt Transfusion None   Tobacco/etoh/drug abuse No nicotine use, etoh abuse, rec drug use Cancer Screening history C-scope (2019)   Occupation  at [employer]       Interval events: doing much better, but mild neuropathy in the fingertips, toes that is persisting (no feeling in the balls of his feet). Not dropping items; it's an irritant. I have reviewed the relevant past medical, surgical, social and family history. I have also reviewed allergies and medications for this patient. Review of Systems    Baseline weight: 300 lbs    Has had intermittent lower back, thigh rash since summer of 2022. Denies F/C, V, SOB, CP, LH, HA, itching, gen weakness, melena, hematuria, hematochezia, falls, diarrhea, or constipation       A 10-point review of system was performed, pertinent positive and negative were detailed as above. Otherwise, the 10-point review of system was negative.       Past Medical History:   Diagnosis Date   • Allergic 1/1/2000    seasonal allergies   • History of transfusion    • Impacted cerumen    • Lymphadenopathy    • Obesity, unspecified    • Seasonal allergies    • Seminoma of testis ([address])    • Testicular cancer ([address]) 01/17/2023   • Torn meniscus     right knee- surgical repair today 7/19/2021   • Wears glasses        Past Surgical History:   Procedure Laterality Date   • FACIAL/NECK BIOPSY Left 01/11/2023    Procedure: EXCISION OF LEFT NECK LYMPH NODE WITH LYMPHOMA PROTOCOL;  Surgeon: Monica Peterson MD;  Location: [facility];  Service: Surgical Oncology   • FL [address]  01/23/2023   • HEMORROIDECTOMY     • IR PORT REMOVAL     • KNEE SURGERY  7/19/2021   • NE ARTHRS KNE SURG W/MENISCECTOMY MED/LAT W/SHVG Right 07/19/2021    Procedure: ARTHROSCOPY KNEE, partial lateral meniscectomy;  Surgeon: Bharathi Welch MD;  Location: [facility];  Service: Orthopedics   • NE ORCHIECTOMY RADICAL TUMOR INGUINAL APPROACH Left 06/16/2023    Procedure: RADICAL ORCHIECTOMY INGUINAL; EXCISION CORD LIPOMA;  Surgeon: Jacek Duran, MD;  Location: [facility] MA[facility];  Service: Urology   • TUNNELED VENOUS PORT PLACEMENT N/A 01/23/2023    Procedure: INSERTION VENOUS PORT (PORT-A-CATH); Surgeon: Cristina Claude, MD;  Location: [facility] MAIN [facility];  Service: Surgical Oncology       Family History   Problem Relation Age of Onset   • Cancer Mother         Breast Cancer   • Breast cancer Mother    • Breast cancer additional onset Mother    • Cancer Father         Skin carcenoma   • Skin cancer Father    • Breast cancer additional onset Sister    • Cancer Sister         Breast Cancer and Skin carcenoma   • Skin cancer Brother        Social History     Socioeconomic History   • Marital status: /Civil Union     Spouse name: Not on file   • Number of children: Not on file   • Years of education: Not on file   • Highest education level: Not on file   Occupational History   • Not on file   Tobacco Use   • Smoking status: Never     Passive exposure: Past   • Smokeless tobacco: Never   • Tobacco comments:     During childhood, both parents smoked. Vaping Use   • Vaping Use: Never used   Substance and Sexual Activity   • Alcohol use: Not Currently     Alcohol/week: 3.0 standard drinks of alcohol     Types: 1 Glasses of wine, 1 Cans of beer, 1 Shots of liquor per week     Comment: 1-2 weekly   • Drug use: Never   • Sexual activity: Yes     Partners: Female   Other Topics Concern   • Not on file   Social History Narrative   • Not on file     Social Determinants of Health     Financial Resource Strain: Not on file   Food Insecurity: No Food Insecurity (2/13/2023)    Hunger Vital Sign    • Worried About Running Out of Food in the Last Year: Never true    • Ran Out of Food in the Last Year: Never true   Transportation Needs: No Transportation Needs (2/13/2023)    PRAPARE - Transportation    • Lack of Transportation (Medical): No    • Lack of Transportation (Non-Medical):  No   Physical Activity: Not on file   Stress: Not on file   Social Connections: Not on file Intimate Partner Violence: Not on file   Housing Stability: Low Risk  (2/13/2023)    Housing Stability Vital Sign    • Unable to Pay for Housing in the Last Year: No    • Number of Places Lived in the Last Year: 1    • Unstable Housing in the Last Year: No       Allergies   Allergen Reactions   • Chiquita Muller - Food Allergy Vomiting       Current Outpatient Medications   Medication Sig Dispense Refill   • acetaminophen (TYLENOL) 325 mg tablet Take 650 mg by mouth every 6 (six) hours as needed for mild pain     • loratadine (CLARITIN) 10 mg tablet Take 10 mg by mouth daily     • triamcinolone (KENALOG) 0.1 % cream if needed     • Cholecalciferol (Vitamin D3) 1.25 MG (33710 UT) CAPS Take by mouth in the morning (Patient not taking: Reported on 6/28/2023)       No current facility-administered medications for this visit. (Not in a hospital admission)      Objective:     24 Hour Vitals Assessment:     Vitals:    07/05/23 1002   BP: 126/66   Pulse: 80   Resp: 16   Temp: 98.3 °F (36.8 °C)   SpO2: 98%       PHYSICIAN EXAM    General: Appearance: alert, cooperative, no distress. HEENT: Normocephalic, atraumatic. No scleral icterus. conjunctivae clear. EOMI. Chest: No tenderness to palpation. No open wound noted. Lungs: Clear to auscultation bilaterally, Respirations unlabored. Cardiac: Regular rate and rhythm, +S1and S2  Abdomen: Soft, non-tender, non-distended. Bowel sounds are normal.   : b/l testicular exam WNL, L groin wound c/d/i  Extremities:  No edema, cyanosis, clubbing. Skin: Skin color, turgor are normal. No rashes. Lymphatics: no palpable axillary, or inguinal adenopathy  L supra-clavicular LN palpated with incision c/d/i    Port:  c/d/i    Neurologic: Awake, Alert, and oriented, no gross focal deficits noted b/l. DATA REVIEW:    Pathology Result:    Final Diagnosis   Date Value Ref Range Status   06/16/2023   Final    A. Left testis (radical orchiectomy):     - Negative for carcinoma. - Testicular parenchyma with extensive fibrosis. See comment. Comment:  The history of metastatic seminoma involving a left neck lymph node ([id number]) status post chemotherapy is noted. B. Left spermatic cord lipoma (excision):     - Mature fibroadipose tissue, compatible with lipoma. - One (1) lymph node, negative for carcinoma. C.  Left spermatic cord (resection):     - Benign spermatic cord. 01/11/2023   Final    A. Lymph node, left neck, excision:  -Germ cell tumor, compatible with metastatic seminoma (see note). -Background nonnecrotizing granulomatous inflammation (see note). Comment: This is an appended report. These results have been appended to a previously preliminary verified report. 10/25/2019   Final    A. Cecum, cold biopsy:  - Portion of benign colonic mucosa with prominent reactive lymphoid aggregate. B. Ascending colon, cold snare:  - Tubular adenoma. - Negative for high grade dysplasia/ carcinoma. C. Colon, splenic flexure, cold snare:  - Tubular adenoma. - Negative for high grade dysplasia/ carcinoma. Image Results:   Image result are reviewed and documented in Hematology/Oncology history    NM PET CT skull base to mid thigh  Narrative: PET/CT SCAN    INDICATION: C62.90: Malignant neoplasm of unspecified testis, unspecified whether descended or undescended metastatic seminoma restaging, T4, N3, M1, left supraclavicular metastasis    MODIFIER: PI    COMPARISON: CT chest April 19, 2023, CT abdomen and pelvis May 1, 2023, CT soft tissue neck January 2023    CELL TYPE: Germ cell tumor/seminoma    TECHNIQUE:   10.6 mCi F-18-FDG administered IV. Multiplanar attenuation corrected and non attenuation corrected PET images are available for interpretation, and contiguous, low dose, axial CT sections were obtained from the vertex through the femurs. Intravenous contrast material was not utilized.  This examination, like all CT scans performed in the [facility]. [address], was performed utilizing techniques to minimize radiation dose exposure, including the use of iterative reconstruction and   automated exposure control. Fasting serum glucose: 90 mg/dl    FINDINGS:  Mediastinal blood flow SUV max 2.2, hepatic parenchyma SUV max 3.6  VISUALIZED BRAIN:  No acute abnormalities are seen. HEAD/NECK:  There is a physiologic distribution of FDG. Minimal oropharyngeal metabolic activity is demonstrated with calcifications on the left very likely postinflammatory. No FDG avid cervical adenopathy is seen. CT images: Unremarkable. CHEST:  Multiple very small bilateral axillary nodes are demonstrated with SUV max up to 3 image 93 in the left axilla. Morphology of the nodes is normal.  CT images: Unremarkable. ABDOMEN:  Left para-aortic node has an SUV max of 2.7 on image 169, below hepatic activity. Left common iliac node on image 183 has SUV max of 3. CT images: Left para-aortic node measures 2.2 x 1.9 cm on image 170, compared to 2.7 x 2.5 cm on the prior CT, with 0.9 x 0.9 cm left common iliac node on image 182 also smaller compared to 1.4 cm previously. Mildly hypermetabolic left external iliac node measures 0.6 x 0.6 cm. PELVIS:  Left external iliac node with SUV max of 4.9 is demonstrated image 205. Multiple additional small inguinal nodes are demonstrated with very minimal metabolic activity. .  CT images: Mildly hypermetabolic left external iliac node measures 0.6 x 0.6 cm.    OSSEOUS STRUCTURES:  No FDG avid lesions are seen. CT images: No significant findings. Impression: 1. Very minimal residual activity in association with lymph nodes as described, mostly below background with a small left external iliac node which shows slightly above background metabolic activity. Overall findings suggest a good response to therapy. 2. Remaining adenopathy appears further decreased compared to the CT abdomen and pelvis of May 2023.     Workstation performed: [id number]      LABS:  Lab data are reviewed and documented in HemOnc history. Lab Results   Component Value Date    HGB 11.3 (L) 06/02/2023    HCT 33.0 (L) 06/02/2023     (H) 06/02/2023     06/02/2023    WBC 5.48 06/02/2023    NRBC 0 06/02/2023    BANDSPCT 1 04/26/2023    ATYLMPCT 2 (H) 04/21/2023     Lab Results   Component Value Date    K 4.0 06/02/2023     06/02/2023    CO2 28 06/02/2023    BUN 22 06/02/2023    CREATININE 0.93 06/02/2023    GLUF 96 05/01/2023    CALCIUM 9.4 06/02/2023    CORRECTEDCA 9.0 03/31/2023    AST 18 06/02/2023    ALT 21 06/02/2023    ALKPHOS 44 06/02/2023    EGFR 92 06/02/2023       No results found for: "IRON", "TIBC", "FERRITIN"    No results found for: "Colonel Estephania"    No results for input(s): "WBC", "CREAT", "PLT" in the last 72 hours.     By:  Marshal Choi MD, 7/5/2023, 10:15 AM

## 2023-07-22 NOTE — PROGRESS NOTES
Hematology/Oncology Outpatient Office Note    Date of Service: 2023    [facility]  [phone]    Reason for Consultation:   Chief Complaint   Patient presents with   • Follow-up       Cancer Stage at diagnosis: IIIC    Referral Physician: No ref. provider found    Primary Care Physician:  Dada Nascimento DO     Nickname: Chetnashira Liviabee    Spouse: Candis Berry ECO    Today's ECO     Goals and Barriers:  Current Goal: Minimize effects of disease burden, extend life. Barriers to accomplishing this: None    Patient's Capacity to Self Care:  Patient is able to self care    Code Status: not addressed today    Advanced directives: not addressed today    ASSESSMENT & PLAN      Diagnosis ICD-10-CM Associated Orders   1. Seminoma (HCC)  C62.90 XR chest pa & lateral            This is a 45 y.o. 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 Baseline PFT: WNL    C3D15 bleomycin omitted due to severe pancytopenia requiring 2U PRBC.    2023  tumor board: get PET/CT, if any growth, would do dissection, Otherwise, start surveillance thereafter  2023:  tumor board recommendation to pursue surveillance    5/3/2023: , HCG <1 WNL, AFP slightly high for first time at 8.1    Variant 1  NTHL1  c.787C>T (p.R263C); heterozygous; uncertain significance   Variant 2  SDHC c.15G>T (p.L5F); heterozygous; uncertain sig    Discussion of decision making  • Oncology history updated, accordingly, during this visit  • Goals of care/patient communication  o I discussed with the patient the clinical course leading up to their cancer diagnosis.  I reviewed relevant office notes, imaging reports and pathology result as well.  o I told the patient that this is a case of potentially curable disease and what this means. We discussed that the goal of anti-cancer therapy is to provide best quality of life, extend overall survival, and progression free survival as shown in clinical trials. We also discussed that there might be a point when the cancer will no longer respond to this anti-neoplastic therapy.   o I explained the risks/benefits of the proposed cancer therapy: Bleomycin 30 units IV, Etoposide 100mg/m2, cisplatin 20mg/m2, and after discussion including understanding risks of possible life-threatening complications and therapy-related malignancy development, informed consent for blood products and treatment has been signed and obtained. • TNM/Staging At Diagnosis  o [id number] S2  Cancer Staging   IIIC, poor risk (non pulmonary visceral mets)  • Disease Features/Tumor Markers/Genetics  o Tumor Marker: 1/18/2023:  (3x ULN), AFP (5.2, WNL), HCG (13, slightly high)  - 7/28/2023: HCG <1, , AFP 4.79  o Notable Path Features:   1/11/2023: Lymph node, left neck, excision: Germ cell tumor, compatible with metastatic seminoma. Background nonnecrotizing granulomatous inflammation. • Treatment: s/p BEP  • Other Supportive care:  • Treatment Team Members  o Surgeon: Dr. Haley Arshad  o Rad Onc  o Palliative  • Labs: 12/28/2022: creat 0.96, T bili 0.63, WBC 6.49k, Hgb 15.9, plt 244k  • Diagnostics   1/3/2023 CT soft tissue neck: Moderate left-sided adenopathy primarily within the left neck, posterior jugular chain levels 3 through 5 with a prominent node present in the upper mediastinum  1/3/2023 CT Chest w/c: Left supraclavicular, superior mediastinal, posterior mediastinal (15mm)/retrocrural (2.2 cm)and suspected partially imaged left abdominal adenopathy  1/4/2023: CT Abd/pelv w/c: Bulky retroperitoneal lymphadenopathy consistent with lymphoma (left para-aortic region extending into the left upper quadrant measures 8.2 x 10.9 cm.   Aortocaval lymphadenopathy measures 3.7 x 5.2 cm. More inferior left para-aortic lymphadenopathy at the bifurcation measures 5.0 x 5.9 cm.)  1/18/2023: Testicular U/S shows a left testicular lesion (1.5 x 1.7 x 0.9 cm) and the RP adenopathy is more to the Left of the aorta  4/19/2023 CT Chest w/c: No evidence of acute intrathoracic pathology  5/1/2023 CT CAP w/c: Interval marked improvement of retroperitoneal adenopathy.   Mild splenomegaly. Image 93, retrocaval, 9 mm previously 3 cm. Image 96, left para-aortic, 2 cm previously 7.5 cm  6/7/2023 PET/CT: essential CR,  L inguina LN SUV 5 (reduced)  7/28/2023: CXR: No acute cardiopulmonary disease  10/5/2023 CT Abd/pelv w/wo c planned:     Discussion of decision making    I personally reviewed the following lab results, the image studies, pathology, other specialty/physicians consult notes and recommendations, and outside medical records from [facility]. I had a lengthy discussion with the patient and shared the work-up findings. We discussed the diagnosis and management plan as below. I spent 34 minutes reviewing the records (labs, clinician notes, outside records, medical history, ordering medicine/tests/procedures, interpreting the imaging/labs previously done) and coordination of care as well as direct time with the patient today, of which greater than 50% of the time was spent in counseling and coordination of care with the patient/family.       · Plan/Labs  · F/u Urology for post-orchiectomy urologic surveillance  · Cont surveillance with CT Abd/pelv w/c in 4 months CXR in 2 months) from that point  · AFP, LDH, HCG ordered q8 weeks standing until 7/2024    Surveillance for clinical stage III seminoma includes history and physical every 2 months for the first year following completion of chemotherapy which would be until April 2024 along with every 4-month CT scan of the abdomen, pelvis along with every 2-month chest x-ray. (until 6/2024)    Year 2 surveillance consists of every 3-month history and physical and serology markers, every 6-month CT abdomen, pelvis, and chest x-ray every 3 months. Years 3-4 surveillance consists of history and physical and markers every 6 months, annual chest x-ray and CT scan  Year 5 surveillance consists of annual chest x-ray and history and physical and markers with as needed CT scan      Follow Up: q2 months with H&P, serology markers scheduling thru 6/2024    All questions were answered to the patient's satisfaction during this encounter. The patient knows the contact information for our office and knows to reach out for any relevant concerns related to this encounter. They are to call for any temperature 100.4 or higher, new symptoms including but not restricted to shaking chills, decreased appetite, nausea, vomiting, diarrhea, increased fatigue, shortness of breath or chest pain, confusion, and not feeling the strength to come to the clinic. For all other listed problems and medical diagnosis in their chart - they are managed by PCP and/or other specialists, which the patient acknowledges. Thank you very much for your consultation and making us a part of this patient's care. We are continuing to follow closely with you. Please do not hesitate to reach out to me with any additional questions or concerns. Nancy Castrejon MD  Hematology & Medical Oncology Staff Physician             Disclaimer: This document was prepared using [employer] Fluency Direct technology. If a word or phrase is confusing, or does not make sense, this is likely due to recognition error which was not discovered during this clinician's review. If you believe an error has occurred, please contact me through [facility] line for rashida? cation.       ONCOLOGY HISTORY OF PRESENT ILLNESS        Oncology History   Seminoma ([address])   1/11/2023 -  Cancer Staged    Staging form: Testis, AJCC 8th Edition  - Clinical stage from 1/11/2023: cT4, cN3, pM1a, S2 - Signed by Marshal Choi MD on 1/20/2023  Stage prefix: Initial diagnosis  Lactate dehydrogenase (LDH) (U/L): 699  Human chorionic gonadotropin (hCG) (mIU/mL): 13  Alpha fetoprotein (AFP) (ng/mL): 5.2  Laterality: Left  Sites of metastasis: Distant lymph nodes, NOS, Retroperitoneal lymph node, NOS  Source of metastatic specimen: Supraclavicular Lymph Nodes  Staged by: Managing physician       1/19/2023 Initial Diagnosis    Seminoma ([address])     1/30/2023 - 4/17/2023 Chemotherapy    pegfilgrastim ([place]), 6 mg, Subcutaneous, Once, 4 of 4 cycles  Administration: 6 mg (2/6/2023), 6 mg (2/27/2023), 6 mg (3/20/2023), 6 mg (3/6/2023), 6 mg (4/17/2023), 6 mg (4/10/2023)  bleomycin (BLENOXANE) IVPB, 30 Units, Intravenous, Once, 4 of 4 cycles  Administration: 30 Units (1/30/2023), 30 Units (2/6/2023), 30 Units (2/13/2023), 30 Units (2/20/2023), 30 Units (2/27/2023), 30 Units (3/6/2023), 30 Units (3/13/2023), 30 Units (3/20/2023), 30 Units (4/3/2023), 30 Units (4/10/2023), 30 Units (4/17/2023)  CISplatin (PLATINOL) infusion, 20 mg/m2 = 52.2 mg, Intravenous, Once, 4 of 4 cycles  Administration: 52.2 mg (1/30/2023), 52.2 mg (1/31/2023), 52.2 mg (2/1/2023), 52.2 mg (2/2/2023), 52.2 mg (2/3/2023), 52.2 mg (2/20/2023), 52.2 mg (2/21/2023), 52.2 mg (2/22/2023), 52.2 mg (2/23/2023), 52.2 mg (2/24/2023), 52.2 mg (3/13/2023), 52.2 mg (3/14/2023), 52.2 mg (3/15/2023), 52.2 mg (3/16/2023), 52.2 mg (3/17/2023), 52.2 mg (4/3/2023), 52.2 mg (4/4/2023), 52.2 mg (4/5/2023), 52.2 mg (4/6/2023), 52.2 mg (4/7/2023)  fosaprepitant (EMEND) IVPB, 150 mg, Intravenous, Once, 4 of 4 cycles  Administration: 150 mg (1/30/2023), 150 mg (2/20/2023), 150 mg (3/13/2023), 150 mg (4/3/2023), 150 mg (2/24/2023), 150 mg (4/10/2023)       Lots of IV Mag and several PRBC required during chemo  C3D15 deferred due to plt <10k, Hgb <8    SUBJECTIVE  (INTERVAL HISTORY)      Clotting History None   Bleeding History None   Cancer History Seminoma   Family Cancer History Mom/sister (breast), same sister (melanoma), father (basal cell skin cancer)   H/O Blood/Plt Transfusion None   Tobacco/etoh/drug abuse No nicotine use, etoh abuse, rec drug use       Cancer Screening history C-scope (2019)   Occupation  at [employer]       Interval events: doing much better, doing projects, but mild neuropathy in the fingertips, toes that is persisting (odd feeling in the balls of his feet and tips of his toes). Not dropping items, gripping is getting better      I have reviewed the relevant past medical, surgical, social and family history. I have also reviewed allergies and medications for this patient. Review of Systems    Baseline weight: 300 lbs    Has had intermittent lower back, thigh rash since summer of 2022. Denies F/C, V, SOB, CP, LH, HA, itching, gen weakness, melena, hematuria, hematochezia, falls, diarrhea, or constipation       A 10-point review of system was performed, pertinent positive and negative were detailed as above. Otherwise, the 10-point review of system was negative.       Past Medical History:   Diagnosis Date   • Allergic 1/1/2000    seasonal allergies   • History of transfusion    • Impacted cerumen    • Lymphadenopathy    • Obesity, unspecified    • Seasonal allergies    • Seminoma of testis ([address])    • Testicular cancer ([address]) 01/17/2023   • Torn meniscus     right knee- surgical repair today 7/19/2021   • Wears glasses        Past Surgical History:   Procedure Laterality Date   • FACIAL/NECK BIOPSY Left 01/11/2023    Procedure: EXCISION OF LEFT NECK LYMPH NODE WITH LYMPHOMA PROTOCOL;  Surgeon: Cleveland Morocho MD;  Location: [facility];  Service: Surgical Oncology   • FL GUIDED CENTRAL VENOUS ACCESS DEVICE INSERTION  01/23/2023   • HEMORROIDECTOMY     • IR PORT REMOVAL     • KNEE SURGERY  7/19/2021   • NJ ARTHRS KNE SURG W/MENISCECTOMY MED/LAT W/SHVG Right 07/19/2021    Procedure: ARTHROSCOPY KNEE, partial lateral meniscectomy;  Surgeon: Juany Wang MD; Location: [facility];  Service: Orthopedics   • AK ORCHIECTOMY RADICAL TUMOR INGUINAL APPROACH Left 06/16/2023    Procedure: RADICAL ORCHIECTOMY INGUINAL; EXCISION CORD LIPOMA;  Surgeon: Sue Cervantes MD;  [facility];  Service: Urology   • TUNNELED VENOUS PORT PLACEMENT N/A 01/23/2023    Procedure: INSERTION VENOUS PORT (PORT-A-CATH); Surgeon: Lidia Styles MD;  Location: [facility];  Service: Surgical Oncology       Family History   Problem Relation Age of Onset   • Cancer Mother         Breast Cancer   • Breast cancer Mother    • Breast cancer additional onset Mother    • Cancer Father         Skin carcenoma   • Skin cancer Father    • Breast cancer additional onset Sister    • Cancer Sister         Breast Cancer and Skin carcenoma   • Skin cancer Brother        Social History     Socioeconomic History   • Marital status: /Civil Union     Spouse name: Not on file   • Number of children: Not on file   • Years of education: Not on file   • Highest education level: Not on file   Occupational History   • Not on file   Tobacco Use   • Smoking status: Never     Passive exposure: Past   • Smokeless tobacco: Never   • Tobacco comments:     During childhood, both parents smoked.    Vaping Use   • Vaping Use: Never used   Substance and Sexual Activity   • Alcohol use: Not Currently     Alcohol/week: 3.0 standard drinks of alcohol     Types: 1 Glasses of wine, 1 Cans of beer, 1 Shots of liquor per week     Comment: 1-2 weekly   • Drug use: Never   • Sexual activity: Yes     Partners: Female   Other Topics Concern   • Not on file   Social History Narrative   • Not on file     Social Determinants of Health     Financial Resource Strain: Not on file   Food Insecurity: No Food Insecurity (2/13/2023)    Hunger Vital Sign    • Worried About Running Out of Food in the Last Year: Never true    • Ran Out of Food in the Last Year: Never true   Transportation Needs: No Transportation Needs (2/13/2023) PRAPARE - Transportation    • Lack of Transportation (Medical): No    • Lack of Transportation (Non-Medical): No   Physical Activity: Not on file   Stress: Not on file   Social Connections: Not on file   Intimate Partner Violence: Not on file   Housing Stability: Low Risk  (2/13/2023)    Housing Stability Vital Sign    • Unable to Pay for Housing in the Last Year: No    • Number of Places Lived in the Last Year: 1    • Unstable Housing in the Last Year: No       Allergies   Allergen Reactions   • Selenasharonda Brantley - Food Allergy Vomiting       Current Outpatient Medications   Medication Sig Dispense Refill   • acetaminophen (TYLENOL) 325 mg tablet Take 650 mg by mouth every 6 (six) hours as needed for mild pain     • loratadine (CLARITIN) 10 mg tablet Take 10 mg by mouth daily     • triamcinolone (KENALOG) 0.1 % cream Apply topically 2 (two) times a day 60 g 1   • Cholecalciferol (Vitamin D3) 1.25 MG (00539 UT) CAPS Take by mouth in the morning (Patient not taking: Reported on 6/28/2023)       No current facility-administered medications for this visit. (Not in a hospital admission)      Objective:     24 Hour Vitals Assessment:     Vitals:    08/01/23 1345   BP: 128/74   Pulse: 70   Resp: 18   Temp: 97.8 °F (36.6 °C)   SpO2: 95%        Weight today: 294 lbs    PHYSICIAN EXAM    General: Appearance: alert, cooperative, no distress. HEENT: Normocephalic, atraumatic. No scleral icterus. conjunctivae clear. EOMI. Chest: No tenderness to palpation. No open wound noted. Lungs: Clear to auscultation bilaterally, Respirations unlabored. Cardiac: Regular rate and rhythm, +S1and S2  Abdomen: Soft, non-tender, non-distended. Bowel sounds are normal.   : b/l testicular exam WNL  Extremities:  No edema, cyanosis, clubbing. Skin: Skin color, turgor are normal. No rashes.   Lymphatics: no palpable axillary, or inguinal adenopathy  L supra-clavicular LN palpated with incision c/d/i    Port:  c/d/i    Neurologic: Awake, Alert, and oriented, no gross focal deficits noted b/l. DATA REVIEW:    Pathology Result:    Final Diagnosis   Date Value Ref Range Status   06/16/2023   Final    A. Left testis (radical orchiectomy):     - Negative for carcinoma. - Testicular parenchyma with extensive fibrosis. See comment. Comment:  The history of metastatic seminoma involving a left neck lymph node (H24-2053) status post chemotherapy is noted. B. Left spermatic cord lipoma (excision):     - Mature fibroadipose tissue, compatible with lipoma. - One (1) lymph node, negative for carcinoma. C.  Left spermatic cord (resection):     - Benign spermatic cord. 01/11/2023   Final    A. Lymph node, left neck, excision:  -Germ cell tumor, compatible with metastatic seminoma (see note). -Background nonnecrotizing granulomatous inflammation (see note). Comment: This is an appended report. These results have been appended to a previously preliminary verified report. 10/25/2019   Final    A. Cecum, cold biopsy:  - Portion of benign colonic mucosa with prominent reactive lymphoid aggregate. B. Ascending colon, cold snare:  - Tubular adenoma. - Negative for high grade dysplasia/ carcinoma. C. Colon, splenic flexure, cold snare:  - Tubular adenoma. - Negative for high grade dysplasia/ carcinoma. Image Results:   Image result are reviewed and documented in Hematology/Oncology history    XR chest pa & lateral  Narrative: CHEST    INDICATION:   C62.90: Malignant neoplasm of unspecified testis, unspecified whether descended or undescended. COMPARISON: PET/CT 6/7/2023, CXR 2/13/2023. EXAM PERFORMED/VIEWS:  XR CHEST PA & LATERAL. DUAL ENERGY SUBTRACTION. FINDINGS: Left port removed. Cardiomediastinal silhouette normal.    Lungs clear. No effusion or pneumothorax. Upper abdomen normal.  Bones normal for age. Impression: No acute cardiopulmonary disease.     Workstation performed: [id number]      LABS:  Lab data are reviewed and documented in HemOnc history. Lab Results   Component Value Date    HGB 13.7 07/28/2023    HCT 40.2 07/28/2023    MCV 96 07/28/2023     07/28/2023    WBC 5.14 07/28/2023    NRBC 0 07/28/2023    BANDSPCT 1 04/26/2023    ATYLMPCT 2 (H) 04/21/2023     Lab Results   Component Value Date    K 4.0 07/28/2023     07/28/2023    CO2 28 07/28/2023    BUN 27 (H) 07/28/2023    CREATININE 1.14 07/28/2023    GLUF 93 07/28/2023    CALCIUM 9.6 07/28/2023    CORRECTEDCA 9.0 03/31/2023    AST 23 07/28/2023    ALT 30 07/28/2023    ALKPHOS 51 07/28/2023    EGFR 72 07/28/2023       No results found for: "IRON", "TIBC", "FERRITIN"    No results found for: "Foster Khurram"    No results for input(s): "WBC", "CREAT", "PLT" in the last 72 hours.     By:  Asha Ayala MD, 8/1/2023, 1:52 PM

## 2023-07-26 DIAGNOSIS — L20.9 ATOPIC DERMATITIS, UNSPECIFIED TYPE: Primary | ICD-10-CM

## 2023-07-26 RX ORDER — TRIAMCINOLONE ACETONIDE 1 MG/G
CREAM TOPICAL 2 TIMES DAILY
Qty: 60 G | Refills: 1 | Status: SHIPPED | OUTPATIENT
Start: 2023-07-26

## 2023-07-28 ENCOUNTER — HOSPITAL ENCOUNTER (OUTPATIENT)
Dept: RADIOLOGY | Facility: HOSPITAL | Age: 53
Discharge: HOME | End: 2023-07-28
Payer: COMMERCIAL

## 2023-07-28 ENCOUNTER — APPOINTMENT (OUTPATIENT)
Dept: LAB | Facility: HOSPITAL | Age: 53
End: 2023-07-28
Payer: COMMERCIAL

## 2023-07-28 DIAGNOSIS — C62.90 SEMINOMA (HCC): ICD-10-CM

## 2023-07-28 DIAGNOSIS — E87.6 HYPOKALEMIA: ICD-10-CM

## 2023-07-28 DIAGNOSIS — E83.42 HYPOMAGNESEMIA: ICD-10-CM

## 2023-07-28 LAB
AFP-TM SERPL-MCNC: 4.79 NG/ML (ref 0–9)
ALBUMIN SERPL BCP-MCNC: 4.2 G/DL (ref 3.5–5)
ALP SERPL-CCNC: 51 U/L (ref 34–104)
ALT SERPL W P-5'-P-CCNC: 30 U/L (ref 7–52)
ANION GAP SERPL CALCULATED.3IONS-SCNC: 6 MMOL/L
AST SERPL W P-5'-P-CCNC: 23 U/L (ref 13–39)
BASOPHILS # BLD AUTO: 0.05 THOUSANDS/ÂΜL (ref 0–0.1)
BASOPHILS NFR BLD AUTO: 1 % (ref 0–1)
BILIRUB SERPL-MCNC: 0.61 MG/DL (ref 0.2–1)
BUN SERPL-MCNC: 27 MG/DL (ref 5–25)
CALCIUM SERPL-MCNC: 9.6 MG/DL (ref 8.4–10.2)
CHLORIDE SERPL-SCNC: 104 MMOL/L (ref 96–108)
CO2 SERPL-SCNC: 28 MMOL/L (ref 21–32)
CREAT SERPL-MCNC: 1.14 MG/DL (ref 0.6–1.3)
EOSINOPHIL # BLD AUTO: 0.15 THOUSAND/ÂΜL (ref 0–0.61)
EOSINOPHIL NFR BLD AUTO: 3 % (ref 0–6)
ERYTHROCYTE [DISTWIDTH] IN BLOOD BY AUTOMATED COUNT: 11.9 % (ref 11.6–15.1)
GFR SERPL CREATININE-BSD FRML MDRD: 72 ML/MIN/1.73SQ M
GLUCOSE P FAST SERPL-MCNC: 93 MG/DL (ref 65–99)
HCG-TM SERPL-SCNC: <1 MLU/ML
HCT VFR BLD AUTO: 40.2 % (ref 36.5–49.3)
HGB BLD-MCNC: 13.7 G/DL (ref 12–17)
IMM GRANULOCYTES # BLD AUTO: 0.02 THOUSAND/UL (ref 0–0.2)
IMM GRANULOCYTES NFR BLD AUTO: 0 % (ref 0–2)
LDH SERPL-CCNC: 139 U/L (ref 140–271)
LYMPHOCYTES # BLD AUTO: 1.17 THOUSANDS/ÂΜL (ref 0.6–4.47)
LYMPHOCYTES NFR BLD AUTO: 23 % (ref 14–44)
MAGNESIUM SERPL-MCNC: 1.9 MG/DL (ref 1.9–2.7)
MCH RBC QN AUTO: 32.9 PG (ref 26.8–34.3)
MCHC RBC AUTO-ENTMCNC: 34.1 G/DL (ref 31.4–37.4)
MCV RBC AUTO: 96 FL (ref 82–98)
MONOCYTES # BLD AUTO: 0.52 THOUSAND/ÂΜL (ref 0.17–1.22)
MONOCYTES NFR BLD AUTO: 10 % (ref 4–12)
NEUTROPHILS # BLD AUTO: 3.23 THOUSANDS/ÂΜL (ref 1.85–7.62)
NEUTS SEG NFR BLD AUTO: 63 % (ref 43–75)
NRBC BLD AUTO-RTO: 0 /100 WBCS
PLATELET # BLD AUTO: 193 THOUSANDS/UL (ref 149–390)
PMV BLD AUTO: 8.5 FL (ref 8.9–12.7)
POTASSIUM SERPL-SCNC: 4 MMOL/L (ref 3.5–5.3)
PROT SERPL-MCNC: 7.4 G/DL (ref 6.4–8.4)
RBC # BLD AUTO: 4.17 MILLION/UL (ref 3.88–5.62)
SODIUM SERPL-SCNC: 138 MMOL/L (ref 135–147)
WBC # BLD AUTO: 5.14 THOUSAND/UL (ref 4.31–10.16)

## 2023-07-28 PROCEDURE — 84702 CHORIONIC GONADOTROPIN TEST: CPT

## 2023-07-28 PROCEDURE — 85025 COMPLETE CBC W/AUTO DIFF WBC: CPT

## 2023-07-28 PROCEDURE — 71046 X-RAY EXAM CHEST 2 VIEWS: CPT

## 2023-07-28 PROCEDURE — 83735 ASSAY OF MAGNESIUM: CPT

## 2023-07-28 PROCEDURE — 36415 COLL VENOUS BLD VENIPUNCTURE: CPT

## 2023-07-28 PROCEDURE — 82105 ALPHA-FETOPROTEIN SERUM: CPT

## 2023-07-28 PROCEDURE — 83615 LACTATE (LD) (LDH) ENZYME: CPT

## 2023-07-28 PROCEDURE — 80053 COMPREHEN METABOLIC PANEL: CPT

## 2023-08-01 ENCOUNTER — OFFICE VISIT (OUTPATIENT)
Dept: HEMATOLOGY ONCOLOGY | Facility: CLINIC | Age: 53
End: 2023-08-01
Payer: COMMERCIAL

## 2023-08-01 VITALS
HEART RATE: 70 BPM | BODY MASS INDEX: 36.62 KG/M2 | HEIGHT: 75 IN | DIASTOLIC BLOOD PRESSURE: 74 MMHG | SYSTOLIC BLOOD PRESSURE: 128 MMHG | WEIGHT: 294.5 LBS | TEMPERATURE: 97.8 F | RESPIRATION RATE: 18 BRPM | OXYGEN SATURATION: 95 %

## 2023-08-01 DIAGNOSIS — C62.90 SEMINOMA (HCC): Primary | ICD-10-CM

## 2023-08-01 PROCEDURE — 99214 OFFICE O/P EST MOD 30 MIN: CPT | Performed by: INTERNAL MEDICINE

## 2023-08-23 DIAGNOSIS — C62.90 SEMINOMA (HCC): Primary | ICD-10-CM

## 2023-09-19 ENCOUNTER — TELEPHONE (OUTPATIENT)
Dept: HEMATOLOGY ONCOLOGY | Facility: CLINIC | Age: 53
End: 2023-09-19

## 2023-09-19 NOTE — TELEPHONE ENCOUNTER
Call Transfer   Who are you speaking with? [facility]   If it is not the patient, are they listed on an active communication consent form? N/A   Who is the patients HemOnc/SurgOnc provider? Dr. Hermelindo Gruber   What is the reason for this call? Sunny Castillo calling to speak with Dr. Lino Ceballos nurse in regards to patient's care. Person/Department that the call was transferred to? Time that call was transferred? Javier Schmitz 9:46am   Your call will be transferred now. If you receive a voicemail, please leave a detailed message and a member of the team will return your call as soon as possible. Did you relay this information to the caller?   Yes

## 2023-09-19 NOTE — TELEPHONE ENCOUNTER
Returned call post review of medications and infusion schedule. Left vm with RN teams number to further discuss. Patient had treatment and Neulasta on pro on 4/10/23.

## 2023-09-19 NOTE — TELEPHONE ENCOUNTER
Received vm from [address] [place] [address] at [facility], "Hi, this message is for Myron Soriano. This is Kimberly Banks returning your call I just missed you by a few minutes. You were returning my call regarding patient Shruthi Yoo and his new lasted treatment back in April. It looks like April 10th and April 17th and if you could give me another call back, just trying to figure out why he came back in seven days later if there was something wrong with the on pro fell off or malfunctioned and he had to come back in for an injection. So again, if you could give me a call back, my number here is [phone].  Thank you."

## 2023-09-19 NOTE — TELEPHONE ENCOUNTER
Received vm, "Hi, this message is for Drenda May. My name is Beto Paulson. I'm calling on behalf of [address] Regarding Doctor Brayan Khan, patient Yong Wyatt date of birth [birth year]. This is regarding a new lastic treatment that he received on April 10th 2023. We are showing that he came back in seven days later for what looks like an injection. Just trying to find out if maybe the on pro malfunction causing him to come back in days later. It could give me a call back. My number here is [phone].  Thank you."

## 2023-09-19 NOTE — TELEPHONE ENCOUNTER
Turned call x2. Left  and reviewed that patient had neulasta on 4/10. No mention of failure but had labs done, which were addressed by on call provider with Dr. Fermin Mendoza and Neulasta injection was recommended due to urgent need. RN teams number provided.

## 2023-09-30 NOTE — PROGRESS NOTES
Telemedicine consent    Patient: Erasmo Runner  Provider: Arnaud Morales MD  Provider located at [address] [place] [id number]    The patient was identified by name and date of birth. Erasmo Runner was informed that this is a telemedicine visit and that the visit is being conducted through Telephone. My office door was closed. No one else was in the room. He acknowledged consent and understanding of privacy and security of the video platform. The patient has agreed to participate and understands they can discontinue the visit at any time. Patient is aware this is a billable service. Hematology/Oncology Outpatient Office Note    Date of Service: 10/9/2023    [facility] HEMATOLOGY ON[facility] SOBIAAkronKYAW Doss [facility]  [phone]    Reason for Consultation:   No chief complaint on file. Cancer Stage at diagnosis: IIIC    Referral Physician: No ref. provider found    Primary Care Physician:  Emilee Laboy DO     Nickname: Junaid Armstrong    Spouse: Candis    Kristi ECO    Today's ECO     Goals and Barriers:  Current Goal: Minimize effects of disease burden, extend life. Barriers to accomplishing this: None    Patient's Capacity to Self Care:  Patient is able to self care    Code Status: not addressed today    Advanced directives: not addressed today    ASSESSMENT & PLAN      Diagnosis ICD-10-CM Associated Orders   1. [facility])  C62.90             This is a 45 y.o. 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     Baseline PFT: WNL    C3D15 bleomycin omitted due to severe pancytopenia requiring 2U PRBC.    2023  tumor board: get PET/CT, if any growth, would do dissection, Otherwise, start surveillance thereafter  6/20/2023:  tumor board recommendation to pursue surveillance    5/3/2023: , HCG <1 WNL, AFP slightly high for first time at 8.1    Variant 1  NTHL1  c.787C>T (p.R263C); heterozygous; uncertain significance   Variant 2  SDHC c.15G>T (p.L5F); heterozygous; uncertain sig    Discussion of decision making  • Oncology history updated, accordingly, during this visit  • Goals of care/patient communication  o I discussed with the patient the clinical course leading up to their cancer diagnosis. I reviewed relevant office notes, imaging reports and pathology result as well.  o I told the patient that this is a case of potentially curable disease and what this means. We discussed that the goal of anti-cancer therapy is to provide best quality of life, extend overall survival, and progression free survival as shown in clinical trials. We also discussed that there might be a point when the cancer will no longer respond to this anti-neoplastic therapy.   o I explained the risks/benefits of the proposed cancer therapy: Bleomycin 30 units IV, Etoposide 100mg/m2, cisplatin 20mg/m2, and after discussion including understanding risks of possible life-threatening complications and therapy-related malignancy development, informed consent for blood products and treatment has been signed and obtained. • TNM/Staging At Diagnosis  o [id number] S2  Cancer Staging   IIIC, poor risk (non pulmonary visceral mets)  • Disease Features/Tumor Markers/Genetics  o Tumor Marker: 1/18/2023:  (3x ULN), AFP (5.2, WNL), HCG (13, slightly high)  - 7/28/2023: HCG <1, , AFP 4.79  - 10/5/2023: HCG <1, , AFP 4.51  o Notable Path Features:   1/11/2023: Lymph node, left neck, excision: Germ cell tumor, compatible with metastatic seminoma. Background nonnecrotizing granulomatous inflammation.   • Treatment: s/p BEP  • Other Supportive care:  • Treatment Team Members  o Surgeon: Dr. Rut Mendoza Onc  o Palliative  • Labs: 12/28/2022: creat 0.96, T bili 0.63, WBC 6.49k, Hgb 15.9, plt 244k  • Diagnostics   1/3/2023 CT soft tissue neck: Moderate left-sided adenopathy primarily within the left neck, posterior jugular chain levels 3 through 5 with a prominent node present in the upper mediastinum  1/3/2023 CT Chest w/c: Left supraclavicular, superior mediastinal, posterior mediastinal (15mm)/retrocrural (2.2 cm)and suspected partially imaged left abdominal adenopathy  1/4/2023: CT Abd/pelv w/c: Bulky retroperitoneal lymphadenopathy consistent with lymphoma (left para-aortic region extending into the left upper quadrant measures 8.2 x 10.9 cm. Aortocaval lymphadenopathy measures 3.7 x 5.2 cm. More inferior left para-aortic lymphadenopathy at the bifurcation measures 5.0 x 5.9 cm.)  1/18/2023: Testicular U/S shows a left testicular lesion (1.5 x 1.7 x 0.9 cm) and the RP adenopathy is more to the Left of the aorta  4/19/2023 CT Chest w/c: No evidence of acute intrathoracic pathology  5/1/2023 CT CAP w/c: Interval marked improvement of retroperitoneal adenopathy.   Mild splenomegaly. Image 93, retrocaval, 9 mm previously 3 cm. Image 96, left para-aortic, 2 cm previously 7.5 cm  6/7/2023 PET/CT: essential CR,  L inguina LN SUV 5 (reduced)  7/28/2023: CXR: No acute cardiopulmonary disease  10/5/2023 CT Abd/pelv w/c: Retroperitoneal lymphadenopathy with index lymph nodes demonstrating continued interval improvement. Splenomegaly  index retroperitoneal/left periaortic lymph nodes largest measuring 14 mm in short axis, previously measured 25 mm in short axis. Retrocaval lymph node measuring 9 x 12 mm, previously measured 13 x 19 mm  10/6/2023: CXR: WNL      Discussion of decision making    I personally reviewed the following lab results, the image studies, pathology, other specialty/physicians consult notes and recommendations, and outside medical records from [facility].  I had a lengthy discussion with the patient and shared the work-up findings. We discussed the diagnosis and management plan as below. I spent 34 minutes reviewing the records (labs, clinician notes, outside records, medical history, ordering medicine/tests/procedures, interpreting the imaging/labs previously done) and coordination of care as well as direct time with the patient today, of which greater than 50% of the time was spent in counseling and coordination of care with the patient/family. · Plan/Labs  · F/u Urology for post-orchiectomy urologic surveillance  · Cont surveillance with CT Abd/pelv w/c in 4 months ordered, CXR in 2 months) from that point  · AFP, LDH, HCG ordered q8 weeks standing until 7/2024    Surveillance for clinical stage III seminoma includes history and physical every 2 months for the first year following completion of chemotherapy which would be until April 2024 along with every 4-month CT scan of the abdomen, pelvis along with every 2-month chest x-ray. (until 6/2024)    Year 2 (starting 7/2024) surveillance consists of every 3-month history and physical and serology markers, every 6-month CT abdomen, pelvis, and chest x-ray every 3 months. Years 3-4 surveillance consists of history and physical and markers every 6 months, annual chest x-ray and CT scan  Year 5 surveillance consists of annual chest x-ray and history and physical and markers with as needed CT scan      Follow Up: q2 months with H&P, serology markers scheduled thru 5/20/2024      All questions were answered to the patient's satisfaction during this encounter. The patient knows the contact information for our office and knows to reach out for any relevant concerns related to this encounter.  They are to call for any temperature 100.4 or higher, new symptoms including but not restricted to shaking chills, decreased appetite, nausea, vomiting, diarrhea, increased fatigue, shortness of breath or chest pain, confusion, and not feeling the strength to come to the clinic. For all other listed problems and medical diagnosis in their chart - they are managed by PCP and/or other specialists, which the patient acknowledges. Thank you very much for your consultation and making us a part of this patient's care. We are continuing to follow closely with you. Please do not hesitate to reach out to me with any additional questions or concerns. Nancy Shelton MD  Hematology & Medical Oncology Staff Physician             Disclaimer: This document was prepared using [employer] Direct technology. If a word or phrase is confusing, or does not make sense, this is likely due to recognition error which was not discovered during this clinician's review. If you believe an error has occurred, please contact me through [id number] line for rashida? cation.       ONCOLOGY HISTORY OF PRESENT ILLNESS        Oncology History   Seminoma ([address] [facility])   1/11/2023 -  Cancer Staged    Staging form: Testis, AJCC 8th Edition  - Clinical stage from 1/11/2023: cT4, cN3, pM1a, S2 - Signed by Kay Rolle MD on 1/20/2023  Stage prefix: Initial diagnosis  Lactate dehydrogenase (LDH) (U/L): 699  Human chorionic gonadotropin (hCG) (mIU/mL): 13  Alpha fetoprotein (AFP) (ng/mL): 5.2  Laterality: Left  Sites of metastasis: Distant lymph nodes, NOS, Retroperitoneal lymph node, NOS  Source of metastatic specimen: Supraclavicular Lymph Nodes  Staged by: Managing physician       1/19/2023 Initial Diagnosis    Seminoma ([address] )     1/30/2023 - 4/17/2023 Chemotherapy    pegfilgrastim (Lore Bora), 6 mg, Subcutaneous, Once, 4 of 4 cycles  Administration: 6 mg (2/6/2023), 6 mg (2/27/2023), 6 mg (3/20/2023), 6 mg (3/6/2023), 6 mg (4/17/2023), 6 mg (4/10/2023)  bleomycin (BLENOXANE) IVPB, 30 Units, Intravenous, Once, 4 of 4 cycles  Administration: 30 Units (1/30/2023), 30 Units (2/6/2023), 30 Units (2/13/2023), 30 Units (2/20/2023), 30 Units (2/27/2023), 30 Units (3/6/2023), 30 Units (3/13/2023), 30 Units (3/20/2023), 30 Units (4/3/2023), 30 Units (4/10/2023), 30 Units (4/17/2023)  CISplatin (PLATINOL) infusion, 20 mg/m2 = 52.2 mg, Intravenous, Once, 4 of 4 cycles  Administration: 52.2 mg (1/30/2023), 52.2 mg (1/31/2023), 52.2 mg (2/1/2023), 52.2 mg (2/2/2023), 52.2 mg (2/3/2023), 52.2 mg (2/20/2023), 52.2 mg (2/21/2023), 52.2 mg (2/22/2023), 52.2 mg (2/23/2023), 52.2 mg (2/24/2023), 52.2 mg (3/13/2023), 52.2 mg (3/14/2023), 52.2 mg (3/15/2023), 52.2 mg (3/16/2023), 52.2 mg (3/17/2023), 52.2 mg (4/3/2023), 52.2 mg (4/4/2023), 52.2 mg (4/5/2023), 52.2 mg (4/6/2023), 52.2 mg (4/7/2023)  fosaprepitant (EMEND) IVPB, 150 mg, Intravenous, Once, 4 of 4 cycles  Administration: 150 mg (1/30/2023), 150 mg (2/20/2023), 150 mg (3/13/2023), 150 mg (4/3/2023), 150 mg (2/24/2023), 150 mg (4/10/2023)       Lots of IV Mag and several PRBC required during chemo  C3D15 deferred due to plt <10k, Hgb <8    SUBJECTIVE  (INTERVAL HISTORY)      Clotting History None   Bleeding History None   Cancer History Seminoma   Family Cancer History Mom/sister (breast), same sister (melanoma), father (basal cell skin cancer)   H/O Blood/Plt Transfusion None   Tobacco/etoh/drug abuse No nicotine use, etoh abuse, rec drug use       Cancer Screening history C-scope (2019)   Occupation  at [employer]       Interval events: doing much better, doing projects, but mild neuropathy in the fingertips (improved), toes that is persisting (odd feeling in the balls of his feet and tips of his toes). Not dropping items, gripping is getting better      I have reviewed the relevant past medical, surgical, social and family history. I have also reviewed allergies and medications for this patent. Review of Systems    Baseline weight: 300 lbs    Has had intermittent lower back, thigh rash since summer of 2022.      Denies F/C, V, SOB, CP, LH, HA, itching, gen weakness, melena, hematuria, hematochezia, falls, diarrhea, or constipation       A 10-point review of system was performed, pertinent positive and negative were detailed as above. Otherwise, the 10-point review of system was negative. Past Medical History:   Diagnosis Date   • Allergic 1/1/2000    seasonal allergies   • History of transfusion    • Impacted cerumen    • Lymphadenopathy    • Obesity, unspecified    • Seasonal allergies    • Seminoma of testis ([address])    • Testicular cancer ([address]) 01/17/2023   • Torn meniscus     right knee- surgical repair today 7/19/2021   • Wears glasses        Past Surgical History:   Procedure Laterality Date   • FACIAL/NECK BIOPSY Left 01/11/2023    Procedure: EXCISION OF LEFT NECK LYMPH NODE WITH LYMPHOMA PROTOCOL;  Surgeon: Monalisa Scott MD;  Location: [facility];  Service: Surgical Oncology   • FL [address]  01/23/2023   • HEMORROIDECTOMY     • IR PORT REMOVAL     • KNEE SURGERY  7/19/2021   • VA ARTHRS KNE SURG W/MENISCECTOMY MED/LAT W/SHVG Right 07/19/2021    Procedure: ARTHROSCOPY KNEE, partial lateral meniscectomy;  Surgeon: Maureen Padilla MD;  Location: [facility];  Service: Orthopedics   • VA ORCHIECTOMY RADICAL TUMOR INGUINAL APPROACH Left 06/16/2023    Procedure: RADICAL ORCHIECTOMY INGUINAL; EXCISION CORD LIPOMA;  Surgeon: Jayme Tamayo MD;  [facility];  Service: Urology   • TUNNELED VENOUS PORT PLACEMENT N/A 01/23/2023    Procedure: INSERTION VENOUS PORT (PORT-A-CATH);   Surgeon: Monalisa Scott MD;  Location: [facility];  Service: Surgical Oncology       Family History   Problem Relation Age of Onset   • Cancer Mother         Breast Cancer   • Breast cancer Mother    • Breast cancer additional onset Mother    • Cancer Father         Skin carcenoma   • Skin cancer Father    • Breast cancer additional onset Sister    • Cancer Sister         Breast Cancer and Skin carcenoma   • Skin cancer Brother        Social History     Socioeconomic History   • Marital status: /Civil Union     Spouse name: Not on file   • Number of children: Not on file   • Years of education: Not on file   • Highest education level: Not on file   Occupational History   • Not on file   Tobacco Use   • Smoking status: Never     Passive exposure: Past   • Smokeless tobacco: Never   • Tobacco comments:     During childhood, both parents smoked. Vaping Use   • Vaping Use: Never used   Substance and Sexual Activity   • Alcohol use: Not Currently     Alcohol/week: 3.0 standard drinks of alcohol     Types: 1 Glasses of wine, 1 Cans of beer, 1 Shots of liquor per week     Comment: 1-2 weekly   • Drug use: Never   • Sexual activity: Yes     Partners: Female   Other Topics Concern   • Not on file   Social History Narrative   • Not on file     Social Determinants of Health     Financial Resource Strain: Not on file   Food Insecurity: No Food Insecurity (2/13/2023)    Hunger Vital Sign    • Worried About Running Out of Food in the Last Year: Never true    • Ran Out of Food in the Last Year: Never true   Transportation Needs: No Transportation Needs (2/13/2023)    PRAPARE - Transportation    • Lack of Transportation (Medical): No    • Lack of Transportation (Non-Medical):  No   Physical Activity: Not on file   Stress: Not on file   Social Connections: Not on file   Intimate Partner Violence: Not on file   Housing Stability: Low Risk  (2/13/2023)    Housing Stability Vital Sign    • Unable to Pay for Housing in the Last Year: No    • Number of Places Lived in the Last Year: 1    • Unstable Housing in the Last Year: No       Allergies   Allergen Reactions   • Pratima Momin - Food Allergy Vomiting       Current Outpatient Medications   Medication Sig Dispense Refill   • acetaminophen (TYLENOL) 325 mg tablet Take 650 mg by mouth every 6 (six) hours as needed for mild pain     • Cholecalciferol (Vitamin D3) 1.25 MG (06286 UT) CAPS Take by mouth in the morning (Patient not taking: Reported on 6/28/2023)     • loratadine (CLARITIN) 10 mg tablet Take 10 mg by mouth daily     • triamcinolone (KENALOG) 0.1 % cream Apply topically 2 (two) times a day 60 g 1     No current facility-administered medications for this visit. (Not in a hospital admission)      Objective:     24 Hour Vitals Assessment:     There were no vitals filed for this visit. Weight at last visit: 294 lbs    Below not updated as this was a televisit    PHYSICIAN EXAM    General: Appearance: alert, cooperative, no distress. HEENT: Normocephalic, atraumatic. No scleral icterus. conjunctivae clear. EOMI. Chest: No tenderness to palpation. No open wound noted. Lungs: Clear to auscultation bilaterally, Respirations unlabored. Cardiac: Regular rate and rhythm, +S1and S2  Abdomen: Soft, non-tender, non-distended. Bowel sounds are normal.   : b/l testicular exam WNL  Extremities:  No edema, cyanosis, clubbing. Skin: Skin color, turgor are normal. No rashes. Lymphatics: no palpable axillary, or inguinal adenopathy  L supra-clavicular LN palpated with incision c/d/i    Port:  c/d/i    Neurologic: Awake, Alert, and oriented, no gross focal deficits noted b/l. DATA REVIEW:    Pathology Result:    Final Diagnosis   Date Value Ref Range Status   06/16/2023   Final    A. Left testis (radical orchiectomy):     - Negative for carcinoma. - Testicular parenchyma with extensive fibrosis. See comment. Comment:  The history of metastatic seminoma involving a left neck lymph node ([id number]) status post chemotherapy is noted. B. Left spermatic cord lipoma (excision):     - Mature fibroadipose tissue, compatible with lipoma. - One (1) lymph node, negative for carcinoma. C.  Left spermatic cord (resection):     - Benign spermatic cord. 01/11/2023   Final    A. Lymph node, left neck, excision:  -Germ cell tumor, compatible with metastatic seminoma (see note). -Background nonnecrotizing granulomatous inflammation (see note). Comment:      This is an appended report. These results have been appended to a previously preliminary verified report. 10/25/2019   Final    A. Cecum, cold biopsy:  - Portion of benign colonic mucosa with prominent reactive lymphoid aggregate. B. Ascending colon, cold snare:  - Tubular adenoma. - Negative for high grade dysplasia/ carcinoma. C. Colon, splenic flexure, cold snare:  - Tubular adenoma. - Negative for high grade dysplasia/ carcinoma. Image Results:   Image result are reviewed and documented in Hematology/Oncology history    CT abdomen pelvis w contrast  Narrative: CT ABDOMEN AND PELVIS WITH IV CONTRAST    INDICATION:   C62.90: Malignant neoplasm of unspecified testis, unspecified whether descended or undescended. COMPARISON: 5/1/2023, 1/4/2023    TECHNIQUE:  CT examination of the abdomen and pelvis was performed. Multiplanar 2D reformatted images were created from the source data. This examination, like all CT scans performed in the [facility], was performed utilizing techniques to minimize radiation dose exposure, including the use of iterative reconstruction and automated exposure control. Radiation dose length   product (DLP) for this visit:  1396 mGy-cm    IV Contrast:  100 mL of iohexol (OMNIPAQUE)  Enteric Contrast:  Enteric contrast was not administered. FINDINGS:    ABDOMEN    LOWER CHEST:  No clinically significant abnormality identified in the visualized lower chest.    LIVER/BILIARY TREE:  Unremarkable. GALLBLADDER:  No calcified gallstones. No pericholecystic inflammatory change. SPLEEN:  The spleen is enlarged, measuring 16.7 cm transversely. PANCREAS:  Unremarkable. ADRENAL GLANDS:  Unremarkable. KIDNEYS/URETERS:  Unremarkable. No hydronephrosis. STOMACH AND BOWEL:  There is colonic diverticulosis without evidence of acute diverticulitis. APPENDIX:  No findings to suggest appendicitis.     ABDOMINOPELVIC CAVITY: Retroperitoneal lymphadenopathy with index retroperitoneal/left periaortic lymph nodes largest measuring 14 mm in short axis, previously measured 25 mm in short axis. Retrocaval lymph node measuring 9 x 12 mm, previously measured   13 x 19 mm. Adjacent areas of groundglass opacity are present. VESSELS:  Unremarkable for patient's age. PELVIS    REPRODUCTIVE ORGANS:  Unremarkable for patient's age. URINARY BLADDER:  Unremarkable. ABDOMINAL WALL/INGUINAL REGIONS: Fat-containing right inguinal hernia. Suspected postsurgical changes along the left inguinal canal.    OSSEOUS STRUCTURES:  No acute fracture or destructive osseous lesion. Spinal degenerative changes are noted. Impression: 1. Retroperitoneal lymphadenopathy with index lymph nodes demonstrating continued interval improvement. 2. Splenomegaly. Workstation performed: [id number]  XR chest pa & lateral  Narrative: CHEST    INDICATION:  C62.90: Malignant neoplasm of unspecified testis, unspecified whether descended or undescended. COMPARISON: 7/28/2023, CT chest 4/19/2023    EXAM PERFORMED/VIEWS:  XR CHEST PA & LATERAL  The frontal view was performed utilizing dual energy radiographic technique. Images: 5    FINDINGS:    Cardiomediastinal silhouette appears unremarkable. The lungs are clear. No pneumothorax or pleural effusion. Paravertebral ossifications thoracic spine. Impression: No acute cardiopulmonary disease. Workstation performed: [id number]      LABS:  Lab data are reviewed and documented in [facility] history.        Lab Results   Component Value Date    HGB 13.3 10/05/2023    HCT 39.0 10/05/2023    MCV 98 10/05/2023     10/05/2023    WBC 5.27 10/05/2023    NRBC 0 10/05/2023    BANDSPCT 1 04/26/2023    ATYLMPCT 2 (H) 04/21/2023     Lab Results   Component Value Date    K 4.6 10/05/2023     10/05/2023    CO2 30 10/05/2023    BUN 19 10/05/2023    CREATININE 1.23 10/05/2023    GLUF 91 10/05/2023    CALCIUM 9.3 10/05/2023    CORRECTEDCA 9.0 03/31/2023    AST 23 10/05/2023    ALT 23 10/05/2023    ALKPHOS 47 10/05/2023    EGFR 66 10/05/2023       No results found for: "IRON", "TIBC", "FERRITIN"    Lab Results   Component Value Date    NQRTGVTQ62 321 10/05/2023       No results for input(s): "WBC", "CREAT", "PLT" in the last 72 hours.     By:  Anton Barrios MD, 10/9/2023, 1:17 PM

## 2023-10-05 ENCOUNTER — HOSPITAL ENCOUNTER (OUTPATIENT)
Dept: CT IMAGING | Facility: HOSPITAL | Age: 53
Discharge: HOME | End: 2023-10-05
Attending: INTERNAL MEDICINE
Payer: COMMERCIAL

## 2023-10-05 ENCOUNTER — APPOINTMENT (OUTPATIENT)
Dept: LAB | Facility: HOSPITAL | Age: 53
End: 2023-10-05
Payer: COMMERCIAL

## 2023-10-05 DIAGNOSIS — C62.90 SEMINOMA (HCC): ICD-10-CM

## 2023-10-05 LAB
AFP-TM SERPL-MCNC: 4.51 NG/ML (ref 0–9)
ALBUMIN SERPL BCP-MCNC: 4.1 G/DL (ref 3.5–5)
ALP SERPL-CCNC: 47 U/L (ref 34–104)
ALT SERPL W P-5'-P-CCNC: 23 U/L (ref 7–52)
ANION GAP SERPL CALCULATED.3IONS-SCNC: 6 MMOL/L
AST SERPL W P-5'-P-CCNC: 23 U/L (ref 13–39)
BASOPHILS # BLD AUTO: 0.04 THOUSANDS/ÂΜL (ref 0–0.1)
BASOPHILS NFR BLD AUTO: 1 % (ref 0–1)
BILIRUB SERPL-MCNC: 0.7 MG/DL (ref 0.2–1)
BUN SERPL-MCNC: 19 MG/DL (ref 5–25)
CALCIUM SERPL-MCNC: 9.3 MG/DL (ref 8.4–10.2)
CHLORIDE SERPL-SCNC: 102 MMOL/L (ref 96–108)
CO2 SERPL-SCNC: 30 MMOL/L (ref 21–32)
CREAT SERPL-MCNC: 1.23 MG/DL (ref 0.6–1.3)
EOSINOPHIL # BLD AUTO: 0.2 THOUSAND/ÂΜL (ref 0–0.61)
EOSINOPHIL NFR BLD AUTO: 4 % (ref 0–6)
ERYTHROCYTE [DISTWIDTH] IN BLOOD BY AUTOMATED COUNT: 12.5 % (ref 11.6–15.1)
GFR SERPL CREATININE-BSD FRML MDRD: 66 ML/MIN/1.73SQ M
GLUCOSE P FAST SERPL-MCNC: 91 MG/DL (ref 65–99)
HCG-TM SERPL-SCNC: <1 MLU/ML
HCT VFR BLD AUTO: 39 % (ref 36.5–49.3)
HGB BLD-MCNC: 13.3 G/DL (ref 12–17)
IMM GRANULOCYTES # BLD AUTO: 0.02 THOUSAND/UL (ref 0–0.2)
IMM GRANULOCYTES NFR BLD AUTO: 0 % (ref 0–2)
LDH SERPL-CCNC: 153 U/L (ref 140–271)
LYMPHOCYTES # BLD AUTO: 1.13 THOUSANDS/ÂΜL (ref 0.6–4.47)
LYMPHOCYTES NFR BLD AUTO: 21 % (ref 14–44)
MCH RBC QN AUTO: 33.3 PG (ref 26.8–34.3)
MCHC RBC AUTO-ENTMCNC: 34.1 G/DL (ref 31.4–37.4)
MCV RBC AUTO: 98 FL (ref 82–98)
MONOCYTES # BLD AUTO: 0.47 THOUSAND/ÂΜL (ref 0.17–1.22)
MONOCYTES NFR BLD AUTO: 9 % (ref 4–12)
NEUTROPHILS # BLD AUTO: 3.41 THOUSANDS/ÂΜL (ref 1.85–7.62)
NEUTS SEG NFR BLD AUTO: 65 % (ref 43–75)
NRBC BLD AUTO-RTO: 0 /100 WBCS
PLATELET # BLD AUTO: 196 THOUSANDS/UL (ref 149–390)
PMV BLD AUTO: 8.9 FL (ref 8.9–12.7)
POTASSIUM SERPL-SCNC: 4.6 MMOL/L (ref 3.5–5.3)
PROT SERPL-MCNC: 7.1 G/DL (ref 6.4–8.4)
RBC # BLD AUTO: 3.99 MILLION/UL (ref 3.88–5.62)
SODIUM SERPL-SCNC: 138 MMOL/L (ref 135–147)
VIT B12 SERPL-MCNC: 321 PG/ML (ref 180–914)
WBC # BLD AUTO: 5.27 THOUSAND/UL (ref 4.31–10.16)

## 2023-10-05 PROCEDURE — 36415 COLL VENOUS BLD VENIPUNCTURE: CPT

## 2023-10-05 PROCEDURE — G1004 CDSM NDSC: HCPCS

## 2023-10-05 PROCEDURE — 85025 COMPLETE CBC W/AUTO DIFF WBC: CPT

## 2023-10-05 PROCEDURE — 84702 CHORIONIC GONADOTROPIN TEST: CPT

## 2023-10-05 PROCEDURE — 80053 COMPREHEN METABOLIC PANEL: CPT

## 2023-10-05 PROCEDURE — 82105 ALPHA-FETOPROTEIN SERUM: CPT

## 2023-10-05 PROCEDURE — 74177 CT ABD & PELVIS W/CONTRAST: CPT

## 2023-10-05 PROCEDURE — 82607 VITAMIN B-12: CPT

## 2023-10-05 PROCEDURE — 83615 LACTATE (LD) (LDH) ENZYME: CPT

## 2023-10-05 PROCEDURE — 84207 ASSAY OF VITAMIN B-6: CPT

## 2023-10-05 RX ADMIN — IOHEXOL 100 ML: 350 INJECTION, SOLUTION INTRAVENOUS at 07:38

## 2023-10-06 ENCOUNTER — HOSPITAL ENCOUNTER (OUTPATIENT)
Dept: RADIOLOGY | Facility: HOSPITAL | Age: 53
Discharge: HOME | End: 2023-10-06
Payer: COMMERCIAL

## 2023-10-06 DIAGNOSIS — C62.90 SEMINOMA (HCC): ICD-10-CM

## 2023-10-06 PROCEDURE — 71046 X-RAY EXAM CHEST 2 VIEWS: CPT

## 2023-10-09 ENCOUNTER — TELEMEDICINE (OUTPATIENT)
Dept: HEMATOLOGY ONCOLOGY | Facility: CLINIC | Age: 53
End: 2023-10-09
Payer: COMMERCIAL

## 2023-10-09 DIAGNOSIS — C62.90 SEMINOMA (HCC): Primary | ICD-10-CM

## 2023-10-09 LAB — VIT B6 SERPL-MCNC: 46.4 UG/L (ref 3.4–65.2)

## 2023-10-09 PROCEDURE — 99214 OFFICE O/P EST MOD 30 MIN: CPT | Performed by: INTERNAL MEDICINE

## 2023-10-20 ENCOUNTER — APPOINTMENT (OUTPATIENT)
Dept: RADIOLOGY | Facility: MEDICAL CENTER | Age: 53
End: 2023-10-20
Payer: COMMERCIAL

## 2023-10-20 ENCOUNTER — OFFICE VISIT (OUTPATIENT)
Dept: OBGYN CLINIC | Facility: MEDICAL CENTER | Age: 53
End: 2023-10-20

## 2023-10-20 VITALS
HEIGHT: 75 IN | HEART RATE: 58 BPM | DIASTOLIC BLOOD PRESSURE: 80 MMHG | WEIGHT: 290.6 LBS | BODY MASS INDEX: 36.13 KG/M2 | SYSTOLIC BLOOD PRESSURE: 116 MMHG

## 2023-10-20 DIAGNOSIS — Z01.89 ENCOUNTER FOR LOWER EXTREMITY COMPARISON IMAGING STUDY: ICD-10-CM

## 2023-10-20 DIAGNOSIS — M25.562 ACUTE PAIN OF LEFT KNEE: ICD-10-CM

## 2023-10-20 DIAGNOSIS — M23.92 INTERNAL DERANGEMENT OF LEFT KNEE: Primary | ICD-10-CM

## 2023-10-20 DIAGNOSIS — M25.562 LEFT KNEE PAIN, UNSPECIFIED CHRONICITY: ICD-10-CM

## 2023-10-20 PROCEDURE — 73564 X-RAY EXAM KNEE 4 OR MORE: CPT

## 2023-10-20 PROCEDURE — 73562 X-RAY EXAM OF KNEE 3: CPT

## 2023-10-20 NOTE — PROGRESS NOTES
Orthopaedic Surgery - Office Note  Yousif Avila (45 y.o. male)   : 1970   MRN: [id number]  Encounter Date: 10/20/2023    Chief Complaint   Patient presents with    Left Knee - Follow-up       Assessment / Plan  Left knee internal derangement concern for meniscal tear    Patient has an examination worrisome for meniscal pathology, given their lack of improvement with physical therapy directed home exercise program indicated at this time for an MRI scan to evaluate for surgical pathology. We will review the results of the study when it has been obtained and discuss further treatment options. Return for discussion of diagnostic studies. History of Present Illness  Yousif Avila is a 45 y.o. male who presents to for evaluation regarding left knee pain. The pain began a few month(s) ago and is not associated with an acute injury. Patient reports he was diagnosed with testicular cancer  he completed a course of Chemo in May 2023. The patient describes the pain as aching and dull. It is intermittent in timing, and localizes the pain to the anterior lateral joint line. He has been doing a home exercise program that he learned last year of the right knee without relief. The pain is worse with activities, walking, and standing and after prolonged sitting and relieved with rest.  He denies mechanical symptoms such as locking and catching. He denies instability of the knee. Review of Systems  Pertinent items are noted in HPI. All other systems were reviewed and are negative. Physical Exam  /80   Pulse 58   Ht 6' 3" (1.905 m)   Wt 132 kg (290 lb 9.6 oz)   BMI 36.32 kg/m²   Cons: Appears well. No apparent distress. Psych: Alert. Oriented x3. Mood and affect normal.  Eyes: PERRLA, EOMI  Resp: Normal effort. No audible wheezing or stridor. CV: Palpable pulse. No discernable arrhythmia. No LE edema. Lymph:  No palpable cervical, axillary, or inguinal lymphadenopathy. Skin: Warm. No palpable masses. No visible lesions. Neuro: Normal muscle tone. Normal and symmetric DTR's. Left Knee Exam  Alignment:  Normal knee alignment. Inspection:  No swelling. No edema. No erythema. No ecchymosis. Palpation:   Positive  tenderness at anteromedial, anterior lateral, and patellar facets. ROM:  Normal knee ROM. Strength:  5/5 quadriceps and hamstrings. Stability:  No objective knee instability. Stable Varus / Valgus stress, Lachman, and Posterior drawer. Tests:  (+) Que. Patella:  Patella tracks centrally with crepitus. Positive patellar grind  Neurovascular:  Sensation intact in DP/SP/Guzman/Sa/T nerve distributions. 2+ DP & PT pulses. Gait:  Normal.     Studies Reviewed  XR of right knee - no fracture or dislocation  XR of left knee - mild medial joint space narrowing, no fracture or dislocation    Procedures  No procedures today. Medical, Surgical, Family, and Social History  The patient's medical history, family history, and social history, were reviewed and updated as appropriate.     Past Medical History:   Diagnosis Date    Allergic 1/1/2000    seasonal allergies    History of transfusion     Impacted cerumen     Lymphadenopathy     Obesity, unspecified     Seasonal allergies     Seminoma of testis [facility])     Testicular cancer ([address]) 01/17/2023    Torn meniscus     right knee- surgical repair today 7/19/2021    Wears glasses        Past Surgical History:   Procedure Laterality Date    FACIAL/NECK BIOPSY Left 01/11/2023    Procedure: EXCISION OF LEFT NECK LYMPH NODE WITH LYMPHOMA PROTOCOL;  Surgeon: Nereyda Agrawal MD;  [facility];  Service: Surgical Oncology    [place] [address]  01/23/2023    HEMORROIDECTOMY      IR PORT REMOVAL      KNEE SURGERY  7/19/2021    [place] ARTHRS KNE SURG W/MENISCECTOMY MED/LAT W/SHVG Right 07/19/2021    Procedure: ARTHROSCOPY KNEE, partial lateral meniscectomy;  Surgeon: Saran Jaramillo MD;  [facility]; Service: Orthopedics    [place] ORCHIECTOMY RADICAL TUMOR INGUINAL APPROACH Left 06/16/2023    Procedure: RADICAL ORCHIECTOMY INGUINAL; EXCISION CORD LIPOMA;  Surgeon: Ruma Trotter MD;  [facility]: [facility];  Service: Urology    TUNNELED VENOUS PORT PLACEMENT N/A 01/23/2023    Procedure: INSERTION VENOUS PORT (PORT-A-CATH); Surgeon: Martha Rahman MD;  [facility];  Service: Surgical Oncology       Family History   Problem Relation Age of Onset    Cancer Mother         Breast Cancer    Breast cancer Mother     Breast cancer additional onset Mother     Cancer Father         Skin carcenoma    Skin cancer Father     Breast cancer additional onset Sister     Cancer Sister         Breast Cancer and Skin carcenoma    Skin cancer Brother        Social History     Occupational History    Not on file   Tobacco Use    Smoking status: Never     Passive exposure: Past    Smokeless tobacco: Never    Tobacco comments:     During childhood, both parents smoked.    Vaping Use    Vaping Use: Never used   Substance and Sexual Activity    Alcohol use: Not Currently     Alcohol/week: 3.0 standard drinks of alcohol     Types: 1 Glasses of wine, 1 Cans of beer, 1 Shots of liquor per week     Comment: 1-2 weekly    Drug use: Never    Sexual activity: Yes     Partners: Female       Allergies   Allergen Reactions    Oyster Shell - Food Allergy Vomiting         Current Outpatient Medications:     acetaminophen (TYLENOL) 325 mg tablet, Take 650 mg by mouth every 6 (six) hours as needed for mild pain, Disp: , Rfl:     loratadine (CLARITIN) 10 mg tablet, Take 10 mg by mouth daily, Disp: , Rfl:     triamcinolone (KENALOG) 0.1 % cream, Apply topically 2 (two) times a day, Disp: 60 g, Rfl: 1    Cholecalciferol (Vitamin D3) 1.25 MG (08706 UT) CAPS, Take by mouth in the morning (Patient not taking: Reported on 6/28/2023), Disp: , Rfl:       Jennifer Araujo    Scribe Attestation      I,:  Jennifer Araujo am acting as a scribe while in the presence of the attending physician.:       I,:  Merlin May, MD personally performed the services described in this documentation    as scribed in my presence.:

## 2023-11-03 ENCOUNTER — HOSPITAL ENCOUNTER (OUTPATIENT)
Dept: MRI IMAGING | Facility: HOSPITAL | Age: 53
Discharge: HOME | End: 2023-11-03
Attending: ORTHOPAEDIC SURGERY
Payer: COMMERCIAL

## 2023-11-03 DIAGNOSIS — M25.562 ACUTE PAIN OF LEFT KNEE: ICD-10-CM

## 2023-11-03 DIAGNOSIS — M23.92 INTERNAL DERANGEMENT OF LEFT KNEE: ICD-10-CM

## 2023-11-03 PROCEDURE — 73721 MRI JNT OF LWR EXTRE W/O DYE: CPT

## 2023-11-03 PROCEDURE — G1004 CDSM NDSC: HCPCS

## 2023-11-10 ENCOUNTER — OFFICE VISIT (OUTPATIENT)
Dept: OBGYN CLINIC | Facility: MEDICAL CENTER | Age: 53
End: 2023-11-10
Payer: COMMERCIAL

## 2023-11-10 VITALS
WEIGHT: 290 LBS | DIASTOLIC BLOOD PRESSURE: 84 MMHG | HEART RATE: 67 BPM | BODY MASS INDEX: 36.06 KG/M2 | SYSTOLIC BLOOD PRESSURE: 126 MMHG | HEIGHT: 75 IN

## 2023-11-10 DIAGNOSIS — S83.242D OTHER TEAR OF MEDIAL MENISCUS OF LEFT KNEE AS CURRENT INJURY, SUBSEQUENT ENCOUNTER: ICD-10-CM

## 2023-11-10 DIAGNOSIS — S83.282D OTHER TEAR OF LATERAL MENISCUS OF LEFT KNEE AS CURRENT INJURY, SUBSEQUENT ENCOUNTER: Primary | ICD-10-CM

## 2023-11-10 PROCEDURE — 99214 OFFICE O/P EST MOD 30 MIN: CPT | Performed by: ORTHOPAEDIC SURGERY

## 2023-11-10 RX ORDER — CHLORHEXIDINE GLUCONATE ORAL RINSE 1.2 MG/ML
15 SOLUTION DENTAL ONCE
OUTPATIENT
Start: 2023-11-10 | End: 2023-11-10

## 2023-11-10 RX ORDER — CEFAZOLIN SODIUM 2 G/50ML
2000 SOLUTION INTRAVENOUS ONCE
OUTPATIENT
Start: 2023-11-10 | End: 2023-11-10

## 2023-11-10 RX ORDER — TRANEXAMIC ACID 10 MG/ML
1000 INJECTION, SOLUTION INTRAVENOUS ONCE
OUTPATIENT
Start: 2023-11-10 | End: 2023-11-10

## 2023-11-10 NOTE — H&P (VIEW-ONLY)
Orthopaedic Surgery - Office Note  Yousif Avila (89 y.o. male)   : 1970   MRN: [id number]  Encounter Date: 11/10/2023    Chief Complaint   Patient presents with    Left Knee - Follow-up       Assessment / Plan  Left knee lateral meniscus tear     The diagnosis and treatment options were reviewed. The patient wishes to proceed with Left knee arthroscopy partial menisectomy vs repair . The risks, benefits, and alternatives were discussed. Written consent was obtained. Given his hx of CA we may need to consider placing the patient on Eliqus following surgery   Return for Post op. History of Present Illness  Yousif Avila is a 52 y.o. male who presents today to discuss the findings of his left knee MRI. The pain began a few month(s) ago and is not associated with an acute injury. Patient reports he was diagnosed with testicular cancer  he completed a course of Chemo in May 2023. The patient describes the pain as aching and dull. It is intermittent in timing, and localizes the pain to the anterior lateral joint line. He has been doing a home exercise program that he learned last year of the right knee without relief. The pain is worse with activities, walking, and standing and after prolonged sitting and relieved with rest.  He denies mechanical symptoms such as locking and catching. He denies instability of the knee. Review of Systems  Pertinent items are noted in HPI. All other systems were reviewed and are negative. Physical Exam  /84   Pulse 67   Ht 6' 3" (1.905 m)   Wt 132 kg (290 lb)   BMI 36.25 kg/m²   Cons: Appears well. No apparent distress. Psych: Alert. Oriented x3. Mood and affect normal.  Eyes: PERRLA, EOMI  Resp: Normal effort. No audible wheezing or stridor. CV: Palpable pulse. No discernable arrhythmia. No LE edema. Lymph:  No palpable cervical, axillary, or inguinal lymphadenopathy. Skin: Warm. No palpable masses. No visible lesions.   Neuro: Normal muscle tone. Normal and symmetric DTR's. Left Knee Exam  Alignment:  Normal knee alignment. Inspection:  No swelling. No edema. No erythema. No ecchymosis. Palpation:   Positive  tenderness at anteromedial, anterior lateral, and patellar facets. ROM:  Normal knee ROM. Strength:  5/5 quadriceps and hamstrings. Stability:  No objective knee instability. Stable Varus / Valgus stress, Lachman, and Posterior drawer. Tests:  (+) Que. Patella:  Patella tracks centrally with crepitus. Positive patellar grind  Neurovascular:  Sensation intact in DP/SP/Guzman/Sa/T nerve distributions. 2+ DP & PT pulses. Gait:  Normal.     Studies Reviewed  MRI of left knee - horizontal tear lateral meniscus posterior  horn, small tear posterior horn medial mensicus, mild degenerative changes. Procedures  No procedures today. Medical, Surgical, Family, and Social History  The patient's medical history, family history, and social history, were reviewed and updated as appropriate.     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)     Testicular cancer ([address]) 01/17/2023    Torn meniscus     right knee- surgical repair today 7/19/2021    Wears glasses        Past Surgical History:   Procedure Laterality Date    FACIAL/NECK BIOPSY Left 01/11/2023    Procedure: EXCISION OF LEFT NECK LYMPH NODE WITH LYMPHOMA PROTOCOL;  Surgeon: Geronimo Rivers MD;  [facility];  Service: Surgical Oncology    [place] [address]  01/23/2023    HEMORROIDECTOMY      IR PORT REMOVAL      KNEE SURGERY  7/19/2021    [place] ARTHRS KNE SURG W/MENISCECTOMY MED/LAT W/SHVG Right 07/19/2021    Procedure: ARTHROSCOPY KNEE, partial lateral meniscectomy;  Surgeon: Mckenna Bales MD;  [facility];  Service: Orthopedics    [place] ORCHIECTOMY RADICAL TUMOR INGUINAL APPROACH Left 06/16/2023    Procedure: RADICAL ORCHIECTOMY INGUINAL; EXCISION CORD LIPOMA;  Surgeon: Aron Rodriguez MD;  [facility];  Service: Urology    TUNNELED VENOUS PORT PLACEMENT N/A 01/23/2023    Procedure: INSERTION VENOUS PORT (PORT-A-CATH); Surgeon: Maria Alejandra Rincon MD;  [facility];  Service: Surgical Oncology       Family History   Problem Relation Age of Onset    Cancer Mother         Breast Cancer    Breast cancer Mother     Breast cancer additional onset Mother     Cancer Father         Skin carcenoma    Skin cancer Father     Breast cancer additional onset Sister     Cancer Sister         Breast Cancer and Skin carcenoma    Skin cancer Brother        Social History     Occupational History    Not on file   Tobacco Use    Smoking status: Never     Passive exposure: Past    Smokeless tobacco: Never    Tobacco comments:     During childhood, both parents smoked.    Vaping Use    Vaping Use: Never used   Substance and Sexual Activity    Alcohol use: Not Currently     Alcohol/week: 3.0 standard drinks of alcohol     Types: 1 Glasses of wine, 1 Cans of beer, 1 Shots of liquor per week     Comment: 1-2 weekly    Drug use: Never    Sexual activity: Yes     Partners: Female       Allergies   Allergen Reactions    Oyster Shell - Food Allergy Vomiting         Current Outpatient Medications:     acetaminophen (TYLENOL) 325 mg tablet, Take 650 mg by mouth every 6 (six) hours as needed for mild pain, Disp: , Rfl:     loratadine (CLARITIN) 10 mg tablet, Take 10 mg by mouth daily, Disp: , Rfl:     triamcinolone (KENALOG) 0.1 % cream, Apply topically 2 (two) times a day, Disp: 60 g, Rfl: 1    Cholecalciferol (Vitamin D3) 1.25 MG (81476 UT) CAPS, Take by mouth in the morning (Patient not taking: Reported on 6/28/2023), Disp: , Rfl:       Shar Mack    Scribe Attestation      I,:  Shar Mack am acting as a scribe while in the presence of the attending physician.:       I,:  Alicia Mcgrath MD personally performed the services described in this documentation    as scribed in my presence.:

## 2023-11-10 NOTE — PROGRESS NOTES
Orthopaedic Surgery - Office Note  Uday Judge (55 y.o. male)   : 1970   MRN: [id number]  Encounter Date: 11/10/2023    Chief Complaint   Patient presents with    Left Knee - Follow-up       Assessment / Plan  Left knee lateral meniscus tear     The diagnosis and treatment options were reviewed. The patient wishes to proceed with Left knee arthroscopy partial menisectomy vs repair . The risks, benefits, and alternatives were discussed. Written consent was obtained. Given his hx of CA we may need to consider placing the patient on Eliqus following surgery   Return for Post op. History of Present Illness  Uday Judge is a 52 y.o. male who presents today to discuss the findings of his left knee MRI. The pain began a few month(s) ago and is not associated with an acute injury. Patient reports he was diagnosed with testicular cancer  he completed a course of Chemo in May 2023. The patient describes the pain as aching and dull. It is intermittent in timing, and localizes the pain to the anterior lateral joint line. He has been doing a home exercise program that he learned last year of the right knee without relief. The pain is worse with activities, walking, and standing and after prolonged sitting and relieved with rest.  He denies mechanical symptoms such as locking and catching. He denies instability of the knee. Review of Systems  Pertinent items are noted in HPI. All other systems were reviewed and are negative. Physical Exam  /84   Pulse 67   Ht 6' 3" (1.905 m)   Wt 132 kg (290 lb)   BMI 36.25 kg/m²   Cons: Appears well. No apparent distress. Psych: Alert. Oriented x3. Mood and affect normal.  Eyes: PERRLA, EOMI  Resp: Normal effort. No audible wheezing or stridor. CV: Palpable pulse. No discernable arrhythmia. No LE edema. Lymph:  No palpable cervical, axillary, or inguinal lymphadenopathy. Skin: Warm. No palpable masses. No visible lesions.   Neuro: Normal muscle tone. Normal and symmetric DTR's. Left Knee Exam  Alignment:  Normal knee alignment. Inspection:  No swelling. No edema. No erythema. No ecchymosis. Palpation:   Positive  tenderness at anteromedial, anterior lateral, and patellar facets. ROM:  Normal knee ROM. Strength:  5/5 quadriceps and hamstrings. Stability:  No objective knee instability. Stable Varus / Valgus stress, Lachman, and Posterior drawer. Tests:  (+) Que. Patella:  Patella tracks centrally with crepitus. Positive patellar grind  Neurovascular:  Sensation intact in DP/SP/Guzman/Sa/T nerve distributions. 2+ DP & PT pulses. Gait:  Normal.     Studies Reviewed  MRI of left knee - horizontal tear lateral meniscus posterior  horn, small tear posterior horn medial mensicus, mild degenerative changes. Procedures  No procedures today. Medical, Surgical, Family, and Social History  The patient's medical history, family history, and social history, were reviewed and updated as appropriate.     Past Medical History:   Diagnosis Date    Allergic 1/1/2000    seasonal allergies    History of transfusion     Impacted cerumen     Lymphadenopathy     Obesity, unspecified     Seasonal allergies     Seminoma of testis [facility])     Testicular cancer ([address]) 01/17/2023    Torn meniscus     right knee- surgical repair today 7/19/2021    Wears glasses        Past Surgical History:   Procedure Laterality Date    FACIAL/NECK BIOPSY Left 01/11/2023    Procedure: EXCISION OF LEFT NECK LYMPH NODE WITH LYMPHOMA PROTOCOL;  Surgeon: Geronimo Rivers MD;  [facility];  Service: Surgical Oncology    [place] [address]  01/23/2023    HEMORROIDECTOMY      IR PORT REMOVAL      KNEE SURGERY  7/19/2021    [place] ARTHRS KNE SURG W/MENISCECTOMY MED/LAT W/SHVG Right 07/19/2021    Procedure: ARTHROSCOPY KNEE, partial lateral meniscectomy;  Surgeon: Mckenna Bales MD;  [facility];  Service: Orthopedics    [place] ORCHIECTOMY RADICAL TUMOR INGUINAL APPROACH Left 06/16/2023    Procedure: RADICAL ORCHIECTOMY INGUINAL; EXCISION CORD LIPOMA;  Surgeon: Isauro Hodgson MD;  [facility];  Service: Urology    TUNNELED VENOUS PORT PLACEMENT N/A 01/23/2023    Procedure: INSERTION VENOUS PORT (PORT-A-CATH); Surgeon: Dhruv Le MD;  [facility];  Service: Surgical Oncology       Family History   Problem Relation Age of Onset    Cancer Mother         Breast Cancer    Breast cancer Mother     Breast cancer additional onset Mother     Cancer Father         Skin carcenoma    Skin cancer Father     Breast cancer additional onset Sister     Cancer Sister         Breast Cancer and Skin carcenoma    Skin cancer Brother        Social History     Occupational History    Not on file   Tobacco Use    Smoking status: Never     Passive exposure: Past    Smokeless tobacco: Never    Tobacco comments:     During childhood, both parents smoked.    Vaping Use    Vaping Use: Never used   Substance and Sexual Activity    Alcohol use: Not Currently     Alcohol/week: 3.0 standard drinks of alcohol     Types: 1 Glasses of wine, 1 Cans of beer, 1 Shots of liquor per week     Comment: 1-2 weekly    Drug use: Never    Sexual activity: Yes     Partners: Female       Allergies   Allergen Reactions    Oyster Shell - Food Allergy Vomiting         Current Outpatient Medications:     acetaminophen (TYLENOL) 325 mg tablet, Take 650 mg by mouth every 6 (six) hours as needed for mild pain, Disp: , Rfl:     loratadine (CLARITIN) 10 mg tablet, Take 10 mg by mouth daily, Disp: , Rfl:     triamcinolone (KENALOG) 0.1 % cream, Apply topically 2 (two) times a day, Disp: 60 g, Rfl: 1    Cholecalciferol (Vitamin D3) 1.25 MG (55169 UT) CAPS, Take by mouth in the morning (Patient not taking: Reported on 6/28/2023), Disp: , Rfl:       Heriberto Winslow    Scribe Attestation      I,:  Heriberto Winlsow am acting as a scribe while in the presence of the attending physician.:       I,:  Roseline Jeffrey MD personally performed the services described in this documentation    as scribed in my presence.:

## 2023-11-21 ENCOUNTER — ANESTHESIA EVENT (OUTPATIENT)
Dept: PERIOP | Facility: HOSPITAL | Age: 53
End: 2023-11-21
Payer: COMMERCIAL

## 2023-11-24 NOTE — PROGRESS NOTES
Hematology/Oncology Outpatient Office Note    Date of Service: 2023    [facility] HEMATOLOGY ONCOLOGY SPECIALISTS DEMETRIO Fernandez  [phone]    Reason for Consultation:   Chief Complaint   Patient presents with    Follow-up       Cancer Stage at diagnosis: IIIC    Referral Physician: No ref. provider found    Primary Care Physician:  Primus Tan,      Nickname: Ko Jimenez    Spouse: Candis Berry ECO    Today's ECO     Goals and Barriers:  Current Goal: Minimize effects of disease burden, extend life. Barriers to accomplishing this: None    Patient's Capacity to Self Care:  Patient is able to self care    Code Status: not addressed today    Advanced directives: not addressed today    ASSESSMENT & PLAN      Diagnosis ICD-10-CM Associated Orders   1. Seminoma [facility])  C62.90             This is a 52 y.o. 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 Baseline PFT: WNL    C3D15 bleomycin omitted due to severe pancytopenia requiring 2U PRBC.    2023  tumor board: get PET/CT, if any growth, would do dissection, Otherwise, start surveillance thereafter  2023:  tumor board recommendation to pursue surveillance    5/3/2023: , HCG <1 WNL, AFP slightly high for first time at 8.1    Variant 1  NTHL1  c.787C>T (p.R263C); heterozygous; uncertain significance   Variant 2  SDHC c.15G>T (p.L5F); heterozygous; uncertain sig    Discussion of decision making  Oncology history updated, accordingly, during this visit  Goals of care/patient communication  I discussed with the patient the clinical course leading up to their cancer diagnosis. I reviewed relevant office notes, imaging reports and pathology result as well.   I told the patient that this is a case of potentially curable disease and what this means. We discussed that the goal of anti-cancer therapy is to provide best quality of life, extend overall survival, and progression free survival as shown in clinical trials. We also discussed that there might be a point when the cancer will no longer respond to this anti-neoplastic therapy. I explained the risks/benefits of the proposed cancer therapy: Bleomycin 30 units IV, Etoposide 100mg/m2, cisplatin 20mg/m2, and after discussion including understanding risks of possible life-threatening complications and therapy-related malignancy development, informed consent for blood products and treatment has been signed and obtained. TNM/Staging At Diagnosis  [id number] S2  Cancer Staging   IIIC, poor risk (non pulmonary visceral mets)  Disease Features/Tumor Markers/Genetics  Tumor Marker: 1/18/2023:  (3x ULN), AFP (5.2, WNL), HCG (13, slightly high)  7/28/2023: HCG <1, , AFP 4.79  10/5/2023: HCG <1, , AFP 4.51  11/30/2023 HCG< 1, AFP 4.47  Notable Path Features:   1/11/2023: Lymph node, left neck, excision: Germ cell tumor, compatible with metastatic seminoma. Background nonnecrotizing granulomatous inflammation. Treatment: s/p BEP  Other Supportive care:  Treatment Team Members  Surgeon: Dr. Arias Congo: 12/28/2022: creat 0.96, T bili 0.63, WBC 6.49k, Hgb 15.9, plt 244k  Diagnostics   1/3/2023 CT soft tissue neck: Moderate left-sided adenopathy primarily within the left neck, posterior jugular chain levels 3 through 5 with a prominent node present in the upper mediastinum  1/3/2023 CT Chest w/c: Left supraclavicular, superior mediastinal, posterior mediastinal (15mm)/retrocrural (2.2 cm)and suspected partially imaged left abdominal adenopathy  1/4/2023: CT Abd/pelv w/c: Bulky retroperitoneal lymphadenopathy consistent with lymphoma (left para-aortic region extending into the left upper quadrant measures 8.2 x 10.9 cm.   Aortocaval lymphadenopathy measures 3.7 x 5.2 cm. More inferior left para-aortic lymphadenopathy at the bifurcation measures 5.0 x 5.9 cm.)  1/18/2023: Testicular U/S shows a left testicular lesion (1.5 x 1.7 x 0.9 cm) and the RP adenopathy is more to the Left of the aorta  4/19/2023 CT Chest w/c: No evidence of acute intrathoracic pathology  5/1/2023 CT CAP w/c: Interval marked improvement of retroperitoneal adenopathy. Mild splenomegaly. Image 93, retrocaval, 9 mm previously 3 cm. Image 96, left para-aortic, 2 cm previously 7.5 cm  6/7/2023 PET/CT: essential CR,  L inguina LN SUV 5 (reduced)  7/28/2023: CXR: No acute cardiopulmonary disease  10/5/2023 CT Abd/pelv w/c: Retroperitoneal lymphadenopathy with index lymph nodes demonstrating continued interval improvement. Splenomegaly  index retroperitoneal/left periaortic lymph nodes largest measuring 14 mm in short axis, previously measured 25 mm in short axis. Retrocaval lymph node measuring 9 x 12 mm, previously measured 13 x 19 mm  10/6/2023: CXR: WNL  11/30/2023 CXR: No acute cardiopulmonary disease, HCG< 1, AFP 4.47    Discussion of decision making    I personally reviewed the following lab results, the image studies, pathology, other specialty/physicians consult notes and recommendations, and outside medical records from [facility]. I had a lengthy discussion with the patient and shared the work-up findings. We discussed the diagnosis and management plan as below. I spent 32 minutes reviewing the records (labs, clinician notes, outside records, medical history, ordering medicine/tests/procedures, interpreting the imaging/labs previously done) and coordination of care as well as direct time with the patient today, of which greater than 50% of the time was spent in counseling and coordination of care with the patient/family.       Plan/Labs  F/u Urology for post-orchiectomy urologic surveillance  Cont surveillance with CT Abd/pelv w/c in 4 months (scheduled 2/5/2024), CXR in 2 months) from that point  AFP, LDH, HCG ordered q8 weeks standing until 7/2024    Surveillance for clinical stage III seminoma includes history and physical every 2 months for the first year following completion of chemotherapy which would be until April 2024 along with every 4-month CT scan of the abdomen, pelvis along with every 2-month chest x-ray. (until 6/2024)    Year 2 (starting 7/2024) surveillance consists of every 3-month history and physical and serology markers, every 6-month CT abdomen, pelvis, and chest x-ray every 3 months. Years 3-4 surveillance consists of history and physical and markers every 6 months, annual chest x-ray and CT scan  Year 5 surveillance consists of annual chest x-ray and history and physical and markers with as needed CT scan      Follow Up: q2 months with H&P, serology markers scheduled thru 5/20/2024      All questions were answered to the patient's satisfaction during this encounter. The patient knows the contact information for our office and knows to reach out for any relevant concerns related to this encounter. They are to call for any temperature 100.4 or higher, new symptoms including but not restricted to shaking chills, decreased appetite, nausea, vomiting, diarrhea, increased fatigue, shortness of breath or chest pain, confusion, and not feeling the strength to come to the clinic. For all other listed problems and medical diagnosis in their chart - they are managed by PCP and/or other specialists, which the patient acknowledges. Thank you very much for your consultation and making us a part of this patient's care. We are continuing to follow closely with you. Please do not hesitate to reach out to me with any additional questions or concerns. Nancy Villasenor MD  Hematology & Medical Oncology Staff Physician             Disclaimer: This document was prepared using [employer] technology.  If a word or phrase is confusing, or does not make sense, this is likely due to recognition error which was not discovered during this clinician's review. If you believe an error has occurred, please contact me through [phone] line for rashida? cation.       ONCOLOGY HISTORY OF PRESENT ILLNESS        Oncology History   Seminoma ([address])   1/11/2023 -  Cancer Staged    Staging form: Testis, AJCC 8th Edition  - Clinical stage from 1/11/2023: cT4, cN3, pM1a, S2 - Signed by Ishan Knowles MD on 1/20/2023  Stage prefix: Initial diagnosis  Lactate dehydrogenase (LDH) (U/L): 699  Human chorionic gonadotropin (hCG) (mIU/mL): 13  Alpha fetoprotein (AFP) (ng/mL): 5.2  Laterality: Left  Sites of metastasis: Distant lymph nodes, NOS, Retroperitoneal lymph node, NOS  Source of metastatic specimen: Supraclavicular Lymph Nodes  Staged by: Managing physician       1/19/2023 Initial Diagnosis    Seminoma ([address])     1/30/2023 - 4/17/2023 Chemotherapy    pegfilgrastim (Fred Novel), 6 mg, Subcutaneous, Once, 4 of 4 cycles  Administration: 6 mg (2/6/2023), 6 mg (2/27/2023), 6 mg (3/20/2023), 6 mg (3/6/2023), 6 mg (4/17/2023), 6 mg (4/10/2023)  bleomycin (BLENOXANE) IVPB, 30 Units, Intravenous, Once, 4 of 4 cycles  Administration: 30 Units (1/30/2023), 30 Units (2/6/2023), 30 Units (2/13/2023), 30 Units (2/20/2023), 30 Units (2/27/2023), 30 Units (3/6/2023), 30 Units (3/13/2023), 30 Units (3/20/2023), 30 Units (4/3/2023), 30 Units (4/10/2023), 30 Units (4/17/2023)  CISplatin (PLATINOL) infusion, 20 mg/m2 = 52.2 mg, Intravenous, Once, 4 of 4 cycles  Administration: 52.2 mg (1/30/2023), 52.2 mg (1/31/2023), 52.2 mg (2/1/2023), 52.2 mg (2/2/2023), 52.2 mg (2/3/2023), 52.2 mg (2/20/2023), 52.2 mg (2/21/2023), 52.2 mg (2/22/2023), 52.2 mg (2/23/2023), 52.2 mg (2/24/2023), 52.2 mg (3/13/2023), 52.2 mg (3/14/2023), 52.2 mg (3/15/2023), 52.2 mg (3/16/2023), 52.2 mg (3/17/2023), 52.2 mg (4/3/2023), 52.2 mg (4/4/2023), 52.2 mg (4/5/2023), 52.2 mg (4/6/2023), 52.2 mg (4/7/2023)  fosaprepitant (EMEND) IVPB, 150 mg, Intravenous, Once, 4 of 4 cycles  Administration: 150 mg (1/30/2023), 150 mg (2/20/2023), 150 mg (3/13/2023), 150 mg (4/3/2023), 150 mg (2/24/2023), 150 mg (4/10/2023)       Lots of IV Mag and several PRBC required during chemo  C3D15 deferred due to plt <10k, Hgb <8    SUBJECTIVE  (INTERVAL HISTORY)      Clotting History None   Bleeding History None   Cancer History Seminoma   Family Cancer History Mom/sister (breast), same sister (melanoma), father (basal cell skin cancer)   H/O Blood/Plt Transfusion None   Tobacco/etoh/drug abuse No nicotine use, etoh abuse, rec drug use       Cancer Screening history C-scope (2019)   Occupation  at [employer]       Interval events: having a tender L breast since Thanksgiving, some discomfort over his L neck. Otherwise doing much better, doing projects, but mild neuropathy in the fingertips (improved), toes that is persisting (odd feeling in the balls of his feet and tips of his toes). Not dropping items, gripping is getting better      I have reviewed the relevant past medical, surgical, social and family history. I have also reviewed allergies and medications for this patent. Review of Systems    Baseline weight: 300 lbs    Has had intermittent lower back, thigh rash since summer of 2022. Denies F/C, V, SOB, CP, LH, HA, itching, gen weakness, melena, hematuria, hematochezia, falls, diarrhea, or constipation       A 10-point review of system was performed, pertinent positive and negative were detailed as above. Otherwise, the 10-point review of system was negative.       Past Medical History:   Diagnosis Date    Allergic 1/1/2000    seasonal allergies    Cancer ([address] [facility])     COVID 2021    History of cancer chemotherapy     History of transfusion     Impacted cerumen     Lymphadenopathy     Obesity, unspecified     Seasonal allergies     Seminoma of testis [facility])     Testicular cancer ([address] [facility]) 01/17/2023    Torn meniscus     right knee- surgical repair today 7/19/2021 Wears glasses        Past Surgical History:   Procedure Laterality Date    COLONOSCOPY      FACIAL/NECK BIOPSY Left 01/11/2023    Procedure: EXCISION OF LEFT NECK LYMPH NODE WITH LYMPHOMA PROTOCOL;  Surgeon: Emilee Richards MD;  Location: [facility];  Service: Surgical Oncology    FL [address]  01/23/2023    HEMORROIDECTOMY      IR PORT REMOVAL      KNEE SURGERY Right 7/19/2021    CA ARTHRS KNE SURG W/MENISCECTOMY MED/LAT W/SHVG Right 07/19/2021    Procedure: ARTHROSCOPY KNEE, partial lateral meniscectomy;  Surgeon: Flora Lisa MD;  Location: [facility];  Service: Orthopedics    CA ORCHIECTOMY RADICAL TUMOR INGUINAL APPROACH Left 06/16/2023    Procedure: RADICAL ORCHIECTOMY INGUINAL; EXCISION CORD LIPOMA;  Surgeon: Belinda Mata MD;  [facility];  Service: Urology    TUNNELED VENOUS PORT PLACEMENT N/A 01/23/2023    Procedure: INSERTION VENOUS PORT (PORT-A-CATH); Surgeon: Emilee Richards MD;  Location: [facility];  Service: Surgical Oncology       Family History   Problem Relation Age of Onset    Cancer Mother         Breast Cancer    Breast cancer Mother     Breast cancer additional onset Mother     Cancer Father         Skin carcenoma    Skin cancer Father     Breast cancer additional onset Sister     Cancer Sister         Breast Cancer and Skin carcenoma    Skin cancer Brother        Social History     Socioeconomic History    Marital status: /Civil Union     Spouse name: Not on file    Number of children: Not on file    Years of education: Not on file    Highest education level: Not on file   Occupational History    Not on file   Tobacco Use    Smoking status: Never     Passive exposure: Past    Smokeless tobacco: Never    Tobacco comments:     During childhood, both parents smoked. Vaping Use    Vaping Use: Never used   Substance and Sexual Activity    Alcohol use:  Yes     Alcohol/week: 3.0 standard drinks of alcohol     Types: 1 Glasses of wine, 1 Cans of beer, 1 Shots of liquor per week     Comment: 1-2 weekly    Drug use: Never    Sexual activity: Yes     Partners: Female   Other Topics Concern    Not on file   Social History Narrative    Not on file     Social Determinants of Health     Financial Resource Strain: Not on file   Food Insecurity: No Food Insecurity (2/13/2023)    Hunger Vital Sign     Worried About Running Out of Food in the Last Year: Never true     Ran Out of Food in the Last Year: Never true   Transportation Needs: No Transportation Needs (2/13/2023)    PRAPARE - Transportation     Lack of Transportation (Medical): No     Lack of Transportation (Non-Medical): No   Physical Activity: Not on file   Stress: Not on file   Social Connections: Not on file   Intimate Partner Violence: Not on file   Housing Stability: Low Risk  (2/13/2023)    Housing Stability Vital Sign     Unable to Pay for Housing in the Last Year: No     Number of Places Lived in the Last Year: 1     Unstable Housing in the Last Year: No       Allergies   Allergen Reactions    Tex Hassan - Food Allergy Vomiting       Current Outpatient Medications   Medication Sig Dispense Refill    acetaminophen (TYLENOL) 325 mg tablet Take 650 mg by mouth every 6 (six) hours as needed for mild pain      Cholecalciferol (Vitamin D3) 1.25 MG (75071 UT) CAPS Take by mouth in the morning      loratadine (CLARITIN) 10 mg tablet Take 10 mg by mouth if needed for allergies      multivitamin (THERAGRAN) TABS Take 1 tablet by mouth daily      NON FORMULARY Take 1 capsule by mouth 3 (three) times a day Nervive; s/p neuropathy from chemo      triamcinolone (KENALOG) 0.1 % cream Apply topically 2 (two) times a day (Patient taking differently: Apply 1 Application topically if needed) 60 g 1     No current facility-administered medications for this visit.        (Not in a hospital admission)      Objective:     24 Hour Vitals Assessment:     Vitals:    12/04/23 0810   BP: 118/68 Pulse: 56   Resp: 18   Temp: 98.2 °F (36.8 °C)   SpO2: 95%        Weight at last visit: 294 lbs    Weight today: 298 lbs    PHYSICIAN EXAM    General: Appearance: alert, cooperative, no distress. HEENT: Normocephalic, atraumatic. No scleral icterus. conjunctivae clear. EOMI. Chest: No tenderness to palpation. No open wound noted. Lungs: Clear to auscultation bilaterally, Respirations unlabored. Cardiac: Regular rate and rhythm, +S1and S2  Abdomen: Soft, non-tender, non-distended. Bowel sounds are normal.   : b/l testicular exam WNL  L breast exam: nodule or two  Extremities:  No edema, cyanosis, clubbing. Skin: Skin color, turgor are normal. No rashes. Lymphatics: no palpable axillary, or inguinal adenopathy  L supra-clavicular LN palpated with incision c/d/i    Port:  c/d/i    Neurologic: Awake, Alert, and oriented, no gross focal deficits noted b/l. DATA REVIEW:    Pathology Result:    Final Diagnosis   Date Value Ref Range Status   06/16/2023   Final    A. Left testis (radical orchiectomy):     - Negative for carcinoma. - Testicular parenchyma with extensive fibrosis. See comment. Comment:  The history of metastatic seminoma involving a left neck lymph node ([id number]) status post chemotherapy is noted. B. Left spermatic cord lipoma (excision):     - Mature fibroadipose tissue, compatible with lipoma. - One (1) lymph node, negative for carcinoma. C.  Left spermatic cord (resection):     - Benign spermatic cord. 01/11/2023   Final    A. Lymph node, left neck, excision:  -Germ cell tumor, compatible with metastatic seminoma (see note). -Background nonnecrotizing granulomatous inflammation (see note). Comment: This is an appended report. These results have been appended to a previously preliminary verified report. 10/25/2019   Final    A. Cecum, cold biopsy:  - Portion of benign colonic mucosa with prominent reactive lymphoid aggregate.      B. Ascending colon, cold snare:  - Tubular adenoma. - Negative for high grade dysplasia/ carcinoma. C. Colon, splenic flexure, cold snare:  - Tubular adenoma. - Negative for high grade dysplasia/ carcinoma. Image Results:   Image result are reviewed and documented in Hematology/Oncology history    XR chest pa & lateral  Narrative: CHEST    INDICATION:   Malignant neoplasm of unspecified testis, unspecified whether descended or undescended. COMPARISON:  Comparison made with previous examination(s) dated (DX) 06-Oct-2023,(CT) 05-Oct-2023,(DX) 28-Jul-2023,(CT) 01-May-2023,(CT) 19-Apr-2023. EXAM PERFORMED/VIEWS:  XR CHEST PA & LATERAL  Images: 4    FINDINGS:    Cardiomediastinal silhouette appears unremarkable. The lungs are clear. No pneumothorax or pleural effusion. Osseous structures appear within normal limits for patient age. Impression: No acute cardiopulmonary disease. Electronically signed: 11/30/2023 10:44 AM Nila Fried MPH,MD      LABS:  Lab data are reviewed and documented in HemOnc history. Lab Results   Component Value Date    HGB 13.5 11/30/2023    HCT 38.5 11/30/2023    MCV 96 11/30/2023     11/30/2023    WBC 4.65 11/30/2023    NRBC 0 11/30/2023    BANDSPCT 1 04/26/2023    ATYLMPCT 2 (H) 04/21/2023     Lab Results   Component Value Date    K 4.3 11/30/2023     11/30/2023    CO2 28 11/30/2023    BUN 25 11/30/2023    CREATININE 1.06 11/30/2023    GLUF 95 11/30/2023    CALCIUM 9.5 11/30/2023    CORRECTEDCA 9.0 03/31/2023    AST 26 11/30/2023    ALT 29 11/30/2023    ALKPHOS 49 11/30/2023    EGFR 78 11/30/2023       No results found for: "IRON", "TIBC", "FERRITIN"    Lab Results   Component Value Date    [id number] 321 10/05/2023       No results for input(s): "WBC", "CREAT", "PLT" in the last 72 hours.     By:  Radha Em MD, 12/4/2023, 8:22 AM

## 2023-11-29 RX ORDER — DIPHENOXYLATE HYDROCHLORIDE AND ATROPINE SULFATE 2.5; .025 MG/1; MG/1
1 TABLET ORAL DAILY
COMMUNITY

## 2023-11-29 NOTE — PRE-PROCEDURE INSTRUCTIONS
Pre-Surgery Instructions:   Medication Instructions    acetaminophen (TYLENOL) 325 mg tablet Uses PRN- OK to take day of surgery    Cholecalciferol (Vitamin D3) 1.25 MG (25667 UT) CAPS Stop taking 7 days prior to surgery. loratadine (CLARITIN) 10 mg tablet Uses PRN- OK to take day of surgery    multivitamin (THERAGRAN) TABS Stop taking 7 days prior to surgery. NON FORMULARY Stop taking 7 days prior to surgery. triamcinolone (KENALOG) 0.1 % cream Hold day of surgery. Pt verbalizes understanding of the following:    Please reference your [facility] Surgical Experience Booklet” for additional information to prepare for your upcoming surgery. - DO NOT EAT OR DRINK ANYTHING after midnight on the evening before your procedure including coffee, tea, gum or hard candy.    - ONLY SIPS OF WATER with your medications are allowed on the morning of your procedure. - Avoid OTC non-directed Vit/ Suppl/ Herbals 7 days prior to surgery to ensure no drug interactions with perioperative surgical/ anesthetic meds  - Avoid NSAIDs 3 days prior. Tylenol is ok to take as needed. - Avoid ASA containing products 5 days prior, unless otherwise instructed by your provider   - Bring a list of meds you take at home with your last dose noted    - Avoid alcohol 24hrs before your surgery. - Follow the pre surgery showering instructions as listed in the [facility] Surgical Experience Booklet” or otherwise provided by your surgeon's office.  - Bathing instructions, has chg, neck down, no genitals  - No lotions, powders, sprays, deodorant, cologne, jewelry  - Do not use a blade to shave the surgical area 1 week before surgery. It is ok to use clean electric clippers up to 24 hours before surgery. Do not shave any body parts with a razor within 24hrs. - Do not use dry shampoo, hair spray, hair gel, or any type of hair products.      - For outpatient surgery, arrange for someone to drive you home after the procedure & stay with you until the next morning. Visitor guidelines discussed. - Bring insurance cards & photo id    - Bring a case for your glasses. - Leave all valuables such as credit cards, money & jewelry at home  - Please bring any specially ordered equipment (sling, braces) if indicated. - Wear causal clothing that is easy to take on and off. Consider your type of surgery.    - Notify surgeon if you develop any new illnesses, exposure, develop a rash/ open wounds or have additional questions prior to your surgery. - Did the surgeon's office give you any other special instructions? No  - Did surgeon require any clearances? No    You will receive a call one business day prior to surgery with an arrival time and hospital directions. If your surgery is scheduled on a Monday, the hospital will be calling you on the Friday prior to your surgery.

## 2023-11-30 ENCOUNTER — TELEPHONE (OUTPATIENT)
Dept: HEMATOLOGY ONCOLOGY | Facility: CLINIC | Age: 53
End: 2023-11-30

## 2023-11-30 ENCOUNTER — HOSPITAL ENCOUNTER (OUTPATIENT)
Dept: RADIOLOGY | Facility: HOSPITAL | Age: 53
Discharge: HOME | End: 2023-11-30
Payer: COMMERCIAL

## 2023-11-30 ENCOUNTER — APPOINTMENT (OUTPATIENT)
Dept: LAB | Facility: HOSPITAL | Age: 53
End: 2023-11-30
Payer: COMMERCIAL

## 2023-11-30 DIAGNOSIS — C62.90 SEMINOMA (HCC): ICD-10-CM

## 2023-11-30 PROCEDURE — 71046 X-RAY EXAM CHEST 2 VIEWS: CPT

## 2023-11-30 NOTE — TELEPHONE ENCOUNTER
Called and spoke with patient's wife about patient having labs collected prior to his Monday 12/4 appointment with Cyndy Iglesias. Patient's wife mentioned patient will be going for labs later today.

## 2023-12-04 ENCOUNTER — OFFICE VISIT (OUTPATIENT)
Dept: HEMATOLOGY ONCOLOGY | Facility: CLINIC | Age: 53
End: 2023-12-04
Payer: COMMERCIAL

## 2023-12-04 VITALS
HEART RATE: 56 BPM | RESPIRATION RATE: 18 BRPM | HEIGHT: 75 IN | WEIGHT: 298 LBS | SYSTOLIC BLOOD PRESSURE: 118 MMHG | TEMPERATURE: 98.2 F | OXYGEN SATURATION: 95 % | BODY MASS INDEX: 37.05 KG/M2 | DIASTOLIC BLOOD PRESSURE: 68 MMHG

## 2023-12-04 DIAGNOSIS — N64.4 BREAST PAIN, LEFT: ICD-10-CM

## 2023-12-04 DIAGNOSIS — C62.90 SEMINOMA (HCC): Primary | ICD-10-CM

## 2023-12-04 PROCEDURE — 99214 OFFICE O/P EST MOD 30 MIN: CPT | Performed by: INTERNAL MEDICINE

## 2023-12-05 ENCOUNTER — HOSPITAL ENCOUNTER (OUTPATIENT)
Facility: HOSPITAL | Age: 53
Setting detail: OUTPATIENT SURGERY
Discharge: HOME | End: 2023-12-05
Attending: ORTHOPAEDIC SURGERY | Admitting: ORTHOPAEDIC SURGERY
Payer: COMMERCIAL

## 2023-12-05 ENCOUNTER — ANESTHESIA (OUTPATIENT)
Dept: PERIOP | Facility: HOSPITAL | Age: 53
End: 2023-12-05
Payer: COMMERCIAL

## 2023-12-05 VITALS
DIASTOLIC BLOOD PRESSURE: 61 MMHG | BODY MASS INDEX: 36.65 KG/M2 | HEIGHT: 75 IN | SYSTOLIC BLOOD PRESSURE: 121 MMHG | RESPIRATION RATE: 14 BRPM | OXYGEN SATURATION: 95 % | WEIGHT: 294.75 LBS | HEART RATE: 55 BPM | TEMPERATURE: 96.9 F

## 2023-12-05 DIAGNOSIS — S83.242A DISLOCATION OF LEFT KNEE WITH MEDIAL MENISCUS TEAR, INITIAL ENCOUNTER: ICD-10-CM

## 2023-12-05 DIAGNOSIS — S83.105A DISLOCATION OF LEFT KNEE WITH MEDIAL MENISCUS TEAR, INITIAL ENCOUNTER: ICD-10-CM

## 2023-12-05 DIAGNOSIS — S83.242A ACUTE MEDIAL MENISCUS TEAR OF LEFT KNEE, INITIAL ENCOUNTER: Primary | ICD-10-CM

## 2023-12-05 PROCEDURE — 29880 ARTHRS KNE SRG MNISECTMY M&L: CPT | Performed by: ORTHOPAEDIC SURGERY

## 2023-12-05 RX ORDER — OXYCODONE HYDROCHLORIDE 5 MG/1
5 TABLET ORAL EVERY 4 HOURS PRN
Qty: 45 TABLET | Refills: 0 | Status: SHIPPED | OUTPATIENT
Start: 2023-12-05 | End: 2023-12-15

## 2023-12-05 RX ORDER — PROPOFOL 10 MG/ML
INJECTION, EMULSION INTRAVENOUS AS NEEDED
Status: DISCONTINUED | OUTPATIENT
Start: 2023-12-05 | End: 2023-12-05

## 2023-12-05 RX ORDER — SODIUM CHLORIDE 9 MG/ML
125 INJECTION, SOLUTION INTRAVENOUS CONTINUOUS
Status: DISCONTINUED | OUTPATIENT
Start: 2023-12-05 | End: 2023-12-05 | Stop reason: HOSPADM

## 2023-12-05 RX ORDER — OXYCODONE HYDROCHLORIDE 5 MG/1
5 TABLET ORAL EVERY 4 HOURS PRN
Status: DISCONTINUED | OUTPATIENT
Start: 2023-12-05 | End: 2023-12-05 | Stop reason: HOSPADM

## 2023-12-05 RX ORDER — ONDANSETRON 2 MG/ML
INJECTION INTRAMUSCULAR; INTRAVENOUS AS NEEDED
Status: DISCONTINUED | OUTPATIENT
Start: 2023-12-05 | End: 2023-12-05

## 2023-12-05 RX ORDER — OXYCODONE HYDROCHLORIDE 10 MG/1
10 TABLET ORAL EVERY 4 HOURS PRN
Status: DISCONTINUED | OUTPATIENT
Start: 2023-12-05 | End: 2023-12-05 | Stop reason: HOSPADM

## 2023-12-05 RX ORDER — ASPIRIN 81 MG/1
81 TABLET ORAL 2 TIMES DAILY
Qty: 28 TABLET | Refills: 0 | Status: SHIPPED | OUTPATIENT
Start: 2023-12-05 | End: 2023-12-05

## 2023-12-05 RX ORDER — ONDANSETRON 2 MG/ML
4 INJECTION INTRAMUSCULAR; INTRAVENOUS ONCE AS NEEDED
Status: DISCONTINUED | OUTPATIENT
Start: 2023-12-05 | End: 2023-12-05 | Stop reason: HOSPADM

## 2023-12-05 RX ORDER — FENTANYL CITRATE 50 UG/ML
INJECTION, SOLUTION INTRAMUSCULAR; INTRAVENOUS AS NEEDED
Status: DISCONTINUED | OUTPATIENT
Start: 2023-12-05 | End: 2023-12-05

## 2023-12-05 RX ORDER — TRANEXAMIC ACID 10 MG/ML
1000 INJECTION, SOLUTION INTRAVENOUS ONCE
Status: COMPLETED | OUTPATIENT
Start: 2023-12-05 | End: 2023-12-05

## 2023-12-05 RX ORDER — MORPHINE SULFATE 10 MG/ML
2 INJECTION, SOLUTION INTRAMUSCULAR; INTRAVENOUS EVERY 4 HOURS PRN
Status: DISCONTINUED | OUTPATIENT
Start: 2023-12-05 | End: 2023-12-05 | Stop reason: HOSPADM

## 2023-12-05 RX ORDER — NAPROXEN 500 MG/1
500 TABLET ORAL 2 TIMES DAILY WITH MEALS
Qty: 60 TABLET | Refills: 0 | Status: SHIPPED | OUTPATIENT
Start: 2023-12-05

## 2023-12-05 RX ORDER — ROPIVACAINE HYDROCHLORIDE 5 MG/ML
INJECTION, SOLUTION EPIDURAL; INFILTRATION; PERINEURAL
Status: COMPLETED | OUTPATIENT
Start: 2023-12-05 | End: 2023-12-05

## 2023-12-05 RX ORDER — FENTANYL CITRATE/PF 50 MCG/ML
25 SYRINGE (ML) INJECTION
Status: DISCONTINUED | OUTPATIENT
Start: 2023-12-05 | End: 2023-12-05 | Stop reason: HOSPADM

## 2023-12-05 RX ORDER — ONDANSETRON 4 MG/1
4 TABLET, ORALLY DISINTEGRATING ORAL EVERY 8 HOURS PRN
Qty: 15 TABLET | Refills: 0 | Status: SHIPPED | OUTPATIENT
Start: 2023-12-05

## 2023-12-05 RX ORDER — LIDOCAINE HCL/EPINEPHRINE/PF 2%-1:200K
VIAL (ML) INJECTION AS NEEDED
Status: DISCONTINUED | OUTPATIENT
Start: 2023-12-05 | End: 2023-12-05

## 2023-12-05 RX ORDER — MIDAZOLAM HYDROCHLORIDE 2 MG/2ML
INJECTION, SOLUTION INTRAMUSCULAR; INTRAVENOUS AS NEEDED
Status: DISCONTINUED | OUTPATIENT
Start: 2023-12-05 | End: 2023-12-05

## 2023-12-05 RX ORDER — DEXAMETHASONE SODIUM PHOSPHATE 10 MG/ML
INJECTION, SOLUTION INTRAMUSCULAR; INTRAVENOUS AS NEEDED
Status: DISCONTINUED | OUTPATIENT
Start: 2023-12-05 | End: 2023-12-05

## 2023-12-05 RX ORDER — CEFAZOLIN SODIUM 2 G/50ML
2000 SOLUTION INTRAVENOUS ONCE
Status: COMPLETED | OUTPATIENT
Start: 2023-12-05 | End: 2023-12-05

## 2023-12-05 RX ORDER — LIDOCAINE HYDROCHLORIDE 20 MG/ML
INJECTION, SOLUTION EPIDURAL; INFILTRATION; INTRACAUDAL; PERINEURAL AS NEEDED
Status: DISCONTINUED | OUTPATIENT
Start: 2023-12-05 | End: 2023-12-05

## 2023-12-05 RX ORDER — CHLORHEXIDINE GLUCONATE ORAL RINSE 1.2 MG/ML
15 SOLUTION DENTAL ONCE
Status: COMPLETED | OUTPATIENT
Start: 2023-12-05 | End: 2023-12-05

## 2023-12-05 RX ADMIN — MIDAZOLAM 2 MG: 1 INJECTION INTRAMUSCULAR; INTRAVENOUS at 09:30

## 2023-12-05 RX ADMIN — FENTANYL CITRATE 100 MCG: 50 INJECTION INTRAMUSCULAR; INTRAVENOUS at 09:30

## 2023-12-05 RX ADMIN — FENTANYL CITRATE 25 MCG: 0.05 INJECTION, SOLUTION INTRAMUSCULAR; INTRAVENOUS at 10:49

## 2023-12-05 RX ADMIN — CEFAZOLIN SODIUM 2000 MG: 2 SOLUTION INTRAVENOUS at 09:30

## 2023-12-05 RX ADMIN — LIDOCAINE HYDROCHLORIDE AND EPINEPHRINE 20 ML: 20; 5 INJECTION, SOLUTION EPIDURAL; INFILTRATION; INTRACAUDAL; PERINEURAL at 09:31

## 2023-12-05 RX ADMIN — LIDOCAINE HYDROCHLORIDE 100 MG: 20 INJECTION, SOLUTION EPIDURAL; INFILTRATION; INTRACAUDAL at 09:39

## 2023-12-05 RX ADMIN — CHLORHEXIDINE GLUCONATE 15 ML: 1.2 RINSE ORAL at 08:00

## 2023-12-05 RX ADMIN — TRANEXAMIC ACID 1000 MG: 10 INJECTION, SOLUTION INTRAVENOUS at 09:44

## 2023-12-05 RX ADMIN — DEXAMETHASONE SODIUM PHOSPHATE 10 MG: 10 INJECTION INTRAMUSCULAR; INTRAVENOUS at 09:43

## 2023-12-05 RX ADMIN — FENTANYL CITRATE 25 MCG: 0.05 INJECTION, SOLUTION INTRAMUSCULAR; INTRAVENOUS at 10:56

## 2023-12-05 RX ADMIN — ONDANSETRON 4 MG: 2 INJECTION INTRAMUSCULAR; INTRAVENOUS at 09:43

## 2023-12-05 RX ADMIN — PROPOFOL 250 MG: 10 INJECTION, EMULSION INTRAVENOUS at 09:39

## 2023-12-05 RX ADMIN — SODIUM CHLORIDE: 0.9 INJECTION, SOLUTION INTRAVENOUS at 10:19

## 2023-12-05 RX ADMIN — ROPIVACAINE HYDROCHLORIDE 30 ML: 5 INJECTION, SOLUTION EPIDURAL; INFILTRATION; PERINEURAL at 09:31

## 2023-12-05 RX ADMIN — SODIUM CHLORIDE 125 ML/HR: 0.9 INJECTION, SOLUTION INTRAVENOUS at 08:00

## 2023-12-05 NOTE — ANESTHESIA POSTPROCEDURE EVALUATION
Post-Op Assessment Note    CV Status:  Stable  Pain Score: 3    Pain management: adequate       Mental Status:  Alert and awake   Hydration Status:  Euvolemic   PONV Controlled:  Controlled   Airway Patency:  Patent  Two or more mitigation strategies used for obstructive sleep apnea   Post Op Vitals Reviewed: Yes      Staff: Anesthesiologist, CRNA               BP      Temp     Pulse     Resp      SpO2      /61   Pulse 55   Temp (!) 96.9 °F (36.1 °C) (Temporal)   Resp 14   Ht 6' 3" (1.905 m)   Wt 134 kg (294 lb 12.1 oz)   SpO2 95%   BMI 36.84 kg/m²

## 2023-12-05 NOTE — OP NOTE
OPERATIVE REPORT    PATIENT NAME: Raffi Guo   :  1970  MRN: [id number]  Pt Location: [facility] OR ROOM 02    SURGERY DATE: 2023    SURGEON(S) and ROLE:  Primary: Pranav Mccloud MD  Assisting: Phyllis Barrera MD    NOTE:  I was present for the entire procedure and performed all essential portions of the surgery. PREOPERATIVE DIAGNOSES:  Left Knee  Medial Meniscus Tear  Lateral Meniscus Tear    POSTOPERATIVE DIAGNOSES:  Left Knee  Medial Meniscus Tear  Lateral Meniscus Tear    PROCEDURES:  Left Knee Arthroscopy with:  Partial Medial Meniscectomy  Partial Lateral Meniscectomy      ANESTHESIA TYPE:  General LMA and Intra-articular block    ANESTHESIA STAFF:   Anesthesiologist: Hank Mccurdy DO  CRNA: Kings Cárdenas CRNA    ESTIMATED BLOOD LOSS:  5 mL    TOURNIQUET TIME:  Not used    PERIOPERATIVE ANTIBIOTICS:  cefazolin, 2 grams    IMPLANTS:  none    * No implants in log *    SPECIMENS:  * No specimens in log *    DRAINS:  None      OPERATIVE INDICATIONS:  The patient is a 52 y.o. male with left knee pain and medial and lateral meniscus tears. Surgical treatment was indicated due to persistent symptoms despite non-surgical treatment. After a thorough discussion of the potential risks, benefits, and alternative treatments, the patient agreed to proceed with surgery. The patient understands that the risks of surgery include, but are not limited to: infection, neurovascular injury, wound healing complications, venous thromboembolism, persistent pain, stiffness, instability, recurrence of symptoms, potential need for additional surgeries, and loss of limb or life. Oral and written consent for surgery was obtained from the patient preoperatively. EXAM UNDER ANESTHESIA:  Neutral alignment  ROM:  0-135 degrees  Ligaments stable to varus stress / valgus stress / Lachman / posterior drawer; negative pivot-shift  Patella tracking normal  without crepitus.       PROCEDURE AND TECHNIQUE:  On the day of surgery, the patient was identified in the preoperative holding area. The operative site was marked by the surgeon. The patient was taken into the operating room. A time-out was conducted to confirm the patient's identity, the operative site, and the proposed procedure. The patient was anesthetized, and perioperative antibiotics were administered. The patient was positioned supine on the OR table. All bony prominences were padded. A tourniquet was not used. The operative site was prepped and draped using standard sterile technique. An anterolateral portal was established, and the arthroscope was inserted into the knee joint. An anteromedial portal was established under direct visualization, and diagnostic arthroscopy was performed. The joint demonstrated mild synovitis which was debrided with a motorized shaver. In the patellofemoral compartment, the trochlea demonstrated diffuse grade 2 chondral wear which was treated with chondroplasty to remove all loose flaps of tissue . The patella demonstrated diffuse grade 1 chondral wear which required no treatment . The patella tracking was normal.  .    In the medial compartment, the medial femoral condyle demonstrated diffuse grade 1 chondral wear which was treated with chondroplasty to remove all loose flaps of tissue . The medial tibial plateau demonstrated diffuse grade 1 chondral wear which was treated with chondroplasty to remove all loose flaps of tissue . The medial meniscus had a horizontal cleavage tear involving the posterior horn. The torn portion of the meniscus was removed and debrided to a stable base with a basket and motorized shaver. 50% of the meniscus width remained intact. In the lateral compartment, the lateral femoral condyle demonstrated diffuse grade 1 chondral wear which required no treatment .   The lateral tibial plateau demonstrated diffuse grade 3 chondral wear which was treated with chondroplasty to remove all loose flaps of tissue . The lateral meniscus had a complex tear involving the posterior horn and body. The torn portion of the meniscus was removed and debrided to a stable base with a basket and motorized shaver. 50% of the meniscus width remained intact. In the intercondylar notch, the PCL was intact. The ACL was intact. At the conclusion of the procedure, the instruments were withdrawn. The portals and incisions were closed with absorbable sutures and steri-strips. A sterile dressing was applied. The surgical drapes were removed. A soft bandage was applied to the operative knee. The patient was awakened from anesthesia and transported to the PACU in stable condition.       COMPLICATIONS:  None    PATIENT DISPOSITION:  PACU       SIGNATURE:  Mckenna Bales MD  DATE:  December 5, 2023  TIME:  4:38 PM

## 2023-12-05 NOTE — DISCHARGE INSTR - AVS FIRST PAGE
POSTOPERATIVE INSTRUCTIONS following KNEE SURGERY    MEDICATIONS:  Resume all home medications unless otherwise instructed by your surgeon. Pain Medication:  Oxycodone 5 mg, 1-3 tablets every 3 hours as needed  If you were given a regional anesthetic (nerve block), please begin taking the pain medication as soon as you get home, even if you have minimal or no pain. DO NOT WAIT FOR THE NERVE BLOCK TO WEAR OFF. Possible side effects include nausea, constipation, and urinary retention. If you experience these side effects, please call our office for assistance. Pain med refills are authorized only during office hours (8am-4pm, Mon-Fri). Anti-Inflammatory:  Naproxen 500 mg, 1 tablet every 12 hours for 4 weeks  Take with food. Stop if you experience nausea, reflux, or stomach pain. Nausea Medication:  Zofran ODT 4 mg, 1 tablet every 6 hours as needed  Fill prescription ONLY if you expericnce severe nausea. Blood Clot Prevention:  Eliquis 2.5 mg, 1 tablet every 12 hours for 2 weeks post-op  Pump your foot up and down 20 times per hour while you are less mobile. WOUND CARE:  Keep the dressing clean and dry. Light drainage may occur the first 2 days postop. You may remove the dressings and get the incision wet in the shower 72 hours after surgery. DO NOT remove steri-strips or sutures. DO NOT immerse the incision under water. Carefully pat the incision dry. If there is wound drainage, re-apply a fresh dry gauze dressing. Please call our office ([phone]) if you experience either of the following:  Sudden increase in swelling, redness, or warmth at the surgical site  Excessive incisional drainage that persists beyond the 3rd day after surgery  Oral temperature greater than 101 degrees, not relieved with Tylenol  Shortness of breath, chest pain, nausea, or any other concerning symptoms    SWELLING CONTROL:  Cold Therapy:   The cold therapy device may be used either continuously or only as needed, according to your preference. Do not let the pad directly touch your skin. Alternatively, apply ice (20 min on, 20 min off) as often as you feel is necessary. Elevation:  Elevate the entire leg above heart level. Place pillows under your ankle to keep your knee straight. Compression:  Apply ACE wraps or a thigh-length compression stocking as needed. RANGE OF MOTION:  You are allowed FULL RANGE OF MOTION as tolerated. IMMOBILIZATION:  None. You are allowed full range of motion as tolerated. ACTIVITY:   BEAR FULL WEIGHT AS TOLERATED on the operative leg. Use crutches to assist only as needed. Using Crutches on Stairs:  Going up, lead with your "good" (nonoperative) leg. Going down, lead with your "bad" (operative) leg. Use a hand rail when available. Knee Extension:  Place a rolled towel or pillow under your ankle for 20-30 minutes 3-5 times per day. This will help to maintain full knee extension. Quad Sets:  Sit or lie with your knee straight. Tighten your quadriceps (front thigh) muscle. Hold for 3 seconds, then relax. Repeat 20 times per hour while awake. PHYSICAL THERAPY:  You will be given a physical therapy prescription when you are seen in the office for your postoperative appointment. FOLLOW-UP APPOINTMENT:  4-5 days after surgery with:    Dr. Bryson Rockwell.  Radha Matos[address] [facility]  [address] [place] [address] MARY JANE Shi, [address] [facility]  [phone] ([address] [place] [address])  [phone] (After-Hours)

## 2023-12-05 NOTE — ANESTHESIA PREPROCEDURE EVALUATION
Procedure:  ARTHROSCOPY KNEE, partial meniscectomy vs repair (Left: Knee)    Relevant Problems   HEMATOLOGY   (+) Normocytic anemia      NEURO/PSYCH   (+) Headache      Other   (+) Chemotherapy-induced neutropenia    (+) Obesity, Class II, BMI 35-39.9        Physical Exam    Airway    Mallampati score: II  TM Distance: >3 FB  Neck ROM: full     Dental   No notable dental hx     Cardiovascular  Rhythm: regular, Rate: normal, Cardiovascular exam normal    Pulmonary  Pulmonary exam normal Breath sounds clear to auscultation    Other Findings    Anesthesia Plan  ASA Score- 2     Anesthesia Type- general with ASA Monitors. Additional Monitors:     Airway Plan: LMA. Comment: Intra-articular block preop--for postop pain control--discussed with patient and spouse preoperatively. .       Plan Factors-    Chart reviewed. Patient summary reviewed. Patient is not a current smoker. Patient instructed to abstain from smoking on day of procedure. Patient did not smoke on day of surgery. Induction- intravenous. Postoperative Plan-     Informed Consent- Anesthetic plan and risks discussed with patient and spouse.

## 2023-12-05 NOTE — INTERVAL H&P NOTE
H&P reviewed. After examining the patient I find no changes in the patients condition since the H&P had been written.     Vitals:    12/05/23 0813   BP: 137/60   Pulse: 84   Resp: 16   Temp: 97.6 °F (36.4 °C)   SpO2: 96% converted to afib 130-140 while ambulating

## 2023-12-05 NOTE — ANESTHESIA PROCEDURE NOTES
Peripheral Block    Patient location during procedure: holding area  Start time: 12/5/2023 9:29 AM  Reason for block: at surgeon's request and post-op pain management  Staffing  Performed by: Michael Ann DO  Authorized by: Alisia Castillo DO    Preanesthetic Checklist  Completed: patient identified, IV checked, site marked, risks and benefits discussed, surgical consent, monitors and equipment checked, pre-op evaluation and timeout performed  Peripheral Block  Patient position: sitting  Prep: ChloraPrep  Patient monitoring: frequent blood pressure checks, continuous pulse oximetry and heart rate  Anesthesia block type: Intra articular. Laterality: right  Injection technique: single-shot  Procedures: ultrasound guided, Ultrasound guidance required for the procedure to increase accuracy and safety of medication placement and decrease risk of complications.   Ultrasound permanent image savedropivacaine (NAROPIN) 0.5 % injection 20 mL - Perineural   30 mL - 12/5/2023 9:31:00 AM  Needle  Needle gauge: 18 G  Needle localization: anatomical landmarks  Assessment  Injection assessment: incremental injection, frequent aspiration, injected with ease, negative aspiration, negative for heart rate change, no paresthesia on injection and no symptoms of intraneural/intravenous injection  Paresthesia pain: none  Post-procedure:  site cleaned  patient tolerated the procedure well with no immediate complications

## 2023-12-06 ENCOUNTER — TELEPHONE (OUTPATIENT)
Dept: GENETICS | Facility: CLINIC | Age: 53
End: 2023-12-06

## 2023-12-06 NOTE — TELEPHONE ENCOUNTER
Patients wife called in with a question regarding genetic test results. Per his wife, patient recently had nodules identified in his breast and so she wanted to make sure that he was tested for breast cancer-related genes. Confirmed that he was tested for a 47 gene panel including breast cancer genes.  Patient's wife requested we send a hard copy of his results to their home

## 2023-12-08 ENCOUNTER — OFFICE VISIT (OUTPATIENT)
Dept: OBGYN CLINIC | Facility: MEDICAL CENTER | Age: 53
End: 2023-12-08

## 2023-12-08 VITALS
SYSTOLIC BLOOD PRESSURE: 121 MMHG | BODY MASS INDEX: 37.18 KG/M2 | DIASTOLIC BLOOD PRESSURE: 73 MMHG | HEART RATE: 58 BPM | WEIGHT: 299 LBS | HEIGHT: 75 IN

## 2023-12-08 DIAGNOSIS — S83.242A OTHER TEAR OF MEDIAL MENISCUS, CURRENT INJURY, LEFT KNEE, INITIAL ENCOUNTER: Primary | ICD-10-CM

## 2023-12-08 PROCEDURE — 99024 POSTOP FOLLOW-UP VISIT: CPT | Performed by: PHYSICIAN ASSISTANT

## 2023-12-08 RX ORDER — SALICYLIC ACID 40 %
ADHESIVE PATCH, MEDICATED TOPICAL
COMMUNITY
Start: 2023-12-05

## 2023-12-08 NOTE — PROGRESS NOTES
Orthopaedic Surgery - Office Note  Davis May (52 y.o. male)   : 1970   MRN: [id number]  Encounter Date: 2023    Chief Complaint   Patient presents with    Left Knee - Follow-up       Assessment / Plan  Status post left knee arthroscopy with partial medial and lateral meniscectomies on 2023    Continue with ice and compression for swelling management. NSAIDs and analgesics as needed for pain. I did review the patient's intraoperative photos with him at today's visit. Patient will start home exercise program, I did provide him a handout of the partial meniscectomy protocol. He will contact us if he would like to pursue outpatient physical therapy at any point. Return in about 6 weeks (around 2024) for follow up in 6 weeks. History of Present Illness  Davis May is a 52 y.o. male who presents for follow-up status post left knee arthroscopy with partial medial and lateral meniscectomies on 2023. Patient is doing well at this time. He has progressed off of the oxycodone after the first day and currently is still taking naproxen and icing frequently. He has continued with compression and not had any issues with the incision up to this point. He denies any distal numbness or tingling at this time. He has been doing some home stretching on his own. Review of Systems  Pertinent items are noted in HPI. All other systems were reviewed and are negative. Physical Exam  /73   Pulse 58   Ht 6' 3" (1.905 m)   Wt 136 kg (299 lb)   BMI 37.37 kg/m²   Cons: Appears well. No apparent distress. Psych: Alert. Oriented x3. Mood and affect normal.  Eyes: PERRLA, EOMI  Resp: Normal effort. No audible wheezing or stridor. CV: Palpable pulse. No discernable arrhythmia. No LE edema. Lymph:  No palpable cervical, axillary, or inguinal lymphadenopathy. Skin: Warm. No palpable masses. No visible lesions. Neuro: Normal muscle tone. Normal and symmetric DTR's.      Left Knee Exam  Alignment:  Normal knee alignment. Inspection:   Incisions healing well at this time Steri-Strips were placed. No active drainage noted. Palpation:   Mild tenderness at gregg-incisional areas. ROM:  Knee Extension 0 degrees. Knee Flexion 100 degrees. Strength:  Able to actively extend knee against gravity. Stability:  Not tested. Tests:  No pertinent positive or negative tests. Patella:  Patella tracks centrally with crepitus. Neurovascular:  Sensation intact in DP/SP/Guzman/Sa/T nerve distributions. Sensation intact in all digital nerve distributions. Toes warm and perfused. Gait:  Antalgic. Studies Reviewed  No studies to review    Procedures  No procedures today. Medical, Surgical, Family, and Social History  The patient's medical history, family history, and social history, were reviewed and updated as appropriate.     Past Medical History:   Diagnosis Date    Allergic 1/1/2000    seasonal allergies    Cancer ([address])     COVID 2021    History of cancer chemotherapy     History of transfusion     Impacted cerumen     Lymphadenopathy     Obesity, unspecified     Seasonal allergies     Seminoma of testis ([address])     Testicular cancer ([address]) 01/17/2023    Torn meniscus     right knee- surgical repair today 7/19/2021    Wears glasses        Past Surgical History:   Procedure Laterality Date    COLONOSCOPY      FACIAL/NECK BIOPSY Left 01/11/2023    Procedure: EXCISION OF LEFT NECK LYMPH NODE WITH LYMPHOMA PROTOCOL;  Surgeon: Angela Lopez MD;  Location: [facility];  Service: Surgical Oncology    [place] [address]  01/23/2023    HEMORROIDECTOMY      IR PORT REMOVAL      KNEE SURGERY Right 7/19/2021    MA ARTHRS KNE SURG W/MENISCECTOMY MED/LAT W/SHVG Right 07/19/2021    Procedure: ARTHROSCOPY KNEE, partial lateral meniscectomy;  Surgeon: Chi Estrada MD;  Location: [facility];  Service: Orthopedics     [address] W/MENISCECTOMY MED/LAT W/SHVG Left 12/5/2023 Procedure: ARTHROSCOPY KNEE, partial lateral meniscectomy;  Surgeon: Nemesio Aburto MD;  Location: [facility];  Service: Orthopedics    OH ORCHIECTOMY RADICAL TUMOR INGUINAL APPROACH Left 06/16/2023    Procedure: RADICAL ORCHIECTOMY INGUINAL; EXCISION CORD LIPOMA;  Surgeon: Orlando Alan MD;  [facility];  Service: Urology    TUNNELED VENOUS PORT PLACEMENT N/A 01/23/2023    Procedure: INSERTION VENOUS PORT (PORT-A-CATH); Surgeon: Rachana Merritt MD;  Location: [facility];  Service: Surgical Oncology       Family History   Problem Relation Age of Onset    Cancer Mother         Breast Cancer    Breast cancer Mother     Breast cancer additional onset Mother     Cancer Father         Skin carcenoma    Skin cancer Father     Breast cancer additional onset Sister     Cancer Sister         Breast Cancer and Skin carcenoma    Skin cancer Brother        Social History     Occupational History    Not on file   Tobacco Use    Smoking status: Never     Passive exposure: Past    Smokeless tobacco: Never    Tobacco comments:     During childhood, both parents smoked. Vaping Use    Vaping Use: Never used   Substance and Sexual Activity    Alcohol use:  Yes     Alcohol/week: 3.0 standard drinks of alcohol     Types: 1 Glasses of wine, 1 Cans of beer, 1 Shots of liquor per week     Comment: 1-2 weekly    Drug use: Never    Sexual activity: Yes     Partners: Female       Allergies   Allergen Reactions    Oyster Shell - Food Allergy Vomiting         Current Outpatient Medications:     apixaban (Eliquis) 2.5 mg, Take 1 tablet (2.5 mg total) by mouth 2 (two) times a day for 14 days, Disp: 28 tablet, Rfl: 0    Cholecalciferol (Vitamin D3) 1.25 MG (30145 UT) CAPS, Take by mouth in the morning, Disp: , Rfl:     loratadine (CLARITIN) 10 mg tablet, Take 10 mg by mouth if needed for allergies, Disp: , Rfl:     multivitamin (THERAGRAN) TABS, Take 1 tablet by mouth daily, Disp: , Rfl:     NON FORMULARY, Take 1 capsule by mouth 3 (three) times a day Nervive; s/p neuropathy from chemo, Disp: , Rfl:     ondansetron (ZOFRAN-ODT) 4 mg disintegrating tablet, Take 1 tablet (4 mg total) by mouth every 8 (eight) hours as needed for nausea or vomiting, Disp: 15 tablet, Rfl: 0    triamcinolone (KENALOG) 0.1 % cream, Apply topically 2 (two) times a day (Patient taking differently: Apply 1 Application topically if needed), Disp: 60 g, Rfl: 1    acetaminophen (TYLENOL) 325 mg tablet, Take 650 mg by mouth every 6 (six) hours as needed for mild pain (Patient not taking: Reported on 12/8/2023), Disp: , Rfl:     [employer] Aspirin Low Dose 81 MG EC tablet, TAKE 1 TABLET (81 MG TOTAL) BY MOUTH 2 (TWO) TIMES A DAY FOR 14 DAYS (Patient not taking: Reported on 12/8/2023), Disp: , Rfl:     naproxen (NAPROSYN) 500 mg tablet, Take 1 tablet (500 mg total) by mouth 2 (two) times a day with meals (Patient not taking: Reported on 12/8/2023), Disp: 60 tablet, Rfl: 0    oxyCODONE (ROXICODONE) 5 immediate release tablet, Take 1 tablet (5 mg total) by mouth every 4 (four) hours as needed for moderate pain for up to 10 days Max Daily Amount: 30 mg (Patient not taking: Reported on 12/8/2023), Disp: 45 tablet, Rfl: 0      Dunog Dutton PA-C    Scribe Attestation      I,:   am acting as a scribe while in the presence of the attending physician.:       I,:   personally performed the services described in this documentation    as scribed in my presence.:

## 2023-12-13 ENCOUNTER — HOSPITAL ENCOUNTER (OUTPATIENT)
Dept: MAMMOGRAPHY | Facility: CLINIC | Age: 53
Discharge: HOME | End: 2023-12-13
Payer: COMMERCIAL

## 2023-12-13 ENCOUNTER — HOSPITAL ENCOUNTER (OUTPATIENT)
Dept: ULTRASOUND IMAGING | Facility: CLINIC | Age: 53
Discharge: HOME | End: 2023-12-13
Payer: COMMERCIAL

## 2023-12-13 VITALS — HEIGHT: 75 IN | WEIGHT: 299 LBS | BODY MASS INDEX: 37.18 KG/M2

## 2023-12-13 DIAGNOSIS — N64.4 BREAST PAIN, LEFT: ICD-10-CM

## 2023-12-13 PROCEDURE — G0279 TOMOSYNTHESIS, MAMMO: HCPCS

## 2023-12-13 PROCEDURE — 76642 ULTRASOUND BREAST LIMITED: CPT

## 2023-12-13 PROCEDURE — 77066 DX MAMMO INCL CAD BI: CPT

## 2024-01-09 DIAGNOSIS — N64.4 BREAST PAIN, LEFT: Primary | ICD-10-CM

## 2024-01-09 DIAGNOSIS — C62.90 SEMINOMA (HCC): ICD-10-CM

## 2024-01-09 DIAGNOSIS — R59.0 RETROPERITONEAL LYMPHADENOPATHY: ICD-10-CM

## 2024-01-19 NOTE — PROGRESS NOTES
Hematology/Oncology Outpatient Office Note    Date of Service: 2024    [facility]  [address] [place] [address]  [phone]    Reason for Consultation:   Chief Complaint   Patient presents with    Follow-up       Cancer Stage at diagnosis: IIIC    Referral Physician: No ref. provider found    Primary Care Physician:  Maurice Muñoz DO     Nickname: Mario    Spouse: Candis Berry ECO    Today's ECO     Goals and Barriers:  Current Goal: Minimize effects of disease burden, extend life.   Barriers to accomplishing this: None    Patient's Capacity to Self Care:  Patient is able to self care    Code Status: not addressed today    Advanced directives: not addressed today    ASSESSMENT & PLAN      Diagnosis ICD-10-CM Associated Orders   1. Seminoma (HCC)  C62.90 XR chest pa & lateral     Ambulatory referral to Radiation Oncology            This is a 53 y.o. 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    Baseline PFT: WNL    C3D15 bleomycin omitted due to severe pancytopenia requiring 2U PRBC.    2023  tumor board: get PET/CT, if any growth, would do dissection, Otherwise, start surveillance thereafter  2023:  tumor board recommendation to pursue surveillance    5/3/2023: , HCG <1 WNL, AFP slightly high for first time at 8.1    Variant 1  NTHL1  c.787C>T (p.R263C); heterozygous; uncertain significance   Variant 2  SDHC c.15G>T (p.L5F); heterozygous; uncertain sig    Discussion of decision making  Oncology history updated, accordingly, during this visit  Goals of care/patient communication  I discussed with the patient the clinical course leading up to their cancer diagnosis. I reviewed relevant office notes, imaging reports and pathology result as  well.  I told the patient that this is a case of potentially curable disease and what this means. We discussed that the goal of anti-cancer therapy is to provide best quality of life, extend overall survival, and progression free survival as shown in clinical trials. We also discussed that there might be a point when the cancer will no longer respond to this anti-neoplastic therapy.   I explained the risks/benefits of the proposed cancer therapy: Bleomycin 30 units IV, Etoposide 100mg/m2, cisplatin 20mg/m2, and after discussion including understanding risks of possible life-threatening complications and therapy-related malignancy development, informed consent for blood products and treatment has been signed and obtained.  TNM/Staging At Diagnosis  [id number] S2  Cancer Staging   IIIC, poor risk (non pulmonary visceral mets)  Disease Features/Tumor Markers/Genetics  Tumor Marker: 1/18/2023:  (3x ULN), AFP (5.2, WNL), HCG (13, slightly high)  7/28/2023: HCG <1, , AFP 4.79  10/5/2023: HCG <1, , AFP 4.51  11/30/2023 HCG< 1, AFP 4.47  1/24/2024: HCG <1, , AFP 4.64-  Notable Path Features:   1/11/2023: Lymph node, left neck, excision: Germ cell tumor, compatible with metastatic seminoma. Background nonnecrotizing granulomatous inflammation.  Treatment: s/p BEP  Other Supportive care:  Treatment Team Members  Surgeon: Dr. Del Toro  Rad Onc  Palliative  Labs: 12/28/2022: creat 0.96, T bili 0.63, WBC 6.49k, Hgb 15.9, plt 244k  1/2024: testosterone 209, estrogen 91 WNL  Diagnostics   1/3/2023 CT soft tissue neck: Moderate left-sided adenopathy primarily within the left neck, posterior jugular chain levels 3 through 5 with a prominent node present in the upper mediastinum  1/3/2023 CT Chest w/c: Left supraclavicular, superior mediastinal, posterior mediastinal (15mm)/retrocrural (2.2 cm)and suspected partially imaged left abdominal adenopathy  1/4/2023: CT Abd/pelv w/c: Bulky retroperitoneal  lymphadenopathy consistent with lymphoma (left para-aortic region extending into the left upper quadrant measures 8.2 x 10.9 cm.  Aortocaval lymphadenopathy measures 3.7 x 5.2 cm.  More inferior left para-aortic lymphadenopathy at the bifurcation measures 5.0 x 5.9 cm.)  1/18/2023: Testicular U/S shows a left testicular lesion (1.5 x 1.7 x 0.9 cm) and the RP adenopathy is more to the Left of the aorta  4/19/2023 CT Chest w/c: No evidence of acute intrathoracic pathology  5/1/2023 CT CAP w/c: Interval marked improvement of retroperitoneal adenopathy.   Mild splenomegaly. Image 93, retrocaval, 9 mm previously 3 cm. Image 96, left para-aortic, 2 cm previously 7.5 cm  6/7/2023 PET/CT: essential CR,  L inguina LN SUV 5 (reduced)  7/28/2023: CXR: No acute cardiopulmonary disease  10/5/2023 CT Abd/pelv w/c: Retroperitoneal lymphadenopathy with index lymph nodes demonstrating continued interval improvement. Splenomegaly  index retroperitoneal/left periaortic lymph nodes largest measuring 14 mm in short axis, previously measured 25 mm in short axis. Retrocaval lymph node measuring 9 x 12 mm, previously measured 13 x 19 mm  10/6/2023: CXR: WNL  11/30/2023 CXR: No acute cardiopulmonary disease  12/13/2023 US L Breast: benign  There is gynecomastia seen in both breasts, minimal on the right and mild on the left. The gynecomastia correlates with the palpable mass and tenderness reported by the patient.  There is a questioned intramammary lymph node in the outer left breast.  Ultrasound breast left limited diagnostic: Targeted ultrasound of the retroareolar left breast was performed for further evaluation of the painful palpable area of concern.  Fibroglandular tissue is visualized in the retroareolar left breast, consistent with mild gynecomastia.  At 2:00 5 cm from the nipple within the left breast, there is a benign-appearing intramammary lymph node  1/24/2024 CXR: No acute cardiopulmonary disease. No significant  change    Discussion of decision making    I personally reviewed the following lab results, the image studies, pathology, other specialty/physicians consult notes and recommendations, and outside medical records from [facility]. I had a lengthy discussion with the patient and shared the work-up findings. We discussed the diagnosis and management plan as below. I spent 32 minutes reviewing the records (labs, clinician notes, outside records, medical history, ordering medicine/tests/procedures, interpreting the imaging/labs previously done) and coordination of care as well as direct time with the patient today, of which greater than 50% of the time was spent in counseling and coordination of care with the patient/family.      Plan/Labs  F/u Urology for post-orchiectomy urologic surveillance  Cont surveillance with CT Abd/pelv w/c in 4 months (scheduled 2/5/2024), CXR in 2 months) from that point  AFP, LDH, HCG ordered q8 weeks standing until 7/2024  Rad Onc referral to consider symptomatic RT to his mammary glands     Surveillance for clinical stage III seminoma includes history and physical every 2 months for the first year following completion of chemotherapy which would be until April 2024 along with every 4-month CT scan of the abdomen, pelvis along with every 2-month chest x-ray. (until 6/2024)    Year 2 (starting 7/2024) surveillance consists of every 3-month history and physical and serology markers, every 6-month CT abdomen, pelvis, and chest x-ray every 3 months.    Years 3-4 surveillance consists of history and physical and markers every 6 months, annual chest x-ray and CT scan  Year 5 surveillance consists of annual chest x-ray and history and physical and markers with as needed CT scan      Follow Up: q2 months with H&P, serology markers scheduled thru 5/20/2024      All questions were answered to the patient's satisfaction during this encounter. The patient knows the contact information for our office and knows  to reach out for any relevant concerns related to this encounter. They are to call for any temperature 100.4 or higher, new symptoms including but not restricted to shaking chills, decreased appetite, nausea, vomiting, diarrhea, increased fatigue, shortness of breath or chest pain, confusion, and not feeling the strength to come to the clinic. For all other listed problems and medical diagnosis in their chart - they are managed by PCP and/or other specialists, which the patient acknowledges. Thank you very much for your consultation and making us a part of this patient's care. We are continuing to follow closely with you. Please do not hesitate to reach out to me with any additional questions or concerns.    Nancy Vázquez MD  Hematology & Medical Oncology Staff Physician             Disclaimer: This document was prepared using [email or web address] Direct technology. If a word or phrase is confusing, or does not make sense, this is likely due to recognition error which was not discovered during this clinician's review. If you believe an error has occurred, please contact me through [employer] service line for rashida?cation.      ONCOLOGY HISTORY OF PRESENT ILLNESS        Oncology History   Seminoma (HCC)   1/11/2023 -  Cancer Staged    Staging form: Testis, AJCC 8th Edition  - Clinical stage from 1/11/2023: cT4, cN3, pM1a, S2 - Signed by Nancy Vázquez MD on 1/20/2023  Stage prefix: Initial diagnosis  Lactate dehydrogenase (LDH) (U/L): 699  Human chorionic gonadotropin (hCG) (mIU/mL): 13  Alpha fetoprotein (AFP) (ng/mL): 5.2  Laterality: Left  Sites of metastasis: Distant lymph nodes, NOS, Retroperitoneal lymph node, NOS  Source of metastatic specimen: Supraclavicular Lymph Nodes  Staged by: Managing physician       1/19/2023 Initial Diagnosis    Seminoma (HCC)     1/30/2023 - 4/17/2023 Chemotherapy    pegfilgrastim (NEULASTA ONPRO), 6 mg, Subcutaneous, Once, 4 of 4 cycles  Administration: 6 mg (2/6/2023), 6 mg  (2/27/2023), 6 mg (3/20/2023), 6 mg (3/6/2023), 6 mg (4/17/2023), 6 mg (4/10/2023)  bleomycin (BLENOXANE) IVPB, 30 Units, Intravenous, Once, 4 of 4 cycles  Administration: 30 Units (1/30/2023), 30 Units (2/6/2023), 30 Units (2/13/2023), 30 Units (2/20/2023), 30 Units (2/27/2023), 30 Units (3/6/2023), 30 Units (3/13/2023), 30 Units (3/20/2023), 30 Units (4/3/2023), 30 Units (4/10/2023), 30 Units (4/17/2023)  CISplatin (PLATINOL) infusion, 20 mg/m2 = 52.2 mg, Intravenous, Once, 4 of 4 cycles  Administration: 52.2 mg (1/30/2023), 52.2 mg (1/31/2023), 52.2 mg (2/1/2023), 52.2 mg (2/2/2023), 52.2 mg (2/3/2023), 52.2 mg (2/20/2023), 52.2 mg (2/21/2023), 52.2 mg (2/22/2023), 52.2 mg (2/23/2023), 52.2 mg (2/24/2023), 52.2 mg (3/13/2023), 52.2 mg (3/14/2023), 52.2 mg (3/15/2023), 52.2 mg (3/16/2023), 52.2 mg (3/17/2023), 52.2 mg (4/3/2023), 52.2 mg (4/4/2023), 52.2 mg (4/5/2023), 52.2 mg (4/6/2023), 52.2 mg (4/7/2023)  fosaprepitant (EMEND) IVPB, 150 mg, Intravenous, Once, 4 of 4 cycles  Administration: 150 mg (1/30/2023), 150 mg (2/20/2023), 150 mg (3/13/2023), 150 mg (4/3/2023), 150 mg (2/24/2023), 150 mg (4/10/2023)       Lots of IV Mag and several PRBC required during chemo  C3D15 deferred due to plt <10k, Hgb <8    SUBJECTIVE  (INTERVAL HISTORY)      Clotting History None   Bleeding History None   Cancer History Seminoma   Family Cancer History Mom/sister (breast), same sister (melanoma), father (basal cell skin cancer)   H/O Blood/Plt Transfusion None   Tobacco/etoh/drug abuse No nicotine use, etoh abuse, rec drug use       Cancer Screening history C-scope (2019)   Occupation  at [employer]       Interval events: having ongoing tender L breast since Thanksgiving. Otherwise doing much better, doing projects, but mild neuropathy in the fingertips (improved), toes that is persisting (odd feeling in the balls of his feet and tips of his toes). Not dropping items, gripping is getting better      I have  reviewed the relevant past medical, surgical, social and family history. I have also reviewed allergies and medications for this patent.    Review of Systems    Baseline weight: 300 lbs    Has had intermittent lower back, thigh rash since summer of 2022.     Denies F/C, V, SOB, CP, LH, HA, itching, gen weakness, melena, hematuria, hematochezia, falls, diarrhea, or constipation       A 10-point review of system was performed, pertinent positive and negative were detailed as above. Otherwise, the 10-point review of system was negative.      Past Medical History:   Diagnosis Date    Allergic 1/1/2000    seasonal allergies    Cancer (HCC)     COVID 2021    History of cancer chemotherapy     History of transfusion     Impacted cerumen     Lymphadenopathy     Obesity, unspecified     Seasonal allergies     Seminoma of testis (HCC)     Testicular cancer (HCC) 01/17/2023    Torn meniscus     right knee- surgical repair today 7/19/2021    Wears glasses        Past Surgical History:   Procedure Laterality Date    COLONOSCOPY      FACIAL/NECK BIOPSY Left 01/11/2023    Procedure: EXCISION OF LEFT NECK LYMPH NODE WITH LYMPHOMA PROTOCOL;  Surgeon: Wilber Kasper MD;  Location: [facility];  Service: Surgical Oncology    FL GUIDED CENTRAL VENOUS ACCESS DEVICE INSERTION  01/23/2023    HEMORROIDECTOMY      IR PORT REMOVAL      KNEE SURGERY Right 7/19/2021    LA ARTHRS KNE SURG W/MENISCECTOMY MED/LAT W/SHVG Right 07/19/2021    Procedure: ARTHROSCOPY KNEE, partial lateral meniscectomy;  Surgeon: Guy Vides MD;  Location[facility];  Service: Orthopedics    LA ARTHRS KNE SURG W/MENISCECTOMY MED/LAT W/SHVG Left 12/5/2023    Procedure: ARTHROSCOPY KNEE, partial lateral meniscectomy;  Surgeon: Guy Vides MD;  Location: [facility];  Service: Orthopedics    LA ORCHIECTOMY RADICAL TUMOR INGUINAL APPROACH Left 06/16/2023    Procedure: RADICAL ORCHIECTOMY INGUINAL; EXCISION CORD LIPOMA;  Surgeon: Ky Del Toro MD;   [facility];  Service: Urology    TUNNELED VENOUS PORT PLACEMENT N/A 01/23/2023    Procedure: INSERTION VENOUS PORT (PORT-A-CATH);  Surgeon: Wilber Kasper MD;  [facility];  Service: Surgical Oncology       Family History   Problem Relation Age of Onset    Cancer Mother         Breast Cancer    Breast cancer Mother 50 - 59    Cancer Father         Skin carcenoma    Skin cancer Father     Breast cancer Sister 40 - 49    Cancer Sister         Breast Cancer and Skin carcenoma    Skin cancer Sister     No Known Problems Daughter     Breast cancer Maternal Grandmother 32    No Known Problems Maternal Grandfather     No Known Problems Paternal Grandmother     No Known Problems Paternal Grandfather     Skin cancer Brother     Pancreatic cancer Maternal Aunt     Pancreatic cancer Maternal Aunt        Social History     Socioeconomic History    Marital status: /Civil Union     Spouse name: Not on file    Number of children: Not on file    Years of education: Not on file    Highest education level: Not on file   Occupational History    Not on file   Tobacco Use    Smoking status: Never     Passive exposure: Past    Smokeless tobacco: Never    Tobacco comments:     During childhood, both parents smoked.   Vaping Use    Vaping status: Never Used   Substance and Sexual Activity    Alcohol use: Yes     Alcohol/week: 3.0 standard drinks of alcohol     Types: 1 Glasses of wine, 1 Cans of beer, 1 Shots of liquor per week     Comment: 1-2 weekly    Drug use: Never    Sexual activity: Yes     Partners: Female   Other Topics Concern    Not on file   Social History Narrative    Not on file     Social Determinants of Health     Financial Resource Strain: Not on file   Food Insecurity: No Food Insecurity (2/13/2023)    Hunger Vital Sign     Worried About Running Out of Food in the Last Year: Never true     Ran Out of Food in the Last Year: Never true   Transportation Needs: No Transportation Needs (2/13/2023)     PRAPARE - Transportation     Lack of Transportation (Medical): No     Lack of Transportation (Non-Medical): No   Physical Activity: Not on file   Stress: Not on file   Social Connections: Not on file   Intimate Partner Violence: Not on file   Housing Stability: Low Risk  (2/13/2023)    Housing Stability Vital Sign     Unable to Pay for Housing in the Last Year: No     Number of Places Lived in the Last Year: 1     Unstable Housing in the Last Year: No       Allergies   Allergen Reactions    Oyster [place] - Food Allergy Vomiting       Current Outpatient Medications   Medication Sig Dispense Refill    Cholecalciferol (Vitamin D3) 1.25 MG (90420 UT) CAPS Take by mouth in the morning      loratadine (CLARITIN) 10 mg tablet Take 10 mg by mouth if needed for allergies      multivitamin (THERAGRAN) TABS Take 1 tablet by mouth daily      NON FORMULARY Take 1 capsule by mouth 3 (three) times a day Nervive; s/p neuropathy from chemo      ondansetron (ZOFRAN-ODT) 4 mg disintegrating tablet Take 1 tablet (4 mg total) by mouth every 8 (eight) hours as needed for nausea or vomiting 15 tablet 0    triamcinolone (KENALOG) 0.1 % cream Apply topically 2 (two) times a day (Patient taking differently: Apply 1 Application topically if needed) 60 g 1    acetaminophen (TYLENOL) 325 mg tablet Take 650 mg by mouth every 6 (six) hours as needed for mild pain (Patient not taking: Reported on 12/8/2023)      apixaban (Eliquis) 2.5 mg Take 1 tablet (2.5 mg total) by mouth 2 (two) times a day for 14 days 28 tablet 0    [facility] Aspirin Low Dose 81 MG EC tablet TAKE 1 TABLET (81 MG TOTAL) BY MOUTH 2 (TWO) TIMES A DAY FOR 14 DAYS (Patient not taking: Reported on 12/8/2023)      naproxen (NAPROSYN) 500 mg tablet Take 1 tablet (500 mg total) by mouth 2 (two) times a day with meals (Patient not taking: Reported on 12/8/2023) 60 tablet 0     No current facility-administered medications for this visit.       (Not in a hospital admission)      Objective:      24 Hour Vitals Assessment:     Vitals:    01/29/24 0757   BP: 126/72   Pulse: 60   Resp: 18   Temp: (!) 97 °F (36.1 °C)   SpO2: 96%          Weight at last visit: 294 lbs    Weight today: 298 lbs    PHYSICIAN EXAM    General: Appearance: alert, cooperative, no distress.  HEENT: Normocephalic, atraumatic. No scleral icterus. conjunctivae clear. EOMI.  Chest: No tenderness to palpation. No open wound noted.  Lungs: Clear to auscultation bilaterally, Respirations unlabored.  Cardiac: Regular rate and rhythm, +S1and S2  Abdomen: Soft, non-tender, non-distended. Bowel sounds are normal.   : b/l testicular exam WNL  L breast exam: nodule or two  Extremities:  No edema, cyanosis, clubbing.  Skin: Skin color, turgor are normal. No rashes.  Lymphatics: no palpable axillary, or inguinal adenopathy  L supra-clavicular LN palpated with incision c/d/i    Port:  c/d/i    Neurologic: Awake, Alert, and oriented, no gross focal deficits noted b/l.       DATA REVIEW:    Pathology Result:    Final Diagnosis   Date Value Ref Range Status   06/16/2023   Final    A.  Left testis (radical orchiectomy):     - Negative for carcinoma.      - Testicular parenchyma with extensive fibrosis. See comment.     Comment:  The history of metastatic seminoma involving a left neck lymph node ([id number]) status post chemotherapy is noted.      B.  Left spermatic cord lipoma (excision):     - Mature fibroadipose tissue, compatible with lipoma.     - One (1) lymph node, negative for carcinoma.    C.  Left spermatic cord (resection):     - Benign spermatic cord.     01/11/2023   Final    A.  Lymph node, left neck, excision:  -Germ cell tumor, compatible with metastatic seminoma (see note).  -Background nonnecrotizing granulomatous inflammation (see note).       Comment:     This is an appended report. These results have been appended to a previously preliminary verified report.   10/25/2019   Final    A. Cecum, cold biopsy:  - Portion of benign  "colonic mucosa with prominent reactive lymphoid aggregate.     B. Ascending colon, cold snare:  - Tubular adenoma.  - Negative for high grade dysplasia/ carcinoma.    C. Colon, splenic flexure, cold snare:  - Tubular adenoma.  - Negative for high grade dysplasia/ carcinoma.            Image Results:   Image result are reviewed and documented in Hematology/Oncology history    XR chest pa & lateral  Narrative: CHEST    INDICATION:   Malignant neoplasm of unspecified testis, unspecified whether descended or undescended.     COMPARISON:  Comparison made with previous examination(s) dated (DX) 30-Nov-2023,(DX) 06-Oct-2023,(CT) 05-Oct-2023,(DX) 28-Jul-2023,(CT) 01-May-2023,(CT) 19-Apr-2023.    EXAM PERFORMED/VIEWS:  XR CHEST PA & LATERAL    FINDINGS:    Cardiomediastinal silhouette appears unremarkable.    The lungs are clear.  No pneumothorax or pleural effusion.    Osseous structures appear within normal limits for patient age.  Impression: No acute cardiopulmonary disease. No significant change from prior study 11/30/2023    Electronically signed: 01/24/2024 09:33 AM Sharad Ochoa MD      LABS:  Lab data are reviewed and documented in HemOnc history.       Lab Results   Component Value Date    HGB 14.3 01/24/2024    HCT 41.1 01/24/2024    MCV 96 01/24/2024     01/24/2024    WBC 5.17 01/24/2024    NRBC 0 01/24/2024    BANDSPCT 1 04/26/2023    ATYLMPCT 2 (H) 04/21/2023     Lab Results   Component Value Date    K 4.2 01/24/2024     01/24/2024    CO2 30 01/24/2024    BUN 26 (H) 01/24/2024    CREATININE 1.14 01/24/2024    GLUF 101 (H) 01/24/2024    CALCIUM 9.3 01/24/2024    CORRECTEDCA 9.0 03/31/2023    AST 23 01/24/2024    ALT 30 01/24/2024    ALKPHOS 60 01/24/2024    EGFR 71 01/24/2024       No results found for: \"IRON\", \"TIBC\", \"FERRITIN\"    Lab Results   Component Value Date    [id number] 321 10/05/2023       No results for input(s): \"WBC\", \"CREAT\", \"PLT\" in the last 72 hours.    By:  Nancy Vázquez, " MD, 1/29/2024, 8:23 AM

## 2024-01-24 ENCOUNTER — APPOINTMENT (OUTPATIENT)
Dept: LAB | Facility: HOSPITAL | Age: 54
End: 2024-01-24
Payer: COMMERCIAL

## 2024-01-24 ENCOUNTER — HOSPITAL ENCOUNTER (OUTPATIENT)
Dept: RADIOLOGY | Facility: HOSPITAL | Age: 54
Discharge: HOME | End: 2024-01-24
Payer: COMMERCIAL

## 2024-01-24 DIAGNOSIS — R59.0 RETROPERITONEAL LYMPHADENOPATHY: ICD-10-CM

## 2024-01-24 DIAGNOSIS — C62.90 SEMINOMA (HCC): ICD-10-CM

## 2024-01-24 DIAGNOSIS — N64.4 BREAST PAIN, LEFT: ICD-10-CM

## 2024-01-24 LAB — TESTOST SERPL-MSCNC: 209 NG/DL

## 2024-01-24 PROCEDURE — 84403 ASSAY OF TOTAL TESTOSTERONE: CPT

## 2024-01-24 PROCEDURE — 36415 COLL VENOUS BLD VENIPUNCTURE: CPT

## 2024-01-24 PROCEDURE — 71046 X-RAY EXAM CHEST 2 VIEWS: CPT

## 2024-01-24 PROCEDURE — 82672 ASSAY OF ESTROGEN: CPT

## 2024-01-28 LAB — ESTROGEN SERPL-MCNC: 91 PG/ML (ref 56–213)

## 2024-01-29 ENCOUNTER — OFFICE VISIT (OUTPATIENT)
Dept: HEMATOLOGY ONCOLOGY | Facility: CLINIC | Age: 54
End: 2024-01-29
Payer: COMMERCIAL

## 2024-01-29 VITALS
SYSTOLIC BLOOD PRESSURE: 126 MMHG | RESPIRATION RATE: 18 BRPM | BODY MASS INDEX: 37.74 KG/M2 | TEMPERATURE: 97 F | WEIGHT: 303.5 LBS | HEIGHT: 75 IN | OXYGEN SATURATION: 96 % | DIASTOLIC BLOOD PRESSURE: 72 MMHG | HEART RATE: 60 BPM

## 2024-01-29 DIAGNOSIS — C62.90 SEMINOMA (HCC): Primary | ICD-10-CM

## 2024-01-29 PROCEDURE — 99214 OFFICE O/P EST MOD 30 MIN: CPT | Performed by: INTERNAL MEDICINE

## 2024-02-05 ENCOUNTER — HOSPITAL ENCOUNTER (OUTPATIENT)
Dept: CT IMAGING | Facility: HOSPITAL | Age: 54
Discharge: HOME | End: 2024-02-05
Attending: INTERNAL MEDICINE
Payer: COMMERCIAL

## 2024-02-05 DIAGNOSIS — C62.90 SEMINOMA (HCC): ICD-10-CM

## 2024-02-05 PROCEDURE — G1004 CDSM NDSC: HCPCS

## 2024-02-05 PROCEDURE — 74177 CT ABD & PELVIS W/CONTRAST: CPT

## 2024-02-05 RX ADMIN — IOHEXOL 100 ML: 350 INJECTION, SOLUTION INTRAVENOUS at 07:03

## 2024-02-06 ENCOUNTER — RADIATION ONCOLOGY CONSULT (OUTPATIENT)
Dept: RADIATION ONCOLOGY | Facility: CLINIC | Age: 54
End: 2024-02-06
Payer: COMMERCIAL

## 2024-02-06 ENCOUNTER — PATIENT OUTREACH (OUTPATIENT)
Dept: HEMATOLOGY ONCOLOGY | Facility: CLINIC | Age: 54
End: 2024-02-06

## 2024-02-06 ENCOUNTER — TELEPHONE (OUTPATIENT)
Age: 54
End: 2024-02-06

## 2024-02-06 VITALS
TEMPERATURE: 97.8 F | OXYGEN SATURATION: 96 % | SYSTOLIC BLOOD PRESSURE: 131 MMHG | BODY MASS INDEX: 37.94 KG/M2 | HEIGHT: 75 IN | HEART RATE: 60 BPM | WEIGHT: 305.12 LBS | DIASTOLIC BLOOD PRESSURE: 84 MMHG

## 2024-02-06 DIAGNOSIS — C62.90 SEMINOMA (HCC): ICD-10-CM

## 2024-02-06 PROCEDURE — 99245 OFF/OP CONSLTJ NEW/EST HI 55: CPT | Performed by: RADIOLOGY

## 2024-02-06 PROCEDURE — 99211 OFF/OP EST MAY X REQ PHY/QHP: CPT | Performed by: RADIOLOGY

## 2024-02-06 PROCEDURE — G0463 HOSPITAL OUTPT CLINIC VISIT: HCPCS | Performed by: RADIOLOGY

## 2024-02-06 NOTE — PROGRESS NOTES
Patients wife called asking if could return her call. I returned her call. She states that patient has been having left breast discomfort since November. Mammo findings from 12/13/23 state that he has B/L Gynecomastia. Patient cannot feel any difference or see any difference on right breast but can on the left. Patients wife states he has not been on any type of hormonal treatment for recent testicular cancer. He recently has testosterone and estrogen levels completed. Dr. Vázquez referred patient to RAD ONC for the gynecomastia. Per wife Dr Donohue states he will not radiate patient and he needs to continue to continue care with Dr. Del Toro. Wife asked if I would speak to Dr. Del Toro for recommendations on what the next steps are. I let her know I will speak to him and get back to her once I hear back. She voice understanding.

## 2024-02-06 NOTE — PROGRESS NOTES
Mario Ratliff 1970 is a 53 y.o. male With h/o seminoma.  S/P left orchiectomy and 4 cycles of chemotherapy.  Presents for discussion of empiric RT for extra mammary tissue post chemotherapy.  Referred by Dr. Vázquez.    12/23/23  Diagnostic bilateral mammogram/Diagnostic US  FINDINGS:   BILATERAL  1) GYNECOMASTIA [A]: There is gynecomastia seen in both breasts, minimal on the right and mild on the left. The gynecomastia correlates with the palpable mass and tenderness reported by the patient.  There is a questioned intramammary lymph node in the outer left breast.  Ultrasound breast left limited diagnostic: Targeted ultrasound of the retroareolar left breast was performed for further evaluation of the painful palpable area of concern.  Fibroglandular tissue is visualized in the retroareolar left breast, consistent with mild gynecomastia.  At 2:00 5 cm from the nipple within the left breast, there is a benign-appearing intramammary lymph node.  IMPRESSION:  No mammographic evidence of malignancy in either breast.  Bilateral gynecomastia, left greater than right.  Findings account for the painful, tender palpable abnormality in the retroareolar left breast.  Clinical management is recommended.   ASSESSMENT/BI-RADS CATEGORY:  Overall: 2 - Benign  RECOMMENDATION:       - Clinical management for both breasts.    1/29/23  Dr. Vázquez  Bilateral gynecomastia.  Continue surveillance for testicular cancer.  Next CT scan scheduled for 2/5/24.  Will refer to RT, to consider RT for gynecomastia.    2/5/24  CT abdomen pelvis  IMPRESSION:   1. Stable retroperitoneal lymphadenopathy. No new suspicious findings within the abdomen or pelvis as compared to CT of October 2023.     2. Stable splenomegaly.         Oncology History   Seminoma (HCC)   1/11/2023 -  Cancer Staged    Staging form: Testis, AJCC 8th Edition  - Clinical stage from 1/11/2023: cT4, cN3, pM1a, S2 - Signed by Nancy Vázquez MD on 1/20/2023  Stage prefix:  Initial diagnosis  Lactate dehydrogenase (LDH) (U/L): 699  Human chorionic gonadotropin (hCG) (mIU/mL): 13  Alpha fetoprotein (AFP) (ng/mL): 5.2  Laterality: Left  Sites of metastasis: Distant lymph nodes, NOS, Retroperitoneal lymph node, NOS  Source of metastatic specimen: Supraclavicular Lymph Nodes  Staged by: Managing physician       1/19/2023 Initial Diagnosis    Seminoma (HCC)     1/30/2023 - 4/17/2023 Chemotherapy    pegfilgrastim (NEULASTA ONPRO), 6 mg, Subcutaneous, Once, 4 of 4 cycles  Administration: 6 mg (2/6/2023), 6 mg (2/27/2023), 6 mg (3/20/2023), 6 mg (3/6/2023), 6 mg (4/17/2023), 6 mg (4/10/2023)  bleomycin (BLENOXANE) IVPB, 30 Units, Intravenous, Once, 4 of 4 cycles  Administration: 30 Units (1/30/2023), 30 Units (2/6/2023), 30 Units (2/13/2023), 30 Units (2/20/2023), 30 Units (2/27/2023), 30 Units (3/6/2023), 30 Units (3/13/2023), 30 Units (3/20/2023), 30 Units (4/3/2023), 30 Units (4/10/2023), 30 Units (4/17/2023)  CISplatin (PLATINOL) infusion, 20 mg/m2 = 52.2 mg, Intravenous, Once, 4 of 4 cycles  Administration: 52.2 mg (1/30/2023), 52.2 mg (1/31/2023), 52.2 mg (2/1/2023), 52.2 mg (2/2/2023), 52.2 mg (2/3/2023), 52.2 mg (2/20/2023), 52.2 mg (2/21/2023), 52.2 mg (2/22/2023), 52.2 mg (2/23/2023), 52.2 mg (2/24/2023), 52.2 mg (3/13/2023), 52.2 mg (3/14/2023), 52.2 mg (3/15/2023), 52.2 mg (3/16/2023), 52.2 mg (3/17/2023), 52.2 mg (4/3/2023), 52.2 mg (4/4/2023), 52.2 mg (4/5/2023), 52.2 mg (4/6/2023), 52.2 mg (4/7/2023)  fosaprepitant (EMEND) IVPB, 150 mg, Intravenous, Once, 4 of 4 cycles  Administration: 150 mg (1/30/2023), 150 mg (2/20/2023), 150 mg (3/13/2023), 150 mg (4/3/2023), 150 mg (2/24/2023), 150 mg (4/10/2023)         Review of Systems:  Review of Systems   Constitutional: Negative.    HENT: Negative.     Eyes: Negative.    Respiratory: Negative.     Cardiovascular: Negative.    Gastrointestinal: Negative.    Endocrine: Negative.    Genitourinary: Negative.    Musculoskeletal:  Positive  for arthralgias.   Skin:  Positive for rash (LLE and bilateral thighs).   Allergic/Immunologic: Negative.    Neurological:  Positive for numbness (bilateral hands and feet).   Hematological: Negative.    Psychiatric/Behavioral: Negative.         Clinical Trial: no      Pain assessment: 0      Prior Radiation None      MST completed    Implantable Devices (Port, pacemaker, pain stimulator) None    Hip Replacement None    Health Maintenance   Topic Date Due    Hepatitis C Screening  Never done    Pneumococcal Vaccine: Pediatrics (0 to 5 Years) and At-Risk Patients (6 to 64 Years) (1 - PCV) Never done    HIV Screening  Never done    Zoster Vaccine (1 of 2) Never done    DTaP,Tdap,and Td Vaccines (1 - Tdap) Never done    Annual Physical  05/07/2022    Colorectal Cancer Screening  10/25/2022    COVID-19 Vaccine (5 - 2023-24 season) 12/05/2023    BMI: Followup Plan  12/28/2023    Depression Screening  06/28/2024    BMI: Adult  01/29/2025    Influenza Vaccine  Completed    HIB Vaccine  Aged Out    IPV Vaccine  Aged Out    Hepatitis A Vaccine  Aged Out    Meningococcal ACWY Vaccine  Aged Out    HPV Vaccine  Aged Out       Past Medical History:   Diagnosis Date    Allergic 1/1/2000    seasonal allergies    Cancer (HCC)     COVID 2021    History of cancer chemotherapy     History of transfusion     Impacted cerumen     Lymphadenopathy     Obesity, unspecified     Seasonal allergies     Seminoma of testis (HCC)     Testicular cancer (HCC) 01/17/2023    Torn meniscus     right knee- surgical repair today 7/19/2021    Wears glasses        Past Surgical History:   Procedure Laterality Date    COLONOSCOPY      FACIAL/NECK BIOPSY Left 01/11/2023    Procedure: EXCISION OF LEFT NECK LYMPH NODE WITH LYMPHOMA PROTOCOL;  Surgeon: Wilber Kasper MD;  Location: [facility];  Service: Surgical Oncology    FL GUIDED CENTRAL VENOUS ACCESS DEVICE INSERTION  01/23/2023    HEMORROIDECTOMY      IR PORT REMOVAL      KNEE SURGERY Right 7/19/2021     IA ARTHRS KNE SURG W/MENISCECTOMY MED/LAT W/SHVG Right 07/19/2021    Procedure: ARTHROSCOPY KNEE, partial lateral meniscectomy;  Surgeon: Guy Vides MD;  Location: [facility];  Service: Orthopedics    IA ARTHRS KNE SURG W/MENISCECTOMY MED/LAT W/SHVG Left 12/5/2023    Procedure: ARTHROSCOPY KNEE, partial lateral meniscectomy;  Surgeon: Guy Vides MD;  Location: [facility];  Service: Orthopedics    IA ORCHIECTOMY RADICAL TUMOR INGUINAL APPROACH Left 06/16/2023    Procedure: RADICAL ORCHIECTOMY INGUINAL; EXCISION CORD LIPOMA;  Surgeon: Ky Del Toro MD;  Location: [facility];  Service: Urology    TUNNELED VENOUS PORT PLACEMENT N/A 01/23/2023    Procedure: INSERTION VENOUS PORT (PORT-A-CATH);  Surgeon: Wilber Kasper MD;  Location: [facility];  Service: Surgical Oncology       Family History   Problem Relation Age of Onset    Cancer Mother         Breast Cancer    Breast cancer Mother 50 - 59    Cancer Father         Skin carcenoma    Skin cancer Father     Breast cancer Sister 40 - 49    Cancer Sister         Breast Cancer and Skin carcenoma    Skin cancer Sister     No Known Problems Daughter     Breast cancer Maternal Grandmother 32    No Known Problems Maternal Grandfather     No Known Problems Paternal Grandmother     No Known Problems Paternal Grandfather     Skin cancer Brother     Pancreatic cancer Maternal Aunt     Pancreatic cancer Maternal Aunt        Social History     Tobacco Use    Smoking status: Never     Passive exposure: Past    Smokeless tobacco: Never    Tobacco comments:     During childhood, both parents smoked.   Vaping Use    Vaping status: Never Used   Substance Use Topics    Alcohol use: Yes     Alcohol/week: 3.0 standard drinks of alcohol     Types: 1 Glasses of wine, 1 Cans of beer, 1 Shots of liquor per week     Comment: 1-2 weekly    Drug use: Never          Current Outpatient Medications:     acetaminophen (TYLENOL) 325 mg tablet, Take 650 mg by mouth  every 6 (six) hours as needed for mild pain (Patient not taking: Reported on 12/8/2023), Disp: , Rfl:     apixaban (Eliquis) 2.5 mg, Take 1 tablet (2.5 mg total) by mouth 2 (two) times a day for 14 days, Disp: 28 tablet, Rfl: 0    Cholecalciferol (Vitamin D3) 1.25 MG (53293 UT) CAPS, Take by mouth in the morning, Disp: , Rfl:     [facility] Aspirin Low Dose 81 MG EC tablet, TAKE 1 TABLET (81 MG TOTAL) BY MOUTH 2 (TWO) TIMES A DAY FOR 14 DAYS (Patient not taking: Reported on 12/8/2023), Disp: , Rfl:     loratadine (CLARITIN) 10 mg tablet, Take 10 mg by mouth if needed for allergies, Disp: , Rfl:     multivitamin (THERAGRAN) TABS, Take 1 tablet by mouth daily, Disp: , Rfl:     naproxen (NAPROSYN) 500 mg tablet, Take 1 tablet (500 mg total) by mouth 2 (two) times a day with meals (Patient not taking: Reported on 12/8/2023), Disp: 60 tablet, Rfl: 0    NON FORMULARY, Take 1 capsule by mouth 3 (three) times a day Nervive; s/p neuropathy from chemo, Disp: , Rfl:     ondansetron (ZOFRAN-ODT) 4 mg disintegrating tablet, Take 1 tablet (4 mg total) by mouth every 8 (eight) hours as needed for nausea or vomiting, Disp: 15 tablet, Rfl: 0    triamcinolone (KENALOG) 0.1 % cream, Apply topically 2 (two) times a day (Patient taking differently: Apply 1 Application topically if needed), Disp: 60 g, Rfl: 1    Allergies   Allergen Reactions    Oyster Shell - Food Allergy Vomiting        There were no vitals filed for this visit.

## 2024-02-06 NOTE — TELEPHONE ENCOUNTER
Pt managed by Dr. Del Toro for testicle cancer.  Pt scheduled for 6/24/24 at [place].    Pt was told to call office to scheduled with  from [facility].  Pt has developed B/L Gynecomastia.  They stated that they do not recommend radiation for this.  Stated to call office and follow up with Dr. Del Toro.    Please advise if this is too far out.    Pt wife can be reached at [phone]

## 2024-02-06 NOTE — PROGRESS NOTES
Consultation - Radiation Oncology      MRN:[id number] : 1970  Encounter: [id number]  Patient Information: Mario Ratliff      CHIEF COMPLAINT  Chief Complaint   Patient presents with    Consult     Cancer Staging   Seminoma (HCC)  Staging form: Testis, AJCC 8th Edition  - Clinical stage from 2023: cT4, cN3, pM1a, S2 - Signed by Nancy Vázquez MD on 2023  Stage prefix: Initial diagnosis  Lactate dehydrogenase (LDH) (U/L): 699  Human chorionic gonadotropin (hCG) (mIU/mL): 13  Alpha fetoprotein (AFP) (ng/mL): 5.2  Laterality: Left  Sites of metastasis: Distant lymph nodes, NOS, Retroperitoneal lymph node, NOS  Source of metastatic specimen: Supraclavicular Lymph Nodes  Staged by: Managing physician           History of Present Illness     Mario Ratliff 1970 is a 53 y.o. male With h/o seminoma.  S/P left orchiectomy and 4 cycles of chemotherapy.  Presents for discussion of empiric RT for extra mammary tissue post chemotherapy.  Referred by Dr. Vázquez.     23  Diagnostic bilateral mammogram/Diagnostic US  FINDINGS:   BILATERAL  1) GYNECOMASTIA [A]: There is gynecomastia seen in both breasts, minimal on the right and mild on the left. The gynecomastia correlates with the palpable mass and tenderness reported by the patient.  There is a questioned intramammary lymph node in the outer left breast.  Ultrasound breast left limited diagnostic: Targeted ultrasound of the retroareolar left breast was performed for further evaluation of the painful palpable area of concern.  Fibroglandular tissue is visualized in the retroareolar left breast, consistent with mild gynecomastia.  At 2:00 5 cm from the nipple within the left breast, there is a benign-appearing intramammary lymph node.  IMPRESSION:  No mammographic evidence of malignancy in either breast.  Bilateral gynecomastia, left greater than right.  Findings account for the painful, tender palpable abnormality in the retroareolar left breast.   Clinical management is recommended.   ASSESSMENT/BI-RADS CATEGORY:  Overall: 2 - Benign  RECOMMENDATION:       - Clinical management for both breasts.     1/29/23  Dr. Vázquez  Bilateral gynecomastia.  Continue surveillance for testicular cancer.  Next CT scan scheduled for 2/5/24.  Will refer to RT, to consider RT for gynecomastia.     2/5/24  CT abdomen pelvis  IMPRESSION:   1. Stable retroperitoneal lymphadenopathy. No new suspicious findings within the abdomen or pelvis as compared to CT of October 2023.     2. Stable splenomegaly.      Historical Information   Oncology History   Seminoma (HCC)   1/11/2023 -  Cancer Staged    Staging form: Testis, AJCC 8th Edition  - Clinical stage from 1/11/2023: cT4, cN3, pM1a, S2 - Signed by Nancy Vázquez MD on 1/20/2023  Stage prefix: Initial diagnosis  Lactate dehydrogenase (LDH) (U/L): 699  Human chorionic gonadotropin (hCG) (mIU/mL): 13  Alpha fetoprotein (AFP) (ng/mL): 5.2  Laterality: Left  Sites of metastasis: Distant lymph nodes, NOS, Retroperitoneal lymph node, NOS  Source of metastatic specimen: Supraclavicular Lymph Nodes  Staged by: Managing physician       1/19/2023 Initial Diagnosis    Seminoma (HCC)     1/30/2023 - 4/17/2023 Chemotherapy    pegfilgrastim (NEULASTA ONPRO), 6 mg, Subcutaneous, Once, 4 of 4 cycles  Administration: 6 mg (2/6/2023), 6 mg (2/27/2023), 6 mg (3/20/2023), 6 mg (3/6/2023), 6 mg (4/17/2023), 6 mg (4/10/2023)  bleomycin (BLENOXANE) IVPB, 30 Units, Intravenous, Once, 4 of 4 cycles  Administration: 30 Units (1/30/2023), 30 Units (2/6/2023), 30 Units (2/13/2023), 30 Units (2/20/2023), 30 Units (2/27/2023), 30 Units (3/6/2023), 30 Units (3/13/2023), 30 Units (3/20/2023), 30 Units (4/3/2023), 30 Units (4/10/2023), 30 Units (4/17/2023)  CISplatin (PLATINOL) infusion, 20 mg/m2 = 52.2 mg, Intravenous, Once, 4 of 4 cycles  Administration: 52.2 mg (1/30/2023), 52.2 mg (1/31/2023), 52.2 mg (2/1/2023), 52.2 mg (2/2/2023), 52.2 mg (2/3/2023),  52.2 mg (2/20/2023), 52.2 mg (2/21/2023), 52.2 mg (2/22/2023), 52.2 mg (2/23/2023), 52.2 mg (2/24/2023), 52.2 mg (3/13/2023), 52.2 mg (3/14/2023), 52.2 mg (3/15/2023), 52.2 mg (3/16/2023), 52.2 mg (3/17/2023), 52.2 mg (4/3/2023), 52.2 mg (4/4/2023), 52.2 mg (4/5/2023), 52.2 mg (4/6/2023), 52.2 mg (4/7/2023)  fosaprepitant (EMEND) IVPB, 150 mg, Intravenous, Once, 4 of 4 cycles  Administration: 150 mg (1/30/2023), 150 mg (2/20/2023), 150 mg (3/13/2023), 150 mg (4/3/2023), 150 mg (2/24/2023), 150 mg (4/10/2023)           Past Medical History:   Diagnosis Date    Allergic 1/1/2000    seasonal allergies    Cancer (HCC)     COVID 2021    History of cancer chemotherapy     History of transfusion     Impacted cerumen     Lymphadenopathy     Obesity, unspecified     Seasonal allergies     Seminoma of testis (HCC)     Testicular cancer (HCC) 01/17/2023    Torn meniscus     right knee- surgical repair today 7/19/2021    Wears glasses      Past Surgical History:   Procedure Laterality Date    COLONOSCOPY      FACIAL/NECK BIOPSY Left 01/11/2023    Procedure: EXCISION OF LEFT NECK LYMPH NODE WITH LYMPHOMA PROTOCOL;  Surgeon: Wilber Kasper MD;  Location: [facility];  Service: Surgical Oncology    FL GUIDED CENTRAL VENOUS ACCESS DEVICE INSERTION  01/23/2023    HEMORROIDECTOMY      IR PORT REMOVAL      KNEE SURGERY Right 7/19/2021    TN ARTHRS KNE SURG W/MENISCECTOMY MED/LAT W/SHVG Right 07/19/2021    Procedure: ARTHROSCOPY KNEE, partial lateral meniscectomy;  Surgeon: Guy Vides MD;  Location: [facility];  Service: Orthopedics    TN ARTHRS KNE SURG W/MENISCECTOMY MED/LAT W/SHVG Left 12/5/2023    Procedure: ARTHROSCOPY KNEE, partial lateral meniscectomy;  Surgeon: Guy Vides MD;  Location: [facility] OR;  Service: Orthopedics    TN ORCHIECTOMY RADICAL TUMOR INGUINAL APPROACH Left 06/16/2023    Procedure: RADICAL ORCHIECTOMY INGUINAL; EXCISION CORD LIPOMA;  Surgeon: Ky Del Toro MD;  [facility];  Service: Urology    TUNNELED VENOUS PORT PLACEMENT N/A 01/23/2023    Procedure: INSERTION VENOUS PORT (PORT-A-CATH);  Surgeon: Wilber Kasper MD;  Location: [facility];  Service: Surgical Oncology       Family History   Problem Relation Age of Onset    Cancer Mother         Breast Cancer    Breast cancer Mother 50 - 59    Breast cancer additional onset Mother     Cancer Father         Skin carcenoma    Skin cancer Father     Breast cancer Sister 40 - 49    Cancer Sister         Breast Cancer and Skin carcenoma    Skin cancer Sister     Breast cancer additional onset Sister     No Known Problems Daughter     Breast cancer Maternal Grandmother 32    No Known Problems Maternal Grandfather     No Known Problems Paternal Grandmother     No Known Problems Paternal Grandfather     Skin cancer Brother     Pancreatic cancer Maternal Aunt     Pancreatic cancer Maternal Aunt        Social History   Social History     Substance and Sexual Activity   Alcohol Use Yes    Alcohol/week: 3.0 standard drinks of alcohol    Types: 1 Glasses of wine, 1 Cans of beer, 1 Shots of liquor per week    Comment: 1-2 weekly     Social History     Substance and Sexual Activity   Drug Use Never     Social History     Tobacco Use   Smoking Status Never    Passive exposure: Past   Smokeless Tobacco Never   Tobacco Comments    During childhood, both parents smoked.         Meds/Allergies     Current Outpatient Medications:     acetaminophen (TYLENOL) 325 mg tablet, Take 650 mg by mouth every 6 (six) hours as needed for mild pain (Patient not taking: Reported on 12/8/2023), Disp: , Rfl:     apixaban (Eliquis) 2.5 mg, Take 1 tablet (2.5 mg total) by mouth 2 (two) times a day for 14 days, Disp: 28 tablet, Rfl: 0    Cholecalciferol (Vitamin D3) 1.25 MG (28054 UT) CAPS, Take by mouth in the morning (Patient not taking: Reported on 2/6/2024), Disp: , Rfl:     [employer] Aspirin Low Dose 81 MG EC tablet, TAKE 1 TABLET (81 MG TOTAL) BY MOUTH 2 (TWO) TIMES A DAY  "FOR 14 DAYS (Patient not taking: Reported on 12/8/2023), Disp: , Rfl:     loratadine (CLARITIN) 10 mg tablet, Take 10 mg by mouth if needed for allergies (Patient not taking: Reported on 2/6/2024), Disp: , Rfl:     multivitamin (THERAGRAN) TABS, Take 1 tablet by mouth daily (Patient not taking: Reported on 2/6/2024), Disp: , Rfl:     naproxen (NAPROSYN) 500 mg tablet, Take 1 tablet (500 mg total) by mouth 2 (two) times a day with meals (Patient not taking: Reported on 12/8/2023), Disp: 60 tablet, Rfl: 0    NON FORMULARY, Take 1 capsule by mouth 3 (three) times a day Nervive; s/p neuropathy from chemo (Patient not taking: Reported on 2/6/2024), Disp: , Rfl:     ondansetron (ZOFRAN-ODT) 4 mg disintegrating tablet, Take 1 tablet (4 mg total) by mouth every 8 (eight) hours as needed for nausea or vomiting (Patient not taking: Reported on 2/6/2024), Disp: 15 tablet, Rfl: 0    triamcinolone (KENALOG) 0.1 % cream, Apply topically 2 (two) times a day (Patient not taking: Reported on 2/6/2024), Disp: 60 g, Rfl: 1  Allergies   Allergen Reactions    Oyster Shell - Food Allergy Vomiting         Review of Systems  Constitutional: Negative.    HENT: Negative.     Eyes: Negative.    Respiratory: Negative.     Cardiovascular: Negative.    Gastrointestinal: Negative.    Endocrine: Negative.    Genitourinary: Negative.    Musculoskeletal:  Positive for arthralgias.   Skin:  Positive for rash (LLE and bilateral thighs).   Allergic/Immunologic: Negative.    Neurological:  Positive for numbness (bilateral hands and feet).   Hematological: Negative.    Psychiatric/Behavioral: Negative.            OBJECTIVE:   /84   Pulse 60   Temp 97.8 °F (36.6 °C)   Ht 6' 3\" (1.905 m)   Wt (!) 138 kg (305 lb 1.9 oz)   SpO2 96%   BMI 38.14 kg/m²   Pain Assessment:  2  Performance Status: ECOG/Zubrod/WHO: 1 - Symptomatic but completely ambulatory    Physical Exam   He has evidence of gynecomastia with minimal tenderness to palpation and " especially in the left breast.  Right breast not particularly tender to palpation.  There are no obvious masses palpable.  He is conversing appropriately.  Ambulating dependently.      RESULTS  Lab Results    Chemistry        Component Value Date/Time    K 4.2 01/24/2024 0729    K 3.9 05/07/2021 0903     01/24/2024 0729     05/07/2021 0903    CO2 30 01/24/2024 0729    CO2 26 05/07/2021 0903    BUN 26 (H) 01/24/2024 0729    BUN 21 05/07/2021 0903    CREATININE 1.14 01/24/2024 0729        Component Value Date/Time    CALCIUM 9.3 01/24/2024 0729    CALCIUM 9.1 05/07/2021 0903    ALKPHOS 60 01/24/2024 0729    ALKPHOS 57 05/07/2021 0903    AST 23 01/24/2024 0729    AST 19 05/07/2021 0903    ALT 30 01/24/2024 0729    ALT 18 05/07/2021 0903            Lab Results   Component Value Date    WBC 5.17 01/24/2024    HGB 14.3 01/24/2024    HCT 41.1 01/24/2024    MCV 96 01/24/2024     01/24/2024         Imaging Studies  CT abdomen pelvis w contrast    Result Date: 2/5/2024  Narrative: CT ABDOMEN AND PELVIS WITH IV CONTRAST INDICATION: C62.90: Malignant neoplasm of unspecified testis, unspecified whether descended or undescended. COMPARISON: Multiple prior abdomen/pelvis CTs with the most recent being obtained 10/5/2023. TECHNIQUE: CT examination of the abdomen and pelvis was performed. Multiplanar 2D reformatted images were created from the source data. This examination, like all CT scans performed in the [facility], was performed utilizing techniques to minimize radiation dose exposure, including the use of iterative reconstruction and automated exposure control. Radiation dose length product (DLP) for this visit: 1642 mGy-cm IV Contrast: 100 mL of iohexol (OMNIPAQUE) Enteric Contrast: Not administered. FINDINGS: ABDOMEN LOWER CHEST: No clinically significant abnormality in the visualized lower chest. LIVER/BILIARY TREE: Punctate hypodensity within the right hepatic lobe is too small to  characterize but stable dating back to January 2023. No evidence of new discrete liver lesion within the confines of a single phase exam. GALLBLADDER: No calcified gallstones. No pericholecystic inflammatory change. SPLEEN: Enlarged measuring 16.4 cm in AP dimension. PANCREAS: Unremarkable. ADRENAL GLANDS: Unremarkable. KIDNEYS/URETERS: Unremarkable. No hydronephrosis. STOMACH AND BOWEL: No abnormal small bowel dilatation. There are a few scattered colonic diverticula without evidence of acute inflammatory changes. APPENDIX: No findings to suggest appendicitis. ABDOMINOPELVIC CAVITY: No pneumoperitoneum. No ascites. Retroperitoneal adenopathy is again demonstrated. These include a retrocaval lymph node which measures 1.5 x 0.9 cm (2:101), stable from October 2023 when accounting for differences in measuring technique. The largest node measures 2.2 x 1.4 cm (2:94), also stable from prior exam. VESSELS: Unremarkable for patient's age. PELVIS REPRODUCTIVE ORGANS: Unremarkable for patient's age. URINARY BLADDER: Unremarkable. ABDOMINAL WALL/INGUINAL REGIONS: Small fat filled right inguinal hernia. Likely postsurgical changes are seen along the left inguinal canal. BONES: No acute fracture or suspicious osseous lesion.     Impression: 1. Stable retroperitoneal lymphadenopathy. No new suspicious findings within the abdomen or pelvis as compared to CT of October 2023. 2. Stable splenomegaly. Workstation performed: [id number]     XR chest pa & lateral    Result Date: 1/24/2024  Narrative: CHEST INDICATION:   Malignant neoplasm of unspecified testis, unspecified whether descended or undescended. COMPARISON:  Comparison made with previous examination(s) dated (DX) 30-Nov-2023,(DX) 06-Oct-2023,(CT) 05-Oct-2023,(DX) 28-Jul-2023,(CT) 01-May-2023,(CT) 19-Apr-2023. EXAM PERFORMED/VIEWS:  XR CHEST PA & LATERAL FINDINGS: Cardiomediastinal silhouette appears unremarkable. The lungs are clear.  No pneumothorax or pleural effusion.  Osseous structures appear within normal limits for patient age.     Impression: No acute cardiopulmonary disease. No significant change from prior study 11/30/2023 Electronically signed: 01/24/2024 09:33 AM Sharad Ochoa MD            ASSESSMENT  1. Seminoma (HCC)  Ambulatory referral to Radiation Oncology        Cancer Staging   Seminoma (HCC)  Staging form: Testis, AJCC 8th Edition  - Clinical stage from 1/11/2023: cT4, cN3, pM1a, S2 - Signed by Nancy Vázquez MD on 1/20/2023  Stage prefix: Initial diagnosis  Lactate dehydrogenase (LDH) (U/L): 699  Human chorionic gonadotropin (hCG) (mIU/mL): 13  Alpha fetoprotein (AFP) (ng/mL): 5.2  Laterality: Left  Sites of metastasis: Distant lymph nodes, NOS, Retroperitoneal lymph node, NOS  Source of metastatic specimen: Supraclavicular Lymph Nodes  Staged by: Managing physician        PLAN/DISCUSSION  No orders of the defined types were placed in this encounter.         Mario Ratliff is a 53 y.o. year old male with seminoma initially detected in the left neck.  He has undergone chemotherapy and currently is on surveillance.  He is referred here today for consideration of radiation for gynecomastia.  The patient did undergo bilateral mammogram and ultrasound without any suspicious findings.  We discussed that low-dose radiation can be utilized in an attempt to prevent gynecomastia typically given prior to ADT therapy for prostate patients.  However usage of radiation in someone who already has gynecomastia is of limited benefit.  In addition is unclear as to the etiology of his gynecomastia.  I have referred him back to his surgeons if his symptoms warrant any further intervention.  We did discuss briefly tamoxifen has been utilized in patients with gynecomastia and we discussed he can review this with Dr. Westbrook if this could be considered.  I have not made any further follow-up appointments for him.          Ethel Donohue MD  2/6/2024,3:05 PM      Portions of the  "record may have been created with voice recognition software.  Occasional wrong word or \"sound a like\" substitutions may have occurred due to the inherent limitations of voice recognition software.  Read the chart carefully and recognize, using context, where substitutions have occurred.          "

## 2024-02-08 ENCOUNTER — PATIENT OUTREACH (OUTPATIENT)
Dept: HEMATOLOGY ONCOLOGY | Facility: CLINIC | Age: 54
End: 2024-02-08

## 2024-02-08 NOTE — PROGRESS NOTES
Outreach made to patients wife following up on call from 2/6/24 regarding patients Gynecomastia. Spoke with wife and confirmed that he was scheduled for 2/13/24 to see Dr. Del Toro. She did confirm she spoke with KALEY Malone and was given this appt. She appreciated the call and knows to call with any other questions or concerns.

## 2024-02-09 ENCOUNTER — TELEPHONE (OUTPATIENT)
Dept: HEMATOLOGY ONCOLOGY | Facility: CLINIC | Age: 54
End: 2024-02-09

## 2024-02-09 NOTE — TELEPHONE ENCOUNTER
Appointment Schedule   Who are you speaking with? Spouse   If it is not the patient, are they listed on an active communication consent form? N/A   Which provider is the appointment scheduled with? Dr. Kasper   At which location is the appointment scheduled for? [place]   When is the appointment scheduled?  Please list date and time 2/20/24 345   What is the reason for this appointment? fu   Did patient voice understanding of the details of this appointment? Yes   Was the no show policy reviewed with patient? Yes

## 2024-02-13 ENCOUNTER — TELEMEDICINE (OUTPATIENT)
Dept: UROLOGY | Facility: AMBULATORY SURGERY CENTER | Age: 54
End: 2024-02-13
Payer: COMMERCIAL

## 2024-02-13 DIAGNOSIS — Z12.5 SCREENING FOR PROSTATE CANCER: ICD-10-CM

## 2024-02-13 DIAGNOSIS — C62.90 SEMINOMA (HCC): Primary | ICD-10-CM

## 2024-02-13 PROCEDURE — 99213 OFFICE O/P EST LOW 20 MIN: CPT | Performed by: UROLOGY

## 2024-02-13 NOTE — LETTER
February 13, 2024     Maurice Muñoz DO  [address]  [facility] [address]    Patient: Mario Ratliff   YOB: 1970   Date of Visit: 2/13/2024       Dear Dr. Muñoz:    Thank you for referring Mario Ratliff to me for evaluation. Below are my notes for this consultation.    If you have questions, please do not hesitate to call me. I look forward to following your patient along with you.         Sincerely,        Ky Del Toro MD        CC: MD Vijaya Lr MD Nimisha Deb, MD Heidar J Albandar, MD Frank Jeremy Tamarkin, MD  2/13/2024  7:47 AM  Sign when Signing Visit  Virtual Brief Visit    This Visit is being completed by telephone. The Patient is located at Home and in the following state in which I hold an active license [place]    The patient was identified by name and date of birth. Mario Ratliff was informed that this is a telemedicine visit and that the visit is being conducted through the [employer] Teams platform. He agrees to proceed..  My office door was closed. No one else was in the room.  He acknowledged consent and understanding of privacy and security of the video platform. The patient has agreed to participate and understands they can discontinue the visit at any time.    Patient is aware this is a billable service.     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 Treated with chemo.  Stable RP LN ~ 2cm unchanged from prior.  CT otherwise shows no evidence of disease.  PSA 12/22 0.6.    Assessment/Plan:    Stage IV seminoma, CHANTEL, gynecomastia, CaP Screening    Recommend referral back to Dr. Kasper re: surgical option.  Not a candidate for XRT per referral to Dr. Donohue.  Refer to Endo re: gynecomastia.  Recommend PSA screening next blood draw.    Problem List Items Addressed This Visit    None      Recent Visits  No visits were found meeting these conditions.  Showing recent visits within past 7 days and meeting all other requirements  Today's Visits  Date Type Provider Dept   02/13/24  Telemedicine Ky Del Toro MD [facility] [place]   Showing today's visits and meeting all other requirements  Future Appointments  No visits were found meeting these conditions.  Showing future appointments within next 150 days and meeting all other requirements         Visit Time  Total Visit Duration: 20 minutes total time

## 2024-02-13 NOTE — PROGRESS NOTES
Virtual Brief Visit    This Visit is being completed by telephone. The Patient is located at Home and in the following state in which I hold an active license [place]    The patient was identified by name and date of birth. Mario Ratliff was informed that this is a telemedicine visit and that the visit is being conducted through the [employer] Teams platform. He agrees to proceed..  My office door was closed. No one else was in the room.  He acknowledged consent and understanding of privacy and security of the video platform. The patient has agreed to participate and understands they can discontinue the visit at any time.    Patient is aware this is a billable service.     54 yo with stage IV seminoma. Treated with chemo.  Stable RP LN ~ 2cm unchanged from prior.  CT otherwise shows no evidence of disease.  PSA 12/22 0.6.    Assessment/Plan:    Stage IV seminoma, CHANTEL, gynecomastia, CaP Screening    Recommend referral back to Dr. Kasper re: surgical option.  Not a candidate for XRT per referral to Dr. Donohue.  Refer to Ricarda re: gynecomastia.  Recommend PSA screening next blood draw.    Problem List Items Addressed This Visit    None      Recent Visits  No visits were found meeting these conditions.  Showing recent visits within past 7 days and meeting all other requirements  Today's Visits  Date Type Provider Dept   02/13/24 Telemedicine Ky Del Toro MD [facility] [place]   Showing today's visits and meeting all other requirements  Future Appointments  No visits were found meeting these conditions.  Showing future appointments within next 150 days and meeting all other requirements         Visit Time  Total Visit Duration: 20 minutes total time

## 2024-02-15 ENCOUNTER — TELEPHONE (OUTPATIENT)
Dept: HEMATOLOGY ONCOLOGY | Facility: CLINIC | Age: 54
End: 2024-02-15

## 2024-02-15 NOTE — TELEPHONE ENCOUNTER
We received  a referral  for patient to establish care with Surgical Oncology.     He is an established patient with Dr. Kasper.  He has a scheduled follow up 2/20/24 @ 3:45pm at [facility]    I closed the referral

## 2024-02-20 ENCOUNTER — OFFICE VISIT (OUTPATIENT)
Dept: SURGICAL ONCOLOGY | Facility: CLINIC | Age: 54
End: 2024-02-20
Payer: COMMERCIAL

## 2024-02-20 VITALS
SYSTOLIC BLOOD PRESSURE: 136 MMHG | RESPIRATION RATE: 18 BRPM | OXYGEN SATURATION: 97 % | HEART RATE: 68 BPM | HEIGHT: 75 IN | BODY MASS INDEX: 37.77 KG/M2 | TEMPERATURE: 97.5 F | WEIGHT: 303.8 LBS | DIASTOLIC BLOOD PRESSURE: 92 MMHG

## 2024-02-20 DIAGNOSIS — C62.90 SEMINOMA (HCC): Primary | ICD-10-CM

## 2024-02-20 PROCEDURE — 99213 OFFICE O/P EST LOW 20 MIN: CPT | Performed by: SURGERY

## 2024-02-20 NOTE — PROGRESS NOTES
Surgical Oncology Follow Up       [facility] [place]  [address] [facility] [address]  [phone]    Mario Ratliff  1970  [id number]  [facility] [place]  [address] [facility] [address]  [phone]    Diagnoses and all orders for this visit:    Seminoma (HCC)        Chief Complaint   Patient presents with    Follow-up       No follow-ups on file.      Oncology History   Seminoma (HCC)   1/11/2023 -  Cancer Staged    Staging form: Testis, AJCC 8th Edition  - Clinical stage from 1/11/2023: cT4, cN3, pM1a, S2 - Signed by Nancy Vázquez MD on 1/20/2023  Stage prefix: Initial diagnosis  Lactate dehydrogenase (LDH) (U/L): 699  Human chorionic gonadotropin (hCG) (mIU/mL): 13  Alpha fetoprotein (AFP) (ng/mL): 5.2  Laterality: Left  Sites of metastasis: Distant lymph nodes, NOS, Retroperitoneal lymph node, NOS  Source of metastatic specimen: Supraclavicular Lymph Nodes  Staged by: Managing physician       1/19/2023 Initial Diagnosis    Seminoma (HCC)     1/30/2023 - 4/17/2023 Chemotherapy    pegfilgrastim (NEULASTA ONPRO), 6 mg, Subcutaneous, Once, 4 of 4 cycles  Administration: 6 mg (2/6/2023), 6 mg (2/27/2023), 6 mg (3/20/2023), 6 mg (3/6/2023), 6 mg (4/17/2023), 6 mg (4/10/2023)  bleomycin (BLENOXANE) IVPB, 30 Units, Intravenous, Once, 4 of 4 cycles  Administration: 30 Units (1/30/2023), 30 Units (2/6/2023), 30 Units (2/13/2023), 30 Units (2/20/2023), 30 Units (2/27/2023), 30 Units (3/6/2023), 30 Units (3/13/2023), 30 Units (3/20/2023), 30 Units (4/3/2023), 30 Units (4/10/2023), 30 Units (4/17/2023)  CISplatin (PLATINOL) infusion, 20 mg/m2 = 52.2 mg, Intravenous, Once, 4 of 4 cycles  Administration: 52.2 mg (1/30/2023), 52.2 mg (1/31/2023), 52.2 mg (2/1/2023), 52.2 mg (2/2/2023), 52.2 mg (2/3/2023), 52.2 mg (2/20/2023), 52.2 mg (2/21/2023), 52.2 mg  (2/22/2023), 52.2 mg (2/23/2023), 52.2 mg (2/24/2023), 52.2 mg (3/13/2023), 52.2 mg (3/14/2023), 52.2 mg (3/15/2023), 52.2 mg (3/16/2023), 52.2 mg (3/17/2023), 52.2 mg (4/3/2023), 52.2 mg (4/4/2023), 52.2 mg (4/5/2023), 52.2 mg (4/6/2023), 52.2 mg (4/7/2023)  fosaprepitant (EMEND) IVPB, 150 mg, Intravenous, Once, 4 of 4 cycles  Administration: 150 mg (1/30/2023), 150 mg (2/20/2023), 150 mg (3/13/2023), 150 mg (4/3/2023), 150 mg (2/24/2023), 150 mg (4/10/2023)             History of Present Illness: Patient returns because over the last few months he noticed swelling in his left chest.  Diagnostic imaging from December 13, 2023 revealed bilateral gynecomastia, left greater than right.  This was BI-RADS 2.  I personally reviewed the films.  He states that the mass on the left is occasionally tender.  He does not think this is getting worse over the last several months.  He has had estrogen and testosterone levels checked which were all within the normal range.    Review of Systems  Complete ROS Surg Onc:   Complete ROS Surg Onc:   Constitutional: The patient denies new or recent history of general fatigue, no recent weight loss, no change in appetite.   Eyes: No complaints of visual problems, no scleral icterus.   ENT: no complaints of ear pain, no hoarseness, no difficulty swallowing,  no tinnitus and no new masses in head, oral cavity, or neck.   Cardiovascular: No complaints of chest pain, no palpitations, no ankle edema.   Respiratory: No complaints of shortness of breath, no cough.   Gastrointestinal: No complaints of jaundice, no bloody stools, no pale stools.   Genitourinary: No complaints of dysuria, no hematuria, no nocturia, no frequent urination, no urethral discharge.   Musculoskeletal: No complaints of weakness, paralysis, joint stiffness or arthralgias.  Integumentary: No complaints of rash, no new lesions.   Neurological: No complaints of convulsions, no seizures, no dizziness.   Hematologic/Lymphatic:  No complaints of easy bruising.   Endocrine:  No hot or cold intolerance.  No polydipsia, polyphagia, or polyuria.  Allergy/immunology:  No environmental allergies.  No food allergies.  Not immunocompromised.  Skin:  No pallor or rash.  No wound.        Patient Active Problem List   Diagnosis    Impacted cerumen    Lymphadenopathy    Obesity, Class II, BMI 35-39.9    Seminoma (HCC)    Tachycardia    Hypokalemia    Headache    Chemotherapy-induced neutropenia     Hypomagnesemia    Hypervolemia    Normocytic anemia    Nausea    Intractable nausea and vomiting    Encounter for care related to Port-a-Cath    Other tear of medial meniscus, current injury, left knee, initial encounter     Past Medical History:   Diagnosis Date    Allergic 1/1/2000    seasonal allergies    Cancer (HCC)     COVID 2021    History of cancer chemotherapy     History of transfusion     Impacted cerumen     Lymphadenopathy     Obesity, unspecified     Seasonal allergies     Seminoma of testis (HCC)     Testicular cancer (HCC) 01/17/2023    Torn meniscus     right knee- surgical repair today 7/19/2021    Wears glasses      Past Surgical History:   Procedure Laterality Date    COLONOSCOPY      FACIAL/NECK BIOPSY Left 01/11/2023    Procedure: EXCISION OF LEFT NECK LYMPH NODE WITH LYMPHOMA PROTOCOL;  Surgeon: Wilber Kasper MD;  Location: [facility];  Service: Surgical Oncology    FL GUIDED CENTRAL VENOUS ACCESS DEVICE INSERTION  01/23/2023    HEMORROIDECTOMY      IR PORT REMOVAL      KNEE SURGERY Right 7/19/2021    VT ARTHRS KNE SURG W/MENISCECTOMY MED/LAT W/SHVG Right 07/19/2021    Procedure: ARTHROSCOPY KNEE, partial lateral meniscectomy;  Surgeon: Guy Vides MD;  Location: [facility];  Service: Orthopedics    VT ARTHRS KNE SURG W/MENISCECTOMY MED/LAT W/SHVG Left 12/5/2023    Procedure: ARTHROSCOPY KNEE, partial lateral meniscectomy;  Surgeon: Guy Vides MD;  Location: [facility];  Service: Orthopedics    VT ORCHIECTOMY  RADICAL TUMOR INGUINAL APPROACH Left 06/16/2023    Procedure: RADICAL ORCHIECTOMY INGUINAL; EXCISION CORD LIPOMA;  Surgeon: Ky Del Toro MD;  Location: [facility];  Service: Urology    TUNNELED VENOUS PORT PLACEMENT N/A 01/23/2023    Procedure: INSERTION VENOUS PORT (PORT-A-CATH);  Surgeon: Wilber Kasper MD;  Location: [facility];  Service: Surgical Oncology     Family History   Problem Relation Age of Onset    Cancer Mother         Breast Cancer    Breast cancer Mother 50 - 59    Breast cancer additional onset Mother     Cancer Father         Skin carcenoma    Skin cancer Father     Breast cancer Sister 40 - 49    Cancer Sister         Breast Cancer and Skin carcenoma    Skin cancer Sister     Breast cancer additional onset Sister     No Known Problems Daughter     Breast cancer Maternal Grandmother 32    No Known Problems Maternal Grandfather     No Known Problems Paternal Grandmother     No Known Problems Paternal Grandfather     Skin cancer Brother     Pancreatic cancer Maternal Aunt     Pancreatic cancer Maternal Aunt      Social History     Socioeconomic History    Marital status: /Civil Union     Spouse name: Not on file    Number of children: Not on file    Years of education: Not on file    Highest education level: Not on file   Occupational History    Not on file   Tobacco Use    Smoking status: Never     Passive exposure: Past    Smokeless tobacco: Never    Tobacco comments:     During childhood, both parents smoked.   Vaping Use    Vaping status: Never Used   Substance and Sexual Activity    Alcohol use: Yes     Alcohol/week: 3.0 standard drinks of alcohol     Types: 1 Glasses of wine, 1 Cans of beer, 1 Shots of liquor per week     Comment: 1-2 weekly    Drug use: Never    Sexual activity: Yes     Partners: Female   Other Topics Concern    Not on file   Social History Narrative    Not on file     Social Determinants of Health     Financial Resource Strain: Not on file   Food  Insecurity: No Food Insecurity (2/13/2023)    Hunger Vital Sign     Worried About Running Out of Food in the Last Year: Never true     Ran Out of Food in the Last Year: Never true   Transportation Needs: No Transportation Needs (2/13/2023)    PRAPARE - Transportation     Lack of Transportation (Medical): No     Lack of Transportation (Non-Medical): No   Physical Activity: Not on file   Stress: Not on file   Social Connections: Not on file   Intimate Partner Violence: Not on file   Housing Stability: Low Risk  (2/13/2023)    Housing Stability Vital Sign     Unable to Pay for Housing in the Last Year: No     Number of Places Lived in the Last Year: 1     Unstable Housing in the Last Year: No       Current Outpatient Medications:     loratadine (CLARITIN) 10 mg tablet, Take 10 mg by mouth if needed for allergies, Disp: , Rfl:     multivitamin (THERAGRAN) TABS, Take 1 tablet by mouth daily, Disp: , Rfl:     NON FORMULARY, Take 1 capsule by mouth 3 (three) times a day Nervive; s/p neuropathy from chemo, Disp: , Rfl:     triamcinolone (KENALOG) 0.1 % cream, Apply topically 2 (two) times a day, Disp: 60 g, Rfl: 1    acetaminophen (TYLENOL) 325 mg tablet, Take 650 mg by mouth every 6 (six) hours as needed for mild pain (Patient not taking: Reported on 2/20/2024), Disp: , Rfl:     apixaban (Eliquis) 2.5 mg, Take 1 tablet (2.5 mg total) by mouth 2 (two) times a day for 14 days (Patient not taking: Reported on 2/20/2024), Disp: 28 tablet, Rfl: 0    Cholecalciferol (Vitamin D3) 1.25 MG (21019 UT) CAPS, Take by mouth in the morning (Patient not taking: Reported on 2/20/2024), Disp: , Rfl:     [facility] Aspirin Low Dose 81 MG EC tablet, TAKE 1 TABLET (81 MG TOTAL) BY MOUTH 2 (TWO) TIMES A DAY FOR 14 DAYS (Patient not taking: Reported on 2/20/2024), Disp: , Rfl:     naproxen (NAPROSYN) 500 mg tablet, Take 1 tablet (500 mg total) by mouth 2 (two) times a day with meals (Patient not taking: Reported on 2/20/2024), Disp: 60 tablet, Rfl:  0    ondansetron (ZOFRAN-ODT) 4 mg disintegrating tablet, Take 1 tablet (4 mg total) by mouth every 8 (eight) hours as needed for nausea or vomiting (Patient not taking: Reported on 2/20/2024), Disp: 15 tablet, Rfl: 0  Allergies   Allergen Reactions    Oyster Shell - Food Allergy Vomiting     Vitals:    02/20/24 1523   BP: 136/92   Pulse: 68   Resp: 18   Temp: 97.5 °F (36.4 °C)   SpO2: 97%       Physical Exam  Constitutional: General appearance: The Patient is well-developed and well-nourished who appears the stated age in no acute distress. Patient is pleasant and talkative.     HEENT:  Normocephalic.  Sclerae are anicteric. Mucous membranes are moist.     Abdomen: Abdomen is soft, non-tender, non-distended and without masses.     Extremities: There is no clubbing or cyanosis. There is no edema.  Symmetric.  Neuro: Grossly nonfocal. Gait is normal.       Skin: Warm, anicteric.    Psych:  Patient is pleasant and talkative.  Breasts: Bilateral gynecomastia, left greater than right.  The areas are rubbery with no dominant masses, skin changes or nipple discharge.  No axillary or subclavicular adenopathy bilaterally.        Pathology:  [unfilled]    Labs:      Imaging  CT abdomen pelvis w contrast    Result Date: 2/5/2024  Narrative: CT ABDOMEN AND PELVIS WITH IV CONTRAST INDICATION: C62.90: Malignant neoplasm of unspecified testis, unspecified whether descended or undescended. COMPARISON: Multiple prior abdomen/pelvis CTs with the most recent being obtained 10/5/2023. TECHNIQUE: CT examination of the abdomen and pelvis was performed. Multiplanar 2D reformatted images were created from the source data. This examination, like all CT scans performed in the [facility], was performed utilizing techniques to minimize radiation dose exposure, including the use of iterative reconstruction and automated exposure control. Radiation dose length product (DLP) for this visit: 1642 mGy-cm IV Contrast: 100 mL of  iohexol (OMNIPAQUE) Enteric Contrast: Not administered. FINDINGS: ABDOMEN LOWER CHEST: No clinically significant abnormality in the visualized lower chest. LIVER/BILIARY TREE: Punctate hypodensity within the right hepatic lobe is too small to characterize but stable dating back to January 2023. No evidence of new discrete liver lesion within the confines of a single phase exam. GALLBLADDER: No calcified gallstones. No pericholecystic inflammatory change. SPLEEN: Enlarged measuring 16.4 cm in AP dimension. PANCREAS: Unremarkable. ADRENAL GLANDS: Unremarkable. KIDNEYS/URETERS: Unremarkable. No hydronephrosis. STOMACH AND BOWEL: No abnormal small bowel dilatation. There are a few scattered colonic diverticula without evidence of acute inflammatory changes. APPENDIX: No findings to suggest appendicitis. ABDOMINOPELVIC CAVITY: No pneumoperitoneum. No ascites. Retroperitoneal adenopathy is again demonstrated. These include a retrocaval lymph node which measures 1.5 x 0.9 cm (2:101), stable from October 2023 when accounting for differences in measuring technique. The largest node measures 2.2 x 1.4 cm (2:94), also stable from prior exam. VESSELS: Unremarkable for patient's age. PELVIS REPRODUCTIVE ORGANS: Unremarkable for patient's age. URINARY BLADDER: Unremarkable. ABDOMINAL WALL/INGUINAL REGIONS: Small fat filled right inguinal hernia. Likely postsurgical changes are seen along the left inguinal canal. BONES: No acute fracture or suspicious osseous lesion.     Impression: 1. Stable retroperitoneal lymphadenopathy. No new suspicious findings within the abdomen or pelvis as compared to CT of October 2023. 2. Stable splenomegaly. Workstation performed: [id number]     XR chest pa & lateral    Result Date: 1/24/2024  Narrative: CHEST INDICATION:   Malignant neoplasm of unspecified testis, unspecified whether descended or undescended. COMPARISON:  Comparison made with previous examination(s) dated (DX) 30-Nov-2023,(DX)  06-Oct-2023,(CT) 05-Oct-2023,(DX) 28-Jul-2023,(CT) 01-May-2023,(CT) 19-Apr-2023. EXAM PERFORMED/VIEWS:  XR CHEST PA & LATERAL FINDINGS: Cardiomediastinal silhouette appears unremarkable. The lungs are clear.  No pneumothorax or pleural effusion. Osseous structures appear within normal limits for patient age.     Impression: No acute cardiopulmonary disease. No significant change from prior study 11/30/2023 Electronically signed: 01/24/2024 09:33 AM Sharad Ochoa MD    I personally reviewed and interpreted the above laboratory and imaging data.    Discussion/Summary: 53-year-old male with a seminoma status post chemotherapy and now with bilateral gynecomastia, left greater than right, both clinically and by imaging.  He is minimally symptomatic.  We had a long discussion regarding gynecomastia.  We discussed causes as well as treatment.  His hormone levels are normal. We discussed we can simply observe this, I could perform a biopsy in the office, we could plan on resecting this if this is very bothersome.  I am comfortable with observation.  I have told him if this would increase in size or become more symptomatic to contact us and we can plan on excision.  He is also going to follow-up with endocrinology.  He is agreeable to this plan.  All his questions were answered.

## 2024-02-20 NOTE — LETTER
February 20, 2024     Maurice Muñoz DO  [address]  [facility] [address]    Patient: Mario Ratliff   YOB: 1970   Date of Visit: 2/20/2024       Dear Dr. Muñoz:    Thank you for referring Mario Ratliff to me for evaluation. Below are my notes for this consultation.    If you have questions, please do not hesitate to call me. I look forward to following your patient along with you.         Sincerely,        Wilber Kasper MD        CC: MD Ky Richmond MD Bankim Bhatt, MD Darius Desai, MD  2/20/2024  3:46 PM  Sign when Signing Visit               Surgical Oncology Follow Up       [facility]  [address] [address]  [phone]    Mario Ratliff  1970  [id number]  [facility]  [address] [address]  [phone]    Diagnoses and all orders for this visit:    Seminoma (HCC)        Chief Complaint   Patient presents with   • Follow-up       No follow-ups on file.      Oncology History   Seminoma (HCC)   1/11/2023 -  Cancer Staged    Staging form: Testis, AJCC 8th Edition  - Clinical stage from 1/11/2023: cT4, cN3, pM1a, S2 - Signed by Nancy Vázquez MD on 1/20/2023  Stage prefix: Initial diagnosis  Lactate dehydrogenase (LDH) (U/L): 699  Human chorionic gonadotropin (hCG) (mIU/mL): 13  Alpha fetoprotein (AFP) (ng/mL): 5.2  Laterality: Left  Sites of metastasis: Distant lymph nodes, NOS, Retroperitoneal lymph node, NOS  Source of metastatic specimen: Supraclavicular Lymph Nodes  Staged by: Managing physician       1/19/2023 Initial Diagnosis    Seminoma (HCC)     1/30/2023 - 4/17/2023 Chemotherapy    pegfilgrastim (NEULASTA ONPRO), 6 mg, Subcutaneous, Once, 4 of 4 cycles  Administration: 6 mg (2/6/2023), 6 mg (2/27/2023), 6 mg (3/20/2023), 6 mg (3/6/2023), 6 mg (4/17/2023), 6 mg  (4/10/2023)  bleomycin (BLENOXANE) IVPB, 30 Units, Intravenous, Once, 4 of 4 cycles  Administration: 30 Units (1/30/2023), 30 Units (2/6/2023), 30 Units (2/13/2023), 30 Units (2/20/2023), 30 Units (2/27/2023), 30 Units (3/6/2023), 30 Units (3/13/2023), 30 Units (3/20/2023), 30 Units (4/3/2023), 30 Units (4/10/2023), 30 Units (4/17/2023)  CISplatin (PLATINOL) infusion, 20 mg/m2 = 52.2 mg, Intravenous, Once, 4 of 4 cycles  Administration: 52.2 mg (1/30/2023), 52.2 mg (1/31/2023), 52.2 mg (2/1/2023), 52.2 mg (2/2/2023), 52.2 mg (2/3/2023), 52.2 mg (2/20/2023), 52.2 mg (2/21/2023), 52.2 mg (2/22/2023), 52.2 mg (2/23/2023), 52.2 mg (2/24/2023), 52.2 mg (3/13/2023), 52.2 mg (3/14/2023), 52.2 mg (3/15/2023), 52.2 mg (3/16/2023), 52.2 mg (3/17/2023), 52.2 mg (4/3/2023), 52.2 mg (4/4/2023), 52.2 mg (4/5/2023), 52.2 mg (4/6/2023), 52.2 mg (4/7/2023)  fosaprepitant (EMEND) IVPB, 150 mg, Intravenous, Once, 4 of 4 cycles  Administration: 150 mg (1/30/2023), 150 mg (2/20/2023), 150 mg (3/13/2023), 150 mg (4/3/2023), 150 mg (2/24/2023), 150 mg (4/10/2023)             History of Present Illness: Patient returns because over the last few months he noticed swelling in his left chest.  Diagnostic imaging from December 13, 2023 revealed bilateral gynecomastia, left greater than right.  This was BI-RADS 2.  I personally reviewed the films.  He states that the mass on the left is occasionally tender.  He does not think this is getting worse over the last several months.  He has had estrogen and testosterone levels checked which were all within the normal range.    Review of Systems  Complete ROS Surg Onc:   Complete ROS Surg Onc:   Constitutional: The patient denies new or recent history of general fatigue, no recent weight loss, no change in appetite.   Eyes: No complaints of visual problems, no scleral icterus.   ENT: no complaints of ear pain, no hoarseness, no difficulty swallowing,  no tinnitus and no new masses in head, oral  cavity, or neck.   Cardiovascular: No complaints of chest pain, no palpitations, no ankle edema.   Respiratory: No complaints of shortness of breath, no cough.   Gastrointestinal: No complaints of jaundice, no bloody stools, no pale stools.   Genitourinary: No complaints of dysuria, no hematuria, no nocturia, no frequent urination, no urethral discharge.   Musculoskeletal: No complaints of weakness, paralysis, joint stiffness or arthralgias.  Integumentary: No complaints of rash, no new lesions.   Neurological: No complaints of convulsions, no seizures, no dizziness.   Hematologic/Lymphatic: No complaints of easy bruising.   Endocrine:  No hot or cold intolerance.  No polydipsia, polyphagia, or polyuria.  Allergy/immunology:  No environmental allergies.  No food allergies.  Not immunocompromised.  Skin:  No pallor or rash.  No wound.        Patient Active Problem List   Diagnosis   • Impacted cerumen   • Lymphadenopathy   • Obesity, Class II, BMI 35-39.9   • Seminoma (HCC)   • Tachycardia   • Hypokalemia   • Headache   • Chemotherapy-induced neutropenia    • Hypomagnesemia   • Hypervolemia   • Normocytic anemia   • Nausea   • Intractable nausea and vomiting   • Encounter for care related to Port-a-Cath   • Other tear of medial meniscus, current injury, left knee, initial encounter     Past Medical History:   Diagnosis Date   • Allergic 1/1/2000    seasonal allergies   • Cancer (HCC)    • COVID 2021   • History of cancer chemotherapy    • History of transfusion    • Impacted cerumen    • Lymphadenopathy    • Obesity, unspecified    • Seasonal allergies    • Seminoma of testis (HCC)    • Testicular cancer (HCC) 01/17/2023   • Torn meniscus     right knee- surgical repair today 7/19/2021   • Wears glasses      Past Surgical History:   Procedure Laterality Date   • COLONOSCOPY     • FACIAL/NECK BIOPSY Left 01/11/2023    Procedure: EXCISION OF LEFT NECK LYMPH NODE WITH LYMPHOMA PROTOCOL;  Surgeon: Wilber Kasper MD;   Location: [facility];  Service: Surgical Oncology   • FL GUIDED CENTRAL VENOUS ACCESS DEVICE INSERTION  01/23/2023   • HEMORROIDECTOMY     • IR PORT REMOVAL     • KNEE SURGERY Right 7/19/2021   • MN ARTHRS KNE SURG W/MENISCECTOMY MED/LAT W/SHVG Right 07/19/2021    Procedure: ARTHROSCOPY KNEE, partial lateral meniscectomy;  Surgeon: Guy Vides MD;  Location: [facility];  Service: Orthopedics   • MN ARTHRS KNE SURG W/MENISCECTOMY MED/LAT W/SHVG Left 12/5/2023    Procedure: ARTHROSCOPY KNEE, partial lateral meniscectomy;  Surgeon: Guy Vides MD;  Location: [facility];  Service: Orthopedics   • MN ORCHIECTOMY RADICAL TUMOR INGUINAL APPROACH Left 06/16/2023    Procedure: RADICAL ORCHIECTOMY INGUINAL; EXCISION CORD LIPOMA;  Surgeon: Ky Del Toro MD;  [facility];  Service: Urology   • TUNNELED VENOUS PORT PLACEMENT N/A 01/23/2023    Procedure: INSERTION VENOUS PORT (PORT-A-CATH);  Surgeon: Wilber Kasper MD;  [facility]: [facility];  Service: Surgical Oncology     Family History   Problem Relation Age of Onset   • Cancer Mother         Breast Cancer   • Breast cancer Mother 50 - 59   • Breast cancer additional onset Mother    • Cancer Father         Skin carcenoma   • Skin cancer Father    • Breast cancer Sister 40 - 49   • Cancer Sister         Breast Cancer and Skin carcenoma   • Skin cancer Sister    • Breast cancer additional onset Sister    • No Known Problems Daughter    • Breast cancer Maternal Grandmother 32   • No Known Problems Maternal Grandfather    • No Known Problems Paternal Grandmother    • No Known Problems Paternal Grandfather    • Skin cancer Brother    • Pancreatic cancer Maternal Aunt    • Pancreatic cancer Maternal Aunt      Social History     Socioeconomic History   • Marital status: /Civil Union     Spouse name: Not on file   • Number of children: Not on file   • Years of education: Not on file   • Highest education level: Not on file   Occupational  History   • Not on file   Tobacco Use   • Smoking status: Never     Passive exposure: Past   • Smokeless tobacco: Never   • Tobacco comments:     During childhood, both parents smoked.   Vaping Use   • Vaping status: Never Used   Substance and Sexual Activity   • Alcohol use: Yes     Alcohol/week: 3.0 standard drinks of alcohol     Types: 1 Glasses of wine, 1 Cans of beer, 1 Shots of liquor per week     Comment: 1-2 weekly   • Drug use: Never   • Sexual activity: Yes     Partners: Female   Other Topics Concern   • Not on file   Social History Narrative   • Not on file     Social Determinants of Health     Financial Resource Strain: Not on file   Food Insecurity: No Food Insecurity (2/13/2023)    Hunger Vital Sign    • Worried About Running Out of Food in the Last Year: Never true    • Ran Out of Food in the Last Year: Never true   Transportation Needs: No Transportation Needs (2/13/2023)    PRAPARE - Transportation    • Lack of Transportation (Medical): No    • Lack of Transportation (Non-Medical): No   Physical Activity: Not on file   Stress: Not on file   Social Connections: Not on file   Intimate Partner Violence: Not on file   Housing Stability: Low Risk  (2/13/2023)    Housing Stability Vital Sign    • Unable to Pay for Housing in the Last Year: No    • Number of Places Lived in the Last Year: 1    • Unstable Housing in the Last Year: No       Current Outpatient Medications:   •  loratadine (CLARITIN) 10 mg tablet, Take 10 mg by mouth if needed for allergies, Disp: , Rfl:   •  multivitamin (THERAGRAN) TABS, Take 1 tablet by mouth daily, Disp: , Rfl:   •  NON FORMULARY, Take 1 capsule by mouth 3 (three) times a day Nervive; s/p neuropathy from chemo, Disp: , Rfl:   •  triamcinolone (KENALOG) 0.1 % cream, Apply topically 2 (two) times a day, Disp: 60 g, Rfl: 1  •  acetaminophen (TYLENOL) 325 mg tablet, Take 650 mg by mouth every 6 (six) hours as needed for mild pain (Patient not taking: Reported on 2/20/2024),  Disp: , Rfl:   •  apixaban (Eliquis) 2.5 mg, Take 1 tablet (2.5 mg total) by mouth 2 (two) times a day for 14 days (Patient not taking: Reported on 2/20/2024), Disp: 28 tablet, Rfl: 0  •  Cholecalciferol (Vitamin D3) 1.25 MG (83270 UT) CAPS, Take by mouth in the morning (Patient not taking: Reported on 2/20/2024), Disp: , Rfl:   •  [employer] Aspirin Low Dose 81 MG EC tablet, TAKE 1 TABLET (81 MG TOTAL) BY MOUTH 2 (TWO) TIMES A DAY FOR 14 DAYS (Patient not taking: Reported on 2/20/2024), Disp: , Rfl:   •  naproxen (NAPROSYN) 500 mg tablet, Take 1 tablet (500 mg total) by mouth 2 (two) times a day with meals (Patient not taking: Reported on 2/20/2024), Disp: 60 tablet, Rfl: 0  •  ondansetron (ZOFRAN-ODT) 4 mg disintegrating tablet, Take 1 tablet (4 mg total) by mouth every 8 (eight) hours as needed for nausea or vomiting (Patient not taking: Reported on 2/20/2024), Disp: 15 tablet, Rfl: 0  Allergies   Allergen Reactions   • Oyster Shell - Food Allergy Vomiting     Vitals:    02/20/24 1523   BP: 136/92   Pulse: 68   Resp: 18   Temp: 97.5 °F (36.4 °C)   SpO2: 97%       Physical Exam  Constitutional: General appearance: The Patient is well-developed and well-nourished who appears the stated age in no acute distress. Patient is pleasant and talkative.     HEENT:  Normocephalic.  Sclerae are anicteric. Mucous membranes are moist.     Abdomen: Abdomen is soft, non-tender, non-distended and without masses.     Extremities: There is no clubbing or cyanosis. There is no edema.  Symmetric.  Neuro: Grossly nonfocal. Gait is normal.       Skin: Warm, anicteric.    Psych:  Patient is pleasant and talkative.  Breasts: Bilateral gynecomastia, left greater than right.  The areas are rubbery with no dominant masses, skin changes or nipple discharge.  No axillary or subclavicular adenopathy bilaterally.        Pathology:  [unfilled]    Labs:      Imaging  CT abdomen pelvis w contrast    Result Date: 2/5/2024  Narrative: CT ABDOMEN AND  PELVIS WITH IV CONTRAST INDICATION: C62.90: Malignant neoplasm of unspecified testis, unspecified whether descended or undescended. COMPARISON: Multiple prior abdomen/pelvis CTs with the most recent being obtained 10/5/2023. TECHNIQUE: CT examination of the abdomen and pelvis was performed. Multiplanar 2D reformatted images were created from the source data. This examination, like all CT scans performed in the [facility], was performed utilizing techniques to minimize radiation dose exposure, including the use of iterative reconstruction and automated exposure control. Radiation dose length product (DLP) for this visit: 1642 mGy-cm IV Contrast: 100 mL of iohexol (OMNIPAQUE) Enteric Contrast: Not administered. FINDINGS: ABDOMEN LOWER CHEST: No clinically significant abnormality in the visualized lower chest. LIVER/BILIARY TREE: Punctate hypodensity within the right hepatic lobe is too small to characterize but stable dating back to January 2023. No evidence of new discrete liver lesion within the confines of a single phase exam. GALLBLADDER: No calcified gallstones. No pericholecystic inflammatory change. SPLEEN: Enlarged measuring 16.4 cm in AP dimension. PANCREAS: Unremarkable. ADRENAL GLANDS: Unremarkable. KIDNEYS/URETERS: Unremarkable. No hydronephrosis. STOMACH AND BOWEL: No abnormal small bowel dilatation. There are a few scattered colonic diverticula without evidence of acute inflammatory changes. APPENDIX: No findings to suggest appendicitis. ABDOMINOPELVIC CAVITY: No pneumoperitoneum. No ascites. Retroperitoneal adenopathy is again demonstrated. These include a retrocaval lymph node which measures 1.5 x 0.9 cm (2:101), stable from October 2023 when accounting for differences in measuring technique. The largest node measures 2.2 x 1.4 cm (2:94), also stable from prior exam. VESSELS: Unremarkable for patient's age. PELVIS REPRODUCTIVE ORGANS: Unremarkable for patient's age. URINARY BLADDER:  Unremarkable. ABDOMINAL WALL/INGUINAL REGIONS: Small fat filled right inguinal hernia. Likely postsurgical changes are seen along the left inguinal canal. BONES: No acute fracture or suspicious osseous lesion.     Impression: 1. Stable retroperitoneal lymphadenopathy. No new suspicious findings within the abdomen or pelvis as compared to CT of October 2023. 2. Stable splenomegaly. Workstation performed: [id number]     XR chest pa & lateral    Result Date: 1/24/2024  Narrative: CHEST INDICATION:   Malignant neoplasm of unspecified testis, unspecified whether descended or undescended. COMPARISON:  Comparison made with previous examination(s) dated (DX) 30-Nov-2023,(DX) 06-Oct-2023,(CT) 05-Oct-2023,(DX) 28-Jul-2023,(CT) 01-May-2023,(CT) 19-Apr-2023. EXAM PERFORMED/VIEWS:  XR CHEST PA & LATERAL FINDINGS: Cardiomediastinal silhouette appears unremarkable. The lungs are clear.  No pneumothorax or pleural effusion. Osseous structures appear within normal limits for patient age.     Impression: No acute cardiopulmonary disease. No significant change from prior study 11/30/2023 Electronically signed: 01/24/2024 09:33 AM Sharad Ochoa MD    I personally reviewed and interpreted the above laboratory and imaging data.    Discussion/Summary: 53-year-old male with a seminoma status post chemotherapy and now with bilateral gynecomastia, left greater than right, both clinically and by imaging.  He is minimally symptomatic.  We had a long discussion regarding gynecomastia.  We discussed we can simply observe this, I could perform a biopsy in the office, we could plan on resecting this if this is very bothersome.  I am comfortable with observation.  I have told him if this would increase in size or become more symptomatic to contact us and we can plan on excision.  He is agreeable to this plan.  All his questions were answered.

## 2024-02-22 ENCOUNTER — OFFICE VISIT (OUTPATIENT)
Dept: DERMATOLOGY | Facility: CLINIC | Age: 54
End: 2024-02-22

## 2024-02-22 VITALS — WEIGHT: 306 LBS | BODY MASS INDEX: 38.25 KG/M2 | TEMPERATURE: 97.1 F

## 2024-02-22 DIAGNOSIS — D22.5 MULTIPLE BENIGN MELANOCYTIC NEVI OF UPPER AND LOWER EXTREMITIES AND TRUNK: ICD-10-CM

## 2024-02-22 DIAGNOSIS — L91.8 ACROCHORDON: ICD-10-CM

## 2024-02-22 DIAGNOSIS — D22.62 MULTIPLE BENIGN MELANOCYTIC NEVI OF UPPER AND LOWER EXTREMITIES AND TRUNK: ICD-10-CM

## 2024-02-22 DIAGNOSIS — D22.71 MULTIPLE BENIGN MELANOCYTIC NEVI OF UPPER AND LOWER EXTREMITIES AND TRUNK: ICD-10-CM

## 2024-02-22 DIAGNOSIS — L81.4 LENTIGO: ICD-10-CM

## 2024-02-22 DIAGNOSIS — D48.5 NEOPLASM OF UNCERTAIN BEHAVIOR OF SKIN: ICD-10-CM

## 2024-02-22 DIAGNOSIS — D18.01 CHERRY ANGIOMA: ICD-10-CM

## 2024-02-22 DIAGNOSIS — L72.0 EPIDERMAL CYST: Primary | ICD-10-CM

## 2024-02-22 DIAGNOSIS — D22.61 MULTIPLE BENIGN MELANOCYTIC NEVI OF UPPER AND LOWER EXTREMITIES AND TRUNK: ICD-10-CM

## 2024-02-22 DIAGNOSIS — D22.72 MULTIPLE BENIGN MELANOCYTIC NEVI OF UPPER AND LOWER EXTREMITIES AND TRUNK: ICD-10-CM

## 2024-02-22 DIAGNOSIS — L20.9 ATOPIC DERMATITIS, UNSPECIFIED TYPE: ICD-10-CM

## 2024-02-22 DIAGNOSIS — L85.3 XEROSIS OF SKIN: ICD-10-CM

## 2024-02-22 PROCEDURE — 88305 TISSUE EXAM BY PATHOLOGIST: CPT | Performed by: STUDENT IN AN ORGANIZED HEALTH CARE EDUCATION/TRAINING PROGRAM

## 2024-02-22 PROCEDURE — 87070 CULTURE OTHR SPECIMN AEROBIC: CPT | Performed by: NURSE PRACTITIONER

## 2024-02-22 PROCEDURE — 87147 CULTURE TYPE IMMUNOLOGIC: CPT | Performed by: NURSE PRACTITIONER

## 2024-02-22 PROCEDURE — 87205 SMEAR GRAM STAIN: CPT | Performed by: NURSE PRACTITIONER

## 2024-02-22 PROCEDURE — 88342 IMHCHEM/IMCYTCHM 1ST ANTB: CPT | Performed by: STUDENT IN AN ORGANIZED HEALTH CARE EDUCATION/TRAINING PROGRAM

## 2024-02-22 PROCEDURE — 87186 SC STD MICRODIL/AGAR DIL: CPT | Performed by: NURSE PRACTITIONER

## 2024-02-22 RX ORDER — KETOCONAZOLE 20 MG/G
CREAM TOPICAL 2 TIMES DAILY
Qty: 30 G | Refills: 3 | Status: SHIPPED | OUTPATIENT
Start: 2024-02-22 | End: 2024-02-22

## 2024-02-22 RX ORDER — KETOCONAZOLE 20 MG/G
CREAM TOPICAL 2 TIMES DAILY
Qty: 30 G | Refills: 3 | Status: SHIPPED | OUTPATIENT
Start: 2024-02-22

## 2024-02-22 NOTE — PATIENT INSTRUCTIONS
EPIDERMAL INCLUSION CYST    Assessment and Plan:  Based on a thorough discussion of this condition and the management approach to it (including a comprehensive discussion of the known risks, side effects and potential benefits of treatment), the patient (family) agrees to implement the following specific plan:  Scheduled for excision on 4/3 @ 9:40 am with  at [place]     What are epidermal inclusion cysts?  Epidermal inclusion cysts are the most common, benign cutaneous cysts. There are many different names for epidermal inclusion cysts, including epidermoid cyst, epidermal cyst, infundibular cyst, inclusion cyst, and keratin cyst. These cysts can occur anywhere on the body and typically present as nodules directly underneath the skin. There is often a visible pore or opening in the center. The cysts are freely moveable and can range from a few millimeters to several centimeters in diameter. The center of epidermoid cysts almost always contains keratin, which has a cheesy appearance, and not sebum. They also do not originate from sebaceous glands. Therefore, epidermal inclusion cysts are not the same as sebaceous cysts.    Cysts may remain stable or progressively enlarge over time. There are no reliable predictive factors to tell if an epidermal inclusion cyst will enlarge, become inflamed, or remain quiescent. Infected cysts tend to become larger, turn red, and are more noticeable to the patient. There may be accompanying pain and discomfort.     What causes epidermal inclusion cysts?  Epidermal inclusion cysts often appear out of the blue and are not contagious. They are due to a proliferation of epidermal cells within the dermis and are more common in men than women. They occur more frequently in patients in their 20s to 40s. Epidermal inclusion cysts by themselves are usually not inherited, but they can be hereditary in rare syndromes such as Phan syndrome, nodular elastosis with cysts and comedones  (Favre-Racouchot syndrome), and basal cell nevus syndrome (Gorlin syndrome). Elderly patients with chronic sun-damaged skin areas have a higher likelihood of developing epidermoid cysts. They often occur in areas where hair follicles have been inflamed or repeatedly irritated are more frequent in patients with acne vulgaris. In the  period, they are called milia.     Patients on BRAF inhibitors such as imiquimod and cyclosporine have a higher incidence of epidermoid cysts of the face.    How do we diagnose an epidermal inclusion cyst?  Epidermoid inclusion cysts are often diagnosed by history and physical exam. There is usually no need for biopsy prior to removal.  Radiographic and laboratory exams, such as ultrasound studies, are unnecessary and not typically ordered unless the practitioner suspects a genetic condition.    What is the treatment for an epidermal inclusion cyst?  Inflamed, uninfected epidermal inclusion cysts rarely resolve spontaneously without therapy or surgical intervention. Treatment is not emergent unless desired by the patient.     Definitive treatment is via surgical excision with walls intact. This method will prevent recurrence. This is best done when the cyst is not inflamed, to decrease the probability of rupture during surgery.   A local anesthetic will be injected around the cyst  A small incision is made in the skin overlying the cyst, and contents are expressed  The incision is repaired with sutures    Another option is to use a 4mm punch biopsy with cyst extraction through the defect.    Incision and drainage is often needed if the cyst is infected or inflamed. If there is surrounding cellulitis, oral antibiotic therapy may be necessary. The common agents used target methicillin sensitive Staphylococcal aureus and methicillin resistant S aureus in areas of high prevalence.     NEOPLASM OF UNCERTAIN BEHAVIOR OF SKIN    Assessment and Plan:  I have discussed with the patient  "that a sample of skin via a \"skin biopsy” would be potentially helpful to further make a specific diagnosis under the microscope.  Based on a thorough discussion of this condition and the management approach to it (including a comprehensive discussion of the known risks, side effects and potential benefits of treatment), the patient (family) agrees to implement the following specific plan:    Procedure:  Skin Biopsy.  After a thorough discussion of treatment options and risk/benefits/alternatives (including but not limited to local pain, scarring, dyspigmentation, blistering, possible superinfection, and inability to confirm a diagnosis via histopathology), verbal and written consent were obtained and portion of the rash was biopsied for tissue sample.  See below for consent that was obtained from patient and subsequent Procedure Note.    INFORMED CONSENT DISCUSSION AND POST-OPERATIVE INSTRUCTIONS FOR PATIENT    I.  RATIONALE FOR PROCEDURE  I understand that a skin biopsy allows the Dermatologist to test a lesion or rash under the microscope to obtain a diagnosis.  It usually involves numbing the area with numbing medication and removing a small piece of skin; sometimes the area will be closed with sutures. In this specific procedure, sutures are not usually needed.  If any sutures are placed, then they are usually need to be removed in 2 weeks or less.    I understand that my Dermatologist recommends that a skin \"shave\" biopsy be performed today.  A local anesthetic, similar to the kind that a dentist uses when filling a cavity, will be injected with a very small needle into the skin area to be sampled.  The injected skin and tissue underneath \"will go to sleep” and become numb so no pain should be felt afterwards.  An instrument shaped like a tiny \"razor blade\" (shave biopsy instrument) will be used to cut a small piece of tissue and skin from the area so that a sample of tissue can be taken and examined more " "closely under the microscope.  A slight amount of bleeding will occur, but it will be stopped with direct pressure and a pressure bandage and any other appropriate methods.  I understands that a scar will form where the wound was created.  Surgical ointment will be applied to help protect the wound.  Sutures are not usually needed.    II.  RISKS AND POTENTIAL COMPLICATIONS   I understand the risks and potential complications of a skin biopsy include but are not limited to the following:  Bleeding  Infection  Pain  Scar/keloid  Skin discoloration  Incomplete Removal  Recurrence  Nerve Damage/Numbness/Loss of Function  Allergic Reaction to Anesthesia  Biopsies are diagnostic procedures and based on findings additional treatment or evaluation may be required  Loss or destruction of specimen resulting in no additional findings    My Dermatologist has explained to me the nature of the condition, the nature of the procedure, and the benefits to be reasonably expected compared with alternative approaches.  My Dermatologist has discussed the likelihood of major risks or complications of this procedure including the specific risks listed above, such as bleeding, infection, and scarring/keloid.  I understand that a scar is expected after this procedure.  I understand that my physician cannot predict if the scar will form a \"keloid,\" which extends beyond the borders of the wound that is created.  A keloid is a thick, painful, and bumpy scar.  A keloid can be difficult to treat, as it does not always respond well to therapy, which includes injecting cortisone directly into the keloid every few weeks.  While this usually reduces the pain and size of the scar, it does not eliminate it.      I understand that photographs may be taken before and after the procedure.  These will be maintained as part of the medical providers confidential records and may not be made available to me.  I further authorize the medical provider to use " "the photographs for teaching purposes or to illustrate scientific papers, books, or lectures if in his/her judgment, medical research, education, or science may benefit from its use.    I have had an opportunity to fully inquire about the risks and benefits of this procedure and its alternatives.   I have been given ample time and opportunity to ask questions and to seek a second opinion if I wished to do so.  I acknowledge that there have specifically been no guarantees as to the cosmetic results from the procedure.  I am aware that with any procedure there is always the possibility of an unexpected complication.    III. POST-PROCEDURAL CARE (WHAT YOU WILL NEED TO DO \"AFTER THE BIOPSY\" TO OPTIMIZE HEALING)    Keep the area clean and dry.  Try NOT to remove the bandage or get it wet for the first 24 hours.    Gently clean the area and apply surgical ointment (such as Vaseline petrolatum ointment, which is available \"over the counter\" and not a prescription) to the biopsy site for up to 2 weeks straight.  This acts to protect the wound from the outside world.      Sutures are not usually placed in this procedure.  If any sutures were placed, return for suture removal as instructed (generally 1 week for the face, 2 weeks for the body).      Take Acetaminophen (Tylenol) for discomfort, if no contraindications.  Ibuprofen or aspirin could make bleeding worse.    Call our office immediately for signs of infection: fever, chills, increased redness, warmth, tenderness, discomfort/pain, or pus or foul smell coming from the wound.    WHAT TO DO IF THERE IS ANY BLEEDING?  If a small amount of bleeding is noticed, place a clean cloth over the area and apply firm pressure for ten minutes.  Check the wound after 10 minutes of direct pressure.  If bleeding persists, try one more time for an additional 10 minutes of direct pressure on the area.  If the bleeding becomes heavier or does not stop after the second attempt, or if you " "have any other questions about this procedure, then please call your [facility]'s Dermatologist by calling [phone] (SKIN).     I hereby acknowledge that I have reviewed and verified the site with my Dermatologist and have requested and authorized my Dermatologist to proceed with the procedure.    SEBORRHEIC DERMATITIS    Assessment and Plan:  Based on a thorough discussion of this condition and the management approach to it (including a comprehensive discussion of the known risks, side effects and potential benefits of treatment), the patient (family) agrees to implement the following specific plan:  Wash with Ketoconazole 2 %  cream on face twice daily   Apply Hydrocortisone 2.5 %  cream to face as needed     Seborrheic Dermatitis   Seborrheic dermatitis is a common, chronic or relapsing form of eczema/dermatitis that mainly affects the sebaceous, gland-rich regions of the scalp, face, and trunk.  There are infantile and adult forms of seborrhoeic dermatitis. It is sometimes associated with psoriasis and, in that clinical scenario, may be referred to as \"sebo-psoriasis.\"  Seborrheic dermatitis is also known as \"seborrheic eczema.\"  Dandruff (also called \"pityriasis capitis\") is an uninflamed form of seborrhoeic dermatitis. Dandruff presents as bran-like scaly patches scattered within hair-bearing areas of the scalp.  In an infant, this condition may be referred to as \"cradle cap.\"  The cause of seborrheic dermatitis is not completely understood. It is associated with proliferation of various species of the skin commensal Malassezia, in its yeast (non-pathogenic) form. Its metabolites (such as the fatty acids oleic acid, malssezin, and indole-3-carbaldehyde) may cause an inflammatory reaction. Differences in skin barrier lipid content and function may account for individual presentations.    Infantile Seborrheic Dermatitis  Infantile seborrheic dermatitis affects babies under the age of 3 months and usually " "resolves by 6-12 months of age.  Infantile seborrheic dermatitis causes \"cradle cap\" (diffuse, greasy scaling on scalp). The rash may spread to affect armpit and groin folds (a type of \"napkin dermatitis\").  There may be associated salmon-pink colored patches that may flake or peel.  The rash in this case is usually not especially itchy, so the baby often appears undisturbed by the rash, even when more generalized.    Adult Seborrheic Dermatitis  Adult seborrheic dermatitis tends to begin in late adolescence; prevalence is greatest in young adults and in the elderly. It is more common in males than in females.    The following factors are sometimes associated with severe adult seborrheic dermatitis:  Oily skin  Familial tendency to seborrhoeic dermatitis or a family history of psoriasis  Immunosuppression: organ transplant recipient, human immunodeficiency virus (HIV) infection and patients with lymphoma  Neurological and psychiatric diseases: Parkinson disease, tardive dyskinesia, depression, epilepsy, facial nerve palsy, spinal cord injury and congenital disorders such as Down syndrome  Treatment for psoriasis with psoralen and ultraviolet A (PUVA) therapy  Lack of sleep  Stressful events.    In adults, seborrheic dermatitis may typically affect the scalp, face (creases around the nose, behind ears, within eyebrows) and upper trunk. Typical clinical features include:  Winter flares, improving in summer following sun exposure  Minimal itch most of the time  Combination oily and dry mid-facial skin  Ill-defined localized scaly patches or diffuse scale in the scalp  Blepharitis; scaly red eyelid margins  [place]-pink, thin, scaly, and ill-defined plaques in skin folds on both sides of the face  Petal or ring-shaped flaky patches on hair-line and on anterior chest  Rash in armpits, under the breasts, in the groin folds and genital creases  Superficial folliculitis (inflamed hair follicles) on cheeks and upper " "trunk    Seborrheic dermatitis is diagnosed by its clinical appearance and behavior. Skin biopsy may be helpful but is rarely necessary to make this diagnosis.     ATOPIC DERMATITIS     Assessment and Plan:  Based on a thorough discussion of this condition and the management approach to it (including a comprehensive discussion of the known risks, side effects and potential benefits of treatment), the patient (family) agrees to implement the following specific plan:  Wound culture taken today   Apply mupirocin 2%  ointment to leg twice daily     Assessment and Plan:   Atopic Dermatitis is a chronic, itchy skin condition that is very common in children but may occur at any age. It is also known as “eczema” or “atopic eczema.” It is the most common form of dermatitis.    Atopic dermatitis usually occurs in people who have an “atopic tendency.”  This means they may develop any or all of these closely linked conditions:  Atopic dermatitis, asthma, hay fever (allergic rhinitis), eosinophilic esophagitis, and gastroenteritis.  Often these conditions run within families with a parent, child or sibling also affected. A family history of asthma, eczema or hay fever is particularly useful in diagnosing atopic dermatitis in infants.    Atopic dermatitis arises because of a complex interaction of genetic and environmental factors. These include defects in skin barrier function making the skin more susceptible to irritation by soap and other contact irritants, the weather, temperature and non-specific triggers.  There is also an element of immune system dysregulation that is often present.  By definition, it is chronic and has a \"waxing-waning\" nature; flares should be expected but with good education and treatment strategies can be minimized.    Your treatment plan  Using only mild cleansers (hypoallergenic and without fragrances) and fragrance free detergent (not “unscented” products which contain a masking agent)  Apply " "moisturizer to entire body at least 2 times a day  Use the above listed medication to affected areas twice a day for a full 2 days after the rash has cleared  Take on over the counter antihistamine like diphenhydramine before bed if the itching is keeping you awake    ATOPIC DERMATITIS FAQS    WHAT IS ATOPIC DERMATITIS?  Atopic dermatitis (also called “eczema”) results from a weak skin barrier and an over active immune system.  The weak skin barrier allows things to get into the skin and then the immune system has an intense reaction to this substances.  The result is itchy red patches anywhere on the body.    WHO GETS ATOPIC DERMATITIS?  There is no single answer because many factors are involved.  It is likely a combination of genetic makeup and environmental triggers and/or exposures.  People with atopic dermatitis are prone to other types of \"atopy\".  They are at increased risk for food allergies, Asthma and hay fever and there is often a family history of these problems as well.      WHAT ABOUT FOOD ALLERGIES?  This is a very controversial topic, as many believe that food allergies are responsible for skin flares. In some cases, specific foods may cause worsening of atopic dermatitis; however this occurs in a minority of cases and usually happens within a few hours of ingestion. While food allergy is more common in children with eczema, foods are specific triggers for flares in only a small percentage of children.  If you notice that the skin flares after certain foods you can see if eliminating one food at time makes a difference, as long as your child can still enjoy a well-balanced diet.   There are blood (RAST) and skin (PRICK) tests that can check for allergies, but they are often positive in children who are not truly allergic. Therefore it is important that you work with your allergist and dermatologist to determine which foods are relevant and causing true symptoms.  Extreme food elimination diets " without the guidance of your doctor, which have become more popular in recent years, may even result in worsening of the skin rash due to malnutrition and avoidance of essential nutrients.      WHY SO MUCH MOISTURIZER?  This is the 1st step and most important part of treatment.  This helps maintaint the skin's barrier function and prevent flares. Creams and ointments are preferable over lotions.  Aveeno eczema relief and Eucerin eczema relief  and cerave are all good ones to start with. It is best to put  moisturizer on directly after bathing, while the skin is damp, it is twice as effective then.  You may bathe daily.  Use warm - not hot - water, for short periods of time (5-10 minutes at a time) Dry off by Lightly patting the skin with a towel and not rubbing. While the skin is still damp, (within 3 minutes) apply any medications to the rashy areas 1st and then moisturize the entire body. It's best to apply the medication 1st but if the medications sting, moisturizer can be applied 1st.    WHY USE THE MEDICATION FOR AN EXTRA 2 DAYS AFTER THE RASH IS GONE?  Sometimes the rash may appear to be gone, but there are still microscopic changes in the skin.  Using the medication and extra 2 days will ensure the inflammation has resolve and make the rash less likely to return quickly.        WHAT ARE TRIGGERS?  Occasionally there are specific things that trigger itching and rashes, but in many cases may none can be identified.  The most common triggers are:  Heat and sweat for some individuals, cold weather for others.  House dust mites, pet fur.  Wool; synthetic fabrics like nylon; dyed fabrics.  Tobacco smoke   Fragrances in: shampoos, soaps, lotions, laundry detergents, fabric softeners.  Saliva or prolonged exposure to water.   start with these.  Avoid the use of fabric softeners in the washing machine or dryer sheets (unless they are fragrance-free).  Try to use laundry detergents, soaps and shampoos that are  "fragrance-free. You may find it helpful to double-rinse your clothes.  Some children are sensitive to house dust mites and they may benefit from a plastic mattress wrap.  While food allergy is more common in children with eczema, foods are specific triggers for flares in only a small percentage of children.  If you notice that the skin flares after certain foods you can see if eliminating one food at time makes a difference, as long as your child can still enjoy a well-balanced diet.     4) WHAT DOES THE ANTIHISTAMINE DO?   Antihistamines help you not to scratch. Scratching only makes the skin more reactive and the barrier function even more disrupted.  It can cause both children and their parents to lose sleep!  There are different types of anti-itch medications.  Some cause more drowsiness than others.  Both types are acceptable depending on your child and your preference.  Start with Benadryl and if that does not work, ask for a prescription “antihistamine.”    5) ARE THERE ANY SIDE EFFECTS TO WORRY ABOUT?  Corticosteroid creams and ointments (generally things with \"-one\" or \"veena\" on the end of their names):  The strength of the cream or ointment depends on the name of the active ingredient.  The numbers at the end do not indicate the relative strength.  Thus triamcinolone 0.1% ointment, considered a mid-strength corticosteroid, is much stronger than hydrocortisone 1% even though the number following the name is much lower.  Topical corticosteroids are very effective in treating atopic dermatitis.  When used in the manner prescribed (to rashy areas of skin and for no more than a few weeks at a time to any one area) they are very safe.  These are corticosteroids and are anti-inflammatory, not the “anabolic steroids” like those used illegally by some athletes. It is important that you do not overuse steroid creams, and if you notice a thin, shiny appearance to the skin or broken blood vessels, you should stop using " the cream and consult your health care provider regarding possible overuse/overthinning of the skin.  The face, armpits and groin have particularly thin and sensitive skin and are therefore most at risk for bad results if steroids are over-used in these sites.      Topical non-steroid creams and ointments (immunomodulators):  These creams and ointments are also called topical calcineurin inhibitors (TCIs).  These include Protopic ointment and Elidel cream. Crisaborole 2% (Eucrisa) is a prescription ointment that targets an enzyme called PDE4 (phosphodiesterase 4).  It is used on the skin topically to treat mild-to-moderate eczema in adults and children 2 years of age and older.  In total, these nonsteroidal prescriptions are used to help decrease itching and redness in the skin.  They are not as strong as most steroid creams; however, it is believed that they do not thin the skin when overused.  They are generally used as second-line medications, though they may be used alone or in conjunction with topical steroids.  In sensitive areas such as the face, underarms or groin, they are often recommended.  They can sting inflamed skin, but are generally well tolerated once the skin is healing.    The FDA placed a “black-box” warning on both Elidel and Protopic in 2006 based on animal studies using the medications.  Some animals developed skin cancer and lymphoma.  Subsequently, the FDA released a statement that there is no causal relationship between the two medications and cancer.  Because of this concern, there are ongoing studies to evaluate this relationship in humans.  So far, there are studies that support the safety of these medications.  One showed that the rates of cancer in patient using these medications topically were less than the rates of the general population and another showed that in patient's using the medication over a large area of the body, the levels of the medication in the blood was  "undetectable.    As for Eucrisa, this product is only approved for the topical treatment of mild-to-moderate eczema in patients 2 years of age and older; use of the medication in kids younger than 2 is considered “off label” and has not been formally studied.  Burning and stinging are the most commonly reported side effects of this medication.  Rarely, this product has been known to cause hives and hypersensitivity reactions; discontinue its use if you develop severe itching, swelling, or redness in the area of application.    MELANOCYTIC NEVI (\"Moles\")    Assessment and Plan:  Based on a thorough discussion of this condition and the management approach to it (including a comprehensive discussion of the known risks, side effects and potential benefits of treatment), the patient (family) agrees to implement the following specific plan:  Reassured benign     Melanocytic Nevi  Melanocytic nevi (\"moles\") are caused by collections of the color producing skin cells, or melanocytes, in 1 area in the skin. They can range in color from pink to dark brown and be either raised or flat.      Some moles are present at birth (I.e., \"congenital nevi\"), while others come up later in life (i.e., \"acquired nevi\").  Sun exposure also stimulates the body to make more moles, ie the more sun you get the more moles you'll grow.    Clinically distinguishing a healthy mole from melanoma may be difficult. The \"ABCDE's\" of moles have been suggested as a means of helping to alert a person to a suspicious mole and the possible increased risk of melanoma.      Asymmetry: Healthy moles tend to be symmetric, while melanomas are often asymmetric.  Asymmetry means if you draw a line through the mole, the two halves do not match in color, size, shape, or surface texture Any mole that starts to demonstrate \"asymmetry\" should be examined promptly by a board certified dermatologist.     Border: Healthy moles tend to have discrete, even borders.  The " "border of a melanoma often blends into the normal skin and does not sharply delineate the mole from normal skin.  Any mole that starts to demonstrate \"uneven borders\" should be examined promptly     Color: Healthy moles tend to be one color throughout.  Melanomas tend to be made up of different colors ranging from dark black, blue, white, or red.  Any mole that demonstrates a color change should be examined promptly    Diameter: Healthy moles tend to be smaller than 0.6 cm in size; an exception are \"congenital nevi\" that can be larger.  Melanomas tend to grow and can often be greater than 0.6 cm (1/4 of an inch, or the size of a pencil eraser). This is only a guideline, and many normal moles may be larger than 0.6 cm without being unhealthy.  Any mole that starts to change in size (small to bigger or bigger to smaller) should be examined promptly    Evolving: Healthy moles tend to \"stay the same.\"  Melanomas may often show signs of change or evolution such as a change in size, shape, color, or elevation.  Any mole that starts to itch, bleed, crust, burn, hurt, or ulcerate or demonstrate a change or evolution should be examined promptly by a board certified dermatologist.      What are atypical moles or dysplastic nevi?  Dysplastic moles are moles that have some of the ABCDE  changes listed above but  are not cancerous  Sometimes a biopsy and microscopic examination are needed to determine the difference. They may indicate an increased risk of melanoma in that person, especially if there is a family history of melanoma.    What is a Melanoma?  The main concern when looking at a new or changing mole it to evaluate whether it may be a melanoma. The appearance of a \"new mole\" remains one of the most reliable methods for identifying a malignant melanoma.   A melanoma is a type of skin cancer that can be deadly if it spreads throughout the body. The prognosis of a Melanoma depends on how deep it has penetrated in the " "skin.  If caught early, they generally will not have had time to grow into the deeper layers of the skin and they cure rate is then very high. Once the melanoma grows deeper into the skin, the cure rate drops dramatically. Therefore, early detection and removal of a malignant melanoma results in a much better chance of complete cure.       ACROCHORDON (\"SKIN TAG\")    Assessment and Plan:  Based on a thorough discussion of this condition and the management approach to it (including a comprehensive discussion of the known risks, side effects and potential benefits of treatment), the patient (family) agrees to implement the following specific plan:  Reassured benign    Skin tags are common, soft, harmless skin lesions that are also called, in the appropriate settings, papillomas, fibroepithelial polyps, and soft fibromas.  They are made up of loosely arranged collagen fibers and blood vessels surrounded by a thickened or thinned-out epidermis.    Skin tags tend to develop in both men and women as we grow older.  They are usually found on the skin folds (neck, armpits, groin).  It is not known what specifically causes skin tags.  Certain factors, though, do appear to play a role:  Chaffing and irritation from skin rubbing together  High levels of growth factors (as seen, for example, in pregnancy or in acromegaly/gigantism)  Insulin resistance  Human papillomavirus (wart virus)    We discussed that most skin tags do not need to be treated unless they are specifically causing the patient physical distress or limitation or pose a risk for a larger problem such as an infection that forms secondary to excoriation or chronic irritation.    We had a thorough discussion of treatment options and specific risks (including that any procedural treatment may not be covered by insurance and would then be the patient's responsibility) and benefits/alternatives including but not limited to the following:  Cryotherapy (freezing)  Shave " "removal  Surgical excision (snip excision with scissors)  Electrosurgery  Ligation (we do not do this procedure and counseled against it due to risk of tissue necrosis and infection)    QUINTERO ANGIOMAS     Assessment and Plan:  Based on a thorough discussion of this condition and the management approach to it (including a comprehensive discussion of the known risks, side effects and potential benefits of treatment), the patient (family) agrees to implement the following specific plan:  reassured benign.     Assessment and Plan:    Cherry angioma, also known as Glass de Roderick spots, are benign vascular skin lesions. A \"cherry angioma\" is a firm red, blue or purple papule, 0.1-1 cm in diameter. When thrombosed, they can appear black in colour until evaluated with a dermatoscope when the red or purple colour is more easily seen. Cherry angioma may develop on any part of the body but most often appear on the scalp, face, lips and trunk.  An angioma is due to proliferating endothelial cells; these are the cells that line the inside of a blood vessel.     Angiomas can arise in early life or later in life; the most common type of angioma is a cherry angioma.  Cherry angiomas are very common in males and females of any age or race. They are more noticeable in white skin than in skin of colour. They markedly increase in number from about the age of 40. There may be a family history of similar lesions. Eruptive cherry angiomas have been rarely reported to be associated with internal malignancy. The cause of angiomas is unknown. Genetic analysis of cherry angiomas has shown that they frequently carry specific somatic missense mutations in the GNAQ and GNA11 (Q209H) genes, which are involved in other vascular and melanocytic proliferations.     Quintero angioma is usually diagnosed clinically and no investigations are necessary for the majority of lesions. It has a characteristic red-clod or lobular pattern on dermatoscopy " (called lacunar pattern using conventional pattern analysis).  When there is uncertainty about the diagnosis, a biopsy may be performed. The angioma is composed of venules in a thickened papillary dermis. Collagen bundles may be prominent between the lobules.     Cherry angiomas are harmless, so they do not usually have to be treated. Occasionally, they are removed to exclude a malignant skin lesion such as a nodular melanoma or because they are irritated or bleeding (and a subsequent risk for infection).  To decrease friction over the lesions, we recommend Neutrogena Daily Defense SPF 50+ at least 3 times a day.     LENTIGO    Assessment and Plan:  Based on a thorough discussion of this condition and the management approach to it (including a comprehensive discussion of the known risks, side effects and potential benefits of treatment), the patient (family) agrees to implement the following specific plan:  Reassured benign    What is a lentigo?  A lentigo is a pigmented flat or slightly raised lesion with a clearly defined edge. Unlike an ephelis (freckle), it does not fade in the winter months. There are several kinds of lentigo.  The name lentigo originally referred to its appearance resembling a small lentil. The plural of lentigo is lentigines, although “lentigos” is also in common use.    Who gets lentigines?  Lentigines can affect males and females of all ages and races. Solar lentigines are especially prevalent in fair skinned adults. Lentigines associated with syndromes are present at birth or arise during childhood.    What causes lentigines?  Common forms of lentigo are due to exposure to ultraviolet radiation:  Sun damage including sunburn   Indoor tanning   Phototherapy, especially photochemotherapy (PUVA)    Ionizing radiation, eg radiation therapy, can also cause lentigines.  Several familial syndromes associated with widespread lentigines originate from mutations in Fernando-MAP kinase, mTOR signaling and  PTEN pathways.    What are the clinical features of lentigines?  Lentigines have been classified into several different types depending on what they look like, where they appear on the body, causative factors, and whether they are associated to other diseases or conditions.  Lentigines may be solitary or more often, multiple. Most lentigines are smaller than 5 mm in diameter.    Lentigo simplex  A precursor to junctional naevus   Arises during childhood and early adult life   Found on trunk and limbs   Small brown round or oval macule or thin plaque   Jagged or smooth edge   May have a dry surface   May disappear in time  Solar lentigo  A precursor to seborrhoeic keratosis   Found on chronically sun exposed sites such as hands, face, lower legs   May also follow sunburn to shoulders   Yellow, light or dark brown regular or irregular macule or thin plaque   May have a dry surface   Often has moth-eaten outline   Can slowly enlarge to several centimeters in diameter   May disappear, often through the process known as lichenoid keratosis   When atypical in appearance, may be difficult to distinguish from melanoma in situ  Ink spot lentigo  Also known as reticulated lentigo   Few in number compared to solar lentigines   Follows sunburn in very fair skinned individuals   Dark brown to black irregular ink spot-like macule  PUVA lentigo  Similar to ink spot lentigo but follows photochemotherapy (PUVA)   Location anywhere exposed to PUVA  Tanning bed lentigo  Similar to ink spot lentigo but follows indoor tanning   Location anywhere exposed to tanning bed  Radiation lentigo  Occurs in site of irradiation (accidental or therapeutic)   Associated with late-stage radiation dermatitis: epidermal atrophy, subcutaneous fibrosis, keratosis, telangiectasias  Melanotic macule  Mucosal surfaces or adjacent glabrous skin eg lip, vulva, penis, anus   Light to dark brown   Also called mucosal melanosis  Generalised lentigines  Found on  any exposed or covered site from early childhood   Small macules may merge to form larger patches   Not associated with a syndrome   Also called lentigines profusa, multiple lentigines  Agminated lentigines  Naevoid eruption of lentigos confined to a single segmental area   Sharp demarcation in midline   May have associated neurological and developmental abnormalities  Patterned lentigines  Inherited tendency to lentigines on face, lips, buttocks, palms, soles   Recognised mainly in people of  ethnicity  Centrofacial neurodysraphic lentiginosis  Associated with mental retardation  Lentiginosis syndromes  Syndromes include LEOPARD/[place], Peutz-Jeghers, Laugier-Hunziker, Moynahan, Xeroderma pigmentosum, myxoma syndromes (BOSCH, NAME, Cortez), Ruvalcaba-Myhre-Velez, Bannayan-Zonnana syndrome, Cowden disease (multiple hamartoma syndrome )   Inheritance is autosomal dominant; sporadic cases common   Widespread lentigines present at birth or arise in early childhood   Associated with neural, endocrine, and mesenchymal tumors    How is the diagnosis made?  Lentigines are usually diagnosed clinically by their typical appearance. Concern regarding possibility of melanoma may lead to:  Dermatoscopy   Diagnostic excision biopsy    Histopathology of a lentigo shows:  Thickened epidermis   An increased number of melanocytes along the basal layer of epidermis   Unlike junctional melanocytic naevus, there are no nests of melanocytes   Increased melanin pigment within the keratinocytes   Additional features depending on type of lentigo    In contrast, an ephelis (freckle) shows sun-induced increased melanin within the keratinocytes, without an increase in number of cells.  What is the treatment for lentigines?  Most lentigines are left alone. Attempts to lighten them may not be successful. The following approaches are used:  SPF 50+ broad-spectrum sunscreen   Hydroquinone bleaching cream   Alpha hydroxy acids   Vitamin C  "  Retinoids   Azelaic acid   Chemical peels  Individual lesions can be permanently removed using:  Cryotherapy   Intense pulsed light   Pigment lasers    How can lentigines be prevented?  Lentigines associated with exposure ultraviolet radiation can be prevented by very careful sun protection. Clothing is more successful at preventing new lentigines than are sunscreens.    What is the outlook for lentigines?  Lentigines usually persist. They may increase in number with age and sun exposure. Some in sun-protected sites may fade and disappear.    XEROSIS (\"DRY SKIN\")    Assessment and Plan:  Based on a thorough discussion of this condition and the management approach to it (including a comprehensive discussion of the known risks, side effects and potential benefits of treatment), the patient (family) agrees to implement the following specific plan:  Use moisturizer like Eucerin,Cerave, Vanicream or Aveeno Cream 3 times a day for the dry skin            Dry skin refers to skin that feels dry to touch. Dry skin has a dull surface with a rough, scaly quality. The skin is less pliable and cracked. When dryness is severe, the skin may become inflamed and fissured.  Although any body site can be dry, dry skin tends to affect the shins more than any other site.    Dry skin is lacking moisture in the outer horny cell layer (stratum corneum) and this results in cracks in the skin surface.  Dry skin is also called xerosis, xeroderma or asteatosis (lack of fat).  It can affect males and females of all ages. There is some racial variability in water and lipid content of the skin.  Dry skin that starts in early childhood may be one of about 20 types of ichthyosis (fish-scale skin). There is often a family history of dry skin.   Dry skin is commonly seen in people with atopic dermatitis.  Nearly everyone > 60 years has dry skin.    Dry skin that begins later may be seen in people with certain diseases and " conditions.  Postmenopausal women  Hypothyroidism  Chronic renal disease   Malnutrition and weight loss   Subclinical dermatitis   Treatment with certain drugs such as oral retinoids, diuretics and epidermal growth factor receptor inhibitors    People exposed to a dry environment may experience dry skin.  Low humidity: in desert climates or cool, windy conditions   Excessive air conditioning   Direct heat from a fire or fan heater   Excessive bathing   Contact with soap, detergents and solvents   Inappropriate topical agents such as alcohol   Frictional irritation from rough clothing or abrasives    Dry skin is due to abnormalities in the integrity of the barrier function of the stratum corneum, which is made up of corneocytes.  There is an overall reduction in the lipids in the stratum corneum.   Ratio of ceramides, cholesterol and free fatty acids may be normal or altered.   There may be a reduction in the proliferation of keratinocytes.   Keratinocyte subtypes change in dry skin with a decrease in keratins K1, K10 and increase in K5, K14.   Involucrin (a protein) may be expressed early, increasing cell stiffness.   The result is retention of corneocytes and reduced water-holding capacity.    The inherited forms of ichthyosis are due to loss of function mutations in various genes (listed in parentheses below).  The clinical features of ichthyosis depend on the specific type of ichthyosis:  Ichthyosis vulgaris (FLG).   Recessive X-linked ichthyosis (STS)   Autosomal recessive congenital ichthyosis (ABCA12, TGM1, ALOXE3)   Keratinopathic ichthyoses (KRT1, KRT10, KRT2)    Acquired ichthyosis may be due to:  Metabolic factors: thyroid deficiency   Illness: lymphoma, internal malignancy, sarcoidosis, HIV infection   Drugs: nicotinic acid, kava, protein kinase inhibitors (eg EGFR inhibitors), hydroxyurea.    Complications of dry skin:  Dry areas of skin may become itchy, indicating a form of eczema/dermatitis has  developed.  Atopic eczema -- especially in people with ichthyosis vulgaris   Eczema craquelé -- especially in elderly people. Also called asteatotic eczema   A dry form of nummular dermatitis/discoid eczema -- especially in people that wash their skin excessively.  When the dry skin of an elderly person is itchy without a visible rash, it is sometimes called winter itch, 7th age itch, senile pruritus or chronic pruritus of the elderly.    Other complications of dry skin may include:  Skin infection when bacteria or viruses penetrate a break in the skin surface   Overheating, especially in some forms of ichthyosis   Food allergy, eg, to peanuts, has been associated with filaggrin mutations   Contact allergy, eg, to nickel, has also been correlated with barrier function defects.    How is the type of dry skin diagnosed?  The type of dry skin is diagnosed by careful history and examination.    In children:  Family history   Age of onset   Appearance at birth, if known   Distribution of dry skin   Other features, eg eczema, abnormal nails, hair, dentition, sight, hearing.    In adults:  Medical history   Medications and topical preparations   Bathing frequency and use of soap   Evaluation of environmental factors that may contribute to dry skin.    What is the treatment for dry skin?  The mainstay of treatment of dry skin and ichthyosis is moisturisers/emollients. They should be applied liberally and often enough to:  Reduce itch   Improve the barrier function   Prevent entry of irritants, bacteria   Reduce transepidermal water loss.    When considering which emollient is most suitable, consider:  Severity of the dryness   Tolerance   Personal preference   Cost and availability.    Emollients generally work best if applied to damp skin, if pH is below 7 (acidic), and if containing humectants such as urea or propylene glycol.  Additional treatments include:  Topical steroid if itchy or there is dermatitis -- choose an  emollient base   Topical calcineurin inhibitors if topical steroids are unsuitable.    How can dry skin be prevented?  Eliminate aggravating factors:  Reduce the frequency of bathing.   A humidifier in winter and air conditioner in summer   Compare having a short shower with a prolonged soak in a bath.   Use lukewarm, not hot, water.   Replace standard soap with a substitute such as a synthetic detergent cleanser, water-miscible emollient, bath oil, anti-pruritic tar oil, colloidal oatmeal etc.   Apply an emollient liberally and often, particularly shortly after bathing, and when itchy. The drier the skin, the thicker this should be, especially on the hands.    What is the outlook for dry skin?  A tendency to dry skin may persist life-long, or it may improve once contributing factors are controlled.

## 2024-02-22 NOTE — PROGRESS NOTES
"[facility] Note     Patient Name: Mario Ratliff  Encounter Date: 2/22/24     Have you been cared for by a [facility] Dermatologist in the last 3 years and, if so, which description applies to you?    NO.   I am considered a \"new\" patient and must complete all patient intake questions. I am MALE/not capable of bearing children.    REVIEW OF SYSTEMS:  Have you recently had or currently have any of the following? Recent fever or chills? No  Any non-healing wound? No   PAST MEDICAL HISTORY:  Have you personally ever had or currently have any of the following?  If \"YES,\" then please provide more detail. Skin cancer (such as Melanoma, Basal Cell Carcinoma, Squamous Cell Carcinoma?  No  Tuberculosis, HIV/AIDS, Hepatitis B or C: No  Radiation Treatment No   HISTORY OF IMMUNOSUPPRESSION:   Do you have a history of any of the following:  Systemic Immunosuppression such as Diabetes, Biologic or Immunotherapy, Chemotherapy, Organ Transplantation, Bone Marrow Transplantation?  YES, chemo; Jan-thru June 2023     Answering \"YES\" requires the addition of the dotphrase \"IMMUNOSUPPRESSED\" as the first diagnosis of the patient's visit.   FAMILY HISTORY:  Any \"first degree relatives\" (parent, brother, sister, or child) with the following?    Skin Cancer, Pancreatic or Other Cancer? YES, HX of skin cancer with father, sister and brother   PATIENT EXPERIENCE:    Do you want the Dermatologist to perform a COMPLETE skin exam today including a clinical examination under the \"bra and underwear\" areas?  Yes  If necessary, do we have your permission to call and leave a detailed message on your Preferred Phone number that includes your specific medical information?  Yes      Allergies   Allergen Reactions    [place] - Food Allergy Vomiting      Current Outpatient Medications:     loratadine (CLARITIN) 10 mg tablet, Take 10 mg by mouth if needed for allergies, Disp: , Rfl:     triamcinolone (KENALOG) 0.1 % cream, Apply " topically 2 (two) times a day, Disp: 60 g, Rfl: 1    acetaminophen (TYLENOL) 325 mg tablet, Take 650 mg by mouth every 6 (six) hours as needed for mild pain (Patient not taking: Reported on 2/20/2024), Disp: , Rfl:     apixaban (Eliquis) 2.5 mg, Take 1 tablet (2.5 mg total) by mouth 2 (two) times a day for 14 days (Patient not taking: Reported on 2/20/2024), Disp: 28 tablet, Rfl: 0    Cholecalciferol (Vitamin D3) 1.25 MG (80188 UT) CAPS, Take by mouth in the morning (Patient not taking: Reported on 2/20/2024), Disp: , Rfl:     [facility] Aspirin Low Dose 81 MG EC tablet, TAKE 1 TABLET (81 MG TOTAL) BY MOUTH 2 (TWO) TIMES A DAY FOR 14 DAYS (Patient not taking: Reported on 2/20/2024), Disp: , Rfl:     multivitamin (THERAGRAN) TABS, Take 1 tablet by mouth daily (Patient not taking: Reported on 2/22/2024), Disp: , Rfl:     naproxen (NAPROSYN) 500 mg tablet, Take 1 tablet (500 mg total) by mouth 2 (two) times a day with meals (Patient not taking: Reported on 2/20/2024), Disp: 60 tablet, Rfl: 0    NON FORMULARY, Take 1 capsule by mouth 3 (three) times a day Nervive; s/p neuropathy from chemo (Patient not taking: Reported on 2/22/2024), Disp: , Rfl:     ondansetron (ZOFRAN-ODT) 4 mg disintegrating tablet, Take 1 tablet (4 mg total) by mouth every 8 (eight) hours as needed for nausea or vomiting (Patient not taking: Reported on 2/20/2024), Disp: 15 tablet, Rfl: 0          Whom besides the patient is providing clinical information about today's encounter?   NO ADDITIONAL HISTORIAN (patient alone provided history)    Physical Exam and Assessment/Plan by Diagnosis:    EPIDERMAL INCLUSION CYST    Physical Exam:  Anatomic Location Affected:  mid back  Morphological Description:  cystic nodule      Additional History of Present Condition:  patient notes that gets bothersome at times; sometimes cyst will drain and then fill up again. Patient has had the cyst for several years.     Assessment and Plan:  Based on a thorough discussion of  this condition and the management approach to it (including a comprehensive discussion of the known risks, side effects and potential benefits of treatment), the patient (family) agrees to implement the following specific plan:  Scheduled for excision on 4/3 @ 9:40 am with  at [place]     What are epidermal inclusion cysts?  Epidermal inclusion cysts are the most common, benign cutaneous cysts. There are many different names for epidermal inclusion cysts, including epidermoid cyst, epidermal cyst, infundibular cyst, inclusion cyst, and keratin cyst. These cysts can occur anywhere on the body and typically present as nodules directly underneath the skin. There is often a visible pore or opening in the center. The cysts are freely moveable and can range from a few millimeters to several centimeters in diameter. The center of epidermoid cysts almost always contains keratin, which has a cheesy appearance, and not sebum. They also do not originate from sebaceous glands. Therefore, epidermal inclusion cysts are not the same as sebaceous cysts.    Cysts may remain stable or progressively enlarge over time. There are no reliable predictive factors to tell if an epidermal inclusion cyst will enlarge, become inflamed, or remain quiescent. Infected cysts tend to become larger, turn red, and are more noticeable to the patient. There may be accompanying pain and discomfort.     What causes epidermal inclusion cysts?  Epidermal inclusion cysts often appear out of the blue and are not contagious. They are due to a proliferation of epidermal cells within the dermis and are more common in men than women. They occur more frequently in patients in their 20s to 40s. Epidermal inclusion cysts by themselves are usually not inherited, but they can be hereditary in rare syndromes such as Phan syndrome, nodular elastosis with cysts and comedones (Favre-Racouchot syndrome), and basal cell nevus syndrome (Gorlin syndrome). Elderly  patients with chronic sun-damaged skin areas have a higher likelihood of developing epidermoid cysts. They often occur in areas where hair follicles have been inflamed or repeatedly irritated are more frequent in patients with acne vulgaris. In the  period, they are called milia.     Patients on BRAF inhibitors such as imiquimod and cyclosporine have a higher incidence of epidermoid cysts of the face.    How do we diagnose an epidermal inclusion cyst?  Epidermoid inclusion cysts are often diagnosed by history and physical exam. There is usually no need for biopsy prior to removal.  Radiographic and laboratory exams, such as ultrasound studies, are unnecessary and not typically ordered unless the practitioner suspects a genetic condition.    What is the treatment for an epidermal inclusion cyst?  Inflamed, uninfected epidermal inclusion cysts rarely resolve spontaneously without therapy or surgical intervention. Treatment is not emergent unless desired by the patient.     Definitive treatment is via surgical excision with walls intact. This method will prevent recurrence. This is best done when the cyst is not inflamed, to decrease the probability of rupture during surgery.   A local anesthetic will be injected around the cyst  A small incision is made in the skin overlying the cyst, and contents are expressed  The incision is repaired with sutures    Another option is to use a 4mm punch biopsy with cyst extraction through the defect.    Incision and drainage is often needed if the cyst is infected or inflamed. If there is surrounding cellulitis, oral antibiotic therapy may be necessary. The common agents used target methicillin sensitive Staphylococcal aureus and methicillin resistant S aureus in areas of high prevalence.     NEOPLASM OF UNCERTAIN BEHAVIOR OF SKIN    Physical Exam:  (Anatomic Location); (Size and Morphological Description); (Differential Diagnosis):  Right upper eyelid: 0.15 cm x 0.15 cm  "skin colored papule; DDX: inflamed acro cordial         Additional History of Present Condition:  Patient reports lesion hanging over right upper eyelid that has been present for several years; he states that it is obstructing view.     Assessment and Plan:  I have discussed with the patient that a sample of skin via a \"skin biopsy” would be potentially helpful to further make a specific diagnosis under the microscope.  Based on a thorough discussion of this condition and the management approach to it (including a comprehensive discussion of the known risks, side effects and potential benefits of treatment), the patient (family) agrees to implement the following specific plan:    Procedure:  Skin Biopsy.  After a thorough discussion of treatment options and risk/benefits/alternatives (including but not limited to local pain, scarring, dyspigmentation, blistering, possible superinfection, and inability to confirm a diagnosis via histopathology), verbal and written consent were obtained and portion of the rash was biopsied for tissue sample.  See below for consent that was obtained from patient and subsequent Procedure Note.    PROCEDURE TANGENTIAL (SHAVE) BIOPSY NOTE:    Performing Physician: MERISSA Stevenson  Anatomic Location; Clinical Description with size (cm); Pre-Op Diagnosis:   SPECIMEN A; right upper eyelid; 0.15 x 0.15 cm skin colored papule; DDX: inflamed acro cordial    Post-op diagnosis: Same     Local anesthesia: 1% xylocaine with epi      Topical anesthesia: None    Hemostasis: Aluminum chloride       After obtaining informed consent  at which time there was a discussion about the purpose of biopsy  and low risks of infection and bleeding.  The area was prepped and draped in the usual fashion. Anesthesia was obtained with 1% lidocaine with epinephrine. A shave biopsy to an appropriate sampling depth was obtained by Shave (Dermablade or 15 blade) The resulting wound was covered with surgical ointment " "and bandaged appropriately.     The patient tolerated the procedure well without complications and was without signs of functional compromise.      Specimen has been sent for review by Dermatopathology.    Standard post-procedure care has been explained and has been included in written form within the patient's copy of Informed Consent.    INFORMED CONSENT DISCUSSION AND POST-OPERATIVE INSTRUCTIONS FOR PATIENT    I.  RATIONALE FOR PROCEDURE  I understand that a skin biopsy allows the Dermatologist to test a lesion or rash under the microscope to obtain a diagnosis.  It usually involves numbing the area with numbing medication and removing a small piece of skin; sometimes the area will be closed with sutures. In this specific procedure, sutures are not usually needed.  If any sutures are placed, then they are usually need to be removed in 2 weeks or less.    I understand that my Dermatologist recommends that a skin \"shave\" biopsy be performed today.  A local anesthetic, similar to the kind that a dentist uses when filling a cavity, will be injected with a very small needle into the skin area to be sampled.  The injected skin and tissue underneath \"will go to sleep” and become numb so no pain should be felt afterwards.  An instrument shaped like a tiny \"razor blade\" (shave biopsy instrument) will be used to cut a small piece of tissue and skin from the area so that a sample of tissue can be taken and examined more closely under the microscope.  A slight amount of bleeding will occur, but it will be stopped with direct pressure and a pressure bandage and any other appropriate methods.  I understands that a scar will form where the wound was created.  Surgical ointment will be applied to help protect the wound.  Sutures are not usually needed.    II.  RISKS AND POTENTIAL COMPLICATIONS   I understand the risks and potential complications of a skin biopsy include but are not limited to the " "following:  Bleeding  Infection  Pain  Scar/keloid  Skin discoloration  Incomplete Removal  Recurrence  Nerve Damage/Numbness/Loss of Function  Allergic Reaction to Anesthesia  Biopsies are diagnostic procedures and based on findings additional treatment or evaluation may be required  Loss or destruction of specimen resulting in no additional findings    My Dermatologist has explained to me the nature of the condition, the nature of the procedure, and the benefits to be reasonably expected compared with alternative approaches.  My Dermatologist has discussed the likelihood of major risks or complications of this procedure including the specific risks listed above, such as bleeding, infection, and scarring/keloid.  I understand that a scar is expected after this procedure.  I understand that my physician cannot predict if the scar will form a \"keloid,\" which extends beyond the borders of the wound that is created.  A keloid is a thick, painful, and bumpy scar.  A keloid can be difficult to treat, as it does not always respond well to therapy, which includes injecting cortisone directly into the keloid every few weeks.  While this usually reduces the pain and size of the scar, it does not eliminate it.      I understand that photographs may be taken before and after the procedure.  These will be maintained as part of the medical providers confidential records and may not be made available to me.  I further authorize the medical provider to use the photographs for teaching purposes or to illustrate scientific papers, books, or lectures if in his/her judgment, medical research, education, or science may benefit from its use.    I have had an opportunity to fully inquire about the risks and benefits of this procedure and its alternatives.   I have been given ample time and opportunity to ask questions and to seek a second opinion if I wished to do so.  I acknowledge that there have specifically been no guarantees as to " "the cosmetic results from the procedure.  I am aware that with any procedure there is always the possibility of an unexpected complication.    III. POST-PROCEDURAL CARE (WHAT YOU WILL NEED TO DO \"AFTER THE BIOPSY\" TO OPTIMIZE HEALING)    Keep the area clean and dry.  Try NOT to remove the bandage or get it wet for the first 24 hours.    Gently clean the area and apply surgical ointment (such as Vaseline petrolatum ointment, which is available \"over the counter\" and not a prescription) to the biopsy site for up to 2 weeks straight.  This acts to protect the wound from the outside world.      Sutures are not usually placed in this procedure.  If any sutures were placed, return for suture removal as instructed (generally 1 week for the face, 2 weeks for the body).      Take Acetaminophen (Tylenol) for discomfort, if no contraindications.  Ibuprofen or aspirin could make bleeding worse.    Call our office immediately for signs of infection: fever, chills, increased redness, warmth, tenderness, discomfort/pain, or pus or foul smell coming from the wound.    WHAT TO DO IF THERE IS ANY BLEEDING?  If a small amount of bleeding is noticed, place a clean cloth over the area and apply firm pressure for ten minutes.  Check the wound after 10 minutes of direct pressure.  If bleeding persists, try one more time for an additional 10 minutes of direct pressure on the area.  If the bleeding becomes heavier or does not stop after the second attempt, or if you have any other questions about this procedure, then please call your [facility]'s Dermatologist by calling [phone] (SKIN).     I hereby acknowledge that I have reviewed and verified the site with my Dermatologist and have requested and authorized my Dermatologist to proceed with the procedure.    SEBORRHEIC DERMATITIS    Physical Exam:  Anatomic Location Affected:  face  Morphological Description:  erythematous plaques with scales     Additional History of Present Condition:  " "Patient reports face has been progressively more dry and scaly over the past year.     Assessment and Plan:  Based on a thorough discussion of this condition and the management approach to it (including a comprehensive discussion of the known risks, side effects and potential benefits of treatment), the patient (family) agrees to implement the following specific plan:  Wash with Ketoconazole 2 %  cream on face twice daily   Apply Hydrocortisone 2.5 %  cream to face as needed (may also apply to left lower leg rash)      Seborrheic Dermatitis   Seborrheic dermatitis is a common, chronic or relapsing form of eczema/dermatitis that mainly affects the sebaceous, gland-rich regions of the scalp, face, and trunk.  There are infantile and adult forms of seborrhoeic dermatitis. It is sometimes associated with psoriasis and, in that clinical scenario, may be referred to as \"sebo-psoriasis.\"  Seborrheic dermatitis is also known as \"seborrheic eczema.\"  Dandruff (also called \"pityriasis capitis\") is an uninflamed form of seborrhoeic dermatitis. Dandruff presents as bran-like scaly patches scattered within hair-bearing areas of the scalp.  In an infant, this condition may be referred to as \"cradle cap.\"  The cause of seborrheic dermatitis is not completely understood. It is associated with proliferation of various species of the skin commensal Malassezia, in its yeast (non-pathogenic) form. Its metabolites (such as the fatty acids oleic acid, malssezin, and indole-3-carbaldehyde) may cause an inflammatory reaction. Differences in skin barrier lipid content and function may account for individual presentations.    Infantile Seborrheic Dermatitis  Infantile seborrheic dermatitis affects babies under the age of 3 months and usually resolves by 6-12 months of age.  Infantile seborrheic dermatitis causes \"cradle cap\" (diffuse, greasy scaling on scalp). The rash may spread to affect armpit and groin folds (a type of \"napkin " "dermatitis\").  There may be associated salmon-pink colored patches that may flake or peel.  The rash in this case is usually not especially itchy, so the baby often appears undisturbed by the rash, even when more generalized.    Adult Seborrheic Dermatitis  Adult seborrheic dermatitis tends to begin in late adolescence; prevalence is greatest in young adults and in the elderly. It is more common in males than in females.    The following factors are sometimes associated with severe adult seborrheic dermatitis:  Oily skin  Familial tendency to seborrhoeic dermatitis or a family history of psoriasis  Immunosuppression: organ transplant recipient, human immunodeficiency virus (HIV) infection and patients with lymphoma  Neurological and psychiatric diseases: Parkinson disease, tardive dyskinesia, depression, epilepsy, facial nerve palsy, spinal cord injury and congenital disorders such as Down syndrome  Treatment for psoriasis with psoralen and ultraviolet A (PUVA) therapy  Lack of sleep  Stressful events.    In adults, seborrheic dermatitis may typically affect the scalp, face (creases around the nose, behind ears, within eyebrows) and upper trunk. Typical clinical features include:  Winter flares, improving in summer following sun exposure  Minimal itch most of the time  Combination oily and dry mid-facial skin  Ill-defined localized scaly patches or diffuse scale in the scalp  Blepharitis; scaly red eyelid margins  Reno-pink, thin, scaly, and ill-defined plaques in skin folds on both sides of the face  Petal or ring-shaped flaky patches on hair-line and on anterior chest  Rash in armpits, under the breasts, in the groin folds and genital creases  Superficial folliculitis (inflamed hair follicles) on cheeks and upper trunk    Seborrheic dermatitis is diagnosed by its clinical appearance and behavior. Skin biopsy may be helpful but is rarely necessary to make this diagnosis.     ATOPIC DERMATITIS     Physical " "Exam:  Anatomic Location Affected:  left lower leg  Morphological Description: erythematous papules and scales, with some crusting   Severity: moderate      Additional History of Present Condition:  Patient reports skin has become more itchy and dry since starting chemotherapy last year.     Assessment and Plan:  Based on a thorough discussion of this condition and the management approach to it (including a comprehensive discussion of the known risks, side effects and potential benefits of treatment), the patient (family) agrees to implement the following specific plan:  Wound culture taken today; will call with results   Empiric treatment of superficial staph infection - Apply mupirocin 2% ointment to leg twice daily x 7-10 days      Assessment and Plan:   Atopic Dermatitis is a chronic, itchy skin condition that is very common in children but may occur at any age. It is also known as “eczema” or “atopic eczema.” It is the most common form of dermatitis.    Atopic dermatitis usually occurs in people who have an “atopic tendency.”  This means they may develop any or all of these closely linked conditions:  Atopic dermatitis, asthma, hay fever (allergic rhinitis), eosinophilic esophagitis, and gastroenteritis.  Often these conditions run within families with a parent, child or sibling also affected. A family history of asthma, eczema or hay fever is particularly useful in diagnosing atopic dermatitis in infants.    Atopic dermatitis arises because of a complex interaction of genetic and environmental factors. These include defects in skin barrier function making the skin more susceptible to irritation by soap and other contact irritants, the weather, temperature and non-specific triggers.  There is also an element of immune system dysregulation that is often present.  By definition, it is chronic and has a \"waxing-waning\" nature; flares should be expected but with good education and treatment strategies can be " "minimized.    Your treatment plan  Using only mild cleansers (hypoallergenic and without fragrances) and fragrance free detergent (not “unscented” products which contain a masking agent)  Apply moisturizer to entire body at least 2 times a day  Use the above listed medication to affected areas twice a day for a full 2 days after the rash has cleared  Take on over the counter antihistamine like diphenhydramine before bed if the itching is keeping you awake    ATOPIC DERMATITIS FAQS    WHAT IS ATOPIC DERMATITIS?  Atopic dermatitis (also called “eczema”) results from a weak skin barrier and an over active immune system.  The weak skin barrier allows things to get into the skin and then the immune system has an intense reaction to this substances.  The result is itchy red patches anywhere on the body.    WHO GETS ATOPIC DERMATITIS?  There is no single answer because many factors are involved.  It is likely a combination of genetic makeup and environmental triggers and/or exposures.  People with atopic dermatitis are prone to other types of \"atopy\".  They are at increased risk for food allergies, Asthma and hay fever and there is often a family history of these problems as well.      WHAT ABOUT FOOD ALLERGIES?  This is a very controversial topic, as many believe that food allergies are responsible for skin flares. In some cases, specific foods may cause worsening of atopic dermatitis; however this occurs in a minority of cases and usually happens within a few hours of ingestion. While food allergy is more common in children with eczema, foods are specific triggers for flares in only a small percentage of children.  If you notice that the skin flares after certain foods you can see if eliminating one food at time makes a difference, as long as your child can still enjoy a well-balanced diet.   There are blood (RAST) and skin (PRICK) tests that can check for allergies, but they are often positive in children who are not " truly allergic. Therefore it is important that you work with your allergist and dermatologist to determine which foods are relevant and causing true symptoms.  Extreme food elimination diets without the guidance of your doctor, which have become more popular in recent years, may even result in worsening of the skin rash due to malnutrition and avoidance of essential nutrients.      WHY SO MUCH MOISTURIZER?  This is the 1st step and most important part of treatment.  This helps maintaint the skin's barrier function and prevent flares. Creams and ointments are preferable over lotions.  Aveeno eczema relief and Eucerin eczema relief  and cerave are all good ones to start with. It is best to put  moisturizer on directly after bathing, while the skin is damp, it is twice as effective then.  You may bathe daily.  Use warm - not hot - water, for short periods of time (5-10 minutes at a time) Dry off by Lightly patting the skin with a towel and not rubbing. While the skin is still damp, (within 3 minutes) apply any medications to the rashy areas 1st and then moisturize the entire body. It's best to apply the medication 1st but if the medications sting, moisturizer can be applied 1st.    WHY USE THE MEDICATION FOR AN EXTRA 2 DAYS AFTER THE RASH IS GONE?  Sometimes the rash may appear to be gone, but there are still microscopic changes in the skin.  Using the medication and extra 2 days will ensure the inflammation has resolve and make the rash less likely to return quickly.        WHAT ARE TRIGGERS?  Occasionally there are specific things that trigger itching and rashes, but in many cases may none can be identified.  The most common triggers are:  Heat and sweat for some individuals, cold weather for others.  House dust mites, pet fur.  Wool; synthetic fabrics like nylon; dyed fabrics.  Tobacco smoke   Fragrances in: shampoos, soaps, lotions, laundry detergents, fabric softeners.  Saliva or prolonged exposure to water.    "start with these.  Avoid the use of fabric softeners in the washing machine or dryer sheets (unless they are fragrance-free).  Try to use laundry detergents, soaps and shampoos that are fragrance-free. You may find it helpful to double-rinse your clothes.  Some children are sensitive to house dust mites and they may benefit from a plastic mattress wrap.  While food allergy is more common in children with eczema, foods are specific triggers for flares in only a small percentage of children.  If you notice that the skin flares after certain foods you can see if eliminating one food at time makes a difference, as long as your child can still enjoy a well-balanced diet.     4) WHAT DOES THE ANTIHISTAMINE DO?   Antihistamines help you not to scratch. Scratching only makes the skin more reactive and the barrier function even more disrupted.  It can cause both children and their parents to lose sleep!  There are different types of anti-itch medications.  Some cause more drowsiness than others.  Both types are acceptable depending on your child and your preference.  Start with Benadryl and if that does not work, ask for a prescription “antihistamine.”    5) ARE THERE ANY SIDE EFFECTS TO WORRY ABOUT?  Corticosteroid creams and ointments (generally things with \"-one\" or \"veena\" on the end of their names):  The strength of the cream or ointment depends on the name of the active ingredient.  The numbers at the end do not indicate the relative strength.  Thus triamcinolone 0.1% ointment, considered a mid-strength corticosteroid, is much stronger than hydrocortisone 1% even though the number following the name is much lower.  Topical corticosteroids are very effective in treating atopic dermatitis.  When used in the manner prescribed (to rashy areas of skin and for no more than a few weeks at a time to any one area) they are very safe.  These are corticosteroids and are anti-inflammatory, not the “anabolic steroids” like those used " illegally by some athletes. It is important that you do not overuse steroid creams, and if you notice a thin, shiny appearance to the skin or broken blood vessels, you should stop using the cream and consult your health care provider regarding possible overuse/overthinning of the skin.  The face, armpits and groin have particularly thin and sensitive skin and are therefore most at risk for bad results if steroids are over-used in these sites.      Topical non-steroid creams and ointments (immunomodulators):  These creams and ointments are also called topical calcineurin inhibitors (TCIs).  These include Protopic ointment and Elidel cream. Crisaborole 2% (Eucrisa) is a prescription ointment that targets an enzyme called PDE4 (phosphodiesterase 4).  It is used on the skin topically to treat mild-to-moderate eczema in adults and children 2 years of age and older.  In total, these nonsteroidal prescriptions are used to help decrease itching and redness in the skin.  They are not as strong as most steroid creams; however, it is believed that they do not thin the skin when overused.  They are generally used as second-line medications, though they may be used alone or in conjunction with topical steroids.  In sensitive areas such as the face, underarms or groin, they are often recommended.  They can sting inflamed skin, but are generally well tolerated once the skin is healing.    The FDA placed a “black-box” warning on both Elidel and Protopic in 2006 based on animal studies using the medications.  Some animals developed skin cancer and lymphoma.  Subsequently, the FDA released a statement that there is no causal relationship between the two medications and cancer.  Because of this concern, there are ongoing studies to evaluate this relationship in humans.  So far, there are studies that support the safety of these medications.  One showed that the rates of cancer in patient using these medications topically were less  "than the rates of the general population and another showed that in patient's using the medication over a large area of the body, the levels of the medication in the blood was undetectable.    As for Eucrisa, this product is only approved for the topical treatment of mild-to-moderate eczema in patients 2 years of age and older; use of the medication in kids younger than 2 is considered “off label” and has not been formally studied.  Burning and stinging are the most commonly reported side effects of this medication.  Rarely, this product has been known to cause hives and hypersensitivity reactions; discontinue its use if you develop severe itching, swelling, or redness in the area of application.    MELANOCYTIC NEVI (\"Moles\")    Physical Exam:  Anatomic Location Affected:   Mostly on sun-exposed areas of the scattered over body  Morphological Description:  Scattered, 1-4mm round to ovoid, symmetrical-appearing, even bordered, skin colored to dark brown macules/papules, mostly in sun-exposed areas      Additional History of Present Condition:  family hx of skin cancer with father, sister and brother with no known history of melanoma in the family.     Assessment and Plan:  Based on a thorough discussion of this condition and the management approach to it (including a comprehensive discussion of the known risks, side effects and potential benefits of treatment), the patient (family) agrees to implement the following specific plan:  Reassured benign     Melanocytic Nevi  Melanocytic nevi (\"moles\") are caused by collections of the color producing skin cells, or melanocytes, in 1 area in the skin. They can range in color from pink to dark brown and be either raised or flat.      Some moles are present at birth (I.e., \"congenital nevi\"), while others come up later in life (i.e., \"acquired nevi\").  Sun exposure also stimulates the body to make more moles, ie the more sun you get the more moles you'll grow.    Clinically " "distinguishing a healthy mole from melanoma may be difficult. The \"ABCDE's\" of moles have been suggested as a means of helping to alert a person to a suspicious mole and the possible increased risk of melanoma.      Asymmetry: Healthy moles tend to be symmetric, while melanomas are often asymmetric.  Asymmetry means if you draw a line through the mole, the two halves do not match in color, size, shape, or surface texture Any mole that starts to demonstrate \"asymmetry\" should be examined promptly by a board certified dermatologist.     Border: Healthy moles tend to have discrete, even borders.  The border of a melanoma often blends into the normal skin and does not sharply delineate the mole from normal skin.  Any mole that starts to demonstrate \"uneven borders\" should be examined promptly     Color: Healthy moles tend to be one color throughout.  Melanomas tend to be made up of different colors ranging from dark black, blue, white, or red.  Any mole that demonstrates a color change should be examined promptly    Diameter: Healthy moles tend to be smaller than 0.6 cm in size; an exception are \"congenital nevi\" that can be larger.  Melanomas tend to grow and can often be greater than 0.6 cm (1/4 of an inch, or the size of a pencil eraser). This is only a guideline, and many normal moles may be larger than 0.6 cm without being unhealthy.  Any mole that starts to change in size (small to bigger or bigger to smaller) should be examined promptly    Evolving: Healthy moles tend to \"stay the same.\"  Melanomas may often show signs of change or evolution such as a change in size, shape, color, or elevation.  Any mole that starts to itch, bleed, crust, burn, hurt, or ulcerate or demonstrate a change or evolution should be examined promptly by a board certified dermatologist.      What are atypical moles or dysplastic nevi?  Dysplastic moles are moles that have some of the ABCDE  changes listed above but  are not cancerous  " "Sometimes a biopsy and microscopic examination are needed to determine the difference. They may indicate an increased risk of melanoma in that person, especially if there is a family history of melanoma.    What is a Melanoma?  The main concern when looking at a new or changing mole it to evaluate whether it may be a melanoma. The appearance of a \"new mole\" remains one of the most reliable methods for identifying a malignant melanoma.   A melanoma is a type of skin cancer that can be deadly if it spreads throughout the body. The prognosis of a Melanoma depends on how deep it has penetrated in the skin.  If caught early, they generally will not have had time to grow into the deeper layers of the skin and they cure rate is then very high. Once the melanoma grows deeper into the skin, the cure rate drops dramatically. Therefore, early detection and removal of a malignant melanoma results in a much better chance of complete cure.       ACROCHORDON (\"SKIN TAG\")    Physical Exam:  Anatomic Location Affected:  right axilla   Morphological Description:  skin colored tags      Assessment and Plan:  Based on a thorough discussion of this condition and the management approach to it (including a comprehensive discussion of the known risks, side effects and potential benefits of treatment), the patient (family) agrees to implement the following specific plan:  Reassured benign    Skin tags are common, soft, harmless skin lesions that are also called, in the appropriate settings, papillomas, fibroepithelial polyps, and soft fibromas.  They are made up of loosely arranged collagen fibers and blood vessels surrounded by a thickened or thinned-out epidermis.    Skin tags tend to develop in both men and women as we grow older.  They are usually found on the skin folds (neck, armpits, groin).  It is not known what specifically causes skin tags.  Certain factors, though, do appear to play a role:  Chaffing and irritation from skin " "rubbing together  High levels of growth factors (as seen, for example, in pregnancy or in acromegaly/gigantism)  Insulin resistance  Human papillomavirus (wart virus)    We discussed that most skin tags do not need to be treated unless they are specifically causing the patient physical distress or limitation or pose a risk for a larger problem such as an infection that forms secondary to excoriation or chronic irritation.    We had a thorough discussion of treatment options and specific risks (including that any procedural treatment may not be covered by insurance and would then be the patient's responsibility) and benefits/alternatives including but not limited to the following:  Cryotherapy (freezing)  Shave removal  Surgical excision (snip excision with scissors)  Electrosurgery  Ligation (we do not do this procedure and counseled against it due to risk of tissue necrosis and infection)    BRODERICK ANGIOMAS     Physical Exam:  Anatomic Location Affected:  Scattered over body  Morphological Description:  Scattered cherry red, 1-4 mm papules.    Assessment and Plan:  Based on a thorough discussion of this condition and the management approach to it (including a comprehensive discussion of the known risks, side effects and potential benefits of treatment), the patient (family) agrees to implement the following specific plan:  reassured benign.     Assessment and Plan:    Cherry angioma, also known as Glass de Roderick spots, are benign vascular skin lesions. A \"cherry angioma\" is a firm red, blue or purple papule, 0.1-1 cm in diameter. When thrombosed, they can appear black in colour until evaluated with a dermatoscope when the red or purple colour is more easily seen. Cherry angioma may develop on any part of the body but most often appear on the scalp, face, lips and trunk.  An angioma is due to proliferating endothelial cells; these are the cells that line the inside of a blood vessel.     Angiomas can arise in " early life or later in life; the most common type of angioma is a cherry angioma.  Cherry angiomas are very common in males and females of any age or race. They are more noticeable in white skin than in skin of colour. They markedly increase in number from about the age of 40. There may be a family history of similar lesions. Eruptive cherry angiomas have been rarely reported to be associated with internal malignancy. The cause of angiomas is unknown. Genetic analysis of cherry angiomas has shown that they frequently carry specific somatic missense mutations in the GNAQ and GNA11 (Q209H) genes, which are involved in other vascular and melanocytic proliferations.     Quintero angioma is usually diagnosed clinically and no investigations are necessary for the majority of lesions. It has a characteristic red-clod or lobular pattern on dermatoscopy (called lacunar pattern using conventional pattern analysis).  When there is uncertainty about the diagnosis, a biopsy may be performed. The angioma is composed of venules in a thickened papillary dermis. Collagen bundles may be prominent between the lobules.     Cherry angiomas are harmless, so they do not usually have to be treated. Occasionally, they are removed to exclude a malignant skin lesion such as a nodular melanoma or because they are irritated or bleeding (and a subsequent risk for infection).  To decrease friction over the lesions, we recommend Neutrogena Daily Defense SPF 50+ at least 3 times a day.     LENTIGO    Physical Exam:  Anatomic Location Affected:  scattered over body  Morphological Description:  tan macules      Assessment and Plan:  Based on a thorough discussion of this condition and the management approach to it (including a comprehensive discussion of the known risks, side effects and potential benefits of treatment), the patient (family) agrees to implement the following specific plan:  Reassured benign    What is a lentigo?  A lentigo is a  pigmented flat or slightly raised lesion with a clearly defined edge. Unlike an ephelis (freckle), it does not fade in the winter months. There are several kinds of lentigo.  The name lentigo originally referred to its appearance resembling a small lentil. The plural of lentigo is lentigines, although “lentigos” is also in common use.    Who gets lentigines?  Lentigines can affect males and females of all ages and races. Solar lentigines are especially prevalent in fair skinned adults. Lentigines associated with syndromes are present at birth or arise during childhood.    What causes lentigines?  Common forms of lentigo are due to exposure to ultraviolet radiation:  Sun damage including sunburn   Indoor tanning   Phototherapy, especially photochemotherapy (PUVA)    Ionizing radiation, eg radiation therapy, can also cause lentigines.  Several familial syndromes associated with widespread lentigines originate from mutations in Fernando-MAP kinase, mTOR signaling and PTEN pathways.    What are the clinical features of lentigines?  Lentigines have been classified into several different types depending on what they look like, where they appear on the body, causative factors, and whether they are associated to other diseases or conditions.  Lentigines may be solitary or more often, multiple. Most lentigines are smaller than 5 mm in diameter.    Lentigo simplex  A precursor to junctional naevus   Arises during childhood and early adult life   Found on trunk and limbs   Small brown round or oval macule or thin plaque   Jagged or smooth edge   May have a dry surface   May disappear in time  Solar lentigo  A precursor to seborrhoeic keratosis   Found on chronically sun exposed sites such as hands, face, lower legs   May also follow sunburn to shoulders   Yellow, light or dark brown regular or irregular macule or thin plaque   May have a dry surface   Often has moth-eaten outline   Can slowly enlarge to several centimeters in  diameter   May disappear, often through the process known as lichenoid keratosis   When atypical in appearance, may be difficult to distinguish from melanoma in situ  Ink spot lentigo  Also known as reticulated lentigo   Few in number compared to solar lentigines   Follows sunburn in very fair skinned individuals   Dark brown to black irregular ink spot-like macule  PUVA lentigo  Similar to ink spot lentigo but follows photochemotherapy (PUVA)   Location anywhere exposed to PUVA  Tanning bed lentigo  Similar to ink spot lentigo but follows indoor tanning   Location anywhere exposed to tanning bed  Radiation lentigo  Occurs in site of irradiation (accidental or therapeutic)   Associated with late-stage radiation dermatitis: epidermal atrophy, subcutaneous fibrosis, keratosis, telangiectasias  Melanotic macule  Mucosal surfaces or adjacent glabrous skin eg lip, vulva, penis, anus   Light to dark brown   Also called mucosal melanosis  Generalised lentigines  Found on any exposed or covered site from early childhood   Small macules may merge to form larger patches   Not associated with a syndrome   Also called lentigines profusa, multiple lentigines  Agminated lentigines  Naevoid eruption of lentigos confined to a single segmental area   Sharp demarcation in midline   May have associated neurological and developmental abnormalities  Patterned lentigines  Inherited tendency to lentigines on face, lips, buttocks, palms, soles   Recognised mainly in people of  ethnicity  Centrofacial neurodysraphic lentiginosis  Associated with mental retardation  Lentiginosis syndromes  Syndromes include LEOPARD/Rangeley, Peutz-Jeghers, Laugier-Hunziker, Moynahan, Xeroderma pigmentosum, myxoma syndromes (BOSCH, NAME, Cortez), Ruvalcaba-Myhre-Velez, Bannayan-Zonnana syndrome, Cowden disease (multiple hamartoma syndrome )   Inheritance is autosomal dominant; sporadic cases common   Widespread lentigines present at birth or arise in  "early childhood   Associated with neural, endocrine, and mesenchymal tumors    How is the diagnosis made?  Lentigines are usually diagnosed clinically by their typical appearance. Concern regarding possibility of melanoma may lead to:  Dermatoscopy   Diagnostic excision biopsy    Histopathology of a lentigo shows:  Thickened epidermis   An increased number of melanocytes along the basal layer of epidermis   Unlike junctional melanocytic naevus, there are no nests of melanocytes   Increased melanin pigment within the keratinocytes   Additional features depending on type of lentigo    In contrast, an ephelis (freckle) shows sun-induced increased melanin within the keratinocytes, without an increase in number of cells.  What is the treatment for lentigines?  Most lentigines are left alone. Attempts to lighten them may not be successful. The following approaches are used:  SPF 50+ broad-spectrum sunscreen   Hydroquinone bleaching cream   Alpha hydroxy acids   Vitamin C   Retinoids   Azelaic acid   Chemical peels  Individual lesions can be permanently removed using:  Cryotherapy   Intense pulsed light   Pigment lasers    How can lentigines be prevented?  Lentigines associated with exposure ultraviolet radiation can be prevented by very careful sun protection. Clothing is more successful at preventing new lentigines than are sunscreens.    What is the outlook for lentigines?  Lentigines usually persist. They may increase in number with age and sun exposure. Some in sun-protected sites may fade and disappear.    XEROSIS (\"DRY SKIN\")    Physical Exam:  Anatomic Location Affected:  trunk and extremities  Morphological Description:  dry flaky skin      Assessment and Plan:  Based on a thorough discussion of this condition and the management approach to it (including a comprehensive discussion of the known risks, side effects and potential benefits of treatment), the patient (family) agrees to implement the following specific " plan:  Use moisturizer like Eucerin,Cerave, Vanicream or Aveeno Cream 3 times a day for the dry skin            Dry skin refers to skin that feels dry to touch. Dry skin has a dull surface with a rough, scaly quality. The skin is less pliable and cracked. When dryness is severe, the skin may become inflamed and fissured.  Although any body site can be dry, dry skin tends to affect the shins more than any other site.    Dry skin is lacking moisture in the outer horny cell layer (stratum corneum) and this results in cracks in the skin surface.  Dry skin is also called xerosis, xeroderma or asteatosis (lack of fat).  It can affect males and females of all ages. There is some racial variability in water and lipid content of the skin.  Dry skin that starts in early childhood may be one of about 20 types of ichthyosis (fish-scale skin). There is often a family history of dry skin.   Dry skin is commonly seen in people with atopic dermatitis.  Nearly everyone > 60 years has dry skin.    Dry skin that begins later may be seen in people with certain diseases and conditions.  Postmenopausal women  Hypothyroidism  Chronic renal disease   Malnutrition and weight loss   Subclinical dermatitis   Treatment with certain drugs such as oral retinoids, diuretics and epidermal growth factor receptor inhibitors    People exposed to a dry environment may experience dry skin.  Low humidity: in desert climates or cool, windy conditions   Excessive air conditioning   Direct heat from a fire or fan heater   Excessive bathing   Contact with soap, detergents and solvents   Inappropriate topical agents such as alcohol   Frictional irritation from rough clothing or abrasives    Dry skin is due to abnormalities in the integrity of the barrier function of the stratum corneum, which is made up of corneocytes.  There is an overall reduction in the lipids in the stratum corneum.   Ratio of ceramides, cholesterol and free fatty acids may be normal or  altered.   There may be a reduction in the proliferation of keratinocytes.   Keratinocyte subtypes change in dry skin with a decrease in keratins K1, K10 and increase in K5, K14.   Involucrin (a protein) may be expressed early, increasing cell stiffness.   The result is retention of corneocytes and reduced water-holding capacity.    The inherited forms of ichthyosis are due to loss of function mutations in various genes (listed in parentheses below).  The clinical features of ichthyosis depend on the specific type of ichthyosis:  Ichthyosis vulgaris (FLG).   Recessive X-linked ichthyosis (STS)   Autosomal recessive congenital ichthyosis (ABCA12, TGM1, ALOXE3)   Keratinopathic ichthyoses (KRT1, KRT10, KRT2)    Acquired ichthyosis may be due to:  Metabolic factors: thyroid deficiency   Illness: lymphoma, internal malignancy, sarcoidosis, HIV infection   Drugs: nicotinic acid, kava, protein kinase inhibitors (eg EGFR inhibitors), hydroxyurea.    Complications of dry skin:  Dry areas of skin may become itchy, indicating a form of eczema/dermatitis has developed.  Atopic eczema -- especially in people with ichthyosis vulgaris   Eczema craquelé -- especially in elderly people. Also called asteatotic eczema   A dry form of nummular dermatitis/discoid eczema -- especially in people that wash their skin excessively.  When the dry skin of an elderly person is itchy without a visible rash, it is sometimes called winter itch, 7th age itch, senile pruritus or chronic pruritus of the elderly.    Other complications of dry skin may include:  Skin infection when bacteria or viruses penetrate a break in the skin surface   Overheating, especially in some forms of ichthyosis   Food allergy, eg, to peanuts, has been associated with filaggrin mutations   Contact allergy, eg, to nickel, has also been correlated with barrier function defects.    How is the type of dry skin diagnosed?  The type of dry skin is diagnosed by careful history  and examination.    In children:  Family history   Age of onset   Appearance at birth, if known   Distribution of dry skin   Other features, eg eczema, abnormal nails, hair, dentition, sight, hearing.    In adults:  Medical history   Medications and topical preparations   Bathing frequency and use of soap   Evaluation of environmental factors that may contribute to dry skin.    What is the treatment for dry skin?  The mainstay of treatment of dry skin and ichthyosis is moisturisers/emollients. They should be applied liberally and often enough to:  Reduce itch   Improve the barrier function   Prevent entry of irritants, bacteria   Reduce transepidermal water loss.    When considering which emollient is most suitable, consider:  Severity of the dryness   Tolerance   Personal preference   Cost and availability.    Emollients generally work best if applied to damp skin, if pH is below 7 (acidic), and if containing humectants such as urea or propylene glycol.  Additional treatments include:  Topical steroid if itchy or there is dermatitis -- choose an emollient base   Topical calcineurin inhibitors if topical steroids are unsuitable.    How can dry skin be prevented?  Eliminate aggravating factors:  Reduce the frequency of bathing.   A humidifier in winter and air conditioner in summer   Compare having a short shower with a prolonged soak in a bath.   Use lukewarm, not hot, water.   Replace standard soap with a substitute such as a synthetic detergent cleanser, water-miscible emollient, bath oil, anti-pruritic tar oil, colloidal oatmeal etc.   Apply an emollient liberally and often, particularly shortly after bathing, and when itchy. The drier the skin, the thicker this should be, especially on the hands.    What is the outlook for dry skin?  A tendency to dry skin may persist life-long, or it may improve once contributing factors are controlled.              Scribe Attestation      I,:  Brianna Weiss am acting as a scribe  while in the presence of the attending physician.:       I,:  Misbah Green MD personally performed the services described in this documentation    as scribed in my presence.:           Patient was seen and discussed with Dr. Green.   MERISSA Alvarez 02/22/24

## 2024-02-25 LAB
BACTERIA WND AEROBE CULT: ABNORMAL
GRAM STN SPEC: ABNORMAL

## 2024-02-26 PROCEDURE — 88342 IMHCHEM/IMCYTCHM 1ST ANTB: CPT | Performed by: STUDENT IN AN ORGANIZED HEALTH CARE EDUCATION/TRAINING PROGRAM

## 2024-02-26 PROCEDURE — 88305 TISSUE EXAM BY PATHOLOGIST: CPT | Performed by: STUDENT IN AN ORGANIZED HEALTH CARE EDUCATION/TRAINING PROGRAM

## 2024-02-26 NOTE — RESULT ENCOUNTER NOTE
Empiric treatment with topical mupirocin is appropriate. Patient contacted regarding results and has been using the medication as prescribed; symptoms are improving.

## 2024-02-27 NOTE — RESULT ENCOUNTER NOTE
DERMATOPATHOLOGY RESULT NOTE    Results reviewed by ordering physician.  Called patient to personally discuss results.       Instructions for Clinical Derm Team:   (remember to route Result Note to appropriate staff):    None    Result & Plan by Specimen:    Specimen A: benign  Plan: reassured, benign      [id number]  Order: [id number]  Status: Final result      Visible to patient: Yes (seen)      Dx: Neoplasm of uncertain behavior of skin    0 Result Notes     Component   Case Report  Surgical Pathology Report                         Case: [id number]                                  Authorizing Provider:  MERISSA Alvarez Collected:           02/22/2024 1617              Ordering Location:     [facility]      Received:            02/22/2024 1617                                     [place]                                                                Pathologist:           Yazan Zepeda MD                                                          Specimen:    Skin, Other, right upper eyelid                                                          Final Diagnosis  A. Skin, right upper eyelid:    SEBORRHEIC KERATOSIS, IRRITATED AND INFLAMED.      Electronically signed by Yazan Zepeda MD on 2/26/2024 at  4:21 PM  Additional Information   All reported additional testing was performed with appropriately reactive controls.  These tests were developed and their performance characteristics determined by [facility] or appropriate performing facility, though some tests may be performed on tissues which have not been validated for performance characteristics (such as staining performed on alcohol exposed cell blocks and decalcified tissues).  Results should be interpreted with caution and in the context of the patients' clinical condition. These tests may not be cleared or approved by the U.[place]. Food and Drug Administration, though the FDA has determined that such clearance or  "approval is not necessary. These tests are used for clinical purposes and they should not be regarded as investigational or for research. This laboratory has been approved by [place], designated as a high-complexity laboratory and is qualified to perform these tests.  .  Gross Description   A. The specimen is received in formalin, labeled with the patient's name and hospital number, and is designated \" right upper eyelid\".  The specimen consists of a 0.3 x 0.2 x 0.2 cm shave biopsy of tan keratotic skin.  The epithelial surface is inked red,  the margin of resection is inked green, and the specimen is entirely submitted between sponges, 1 cassette.    Note: The estimated total formalin fixation time based upon information provided by the submitting clinician and the standard processing schedule is under 72 hours.  -Lloyd Thomas  Clinical Information   o ATTENTION:  DERMPATH GROUP  o  o SPECIMEN A; Skin; Anatomic Location: right upper eyelid;  53 y.o. year old  Male with a Morphological Description:0.15 cm x 0.15 cm skin colored papule  o Differential Diagnosis and/or Specific Clinical Question:DDX: inflamed acro cordial  Resulting Agency [facility]    Specimen Collected: 02/22/24  4:17 PM Last Resulted: 02/26/24  4:21 PM          "

## 2024-03-15 NOTE — PROGRESS NOTES
Hematology/Oncology Outpatient Office Note    Date of Service: 3/25/2024    [facility] [place]  [address] [address]  [phone]    Reason for Consultation:   Chief Complaint   Patient presents with    Follow-up       Cancer Stage at diagnosis: IIIC    Referral Physician: No ref. provider found    Primary Care Physician:  Maurice Muñoz DO     Nickname: Mario    Spouse: Candis Berry ECO    Today's ECO     Goals and Barriers:  Current Goal: Minimize effects of disease burden, extend life.   Barriers to accomplishing this: None    Patient's Capacity to Self Care:  Patient is able to self care    Code Status: not addressed today    Advanced directives: not addressed today    ASSESSMENT & PLAN      Diagnosis ICD-10-CM Associated Orders   1. Seminoma (HCC)  C62.90 CT abdomen pelvis w wo contrast     Comprehensive metabolic panel     XR chest pa & lateral     AFP tumor marker     LD,Blood     HCG, tumor marker     Comprehensive metabolic panel      2. Immunocompromised (HCC)  D84.9             This is a 53 y.o. 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    Baseline PFT: WNL    C3D15 bleomycin omitted due to severe pancytopenia requiring 2U PRBC.    2023  tumor board: get PET/CT, if any growth, would do dissection, Otherwise, start surveillance thereafter  2023:  tumor board recommendation to pursue surveillance    5/3/2023: , HCG <1 WNL, AFP slightly high for first time at 8.1    Variant 1  NTHL1  c.787C>T (p.R263C); heterozygous; uncertain significance   Variant 2  SDHC c.15G>T (p.L5F); heterozygous; uncertain sig    Discussion of decision making  Oncology history updated, accordingly, during this visit  Goals of care/patient communication  I  discussed with the patient the clinical course leading up to their cancer diagnosis. I reviewed relevant office notes, imaging reports and pathology result as well.  I told the patient that this is a case of potentially curable disease and what this means. We discussed that the goal of anti-cancer therapy is to provide best quality of life, extend overall survival, and progression free survival as shown in clinical trials. We also discussed that there might be a point when the cancer will no longer respond to this anti-neoplastic therapy.   I explained the risks/benefits of the proposed cancer therapy: Bleomycin 30 units IV, Etoposide 100mg/m2, cisplatin 20mg/m2, and after discussion including understanding risks of possible life-threatening complications and therapy-related malignancy development, informed consent for blood products and treatment has been signed and obtained.  TNM/Staging At Diagnosis  [id number] S2  Cancer Staging   IIIC, poor risk (non pulmonary visceral mets)  Disease Features/Tumor Markers/Genetics  Tumor Marker: 1/18/2023:  (3x ULN), AFP (5.2, WNL), HCG (13, slightly high)  7/28/2023: HCG <1, , AFP 4.79  10/5/2023: HCG <1, , AFP 4.51  11/30/2023 HCG< 1, AFP 4.47  1/24/2024: HCG <1, , AFP 4.64-  Notable Path Features:   1/11/2023: Lymph node, left neck, excision: Germ cell tumor, compatible with metastatic seminoma. Background nonnecrotizing granulomatous inflammation.  Treatment: s/p BEP  Other Supportive care:  Treatment Team Members  Surgeon: Dr. Del Toro  Rad Onc  Palliative  Labs: 12/28/2022: creat 0.96, T bili 0.63, WBC 6.49k, Hgb 15.9, plt 244k  1/2024: testosterone 209, estrogen 91 WNL  3/20/2024: HCG <1, LDH WNL, AFP 4.85, CXR WNL  Diagnostics   1/3/2023 CT soft tissue neck: Moderate left-sided adenopathy primarily within the left neck, posterior jugular chain levels 3 through 5 with a prominent node present in the upper mediastinum  1/3/2023 CT Chest w/c:  Left supraclavicular, superior mediastinal, posterior mediastinal (15mm)/retrocrural (2.2 cm)and suspected partially imaged left abdominal adenopathy  1/4/2023: CT Abd/pelv w/c: Bulky retroperitoneal lymphadenopathy consistent with lymphoma (left para-aortic region extending into the left upper quadrant measures 8.2 x 10.9 cm.  Aortocaval lymphadenopathy measures 3.7 x 5.2 cm.  More inferior left para-aortic lymphadenopathy at the bifurcation measures 5.0 x 5.9 cm.)  1/18/2023: Testicular U/S shows a left testicular lesion (1.5 x 1.7 x 0.9 cm) and the RP adenopathy is more to the Left of the aorta  4/19/2023 CT Chest w/c: No evidence of acute intrathoracic pathology  5/1/2023 CT CAP w/c: Interval marked improvement of retroperitoneal adenopathy.   Mild splenomegaly. Image 93, retrocaval, 9 mm previously 3 cm. Image 96, left para-aortic, 2 cm previously 7.5 cm  6/7/2023 PET/CT: essential CR,  L inguina LN SUV 5 (reduced)  7/28/2023: CXR: No acute cardiopulmonary disease  10/5/2023 CT Abd/pelv w/c: Retroperitoneal lymphadenopathy with index lymph nodes demonstrating continued interval improvement. Splenomegaly  index retroperitoneal/left periaortic lymph nodes largest measuring 14 mm in short axis, previously measured 25 mm in short axis. Retrocaval lymph node measuring 9 x 12 mm, previously measured 13 x 19 mm  10/6/2023: CXR: WNL  11/30/2023 CXR: No acute cardiopulmonary disease  12/13/2023 US L Breast: benign  There is gynecomastia seen in both breasts, minimal on the right and mild on the left. The gynecomastia correlates with the palpable mass and tenderness reported by the patient.  There is a questioned intramammary lymph node in the outer left breast.  Ultrasound breast left limited diagnostic: Targeted ultrasound of the retroareolar left breast was performed for further evaluation of the painful palpable area of concern.  Fibroglandular tissue is visualized in the retroareolar left breast, consistent with  mild gynecomastia.  At 2:00 5 cm from the nipple within the left breast, there is a benign-appearing intramammary lymph node  1/24/2024 CXR: No acute cardiopulmonary disease. No significant change  2/5/2024 CT CAP w/c: Stable retroperitoneal lymphadenopathy. No new suspicious findings      Discussion of decision making    I personally reviewed the following lab results, the image studies, pathology, other specialty/physicians consult notes and recommendations, and outside medical records from [facility]. I had a lengthy discussion with the patient and shared the work-up findings. We discussed the diagnosis and management plan as below. I spent 32 minutes reviewing the records (labs, clinician notes, outside records, medical history, ordering medicine/tests/procedures, interpreting the imaging/labs previously done) and coordination of care as well as direct time with the patient today, of which greater than 50% of the time was spent in counseling and coordination of care with the patient/family.      Plan/Labs  F/u Urology for post-orchiectomy urologic surveillance  Cont surveillance with CT Abd/pelv w/c in 4 months (6/5/2024), CXR in 2 months as well  CN+MP, AFP, LDH, HCG ordered q12 weeks standing until 5/2025   Rad Onc f/u to consider prn symptomatic RT to his mammary glands (not indicated at this time): seeing endocrine for this as well, will pursue resection if worsening    Surveillance for clinical stage III seminoma includes history and physical every 2 months for the first year following completion of chemotherapy which would be until April 2024 along with every 4-month CT scan of the abdomen, pelvis along with every 2-month chest x-ray. (until 6/2024)    Year 2 (starting 7/2024) surveillance consists of every 3-month history and physical and serology markers, every 6-month CT abdomen, pelvis, and chest x-ray every 3 months.    Years 3-4 surveillance consists of history and physical and markers every 6 months,  annual chest x-ray and CT scan  Year 5 surveillance consists of annual chest x-ray and history and physical and markers with as needed CT scan      Follow Up: q2 months with H&P, serology markers scheduled thru 5/20/2024, add 9,12/2024 visits    All questions were answered to the patient's satisfaction during this encounter. The patient knows the contact information for our office and knows to reach out for any relevant concerns related to this encounter. They are to call for any temperature 100.4 or higher, new symptoms including but not restricted to shaking chills, decreased appetite, nausea, vomiting, diarrhea, increased fatigue, shortness of breath or chest pain, confusion, and not feeling the strength to come to the clinic. For all other listed problems and medical diagnosis in their chart - they are managed by PCP and/or other specialists, which the patient acknowledges. Thank you very much for your consultation and making us a part of this patient's care. We are continuing to follow closely with you. Please do not hesitate to reach out to me with any additional questions or concerns.    Nancy Vázquez MD  Hematology & Medical Oncology Staff Physician             Disclaimer: This document was prepared using [employer] Direct technology. If a word or phrase is confusing, or does not make sense, this is likely due to recognition error which was not discovered during this clinician's review. If you believe an error has occurred, please contact me through [facility] service line for rashida?cation.      ONCOLOGY HISTORY OF PRESENT ILLNESS        Oncology History   Seminoma (HCC)   1/11/2023 -  Cancer Staged    Staging form: Testis, AJCC 8th Edition  - Clinical stage from 1/11/2023: cT4, cN3, pM1a, S2 - Signed by Nancy Vázquez MD on 1/20/2023  Stage prefix: Initial diagnosis  Lactate dehydrogenase (LDH) (U/L): 699  Human chorionic gonadotropin (hCG) (mIU/mL): 13  Alpha fetoprotein (AFP) (ng/mL):  5.2  Laterality: Left  Sites of metastasis: Distant lymph nodes, NOS, Retroperitoneal lymph node, NOS  Source of metastatic specimen: Supraclavicular Lymph Nodes  Staged by: Managing physician       1/19/2023 Initial Diagnosis    Seminoma (HCC)     1/30/2023 - 4/17/2023 Chemotherapy    pegfilgrastim (NEULASTA ONPRO), 6 mg, Subcutaneous, Once, 4 of 4 cycles  Administration: 6 mg (2/6/2023), 6 mg (2/27/2023), 6 mg (3/20/2023), 6 mg (3/6/2023), 6 mg (4/17/2023), 6 mg (4/10/2023)  bleomycin (BLENOXANE) IVPB, 30 Units, Intravenous, Once, 4 of 4 cycles  Administration: 30 Units (1/30/2023), 30 Units (2/6/2023), 30 Units (2/13/2023), 30 Units (2/20/2023), 30 Units (2/27/2023), 30 Units (3/6/2023), 30 Units (3/13/2023), 30 Units (3/20/2023), 30 Units (4/3/2023), 30 Units (4/10/2023), 30 Units (4/17/2023)  CISplatin (PLATINOL) infusion, 20 mg/m2 = 52.2 mg, Intravenous, Once, 4 of 4 cycles  Administration: 52.2 mg (1/30/2023), 52.2 mg (1/31/2023), 52.2 mg (2/1/2023), 52.2 mg (2/2/2023), 52.2 mg (2/3/2023), 52.2 mg (2/20/2023), 52.2 mg (2/21/2023), 52.2 mg (2/22/2023), 52.2 mg (2/23/2023), 52.2 mg (2/24/2023), 52.2 mg (3/13/2023), 52.2 mg (3/14/2023), 52.2 mg (3/15/2023), 52.2 mg (3/16/2023), 52.2 mg (3/17/2023), 52.2 mg (4/3/2023), 52.2 mg (4/4/2023), 52.2 mg (4/5/2023), 52.2 mg (4/6/2023), 52.2 mg (4/7/2023)  fosaprepitant (EMEND) IVPB, 150 mg, Intravenous, Once, 4 of 4 cycles  Administration: 150 mg (1/30/2023), 150 mg (2/20/2023), 150 mg (3/13/2023), 150 mg (4/3/2023), 150 mg (2/24/2023), 150 mg (4/10/2023)       Lots of IV Mag and several PRBC required during chemo  C3D15 deferred due to plt <10k, Hgb <8    SUBJECTIVE  (INTERVAL HISTORY)      Clotting History None   Bleeding History None   Cancer History Seminoma   Family Cancer History Mom/sister (breast), same sister (melanoma), father (basal cell skin cancer)   H/O Blood/Plt Transfusion None   Tobacco/etoh/drug abuse No nicotine use, etoh abuse, rec drug use        Cancer Screening history C-scope (2019)   Occupation  at [employer]       Interval events: having ongoing tender L breast since Thanksgiving that is pronounced. Otherwise doing much better, doing projects, but mild neuropathy in the fingertips (improved), toes that is persisting (odd feeling in the balls of his feet and tips of his toes). Not dropping items, gripping is getting better      I have reviewed the relevant past medical, surgical, social and family history. I have also reviewed allergies and medications for this patent.    Review of Systems    Baseline weight: 300 lbs    Has had intermittent lower back, thigh rash since summer of 2022.     Denies F/C, V, SOB, CP, LH, HA, itching, gen weakness, melena, hematuria, hematochezia, falls, diarrhea, or constipation       A 10-point review of system was performed, pertinent positive and negative were detailed as above. Otherwise, the 10-point review of system was negative.      Past Medical History:   Diagnosis Date    Allergic 1/1/2000    seasonal allergies    Cancer (HCC)     testicular cancer    COVID 2021    History of cancer chemotherapy     History of transfusion     Impacted cerumen     Lymphadenopathy     Obesity, unspecified     Seasonal allergies     Seminoma of testis (HCC)     Testicular cancer (HCC) 01/17/2023    Torn meniscus     right knee- surgical repair today 7/19/2021    Wears glasses        Past Surgical History:   Procedure Laterality Date    COLONOSCOPY      FACIAL/NECK BIOPSY Left 01/11/2023    Procedure: EXCISION OF LEFT NECK LYMPH NODE WITH LYMPHOMA PROTOCOL;  Surgeon: Wilber Kasper MD;  Location: [facility];  Service: Surgical Oncology    FL GUIDED CENTRAL VENOUS ACCESS DEVICE INSERTION  01/23/2023    HEMORROIDECTOMY      IR PORT REMOVAL      KNEE SURGERY Right 7/19/2021    SD ARTHRS KNE SURG W/MENISCECTOMY MED/LAT W/SHVG Right 07/19/2021    Procedure: ARTHROSCOPY KNEE, partial lateral meniscectomy;  Surgeon: Guy Cristina  MD Peewee;  Location: [facility];  Service: Orthopedics    DC ARTHRS KNE SURG W/MENISCECTOMY MED/LAT W/SHVG Left 12/05/2023    Procedure: ARTHROSCOPY KNEE, partial lateral meniscectomy;  Surgeon: Guy Vides MD;  Location[facility];  Service: Orthopedics    DC ORCHIECTOMY RADICAL TUMOR INGUINAL APPROACH Left 06/16/2023    Procedure: RADICAL ORCHIECTOMY INGUINAL; EXCISION CORD LIPOMA;  Surgeon: Ky Del Toro MD;  [facility];  Service: Urology    SKIN BIOPSY      TUNNELED VENOUS PORT PLACEMENT N/A 01/23/2023    Procedure: INSERTION VENOUS PORT (PORT-A-CATH);  Surgeon: Wilber Kasper MD;  [facility];  Service: Surgical Oncology       Family History   Problem Relation Age of Onset    Cancer Mother         Breast Cancer    Breast cancer Mother 50 - 59    Breast cancer additional onset Mother     Squamous cell carcinoma Father     Basal cell carcinoma Father     Cancer Father         Skin carcenoma    Basal cell carcinoma Sister     Breast cancer Sister 40 - 49    Cancer Sister         Breast Cancer and Skin carcenoma    Breast cancer additional onset Sister     Basal cell carcinoma Brother     Pancreatic cancer Maternal Aunt     Pancreatic cancer Maternal Aunt     Breast cancer Maternal Grandmother 32    No Known Problems Maternal Grandfather     No Known Problems Paternal Grandmother     No Known Problems Paternal Grandfather     No Known Problems Daughter        Social History     Socioeconomic History    Marital status: /Civil Union     Spouse name: Not on file    Number of children: Not on file    Years of education: Not on file    Highest education level: Not on file   Occupational History    Not on file   Tobacco Use    Smoking status: Never     Passive exposure: Past    Smokeless tobacco: Never    Tobacco comments:     During childhood, both parents smoked.   Vaping Use    Vaping status: Never Used   Substance and Sexual Activity    Alcohol use: Yes      Alcohol/week: 3.0 standard drinks of alcohol     Types: 1 Glasses of wine, 1 Cans of beer, 1 Shots of liquor per week     Comment: 1-2 weekly    Drug use: Never    Sexual activity: Yes     Partners: Female   Other Topics Concern    Not on file   Social History Narrative    Not on file     Social Determinants of Health     Financial Resource Strain: Not on file   Food Insecurity: No Food Insecurity (2/13/2023)    Hunger Vital Sign     Worried About Running Out of Food in the Last Year: Never true     Ran Out of Food in the Last Year: Never true   Transportation Needs: No Transportation Needs (2/13/2023)    PRAPARE - Transportation     Lack of Transportation (Medical): No     Lack of Transportation (Non-Medical): No   Physical Activity: Not on file   Stress: Not on file   Social Connections: Not on file   Intimate Partner Violence: Not on file   Housing Stability: Low Risk  (2/13/2023)    Housing Stability Vital Sign     Unable to Pay for Housing in the Last Year: No     Number of Places Lived in the Last Year: 1     Unstable Housing in the Last Year: No       Allergies   Allergen Reactions    Oyster Shell - Food Allergy Vomiting       Current Outpatient Medications   Medication Sig Dispense Refill    hydrocortisone 2.5 % cream Apply as needed to face, no more than 3-4 days.   May apply to left lower leg as needed 28 g 3    ketoconazole (NIZORAL) 2 % cream Apply topically 2 (two) times a day To face 30 g 3    loratadine (CLARITIN) 10 mg tablet Take 10 mg by mouth if needed for allergies      mupirocin (BACTROBAN) 2 % ointment Apply topically 2 (two) times a day To left lower leg for 7-10 days 30 g 0    triamcinolone (KENALOG) 0.1 % cream Apply topically 2 (two) times a day 60 g 1    acetaminophen (TYLENOL) 325 mg tablet Take 650 mg by mouth every 6 (six) hours as needed for mild pain (Patient not taking: Reported on 2/20/2024)      apixaban (Eliquis) 2.5 mg Take 1 tablet (2.5 mg total) by mouth 2 (two) times a day for  14 days (Patient not taking: Reported on 2/20/2024) 28 tablet 0    Cholecalciferol (Vitamin D3) 1.25 MG (50387 UT) CAPS Take by mouth in the morning (Patient not taking: Reported on 2/20/2024)      [facility] Aspirin Low Dose 81 MG EC tablet TAKE 1 TABLET (81 MG TOTAL) BY MOUTH 2 (TWO) TIMES A DAY FOR 14 DAYS (Patient not taking: Reported on 2/20/2024)      multivitamin (THERAGRAN) TABS Take 1 tablet by mouth daily (Patient not taking: Reported on 2/22/2024)      naproxen (NAPROSYN) 500 mg tablet Take 1 tablet (500 mg total) by mouth 2 (two) times a day with meals (Patient not taking: Reported on 2/20/2024) 60 tablet 0    NON FORMULARY Take 1 capsule by mouth 3 (three) times a day Nervive; s/p neuropathy from chemo (Patient not taking: Reported on 2/22/2024)      ondansetron (ZOFRAN-ODT) 4 mg disintegrating tablet Take 1 tablet (4 mg total) by mouth every 8 (eight) hours as needed for nausea or vomiting (Patient not taking: Reported on 2/20/2024) 15 tablet 0     No current facility-administered medications for this visit.       (Not in a hospital admission)      Objective:     24 Hour Vitals Assessment:     Vitals:    03/25/24 0757   BP: 126/80   Pulse: 64   Resp: 18   Temp: (!) 96.9 °F (36.1 °C)   SpO2: 97%            Weight at last visit: 298 lbs    Weight today: 302 lbs    PHYSICIAN EXAM    General: Appearance: alert, cooperative, no distress.  HEENT: Normocephalic, atraumatic. No scleral icterus. conjunctivae clear. EOMI.  Chest: No tenderness to palpation. No open wound noted.  Lungs: Clear to auscultation bilaterally, Respirations unlabored.  Cardiac: Regular rate and rhythm, +S1and S2  Abdomen: Soft, non-tender, non-distended. Bowel sounds are normal.   : b/l testicular exam WNL  L breast exam: nodule or two  Extremities:  No edema, cyanosis, clubbing.  Skin: Skin color, turgor are normal. No rashes.  Lymphatics: no palpable axillary, or inguinal adenopathy  L supra-clavicular LN palpated with incision  c/d/i    Port:  c/d/i    Neurologic: Awake, Alert, and oriented, no gross focal deficits noted b/l.       DATA REVIEW:    Pathology Result:    Final Diagnosis   Date Value Ref Range Status   02/22/2024   Final    A. Skin, right upper eyelid:    SEBORRHEIC KERATOSIS, IRRITATED AND INFLAMED.          06/16/2023   Final    A.  Left testis (radical orchiectomy):     - Negative for carcinoma.      - Testicular parenchyma with extensive fibrosis. See comment.     Comment:  The history of metastatic seminoma involving a left neck lymph node ([id number]) status post chemotherapy is noted.      B.  Left spermatic cord lipoma (excision):     - Mature fibroadipose tissue, compatible with lipoma.     - One (1) lymph node, negative for carcinoma.    C.  Left spermatic cord (resection):     - Benign spermatic cord.     01/11/2023   Final    A.  Lymph node, left neck, excision:  -Germ cell tumor, compatible with metastatic seminoma (see note).  -Background nonnecrotizing granulomatous inflammation (see note).       Comment:     This is an appended report. These results have been appended to a previously preliminary verified report.   10/25/2019   Final    A. Cecum, cold biopsy:  - Portion of benign colonic mucosa with prominent reactive lymphoid aggregate.     B. Ascending colon, cold snare:  - Tubular adenoma.  - Negative for high grade dysplasia/ carcinoma.    C. Colon, splenic flexure, cold snare:  - Tubular adenoma.  - Negative for high grade dysplasia/ carcinoma.            Image Results:   Image result are reviewed and documented in Hematology/Oncology history    XR chest pa & lateral  Narrative: CHEST    INDICATION:   Malignant neoplasm of unspecified testis, unspecified whether descended or undescended.     COMPARISON: 1/24/2024    EXAM PERFORMED/VIEWS:  XR CHEST PA & LATERAL    FINDINGS:    Cardiomediastinal silhouette appears unremarkable.    The lungs are clear.  No pneumothorax or pleural effusion.    Osseous structures  "appear within normal limits for patient age.  Impression: Normal examination.    Electronically signed: 03/20/2024 09:00 PM Vance Sampson MD      LABS:  Lab data are reviewed and documented in [facility] history.       Lab Results   Component Value Date    HGB 14.7 03/20/2024    HCT 41.4 03/20/2024    MCV 96 03/20/2024     03/20/2024    WBC 6.42 03/20/2024    NRBC 0 03/20/2024    BANDSPCT 1 04/26/2023    ATYLMPCT 2 (H) 04/21/2023     Lab Results   Component Value Date    K 3.6 03/20/2024     03/20/2024    CO2 29 03/20/2024    BUN 21 03/20/2024    CREATININE 1.16 03/20/2024    GLUF 88 03/20/2024    CALCIUM 9.3 03/20/2024    CORRECTEDCA 9.0 03/31/2023    AST 20 03/20/2024    ALT 24 03/20/2024    ALKPHOS 52 03/20/2024    EGFR 70 03/20/2024       No results found for: \"IRON\", \"TIBC\", \"FERRITIN\"    Lab Results   Component Value Date    UNCPUUZO17 321 10/05/2023       No results for input(s): \"WBC\", \"CREAT\", \"PLT\" in the last 72 hours.    By:  Nancy Vázquez MD, 3/25/2024, 8:25 AM                                  "

## 2024-03-20 ENCOUNTER — TELEPHONE (OUTPATIENT)
Dept: HEMATOLOGY ONCOLOGY | Facility: CLINIC | Age: 54
End: 2024-03-20

## 2024-03-20 ENCOUNTER — APPOINTMENT (OUTPATIENT)
Dept: LAB | Facility: HOSPITAL | Age: 54
End: 2024-03-20
Payer: COMMERCIAL

## 2024-03-20 ENCOUNTER — HOSPITAL ENCOUNTER (OUTPATIENT)
Dept: RADIOLOGY | Facility: HOSPITAL | Age: 54
Discharge: HOME | End: 2024-03-20
Payer: COMMERCIAL

## 2024-03-20 DIAGNOSIS — Z12.5 SCREENING FOR PROSTATE CANCER: ICD-10-CM

## 2024-03-20 DIAGNOSIS — C62.90 SEMINOMA (HCC): ICD-10-CM

## 2024-03-20 LAB — PSA SERPL-MCNC: 0.44 NG/ML (ref 0–4)

## 2024-03-20 PROCEDURE — 71046 X-RAY EXAM CHEST 2 VIEWS: CPT

## 2024-03-20 PROCEDURE — G0103 PSA SCREENING: HCPCS

## 2024-03-20 PROCEDURE — 36415 COLL VENOUS BLD VENIPUNCTURE: CPT

## 2024-03-20 NOTE — TELEPHONE ENCOUNTER
CALLED TO HAVE PATIENT'S XR SCAN READ PRIOR TO HIS APPOINTMENT ON MONDAY 3/25.    XR-CHEST PA & LATERAL FROM 3/20/24- ACCESSION #[id number]

## 2024-03-25 ENCOUNTER — OFFICE VISIT (OUTPATIENT)
Dept: HEMATOLOGY ONCOLOGY | Facility: CLINIC | Age: 54
End: 2024-03-25
Payer: COMMERCIAL

## 2024-03-25 VITALS
WEIGHT: 302 LBS | SYSTOLIC BLOOD PRESSURE: 126 MMHG | BODY MASS INDEX: 37.55 KG/M2 | RESPIRATION RATE: 18 BRPM | TEMPERATURE: 96.9 F | OXYGEN SATURATION: 97 % | HEIGHT: 75 IN | HEART RATE: 64 BPM | DIASTOLIC BLOOD PRESSURE: 80 MMHG

## 2024-03-25 DIAGNOSIS — C62.90 SEMINOMA (HCC): Primary | ICD-10-CM

## 2024-03-25 DIAGNOSIS — D84.9 IMMUNOCOMPROMISED (HCC): ICD-10-CM

## 2024-03-25 PROCEDURE — 99214 OFFICE O/P EST MOD 30 MIN: CPT | Performed by: INTERNAL MEDICINE

## 2024-04-11 ENCOUNTER — OFFICE VISIT (OUTPATIENT)
Dept: FAMILY MEDICINE CLINIC | Facility: CLINIC | Age: 54
End: 2024-04-11
Payer: COMMERCIAL

## 2024-04-11 VITALS
HEART RATE: 80 BPM | SYSTOLIC BLOOD PRESSURE: 133 MMHG | BODY MASS INDEX: 36.68 KG/M2 | DIASTOLIC BLOOD PRESSURE: 67 MMHG | TEMPERATURE: 98 F | OXYGEN SATURATION: 94 % | WEIGHT: 295 LBS | HEIGHT: 75 IN

## 2024-04-11 DIAGNOSIS — Z12.11 SCREENING FOR COLORECTAL CANCER: ICD-10-CM

## 2024-04-11 DIAGNOSIS — Z12.12 SCREENING FOR COLORECTAL CANCER: ICD-10-CM

## 2024-04-11 DIAGNOSIS — Z11.4 SCREENING FOR HIV (HUMAN IMMUNODEFICIENCY VIRUS): ICD-10-CM

## 2024-04-11 DIAGNOSIS — Z11.59 SCREENING FOR VIRAL DISEASE: ICD-10-CM

## 2024-04-11 DIAGNOSIS — Z11.59 NEED FOR HEPATITIS C SCREENING TEST: ICD-10-CM

## 2024-04-11 DIAGNOSIS — R50.9 FEVER, UNSPECIFIED FEVER CAUSE: ICD-10-CM

## 2024-04-11 DIAGNOSIS — J40 BRONCHITIS: Primary | ICD-10-CM

## 2024-04-11 LAB
SARS-COV-2 AG UPPER RESP QL IA: NEGATIVE
VALID CONTROL: NORMAL

## 2024-04-11 PROCEDURE — 99213 OFFICE O/P EST LOW 20 MIN: CPT | Performed by: FAMILY MEDICINE

## 2024-04-11 PROCEDURE — 87811 SARS-COV-2 COVID19 W/OPTIC: CPT | Performed by: FAMILY MEDICINE

## 2024-04-11 RX ORDER — AZITHROMYCIN 250 MG/1
TABLET, FILM COATED ORAL
Qty: 6 TABLET | Refills: 0 | Status: SHIPPED | OUTPATIENT
Start: 2024-04-11 | End: 2024-04-18

## 2024-04-11 NOTE — PROGRESS NOTES
Assessment/Plan: Side effect profile of medication reviewed.  Recommend lab testing if fevers return or persist.  Patient will call with any new persisting or worsening symptoms.  COVID testing is negative.  Continue surveillance with oncology.     1. Bronchitis  -     azithromycin (ZITHROMAX) 250 mg tablet; Take 2 tablets today then 1 tablet daily x 4 days    2. Fever, unspecified fever cause  -     CBC and differential  -     Comprehensive metabolic panel  -     UA (URINE) with reflex to Scope; Future  -     Urine culture; Future    3. Need for hepatitis C screening test  -     Hepatitis C Antibody; Future    4. Screening for HIV (human immunodeficiency virus)  -     HIV 1/2 AG/AB w Reflex [facility] for 2 yr old and above; Future    5. Screening for colorectal cancer  -     Ambulatory Referral to Gastroenterology; Future    6. Screening for viral disease  -     POCT Rapid Covid Ag          Subjective:      Patient ID: Mario Ratliff is a 53 y.o. male.    Patient with bronchitis and fever symptoms over the last 1 to 2 days.  Fever was up over 100.  Patient is afebrile today.    Fever  Associated symptoms include congestion, coughing and a fever.            The following portions of the patient's history were reviewed and updated as appropriate: allergies, current medications, past family history, past medical history, past social history, past surgical history, and problem list.    Review of Systems   Constitutional:  Positive for fever.   HENT:  Positive for congestion.    Eyes: Negative.    Respiratory:  Positive for cough.    Cardiovascular: Negative.    Gastrointestinal: Negative.    Endocrine: Negative.    Genitourinary: Negative.    Musculoskeletal: Negative.    Skin: Negative.    Allergic/Immunologic: Negative.    Neurological: Negative.    Hematological: Negative.    Psychiatric/Behavioral: Negative.           Objective:      /67 (BP Location: Left arm, Patient Position: Sitting, Cuff Size: Standard)    "Pulse 80   Temp 98 °F (36.7 °C) (Tympanic)   Ht 6' 3\" (1.905 m)   Wt 134 kg (295 lb)   SpO2 94%   BMI 36.87 kg/m²          Physical Exam  Vitals reviewed.   Constitutional:       Appearance: He is well-developed.   HENT:      Head: Normocephalic and atraumatic.      Right Ear: External ear normal. Tympanic membrane is not erythematous or bulging.      Left Ear: External ear normal. Tympanic membrane is not erythematous or bulging.      Nose: Nose normal.      Mouth/Throat:      Mouth: No oral lesions.      Pharynx: No oropharyngeal exudate.   Eyes:      General: No scleral icterus.        Right eye: No discharge.         Left eye: No discharge.      Conjunctiva/sclera: Conjunctivae normal.   Neck:      Thyroid: No thyromegaly.   Cardiovascular:      Rate and Rhythm: Normal rate and regular rhythm.      Heart sounds: Normal heart sounds. No murmur heard.     No friction rub. No gallop.   Pulmonary:      Effort: Pulmonary effort is normal. No respiratory distress.      Breath sounds: No wheezing or rales.   Chest:      Chest wall: No tenderness.   Abdominal:      General: Bowel sounds are normal. There is no distension.      Palpations: Abdomen is soft. There is no mass.      Tenderness: There is no abdominal tenderness. There is no guarding or rebound.   Musculoskeletal:         General: No tenderness or deformity. Normal range of motion.      Cervical back: Normal range of motion and neck supple.   Lymphadenopathy:      Cervical: No cervical adenopathy.   Skin:     General: Skin is warm and dry.      Coloration: Skin is not pale.      Findings: No erythema or rash.   Neurological:      Mental Status: He is alert and oriented to person, place, and time.      Cranial Nerves: No cranial nerve deficit.      Motor: No abnormal muscle tone.      Coordination: Coordination normal.      Deep Tendon Reflexes: Reflexes are normal and symmetric.   Psychiatric:         Behavior: Behavior normal.         "

## 2024-04-12 ENCOUNTER — TELEPHONE (OUTPATIENT)
Age: 54
End: 2024-04-12

## 2024-04-12 ENCOUNTER — PATIENT MESSAGE (OUTPATIENT)
Dept: FAMILY MEDICINE CLINIC | Facility: CLINIC | Age: 54
End: 2024-04-12

## 2024-04-12 DIAGNOSIS — J40 BRONCHITIS: Primary | ICD-10-CM

## 2024-04-12 RX ORDER — BENZONATATE 200 MG/1
200 CAPSULE ORAL 3 TIMES DAILY PRN
Qty: 20 CAPSULE | Refills: 0 | Status: SHIPPED | OUTPATIENT
Start: 2024-04-12

## 2024-04-12 NOTE — PATIENT COMMUNICATION
Wife called in requesting update on my chart message. Cough worsened.   Patient requesting cough medication.  Pharmacy [facility].    Please review and advice.  Thank you.

## 2024-04-12 NOTE — TELEPHONE ENCOUNTER
"I see \"physical exam\" as part of the notes in patients office visit from yesterday 4/11, but I believe this is the part of every office note, correct?  I want to find out if 4/11 will count as his yearly physical or if he still needs to have his yearly done. This coming Monday (4/15/24) was supposed to be his physical but it was cancelled per our office's instructions. Does he need to reschedule it? Or does the 4/11 apt count for his yearly? Please advise  "

## 2024-04-17 DIAGNOSIS — J40 BRONCHITIS: Primary | ICD-10-CM

## 2024-04-17 RX ORDER — ALBUTEROL SULFATE 90 UG/1
2 AEROSOL, METERED RESPIRATORY (INHALATION) EVERY 6 HOURS PRN
Qty: 18 G | Refills: 5 | Status: SHIPPED | OUTPATIENT
Start: 2024-04-17

## 2024-04-26 ENCOUNTER — CONSULT (OUTPATIENT)
Dept: ENDOCRINOLOGY | Facility: CLINIC | Age: 54
End: 2024-04-26
Payer: COMMERCIAL

## 2024-04-26 VITALS
SYSTOLIC BLOOD PRESSURE: 128 MMHG | HEIGHT: 75 IN | BODY MASS INDEX: 35.51 KG/M2 | WEIGHT: 285.6 LBS | DIASTOLIC BLOOD PRESSURE: 78 MMHG

## 2024-04-26 DIAGNOSIS — N62 GYNECOMASTIA: Primary | ICD-10-CM

## 2024-04-26 DIAGNOSIS — C62.90 SEMINOMA (HCC): ICD-10-CM

## 2024-04-26 PROCEDURE — 99244 OFF/OP CNSLTJ NEW/EST MOD 40: CPT | Performed by: STUDENT IN AN ORGANIZED HEALTH CARE EDUCATION/TRAINING PROGRAM

## 2024-04-26 NOTE — PROGRESS NOTES
[facility] - [place]  Clinic Visit Note  Mario Ratliff 53 y.o. male   MRN: [id number]    Assessment and Plan      Diagnoses and all orders for this visit:    Gynecomastia: Patient with left-sided gynecomastia more so than right, recent mammogram findings indicated of fibroglandular tissue.  Prior testosterone and estrogen levels reviewed from January 2024 and based on his age, his testosterone may be slightly on the lower side.  Before further treatment for this, we spoke with the patient about getting repeat testing as mentioned below and going from there.  Spoke with the patient about natural progression of gynecomastia and his mammogram results.  Advised him that he needs further lab testing which was ordered at today's clinic visit.  We also spoke with the patient at length about treatment options including clinical observation, surgery if needed.  In that the treatment options will depend on his lab results.  There may also be a role of using testosterone for treatment for this and that we would have to work with this oncology team before its use.  Patient advised to follow-up with us in 6 months for this.  -     Testosterone, free, total  -     Luteinizing hormone  -     Follicle stimulating hormone  -     Prolactin  -     TSH, 3rd generation  -     T4, free  -     Estradiol  -     Sex Hormone Binding Globulin  -     Follow-up with clinic in 6 months    Seminoma (HCC)  -Following with medical oncology and surgical oncology in the outpatient setting  -Based on chart review, it appears that patient has had BEP therapy and orchiectomy      Subjective     History of Present Illness:  Patient is a 53-year-old male with history of metastatic seminoma because of which he has been previously following with hematology and oncology complicated by history of severe pancytopenia.  Over the course of the last years, patient was also noted to be having gynecomastia, left greater than right.  He has not  had imaging for this done recently. He states that the mass on the left is occasionally tender.  As per the patient, he has not had any prior injury to the left side of his chest, he is a non-smoker at baseline.  He states that this became progressively worse over the course the last few months but it started sometime in December or [place] time.  He denies any nipple discharge or changes around the skin.  Reporting that the pain begins as discomfort in the left side of the nipple and then becomes more tender or painful approaching the left axilla.  Denies any significant hair loss or voice change.  He states that the discomfort often gets worse with some pressure on the area or putting on clothes and is to brush the area.  Denies any fevers or chills.  Spoke with the patient about natural progression of gynecomastia and his mammogram results.  Advised him that he needs further lab testing which was ordered at today's clinic visit.  We also spoke with the patient at length about treatment options including clinical observation, surgery if needed.  In that the treatment options will depend on his lab results.  There may also be a role of using testosterone for treatment for this and that we would have to work with this oncology team before its use.  Patient advised to follow-up with us in 6 months for this.    Review of Systems   Constitutional:  Negative for appetite change, chills and fever.   Respiratory:  Negative for cough and shortness of breath.    Cardiovascular:  Positive for chest pain. Negative for palpitations.   Gastrointestinal:  Negative for abdominal pain, nausea and vomiting.   Neurological:  Negative for dizziness and light-headedness.         Current Outpatient Medications:     hydrocortisone 2.5 % cream, Apply as needed to face, no more than 3-4 days.  May apply to left lower leg as needed, Disp: 28 g, Rfl: 3    ketoconazole (NIZORAL) 2 % cream, Apply topically 2 (two) times a day To face, Disp:  30 g, Rfl: 3    triamcinolone (KENALOG) 0.1 % cream, Apply topically 2 (two) times a day, Disp: 60 g, Rfl: 1    acetaminophen (TYLENOL) 325 mg tablet, Take 650 mg by mouth every 6 (six) hours as needed for mild pain (Patient not taking: Reported on 2/20/2024), Disp: , Rfl:     albuterol (Ventolin HFA) 90 mcg/act inhaler, Inhale 2 puffs every 6 (six) hours as needed for wheezing, Disp: 18 g, Rfl: 5    apixaban (Eliquis) 2.5 mg, Take 1 tablet (2.5 mg total) by mouth 2 (two) times a day for 14 days (Patient not taking: Reported on 2/20/2024), Disp: 28 tablet, Rfl: 0    benzonatate (TESSALON) 200 MG capsule, Take 1 capsule (200 mg total) by mouth 3 (three) times a day as needed for cough (Patient not taking: Reported on 4/26/2024), Disp: 20 capsule, Rfl: 0    Cholecalciferol (Vitamin D3) 1.25 MG (31194 UT) CAPS, Take by mouth in the morning (Patient not taking: Reported on 2/20/2024), Disp: , Rfl:     [employer] Aspirin Low Dose 81 MG EC tablet, TAKE 1 TABLET (81 MG TOTAL) BY MOUTH 2 (TWO) TIMES A DAY FOR 14 DAYS (Patient not taking: Reported on 2/20/2024), Disp: , Rfl:     loratadine (CLARITIN) 10 mg tablet, Take 10 mg by mouth if needed for allergies, Disp: , Rfl:     multivitamin (THERAGRAN) TABS, Take 1 tablet by mouth daily (Patient not taking: Reported on 2/22/2024), Disp: , Rfl:     mupirocin (BACTROBAN) 2 % ointment, Apply topically 2 (two) times a day To left lower leg for 7-10 days (Patient not taking: Reported on 4/26/2024), Disp: 30 g, Rfl: 0    naproxen (NAPROSYN) 500 mg tablet, Take 1 tablet (500 mg total) by mouth 2 (two) times a day with meals (Patient not taking: Reported on 2/20/2024), Disp: 60 tablet, Rfl: 0    NON FORMULARY, Take 1 capsule by mouth 3 (three) times a day Nervive; s/p neuropathy from chemo (Patient not taking: Reported on 2/22/2024), Disp: , Rfl:     ondansetron (ZOFRAN-ODT) 4 mg disintegrating tablet, Take 1 tablet (4 mg total) by mouth every 8 (eight) hours as needed for nausea or vomiting  (Patient not taking: Reported on 2/20/2024), Disp: 15 tablet, Rfl: 0  Allergies   Allergen Reactions    Oyster [employer] - Food Allergy Vomiting     Past Medical History:   Diagnosis Date    Allergic 1/1/2000    seasonal allergies    Cancer (HCC)     testicular cancer    COVID 2021    History of cancer chemotherapy     History of transfusion     Impacted cerumen     Lymphadenopathy     Obesity, unspecified     Seasonal allergies     Seminoma of testis (HCC)     Testicular cancer (HCC) 01/17/2023    Torn meniscus     right knee- surgical repair today 7/19/2021    Wears glasses      Past Surgical History:   Procedure Laterality Date    COLONOSCOPY      FACIAL/NECK BIOPSY Left 01/11/2023    Procedure: EXCISION OF LEFT NECK LYMPH NODE WITH LYMPHOMA PROTOCOL;  Surgeon: Wilber Kasper MD;  Location: [facility];  Service: Surgical Oncology    FL GUIDED CENTRAL VENOUS ACCESS DEVICE INSERTION  01/23/2023    HEMORROIDECTOMY      IR PORT REMOVAL      KNEE SURGERY Right 7/19/2021    VT ARTHRS KNE SURG W/MENISCECTOMY MED/LAT W/SHVG Right 07/19/2021    Procedure: ARTHROSCOPY KNEE, partial lateral meniscectomy;  Surgeon: Guy Vides MD;  Location: [facility];  Service: Orthopedics    VT ARTHRS KNE SURG W/MENISCECTOMY MED/LAT W/SHVG Left 12/05/2023    Procedure: ARTHROSCOPY KNEE, partial lateral meniscectomy;  Surgeon: Guy Vides MD;  Location: [facility];  Service: Orthopedics    VT ORCHIECTOMY RADICAL TUMOR INGUINAL APPROACH Left 06/16/2023    Procedure: RADICAL ORCHIECTOMY INGUINAL; EXCISION CORD LIPOMA;  Surgeon: Ky Del Toro MD;  [facility];  Service: Urology    SKIN BIOPSY      TUNNELED VENOUS PORT PLACEMENT N/A 01/23/2023    Procedure: INSERTION VENOUS PORT (PORT-A-CATH);  Surgeon: Wilber Kasper MD;  Location: [facility];  Service: Surgical Oncology     Family History   Problem Relation Age of Onset    Cancer Mother         Breast Cancer    Breast cancer Mother 50 - 59    Breast cancer  "additional onset Mother     Squamous cell carcinoma Father     Basal cell carcinoma Father     Cancer Father         Skin carcenoma    Basal cell carcinoma Sister     Breast cancer Sister 40 - 49    Cancer Sister         Breast Cancer and Skin carcenoma    Breast cancer additional onset Sister     Basal cell carcinoma Brother     Pancreatic cancer Maternal Aunt     Pancreatic cancer Maternal Aunt     Breast cancer Maternal Grandmother 32    No Known Problems Maternal Grandfather     No Known Problems Paternal Grandmother     No Known Problems Paternal Grandfather     No Known Problems Daughter      Social History     Substance and Sexual Activity   Alcohol Use Yes    Alcohol/week: 3.0 standard drinks of alcohol    Types: 1 Glasses of wine, 1 Cans of beer, 1 Shots of liquor per week    Comment: 1-2 weekly       Social History     Substance and Sexual Activity   Drug Use Never     Social History     Tobacco Use   Smoking Status Never    Passive exposure: Past   Smokeless Tobacco Never   Tobacco Comments    During childhood, both parents smoked.       Objective     Vitals:    04/26/24 0806   BP: 128/78   BP Location: Right arm   Patient Position: Sitting   Cuff Size: Adult   Weight: 130 kg (285 lb 9.6 oz)   Height: 6' 3\" (1.905 m)       Physical Exam  Vitals reviewed.   HENT:      Head: Normocephalic.   Cardiovascular:      Rate and Rhythm: Normal rate and regular rhythm.      Pulses: Normal pulses.      Heart sounds: Normal heart sounds.   Pulmonary:      Effort: Pulmonary effort is normal.      Breath sounds: Normal breath sounds.   Chest:   Breasts:     Breasts are asymmetrical.      Left: Swelling and tenderness present. No bleeding, nipple discharge or skin change.   Abdominal:      General: Bowel sounds are normal.      Palpations: Abdomen is soft.   Skin:     General: Skin is warm and dry.      Capillary Refill: Capillary refill takes less than 2 seconds.   Neurological:      Mental Status: He is alert and " oriented to person, place, and time.           Care Time Delivered:   I have spent 26 minutes with patient today in which greater than 50% of this time was spent in counseling/coordination of care.      Joselo Kasper MD  Internal Medicine Residency PGY-3  [facility], [place]      ==  PLEASE NOTE:  This encounter was completed utilizing the Dragon [employer] Voice Recognition Software. Grammatical errors, random word insertions, pronoun errors and incomplete sentences are occasional consequences of the system due to software limitations, ambient noise and hardware issues.These may be missed by proof reading prior to affixing electronic signature. Any questions or concerns about the content, text or information contained within the body of this dictation should be directly addressed to the physician for clarification. Please do not hesitate to call me directly if you have any any questions or concerns.

## 2024-05-13 ENCOUNTER — TELEPHONE (OUTPATIENT)
Dept: HEMATOLOGY ONCOLOGY | Facility: CLINIC | Age: 54
End: 2024-05-13

## 2024-05-13 DIAGNOSIS — N64.4 BREAST PAIN, LEFT: Primary | ICD-10-CM

## 2024-05-13 DIAGNOSIS — C62.90 SEMINOMA (HCC): ICD-10-CM

## 2024-05-13 DIAGNOSIS — N62 GYNECOMASTIA: ICD-10-CM

## 2024-05-13 NOTE — PROGRESS NOTES
Hematology/Oncology Outpatient Office Note    Date of Service: 2024    [facility] [place]  [address] [place] [address]  [phone]    Reason for Consultation:   Chief Complaint   Patient presents with    Follow-up       Cancer Stage at diagnosis: IIIC    Referral Physician: No ref. provider found    Primary Care Physician:  Maurice Muñoz DO     Nickname: Mario    Spouse: Candis Berry ECO    Today's ECO     Goals and Barriers:  Current Goal: Minimize effects of disease burden, extend life.   Barriers to accomplishing this: None    Patient's Capacity to Self Care:  Patient is able to self care    Code Status: not addressed today    Advanced directives: not addressed today    ASSESSMENT & PLAN      Diagnosis ICD-10-CM Associated Orders   1. Seminoma (HCC)  C62.90 XR chest pa & lateral     CT abdomen pelvis w wo contrast      2. Lymphadenopathy  R59.1       3. Ventricular enlargement, right  I51.7 Echo complete w/ contrast if indicated            This is a 53 y.o. 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    Baseline PFT: WNL    C3D15 bleomycin omitted due to severe pancytopenia requiring 2U PRBC.    2023  tumor board: get PET/CT, if any growth, would do dissection, Otherwise, start surveillance thereafter  2023:  tumor board recommendation to pursue surveillance    5/3/2023: , HCG <1 WNL, AFP slightly high for first time at 8.1    Variant 1  NTHL1  c.787C>T (p.R263C); heterozygous; uncertain significance   Variant 2  SDHC c.15G>T (p.L5F); heterozygous; uncertain sig    Discussion of decision making  Oncology history updated, accordingly, during this visit  Goals of care/patient communication  I discussed with the patient the clinical course  leading up to their cancer diagnosis. I reviewed relevant office notes, imaging reports and pathology result as well.  I told the patient that this is a case of potentially curable disease and what this means. We discussed that the goal of anti-cancer therapy is to provide best quality of life, extend overall survival, and progression free survival as shown in clinical trials. We also discussed that there might be a point when the cancer will no longer respond to this anti-neoplastic therapy.   I explained the risks/benefits of the proposed cancer therapy: Bleomycin 30 units IV, Etoposide 100mg/m2, cisplatin 20mg/m2, and after discussion including understanding risks of possible life-threatening complications and therapy-related malignancy development, informed consent for blood products and treatment has been signed and obtained.  TNM/Staging At Diagnosis  [id number] S2  Cancer Staging   IIIC, poor risk (non pulmonary visceral mets)  Disease Features/Tumor Markers/Genetics  Tumor Marker: 1/18/2023:  (3x ULN), AFP (5.2, WNL), HCG (13, slightly high)  7/28/2023: HCG <1, , AFP 4.79  10/5/2023: HCG <1, , AFP 4.51  11/30/2023 HCG< 1, AFP 4.47  1/24/2024: HCG <1, , AFP 4.64-  Notable Path Features:   1/11/2023: Lymph node, left neck, excision: Germ cell tumor, compatible with metastatic seminoma. Background nonnecrotizing granulomatous inflammation.  Treatment: s/p BEP  Other Supportive care:  Treatment Team Members  Surgeon: Dr. Del Toro  Rad Onc  Palliative  Labs: 12/28/2022: creat 0.96, T bili 0.63, WBC 6.49k, Hgb 15.9, plt 244k  1/2024: testosterone 209, estrogen 91 WNL  3/20/2024: HCG <1, LDH WNL, AFP 4.85, CXR WNL  Diagnostics   1/3/2023 CT soft tissue neck: Moderate left-sided adenopathy primarily within the left neck, posterior jugular chain levels 3 through 5 with a prominent node present in the upper mediastinum  1/3/2023 CT Chest w/c: Left supraclavicular, superior mediastinal,  posterior mediastinal (15mm)/retrocrural (2.2 cm)and suspected partially imaged left abdominal adenopathy  1/4/2023: CT Abd/pelv w/c: Bulky retroperitoneal lymphadenopathy consistent with lymphoma (left para-aortic region extending into the left upper quadrant measures 8.2 x 10.9 cm.  Aortocaval lymphadenopathy measures 3.7 x 5.2 cm.  More inferior left para-aortic lymphadenopathy at the bifurcation measures 5.0 x 5.9 cm.)  1/18/2023: Testicular U/S shows a left testicular lesion (1.5 x 1.7 x 0.9 cm) and the RP adenopathy is more to the Left of the aorta  4/19/2023 CT Chest w/c: No evidence of acute intrathoracic pathology  5/1/2023 CT CAP w/c: Interval marked improvement of retroperitoneal adenopathy.   Mild splenomegaly. Image 93, retrocaval, 9 mm previously 3 cm. Image 96, left para-aortic, 2 cm previously 7.5 cm  6/7/2023 PET/CT: essential CR,  L inguina LN SUV 5 (reduced)  7/28/2023: CXR: No acute cardiopulmonary disease  10/5/2023 CT Abd/pelv w/c: Retroperitoneal lymphadenopathy with index lymph nodes demonstrating continued interval improvement. Splenomegaly  index retroperitoneal/left periaortic lymph nodes largest measuring 14 mm in short axis, previously measured 25 mm in short axis. Retrocaval lymph node measuring 9 x 12 mm, previously measured 13 x 19 mm  10/6/2023: CXR: WNL  11/30/2023 CXR: No acute cardiopulmonary disease  12/13/2023 US L Breast: benign  There is gynecomastia seen in both breasts, minimal on the right and mild on the left. The gynecomastia correlates with the palpable mass and tenderness reported by the patient.  There is a questioned intramammary lymph node in the outer left breast.  Ultrasound breast left limited diagnostic: Targeted ultrasound of the retroareolar left breast was performed for further evaluation of the painful palpable area of concern.  Fibroglandular tissue is visualized in the retroareolar left breast, consistent with mild gynecomastia.  At 2:00 5 cm from the  nipple within the left breast, there is a benign-appearing intramammary lymph node  1/24/2024 CXR: No acute cardiopulmonary disease. No significant change  2/5/2024 CT CAP w/c: Stable retroperitoneal lymphadenopathy. No new suspicious findings  5/17/2024 CT CAP w/c: Progressive marked gynecomastia, left much greater than right.  Scattered retroperitoneal lymph nodes are slightly less prominent than on prior exam and all subcentimeter short axis.. Largest node previously measured 1.3 cm short axis and now measures 1 cm short axis      Discussion of decision making    I personally reviewed the following lab results, the image studies, pathology, other specialty/physicians consult notes and recommendations, and outside medical records from [facility]. I had a lengthy discussion with the patient and shared the work-up findings. We discussed the diagnosis and management plan as below. I spent 32 minutes reviewing the records (labs, clinician notes, outside records, medical history, ordering medicine/tests/procedures, interpreting the imaging/labs previously done) and coordination of care as well as direct time with the patient today, of which greater than 50% of the time was spent in counseling and coordination of care with the patient/family.      Plan/Labs  F/u Urology for post-orchiectomy urologic surveillance  Cont surveillance with CT Abd/pelv w/c in 4 months (11/2024), CXR in 3 months as well (9/2024)  CMP, AFP, LDH, HCG ordered q12 weeks standing until 5/2025   Rad Onc f/u to consider prn symptomatic RT to his mammary glands (not indicated at this time): seeing endocrine for this as well, will pursue resection if worsening    Surveillance for clinical stage III seminoma includes history and physical every 2 months for the first year following completion of chemotherapy which would be until April 2024 along with every 4-month CT scan of the abdomen, pelvis along with every 2-month chest x-ray. (until 6/2024)    Year 2  (starting 7/2024) surveillance consists of every 3-month history and physical and serology markers, every 6-month CT abdomen, pelvis, and chest x-ray every 3 months.    Years 3-4 surveillance consists of history and physical and markers every 6 months, annual chest x-ray and CT scan  Year 5 surveillance consists of annual chest x-ray and history and physical and markers with as needed CT scan      Follow Up: q2 months with H&P, serology markers scheduled thru 12/9/2024    All questions were answered to the patient's satisfaction during this encounter. The patient knows the contact information for our office and knows to reach out for any relevant concerns related to this encounter. They are to call for any temperature 100.4 or higher, new symptoms including but not restricted to shaking chills, decreased appetite, nausea, vomiting, diarrhea, increased fatigue, shortness of breath or chest pain, confusion, and not feeling the strength to come to the clinic. For all other listed problems and medical diagnosis in their chart - they are managed by PCP and/or other specialists, which the patient acknowledges. Thank you very much for your consultation and making us a part of this patient's care. We are continuing to follow closely with you. Please do not hesitate to reach out to me with any additional questions or concerns.    Nancy Vázquez MD  Hematology & Medical Oncology Staff Physician             Disclaimer: This document was prepared using [employer] technology. If a word or phrase is confusing, or does not make sense, this is likely due to recognition error which was not discovered during this clinician's review. If you believe an error has occurred, please contact me through [employer] service line for rashida?cation.      ONCOLOGY HISTORY OF PRESENT ILLNESS        Oncology History   Seminoma (HCC)   1/11/2023 -  Cancer Staged    Staging form: Testis, AJCC 8th Edition  - Clinical stage from 1/11/2023:  cT4, cN3, pM1a, S2 - Signed by Nancy Vázquez MD on 1/20/2023  Stage prefix: Initial diagnosis  Lactate dehydrogenase (LDH) (U/L): 699  Human chorionic gonadotropin (hCG) (mIU/mL): 13  Alpha fetoprotein (AFP) (ng/mL): 5.2  Laterality: Left  Sites of metastasis: Distant lymph nodes, NOS, Retroperitoneal lymph node, NOS  Source of metastatic specimen: Supraclavicular Lymph Nodes  Staged by: Managing physician       1/19/2023 Initial Diagnosis    Seminoma (HCC)     1/30/2023 - 4/17/2023 Chemotherapy    pegfilgrastim (NEULASTA ONPRO), 6 mg, Subcutaneous, Once, 4 of 4 cycles  Administration: 6 mg (2/6/2023), 6 mg (2/27/2023), 6 mg (3/20/2023), 6 mg (3/6/2023), 6 mg (4/17/2023), 6 mg (4/10/2023)  bleomycin (BLENOXANE) IVPB, 30 Units, Intravenous, Once, 4 of 4 cycles  Administration: 30 Units (1/30/2023), 30 Units (2/6/2023), 30 Units (2/13/2023), 30 Units (2/20/2023), 30 Units (2/27/2023), 30 Units (3/6/2023), 30 Units (3/13/2023), 30 Units (3/20/2023), 30 Units (4/3/2023), 30 Units (4/10/2023), 30 Units (4/17/2023)  CISplatin (PLATINOL) infusion, 20 mg/m2 = 52.2 mg, Intravenous, Once, 4 of 4 cycles  Administration: 52.2 mg (1/30/2023), 52.2 mg (1/31/2023), 52.2 mg (2/1/2023), 52.2 mg (2/2/2023), 52.2 mg (2/3/2023), 52.2 mg (2/20/2023), 52.2 mg (2/21/2023), 52.2 mg (2/22/2023), 52.2 mg (2/23/2023), 52.2 mg (2/24/2023), 52.2 mg (3/13/2023), 52.2 mg (3/14/2023), 52.2 mg (3/15/2023), 52.2 mg (3/16/2023), 52.2 mg (3/17/2023), 52.2 mg (4/3/2023), 52.2 mg (4/4/2023), 52.2 mg (4/5/2023), 52.2 mg (4/6/2023), 52.2 mg (4/7/2023)  fosaprepitant (EMEND) IVPB, 150 mg, Intravenous, Once, 4 of 4 cycles  Administration: 150 mg (1/30/2023), 150 mg (2/20/2023), 150 mg (3/13/2023), 150 mg (4/3/2023), 150 mg (2/24/2023), 150 mg (4/10/2023)       Lots of IV Mag and several PRBC required during chemo  C3D15 deferred due to plt <10k, Hgb <8    SUBJECTIVE  (INTERVAL HISTORY)      Clotting History None   Bleeding History None   Cancer History  Seminoma   Family Cancer History Mom/sister (breast), same sister (melanoma), father (basal cell skin cancer)   H/O Blood/Plt Transfusion None   Tobacco/etoh/drug abuse No nicotine use, etoh abuse, rec drug use       Cancer Screening history C-scope (2019)   Occupation  at [employer]       Interval events: having ongoing increased tenderness of the L Breast (since Thanksgiving 2023) along with some radiation. Otherwise doing much better, doing projects, but mild neuropathy in the fingertips, toes that is persisting (odd feeling in the balls of his feet and tips of his toes). Not dropping items, gripping is getting better      I have reviewed the relevant past medical, surgical, social and family history. I have also reviewed allergies and medications for this patent.    Review of Systems    Baseline weight: 300 lbs    Has had intermittent lower back, thigh rash since summer of 2022.     Denies F/C, V, SOB, CP, LH, HA, itching, gen weakness, melena, hematuria, hematochezia, falls, diarrhea, or constipation       A 10-point review of system was performed, pertinent positive and negative were detailed as above. Otherwise, the 10-point review of system was negative.      Past Medical History:   Diagnosis Date    Allergic 1/1/2000    seasonal allergies    Cancer (HCC)     testicular cancer    COVID 2021    History of cancer chemotherapy     History of transfusion     Impacted cerumen     Lymphadenopathy     Obesity, unspecified     Seasonal allergies     Seminoma of testis (HCC)     Testicular cancer (HCC) 01/17/2023    Torn meniscus     right knee- surgical repair today 7/19/2021    Wears glasses        Past Surgical History:   Procedure Laterality Date    COLONOSCOPY      FACIAL/NECK BIOPSY Left 01/11/2023    Procedure: EXCISION OF LEFT NECK LYMPH NODE WITH LYMPHOMA PROTOCOL;  Surgeon: Wilber Kasper MD;  Location[facility];  Service: Surgical Oncology    FL GUIDED CENTRAL VENOUS ACCESS DEVICE INSERTION   01/23/2023    HEMORROIDECTOMY      IR PORT REMOVAL      KNEE SURGERY Right 7/19/2021    WY ARTHRS KNE SURG W/MENISCECTOMY MED/LAT W/SHVG Right 07/19/2021    Procedure: ARTHROSCOPY KNEE, partial lateral meniscectomy;  Surgeon: Guy Vides MD;  Location[facility];  Service: Orthopedics    WY ARTHRS KNE SURG W/MENISCECTOMY MED/LAT W/SHVG Left 12/05/2023    Procedure: ARTHROSCOPY KNEE, partial lateral meniscectomy;  Surgeon: Guy Vides MD;  [facility];  Service: Orthopedics    WY ORCHIECTOMY RADICAL TUMOR INGUINAL APPROACH Left 06/16/2023    Procedure: RADICAL ORCHIECTOMY INGUINAL; EXCISION CORD LIPOMA;  Surgeon: Ky Del Toro MD;  [facility];  Service: Urology    SKIN BIOPSY      TUNNELED VENOUS PORT PLACEMENT N/A 01/23/2023    Procedure: INSERTION VENOUS PORT (PORT-A-CATH);  Surgeon: Wilber Kasper MD;  Location[facility];  Service: Surgical Oncology       Family History   Problem Relation Age of Onset    Cancer Mother         Breast Cancer    Breast cancer Mother 50 - 59    Breast cancer additional onset Mother     Squamous cell carcinoma Father     Basal cell carcinoma Father     Cancer Father         Skin carcenoma    Basal cell carcinoma Sister     Breast cancer Sister 40 - 49    Cancer Sister         Breast Cancer and Skin carcenoma    Breast cancer additional onset Sister     Basal cell carcinoma Brother     Pancreatic cancer Maternal Aunt     Pancreatic cancer Maternal Aunt     Breast cancer Maternal Grandmother 32    No Known Problems Maternal Grandfather     No Known Problems Paternal Grandmother     No Known Problems Paternal Grandfather     No Known Problems Daughter        Social History     Socioeconomic History    Marital status: /Civil Union     Spouse name: Not on file    Number of children: Not on file    Years of education: Not on file    Highest education level: Not on file   Occupational History    Not on file   Tobacco Use    Smoking  status: Never     Passive exposure: Past    Smokeless tobacco: Never    Tobacco comments:     During childhood, both parents smoked.   Vaping Use    Vaping status: Never Used   Substance and Sexual Activity    Alcohol use: Yes     Alcohol/week: 3.0 standard drinks of alcohol     Types: 1 Glasses of wine, 1 Cans of beer, 1 Shots of liquor per week     Comment: 1-2 weekly    Drug use: Never    Sexual activity: Yes     Partners: Female   Other Topics Concern    Not on file   Social History Narrative    Not on file     Social Determinants of Health     Financial Resource Strain: Not on file   Food Insecurity: No Food Insecurity (2/13/2023)    Hunger Vital Sign     Worried About Running Out of Food in the Last Year: Never true     Ran Out of Food in the Last Year: Never true   Transportation Needs: No Transportation Needs (2/13/2023)    PRAPARE - Transportation     Lack of Transportation (Medical): No     Lack of Transportation (Non-Medical): No   Physical Activity: Not on file   Stress: Not on file   Social Connections: Not on file   Intimate Partner Violence: Not on file   Housing Stability: Low Risk  (2/13/2023)    Housing Stability Vital Sign     Unable to Pay for Housing in the Last Year: No     Number of Places Lived in the Last Year: 1     Unstable Housing in the Last Year: No       Allergies   Allergen Reactions    Oyster Shell - Food Allergy Vomiting       Current Outpatient Medications   Medication Sig Dispense Refill    hydrocortisone 2.5 % cream Apply as needed to face, no more than 3-4 days.   May apply to left lower leg as needed 28 g 3    ketoconazole (NIZORAL) 2 % cream Apply topically 2 (two) times a day To face 30 g 3    loratadine (CLARITIN) 10 mg tablet Take 10 mg by mouth if needed for allergies      triamcinolone (KENALOG) 0.1 % cream Apply topically 2 (two) times a day For 4 weeks. 454 g 1    acetaminophen (TYLENOL) 325 mg tablet Take 650 mg by mouth every 6 (six) hours as needed for mild pain  (Patient not taking: Reported on 2/20/2024)      albuterol (Ventolin HFA) 90 mcg/act inhaler Inhale 2 puffs every 6 (six) hours as needed for wheezing (Patient not taking: Reported on 5/15/2024) 18 g 5    apixaban (Eliquis) 2.5 mg Take 1 tablet (2.5 mg total) by mouth 2 (two) times a day for 14 days (Patient not taking: Reported on 2/20/2024) 28 tablet 0    benzonatate (TESSALON) 200 MG capsule Take 1 capsule (200 mg total) by mouth 3 (three) times a day as needed for cough (Patient not taking: Reported on 4/26/2024) 20 capsule 0    Cholecalciferol (Vitamin D3) 1.25 MG (90976 UT) CAPS Take by mouth in the morning (Patient not taking: Reported on 2/20/2024)      [employer] Aspirin Low Dose 81 MG EC tablet TAKE 1 TABLET (81 MG TOTAL) BY MOUTH 2 (TWO) TIMES A DAY FOR 14 DAYS (Patient not taking: Reported on 2/20/2024)      multivitamin (THERAGRAN) TABS Take 1 tablet by mouth daily (Patient not taking: Reported on 2/22/2024)      mupirocin (BACTROBAN) 2 % ointment Apply topically 2 (two) times a day To left lower leg for 7-10 days (Patient not taking: Reported on 4/26/2024) 30 g 0    naproxen (NAPROSYN) 500 mg tablet Take 1 tablet (500 mg total) by mouth 2 (two) times a day with meals (Patient not taking: Reported on 2/20/2024) 60 tablet 0    NON FORMULARY Take 1 capsule by mouth 3 (three) times a day Nervive; s/p neuropathy from chemo (Patient not taking: Reported on 2/22/2024)      ondansetron (ZOFRAN-ODT) 4 mg disintegrating tablet Take 1 tablet (4 mg total) by mouth every 8 (eight) hours as needed for nausea or vomiting (Patient not taking: Reported on 2/20/2024) 15 tablet 0     No current facility-administered medications for this visit.       (Not in a hospital admission)      Objective:     24 Hour Vitals Assessment:     Vitals:    05/20/24 0757   BP: 124/60   Resp: 16   Temp: 97.9 °F (36.6 °C)         Weight at last visit: 302 lbs    Weight today: 281 lbs    PHYSICIAN EXAM    General: Appearance: alert, cooperative, no  distress.  HEENT: Normocephalic, atraumatic. No scleral icterus. conjunctivae clear. EOMI.  Chest: No tenderness to palpation. No open wound noted. L gynecomastia noted  Lungs: Clear to auscultation bilaterally, Respirations unlabored.  Cardiac: Regular rate and rhythm, +S1and S2  Abdomen: Soft, non-tender, non-distended. Bowel sounds are normal.   : b/l testicular exam WNL  L breast exam: nodule or two  Extremities:  No edema, cyanosis, clubbing.  Skin: Skin color, turgor are normal. No rashes.  Lymphatics: no palpable axillary, or inguinal adenopathy  L supra-clavicular LN palpated with incision c/d/i    Port:  c/d/i    Neurologic: Awake, Alert, and oriented, no gross focal deficits noted b/l.       DATA REVIEW:    Pathology Result:    Final Diagnosis   Date Value Ref Range Status   02/22/2024   Final    A. Skin, right upper eyelid:    SEBORRHEIC KERATOSIS, IRRITATED AND INFLAMED.          06/16/2023   Final    A.  Left testis (radical orchiectomy):     - Negative for carcinoma.      - Testicular parenchyma with extensive fibrosis. See comment.     Comment:  The history of metastatic seminoma involving a left neck lymph node ([id number]) status post chemotherapy is noted.      B.  Left spermatic cord lipoma (excision):     - Mature fibroadipose tissue, compatible with lipoma.     - One (1) lymph node, negative for carcinoma.    C.  Left spermatic cord (resection):     - Benign spermatic cord.     01/11/2023   Final    A.  Lymph node, left neck, excision:  -Germ cell tumor, compatible with metastatic seminoma (see note).  -Background nonnecrotizing granulomatous inflammation (see note).       Comment:     This is an appended report. These results have been appended to a previously preliminary verified report.   10/25/2019   Final    A. Cecum, cold biopsy:  - Portion of benign colonic mucosa with prominent reactive lymphoid aggregate.     B. Ascending colon, cold snare:  - Tubular adenoma.  - Negative for high grade  dysplasia/ carcinoma.    C. Colon, splenic flexure, cold snare:  - Tubular adenoma.  - Negative for high grade dysplasia/ carcinoma.            Image Results:   Image result are reviewed and documented in Hematology/Oncology history    CT chest abdomen pelvis w contrast  Narrative: CT CHEST, ABDOMEN AND PELVIS WITH IV CONTRAST    INDICATION: C62.90: Malignant neoplasm of unspecified testis, unspecified whether descended or undescended.    COMPARISON: Abdomen/pelvis CT 2/5/2024 and chest CT 4/19/2023    TECHNIQUE: CT examination of the chest, abdomen and pelvis was performed. Multiplanar 2D reformatted images were created from the source data.    This examination, like all CT scans performed in the [facility], was performed utilizing techniques to minimize radiation dose exposure, including the use of iterative reconstruction and automated exposure control. Radiation dose length   product (DLP) for this visit: 1622.36 mGy-cm    IV Contrast: 100 mL of iohexol (OMNIPAQUE)  Enteric Contrast: Not administered.    FINDINGS:    CHEST    LUNGS: No new or enlarging pulmonary nodules. No pulmonary consolidation. No suspicious airway filling defects.    PLEURA: Unremarkable.    HEART/GREAT VESSELS: Unchanged left ventricular atrophy and mild right ventricular enlargement.. No thoracic aortic aneurysm.    MEDIASTINUM AND JESSICA: Unremarkable.    CHEST WALL AND LOWER NECK: Progressive left much greater than right gynecomastia.    ABDOMEN    LIVER/BILIARY TREE: Unremarkable.    GALLBLADDER: No calcified gallstones. No pericholecystic inflammatory change.    SPLEEN: Unremarkable.    PANCREAS: No pancreatic masses. Normal pancreatic duct. Mild pancreatic fatty replacement.    ADRENAL GLANDS: Unremarkable.    KIDNEYS/URETERS: Unremarkable. No hydronephrosis.    STOMACH AND BOWEL: Unremarkable.    APPENDIX: Normal appendix.    ABDOMINOPELVIC CAVITY: No ascites. No pneumoperitoneum. No lymphadenopathy. Scattered  "retroperitoneal nodes are again more numerous than typically seen. These are all subcentimeter short axis and less prominent than on prior exam. Largest node previously   measured 1.3 cm short axis and now measures 1 cm short axis.    VESSELS: Unremarkable for patient's age.    PELVIS    REPRODUCTIVE ORGANS: Left orchiectomy.    URINARY BLADDER: Unremarkable.    ABDOMINAL WALL/INGUINAL REGIONS: Similar moderate size fat-containing right inguinal hernia. Tiny fat-containing umbilical hernia.    BONES: No acute fracture or suspicious osseous lesion. Unchanged T3 hemangioma. Mild lower lumbar facet disease. Mild osteoarthrosis of the hips. Proximal right femoral bone island. Thoracic spine DISH.  Impression: Progressive marked gynecomastia, left much greater than right.    Scattered retroperitoneal lymph nodes are slightly less prominent than on prior exam and all subcentimeter short axis..    No metastatic disease to the chest, abdomen, or pelvis.    Workstation performed: [id number]      LABS:  Lab data are reviewed and documented in [facility] history.       Lab Results   Component Value Date    HGB 14.4 05/17/2024    HCT 42.0 05/17/2024    MCV 97 05/17/2024     05/17/2024    WBC 5.13 05/17/2024    NRBC 0 05/17/2024    BANDSPCT 1 04/26/2023    ATYLMPCT 2 (H) 04/21/2023     Lab Results   Component Value Date    K 3.9 05/17/2024     05/17/2024    CO2 27 05/17/2024    BUN 28 (H) 05/17/2024    CREATININE 1.15 05/17/2024    GLUF 95 05/17/2024    CALCIUM 9.3 05/17/2024    CORRECTEDCA 9.0 03/31/2023    AST 18 05/17/2024    ALT 18 05/17/2024    ALKPHOS 54 05/17/2024    EGFR 71 05/17/2024       No results found for: \"IRON\", \"TIBC\", \"FERRITIN\"    Lab Results   Component Value Date    FJRZRGLY60 321 10/05/2023       No results for input(s): \"WBC\", \"CREAT\", \"PLT\" in the last 72 hours.    By:  Nancy Vázquez MD, 5/20/2024, 8:18 AM                                  "

## 2024-05-14 ENCOUNTER — TELEPHONE (OUTPATIENT)
Dept: HEMATOLOGY ONCOLOGY | Facility: CLINIC | Age: 54
End: 2024-05-14

## 2024-05-14 NOTE — TELEPHONE ENCOUNTER
Called patient and let him know we added on the CT scan to the one scheduled on 5/19. Patient acknowledged.     Cheiloplasty (Complex) Text: A decision was made to reconstruct the defect with a  cheiloplasty.  The defect was undermined extensively.  Additional obicularis oris muscle was excised with a 15 blade scalpel.  The defect was converted into a full thickness wedge to facilite a better cosmetic result.  Small vessels were then tied off with 5-0 monocyrl. The obicularis oris, superficial fascia, adipose and dermis were then reapproximated.  After the deeper layers were approximated the epidermis was reapproximated with particular care given to realign the vermilion border.

## 2024-05-14 NOTE — TELEPHONE ENCOUNTER
"Per Carissa, MR at [place] office      \"Called patient and let him know we added on the CT scan to the one scheduled on 5/19. Patient acknowledged.\"     "

## 2024-05-15 ENCOUNTER — PROCEDURE VISIT (OUTPATIENT)
Dept: DERMATOLOGY | Facility: CLINIC | Age: 54
End: 2024-05-15
Payer: COMMERCIAL

## 2024-05-15 VITALS — WEIGHT: 286.3 LBS | TEMPERATURE: 97.7 F | BODY MASS INDEX: 35.6 KG/M2 | HEIGHT: 75 IN

## 2024-05-15 DIAGNOSIS — L72.0 EPIDERMAL CYST: Primary | ICD-10-CM

## 2024-05-15 DIAGNOSIS — L20.9 ATOPIC DERMATITIS, UNSPECIFIED TYPE: ICD-10-CM

## 2024-05-15 PROCEDURE — 88304 TISSUE EXAM BY PATHOLOGIST: CPT | Performed by: STUDENT IN AN ORGANIZED HEALTH CARE EDUCATION/TRAINING PROGRAM

## 2024-05-15 PROCEDURE — 12032 INTMD RPR S/A/T/EXT 2.6-7.5: CPT | Performed by: DERMATOLOGY

## 2024-05-15 PROCEDURE — 11402 EXC TR-EXT B9+MARG 1.1-2 CM: CPT | Performed by: DERMATOLOGY

## 2024-05-15 RX ORDER — TRIAMCINOLONE ACETONIDE 1 MG/G
CREAM TOPICAL 2 TIMES DAILY
Qty: 454 G | Refills: 1 | Status: SHIPPED | OUTPATIENT
Start: 2024-05-15

## 2024-05-15 NOTE — PROGRESS NOTES
PROCEDURE:  EXCISION WITH INTERMEDIATE LAYERED CLOSURE     Attending:   Assistant:  Gneesis Moss performed excision    Pre-Op Diagnosis: Epidermal inclusion cyst  Post-Op Diagnosis: Same   Benign versus Malignant Benign      Lesion Anatomic Location: Mid Back   Pre-op size: 1.9 cm x 1.7 cm  Size of defect:  1.9 cm x 1.7 cm (with 0 centimeter margins)  Final repaired wound length:  3.2cm    Written and verbal, witnessed informed consent was obtained. I discussed that excision is a method of removing lesions both benign and malignant lesions.  A portion of normal skin is often taken to ensure completeness of removal.  I reviewed that procedure will include numbing the area,  cutting around and under defect, undermining tissue, and closing the wound with sutures both inside and out.  These sutures are usually removed in 7 to 14 days. Risks (bleeding, pain, infection, scarring, recurrence) and benefits discussed. It was discussed with patient that every effort is made to minimize scar, but scarring is influenced also by extrinsic factor such as location, age and genetics.     Time Out: performed:  yes  Correct patient: yes.   Correct site per Clinic Report: yes  Correct site per Patient Report: yes    LOCAL ANESTHESIA: 3:1 1% xylocaine with epi and 1-100,000 buffered    DESCRIPTION OF PROCEDURE: The patient was brought back into the procedure room, anesthetized locally, prepped and draped in the usual fashion. Using a #15 blade with a scalpel, the lesion was excised in elliptical fashion. The wound was  undermined in the  fascial plane. Hemostasis was achieved with light electrocoagulation. Purpose of undermining was to decrease wound tension and facilitate closure.    The wound was closed with subcutaneous sutures as follows:    Deep suture:4-0 Vicryl      Epidermal edge closure was accomplished with superficial sutures as follows:    Superficial suture: 4-0 Prolene  Superficial suture type:  interrupted 5 sutures    Estimated blood loss less than 3ml.    The patient tolerated the procedure well without any complications. The wound was cleaned with sterile saline, dried off, surgical vaseline ointment was applied, and the wound was covered. A pressure dressing was applied for stabilization and light pressure. The patient was given detailed oral and written instructions on postoperative care as detailed in consent. The patient left in good medical condition.    POSTOP DISCUSSION DISCUSSION AND INSTRUCTIONS FOR PATIENT      Rationale for Procedure  A skin excision allows the dermatologist to remove a lesion. The procedure involves a local numbing medication and removing the entire lesion. Typically, the lesion is being removed because it is atypical, traumatized, or for significant appearance reasons.  The area will be open like a brush burn and allowed to heal.   There will be no sutures.Tissue is sent to pathologist who will reconfirm diagnosis and verify completeness of lesion removal.    Description of Procedure  We would like to perform a skin excision today.  A local anesthetic, similar to the kind that a dentist uses when filling a cavity, will be injected with a very small needle into the skin area to be sampled.  The injected skin and tissue underneath should “go to sleep” and become numbed so that no further pain should be felt.  A scalpel will be used to cut around the lesion and tissue will be submitted to pathology for examination.  Depending on the diagnosis the lesion will be excised with a certain amount of normal skin to help assure completeness of lesion removal.  The physician will discuss in advance the anticipated size and extent of removal.   Bleeding will occur, but it will stopped with direct pressure, sutures, and electrocautery.    Surgical “Vaseline-type” ointment will also applied after the procedure to help create a barrier between the wound and the outside world.      Risks  and Potential Complications  The advantage of a skin excision is that it allows us to remove a problem lesion quickly.  Although this usually permits the lesion to heal as soon as possible with the least scarring, there are some risks and potential complications that include but are not limited to the following:  Some bleeding is normal at time of procedure and some bleeding on gauze is normal  the first few days after surgery.  Profuse bleeding and bleeding with swelling and pain should be reported as detailed  below  Infection is uncommon in skin surgery.  Infection should be reported and is indicated by pain, redness, and discharge of purulent material.  Some dull to at time sharp pain could occur initially the day after surgery.  Persistent pain or increasing pain days after surgery is not expected and should be reported.  Every effort is made to minimize scar, but location, size, and genetics do play a role in scar appearance.  A surgical wound does not achieve its optimal appearance until 6 months.  There are several treatments available if scarring would be problematic including scar creams, silicone pad, laser and scar revision.  Skin discoloration can occur especially in people of color.  Its important to avoid sun on wound in first 6 months after surgery.  Treatment is available if pigment is problematic.  Incomplete removal of the lesion or recurrence of lesion can occur and this would then require further treatment and more surgery.  Nerve Damage/Numbness/Loss of Function is very rare, but is most likely to occur if lesion is large or if it is in a high risk location  Allergic Reaction to lidocaine is rare.  More commonly,  epinephrine is used  with the lidocaine.  Occasionally, epinephrine (aka adrenalin) may cause a brief  feeling of anxiety or jitteriness.  The person at the microscope  (pathologist) may provide additional information that was unexpected. This unexpected finding could provoke the need  for additional treatment or evaluation.    What You Will Need to Do After the Procedure  Keep the area clean and dry the first day. Try NOT to remove the bandage for the first day.  Gently clean the area with soap and water and apply Vaseline ointment (this is over the counter and not a prescription) to the excision  site for up to 2 weeks.    Apply a clean appropriately sized bandage to area.  Gauze and paper tape are recommended for sensitive skin.  Return for suture removal as instructed (generally 1 week for the face, 2 weeks for the body).  Take Acetaminophen (Tylenol) for discomfort, if no contraindications.  Do NOT take Ibuprofen or aspirin unless specifically told to do so by your Dermatologist because these medications can make bleeding worse.  Call our office immediately for signs of infection: fever, chills, increased redness, warmth, tenderness, discomfort/pain, or pus or foul smell coming from the wound.    If bleeding is noticed, place a clean cloth over the area and apply firm pressure for thirty minutes.  Check the wound ONLY after 30 minutes of direct pressure; do not cheat and sneak a peak, as that does not count.  If bleeding persists after 30 minutes of legitimate direct pressure, then try one more round of direct pressure for an additional 10 minutes to the area.  Should the bleeding become heavier or not stop after the second attempt, call [facility] directly at [phone] (SKIN) or, if after hours, go to your local Emergency Room/Emergency Department.       Scribe Attestation    I,:  Genesis Pete am acting as a scribe while in the presence of the attending physician.:       I,:  Misbah Green MD personally performed the services described in this documentation    as scribed in my presence.:

## 2024-05-15 NOTE — PATIENT INSTRUCTIONS
"POSTOP DISCUSSION DISCUSSION AND INSTRUCTIONS FOR PATIENT      Rationale for Procedure  A skin excision allows the dermatologist to remove a lesion. The procedure involves a local numbing medication and removing the entire lesion. Typically, the lesion is being removed because it is atypical, traumatized, or for significant appearance reasons.  The area will be open like a brush burn and allowed to heal.   There will be no sutures.Tissue is sent to pathologist who will reconfirm diagnosis and verify completeness of lesion removal.    Description of Procedure  We would like to perform a skin excision today.  A local anesthetic, similar to the kind that a dentist uses when filling a cavity, will be injected with a very small needle into the skin area to be sampled.  The injected skin and tissue underneath should \"go to sleep\" and become numbed so that no further pain should be felt.  A scalpel will be used to cut around the lesion and tissue will be submitted to pathology for examination.  Depending on the diagnosis the lesion will be excised with a certain amount of normal skin to help assure completeness of lesion removal.  The physician will discuss in advance the anticipated size and extent of removal.   Bleeding will occur, but it will stopped with direct pressure, sutures, and electrocautery.    Surgical \"Vaseline-type\" ointment will also applied after the procedure to help create a barrier between the wound and the outside world.      Risks and Potential Complications  The advantage of a skin excision is that it allows us to remove a problem lesion quickly.  Although this usually permits the lesion to heal as soon as possible with the least scarring, there are some risks and potential complications that include but are not limited to the following:  ? Some bleeding is normal at time of procedure and some bleeding on gauze is normal  the first few days after surgery.  Profuse bleeding and bleeding with swelling " and pain should be reported as detailed  below  ? Infection is uncommon in skin surgery.  Infection should be reported and is indicated by pain, redness, and discharge of purulent material.  ? Some dull to at time sharp pain could occur initially the day after surgery.  Persistent pain or increasing pain days after surgery is not expected and should be reported.  ? Every effort is made to minimize scar, but location, size, and genetics do play a role in scar appearance.  A surgical wound does not achieve its optimal appearance until 6 months.  There are several treatments available if scarring would be problematic including scar creams, silicone pad, laser and scar revision.  ? Skin discoloration can occur especially in people of color.  Its important to avoid sun on wound in first 6 months after surgery.  Treatment is available if pigment is problematic.  ? Incomplete removal of the lesion or recurrence of lesion can occur and this would then require further treatment and more surgery.  ? Nerve Damage/Numbness/Loss of Function is very rare, but is most likely to occur if lesion is large or if it is in a high risk location  ? Allergic Reaction to lidocaine is rare.  More commonly,  epinephrine is used  with the lidocaine.  Occasionally, epinephrine (aka adrenalin) may cause a brief  feeling of anxiety or jitteriness.  ? The person at the microscope  (pathologist) may provide additional information that was unexpected. This unexpected finding could provoke the need for additional treatment or evaluation.    What You Will Need to Do After the Procedure  1. Keep the area clean and dry the first day. Try NOT to remove the bandage for the first day.  2. Gently clean the area with soap and water and apply Vaseline ointment (this is over the counter and not a prescription) to the excision  site for up to 2 weeks.    3. Apply a clean appropriately sized bandage to area.  Gauze and paper tape are recommended for sensitive  skin.  4. Return for suture removal as instructed (generally 1 week for the face, 2 weeks for the body).  5. Take Acetaminophen (Tylenol) for discomfort, if no contraindications.  Do NOT take Ibuprofen or aspirin unless specifically told to do so by your Dermatologist because these medications can make bleeding worse.  6. Call our office immediately for signs of infection: fever, chills, increased redness, warmth, tenderness, discomfort/pain, or pus or foul smell coming from the wound.    If bleeding is noticed, place a clean cloth over the area and apply firm pressure for thirty minutes.  Check the wound ONLY after 30 minutes of direct pressure; do not cheat and sneak a peak, as that does not count.  If bleeding persists after 30 minutes of legitimate direct pressure, then try one more round of direct pressure for an additional 10 minutes to the area.  Should the bleeding become heavier or not stop after the second attempt, call [facility] directly at [phone] (SKIN) or, if after hours, go to your local Emergency Room/Emergency Department.

## 2024-05-17 ENCOUNTER — APPOINTMENT (OUTPATIENT)
Dept: LAB | Facility: HOSPITAL | Age: 54
End: 2024-05-17
Payer: COMMERCIAL

## 2024-05-17 ENCOUNTER — TELEPHONE (OUTPATIENT)
Dept: HEMATOLOGY ONCOLOGY | Facility: CLINIC | Age: 54
End: 2024-05-17

## 2024-05-17 ENCOUNTER — HOSPITAL ENCOUNTER (OUTPATIENT)
Dept: CT IMAGING | Facility: HOSPITAL | Age: 54
Discharge: HOME | End: 2024-05-17
Attending: INTERNAL MEDICINE
Payer: COMMERCIAL

## 2024-05-17 DIAGNOSIS — N62 GYNECOMASTIA: ICD-10-CM

## 2024-05-17 DIAGNOSIS — C62.90 SEMINOMA (HCC): ICD-10-CM

## 2024-05-17 DIAGNOSIS — N64.4 BREAST PAIN, LEFT: ICD-10-CM

## 2024-05-17 LAB
AFP-TM SERPL-MCNC: 4.74 NG/ML (ref 0–9)
ALBUMIN SERPL BCP-MCNC: 4.3 G/DL (ref 3.5–5)
ALP SERPL-CCNC: 54 U/L (ref 34–104)
ALT SERPL W P-5'-P-CCNC: 18 U/L (ref 7–52)
ANION GAP SERPL CALCULATED.3IONS-SCNC: 5 MMOL/L (ref 4–13)
AST SERPL W P-5'-P-CCNC: 18 U/L (ref 13–39)
BASOPHILS # BLD AUTO: 0.04 THOUSANDS/ÂΜL (ref 0–0.1)
BASOPHILS NFR BLD AUTO: 1 % (ref 0–1)
BILIRUB SERPL-MCNC: 1.03 MG/DL (ref 0.2–1)
BUN SERPL-MCNC: 28 MG/DL (ref 5–25)
CALCIUM SERPL-MCNC: 9.3 MG/DL (ref 8.4–10.2)
CHLORIDE SERPL-SCNC: 104 MMOL/L (ref 96–108)
CO2 SERPL-SCNC: 27 MMOL/L (ref 21–32)
CREAT SERPL-MCNC: 1.15 MG/DL (ref 0.6–1.3)
EOSINOPHIL # BLD AUTO: 0.18 THOUSAND/ÂΜL (ref 0–0.61)
EOSINOPHIL NFR BLD AUTO: 4 % (ref 0–6)
ERYTHROCYTE [DISTWIDTH] IN BLOOD BY AUTOMATED COUNT: 12.6 % (ref 11.6–15.1)
ESTRADIOL SERPL-MCNC: 27.5 PG/ML
FSH SERPL-ACNC: 36.5 MIU/ML
GFR SERPL CREATININE-BSD FRML MDRD: 71 ML/MIN/1.73SQ M
GLUCOSE P FAST SERPL-MCNC: 95 MG/DL (ref 65–99)
HCG-TM SERPL-SCNC: 0.8 MLU/ML
HCT VFR BLD AUTO: 42 % (ref 36.5–49.3)
HGB BLD-MCNC: 14.4 G/DL (ref 12–17)
IMM GRANULOCYTES # BLD AUTO: 0.03 THOUSAND/UL (ref 0–0.2)
IMM GRANULOCYTES NFR BLD AUTO: 1 % (ref 0–2)
LDH SERPL-CCNC: 135 U/L (ref 140–271)
LH SERPL-ACNC: 14.5 MIU/ML
LYMPHOCYTES # BLD AUTO: 1.2 THOUSANDS/ÂΜL (ref 0.6–4.47)
LYMPHOCYTES NFR BLD AUTO: 23 % (ref 14–44)
MCH RBC QN AUTO: 33.2 PG (ref 26.8–34.3)
MCHC RBC AUTO-ENTMCNC: 34.3 G/DL (ref 31.4–37.4)
MCV RBC AUTO: 97 FL (ref 82–98)
MONOCYTES # BLD AUTO: 0.56 THOUSAND/ÂΜL (ref 0.17–1.22)
MONOCYTES NFR BLD AUTO: 11 % (ref 4–12)
NEUTROPHILS # BLD AUTO: 3.12 THOUSANDS/ÂΜL (ref 1.85–7.62)
NEUTS SEG NFR BLD AUTO: 60 % (ref 43–75)
NRBC BLD AUTO-RTO: 0 /100 WBCS
PLATELET # BLD AUTO: 190 THOUSANDS/UL (ref 149–390)
PMV BLD AUTO: 9.1 FL (ref 8.9–12.7)
POTASSIUM SERPL-SCNC: 3.9 MMOL/L (ref 3.5–5.3)
PROLACTIN SERPL-MCNC: 10.68 NG/ML
PROT SERPL-MCNC: 7 G/DL (ref 6.4–8.4)
RBC # BLD AUTO: 4.34 MILLION/UL (ref 3.88–5.62)
SODIUM SERPL-SCNC: 136 MMOL/L (ref 135–147)
T4 FREE SERPL-MCNC: 0.69 NG/DL (ref 0.61–1.12)
TSH SERPL DL<=0.05 MIU/L-ACNC: 4.64 UIU/ML (ref 0.45–4.5)
WBC # BLD AUTO: 5.13 THOUSAND/UL (ref 4.31–10.16)

## 2024-05-17 PROCEDURE — 74177 CT ABD & PELVIS W/CONTRAST: CPT

## 2024-05-17 PROCEDURE — 82105 ALPHA-FETOPROTEIN SERUM: CPT

## 2024-05-17 PROCEDURE — 71260 CT THORAX DX C+: CPT

## 2024-05-17 PROCEDURE — 84702 CHORIONIC GONADOTROPIN TEST: CPT

## 2024-05-17 PROCEDURE — 83615 LACTATE (LD) (LDH) ENZYME: CPT

## 2024-05-17 RX ADMIN — IOHEXOL 100 ML: 350 INJECTION, SOLUTION INTRAVENOUS at 18:08

## 2024-05-17 NOTE — TELEPHONE ENCOUNTER
Called the radiology reading room to request a STAT read for:    CT abdomen pelvis w wo contrast (Order #[id number]) on 3/25/24     Patient is scheduled for CT Scan @ 8AM Sunday 5/19/24 and he is scheduled for follow up with Dr. Vázquez the next morning, 5/20/2024 @ 8:00 AM.    Spoke with Pascual who stated that he will reach our to the radiology site performing the test to request that the study be read right away.      ----- Message from Ada JEFFERY sent at 5/16/2024  5:36 PM EDT -----  Regarding: FW: CT SCAN SCHEDULED SUNDAY.  Could one of you please check with patient tomorrow-prior to his appointment on Monday 5/20.      Thanks!  ----- Message -----  From: Nancy Vázquez MD  Sent: 5/16/2024   5:16 PM EDT  To: Ada Purvis MA; Marley Atkins RN; #  Subject: RE: CT SCAN SCHEDULED SUNDAY.                     If the patient is OK being seen without the CT scan being read, and that is fine by me since I don’t know when we would be able to reschedule him  ----- Message -----  From: Ada Purvis MA  Sent: 5/16/2024   5:07 PM EDT  To: Nancy Vázquez MD  Subject: CT SCAN SCHEDULED SUNDAY.                        Hello,    This patient is scheduled to see you 1st thing Monday morning 5/20 @8:00am.    For whatever reason his scan is scheduled for SUNDAY 5/19.    There is no way to have this read prior to his appointment.    Not sure if you care or if you'd like to reschedule this appointment. I will be OOO tomorrow 5/17- maybe Marjan,Kristen or Collette can get this appointment rescheduled if that's what you wish to do.    Thanks!

## 2024-05-17 NOTE — TELEPHONE ENCOUNTER
Spoke with patient informing him that he should let the tech know he has an appointment with Dr. Vázquez on Monday morning at 8AM so they can have his scan read. Patient verbalized understanding and appreciated the call.

## 2024-05-17 NOTE — TELEPHONE ENCOUNTER
Patient communicated via [facility] that he has moved his CT scan appointment date to TODAY at 6PM. Called the reading room once more to provide them with the update.    Spoke to Emily who stated that she will add this information to the previous note created by Pascual from the reading room.

## 2024-05-18 LAB
SHBG SERPL-SCNC: 36.9 NMOL/L (ref 19.3–76.4)
TESTOST FREE SERPL-MCNC: 1.6 PG/ML (ref 7.2–24)
TESTOST SERPL-MCNC: 218 NG/DL (ref 264–916)

## 2024-05-20 ENCOUNTER — TELEPHONE (OUTPATIENT)
Age: 54
End: 2024-05-20

## 2024-05-20 ENCOUNTER — OFFICE VISIT (OUTPATIENT)
Dept: HEMATOLOGY ONCOLOGY | Facility: CLINIC | Age: 54
End: 2024-05-20
Payer: COMMERCIAL

## 2024-05-20 VITALS
WEIGHT: 281 LBS | RESPIRATION RATE: 16 BRPM | TEMPERATURE: 97.9 F | DIASTOLIC BLOOD PRESSURE: 60 MMHG | BODY MASS INDEX: 34.94 KG/M2 | HEIGHT: 75 IN | SYSTOLIC BLOOD PRESSURE: 124 MMHG

## 2024-05-20 DIAGNOSIS — I51.7 VENTRICULAR ENLARGEMENT, RIGHT: ICD-10-CM

## 2024-05-20 DIAGNOSIS — R59.1 LYMPHADENOPATHY: ICD-10-CM

## 2024-05-20 DIAGNOSIS — C62.90 SEMINOMA (HCC): Primary | ICD-10-CM

## 2024-05-20 PROCEDURE — 88304 TISSUE EXAM BY PATHOLOGIST: CPT | Performed by: STUDENT IN AN ORGANIZED HEALTH CARE EDUCATION/TRAINING PROGRAM

## 2024-05-20 PROCEDURE — 99214 OFFICE O/P EST MOD 30 MIN: CPT | Performed by: INTERNAL MEDICINE

## 2024-05-20 NOTE — TELEPHONE ENCOUNTER
Patient  wife called  she stated that  his recent  lab results  came back she would like to  discuss results and would like a sooner appointment. She would  like a phone call back.

## 2024-05-23 DIAGNOSIS — E29.1 PRIMARY HYPOGONADISM IN MALE: Primary | ICD-10-CM

## 2024-05-23 RX ORDER — TESTOSTERONE 16.2 MG/G
GEL TRANSDERMAL
Qty: 30 ACTUATION | Refills: 2 | Status: SHIPPED | OUTPATIENT
Start: 2024-05-23

## 2024-05-23 NOTE — PROGRESS NOTES
Spoke to patient and his wife about starting testosterone treatment.  Will order AndroGel 1 pump daily to start conservatively and repeat labs in 1 month.  Discussed that we will need to wait at least 2 or 3 months to see if there is any improvement in gynecomastia symptoms.  I have also ordered a testicular ultrasound given elevated gonadotropins but suspect his low testosterone is in relation to history of orchiectomy.

## 2024-05-28 ENCOUNTER — HOSPITAL ENCOUNTER (OUTPATIENT)
Dept: NON INVASIVE DIAGNOSTICS | Facility: HOSPITAL | Age: 54
Discharge: HOME | End: 2024-05-28
Attending: INTERNAL MEDICINE
Payer: COMMERCIAL

## 2024-05-28 VITALS
DIASTOLIC BLOOD PRESSURE: 60 MMHG | HEIGHT: 75 IN | WEIGHT: 279.98 LBS | BODY MASS INDEX: 34.81 KG/M2 | SYSTOLIC BLOOD PRESSURE: 124 MMHG

## 2024-05-28 DIAGNOSIS — I51.7 VENTRICULAR ENLARGEMENT, RIGHT: ICD-10-CM

## 2024-05-28 LAB
AORTIC ROOT: 3.7 CM
AORTIC VALVE MEAN VELOCITY: 7.5 M/S
APICAL FOUR CHAMBER EJECTION FRACTION: 66 %
ASCENDING AORTA: 3.8 CM
AV LVOT MEAN GRADIENT: 2 MMHG
AV LVOT PEAK GRADIENT: 3 MMHG
AV MEAN GRADIENT: 2 MMHG
AV PEAK GRADIENT: 4 MMHG
AV VELOCITY RATIO: 0.83
BSA FOR ECHO PROCEDURE: 2.53 M2
DOP CALC AO PEAK VEL: 1.01 M/S
DOP CALC AO VTI: 26.12 CM
DOP CALC LVOT PEAK VEL VTI: 23.04 CM
DOP CALC LVOT PEAK VEL: 0.84 M/S
E WAVE DECELERATION TIME: 268 MS
E/A RATIO: 1.42
FRACTIONAL SHORTENING: 45 (ref 28–44)
GLOBAL LONGITUIDAL STRAIN: 16 %
INTERVENTRICULAR SEPTUM IN DIASTOLE (PARASTERNAL SHORT AXIS VIEW): 1 CM
INTERVENTRICULAR SEPTUM: 1 CM (ref 0.6–1.1)
IVC: 25 MM
LAAS-AP2: 24.8 CM2
LAAS-AP4: 29.4 CM2
LEFT ATRIUM SIZE: 4 CM
LEFT ATRIUM VOLUME (MOD BIPLANE): 96 ML
LEFT ATRIUM VOLUME INDEX (MOD BIPLANE): 37.8 ML/M2
LEFT INTERNAL DIMENSION IN SYSTOLE: 2.8 CM (ref 2.1–4)
LEFT VENTRICLE DIASTOLIC VOLUME (MOD BIPLANE): 194 ML
LEFT VENTRICLE DIASTOLIC VOLUME INDEX (MOD BIPLANE): 76.7 ML/M2
LEFT VENTRICLE SYSTOLIC VOLUME (MOD BIPLANE): 64 ML
LEFT VENTRICLE SYSTOLIC VOLUME INDEX (MOD BIPLANE): 25.3 ML/M2
LEFT VENTRICULAR INTERNAL DIMENSION IN DIASTOLE: 5.1 CM (ref 3.5–6)
LEFT VENTRICULAR POSTERIOR WALL IN END DIASTOLE: 1.1 CM
LEFT VENTRICULAR STROKE VOLUME: 96 ML
LV EF: 67 %
LVSV (TEICH): 96 ML
MV E'TISSUE VEL-LAT: 10 CM/S
MV E'TISSUE VEL-SEP: 9 CM/S
MV PEAK A VEL: 0.5 M/S
MV PEAK E VEL: 71 CM/S
MV STENOSIS PRESSURE HALF TIME: 78 MS
MV VALVE AREA P 1/2 METHOD: 2.82
RIGHT ATRIUM AREA SYSTOLE A4C: 27.2 CM2
RIGHT VENTRICLE ID DIMENSION: 3.6 CM
SL CV LEFT ATRIUM LENGTH A2C: 6 CM
SL CV PED ECHO LEFT VENTRICLE DIASTOLIC VOLUME (MOD BIPLANE) 2D: 126 ML
SL CV PED ECHO LEFT VENTRICLE SYSTOLIC VOLUME (MOD BIPLANE) 2D: 30 ML
TR MAX PG: 29 MMHG
TR PEAK VELOCITY: 2.7 M/S
TRICUSPID ANNULAR PLANE SYSTOLIC EXCURSION: 2.3 CM
TRICUSPID VALVE PEAK REGURGITATION VELOCITY: 2.69 M/S

## 2024-05-28 PROCEDURE — 93306 TTE W/DOPPLER COMPLETE: CPT

## 2024-06-10 ENCOUNTER — PATIENT MESSAGE (OUTPATIENT)
Dept: ENDOCRINOLOGY | Facility: CLINIC | Age: 54
End: 2024-06-10

## 2024-06-10 DIAGNOSIS — E29.1 PRIMARY HYPOGONADISM IN MALE: ICD-10-CM

## 2024-06-10 RX ORDER — TESTOSTERONE 16.2 MG/G
GEL TRANSDERMAL
Qty: 30 ACTUATION | Refills: 3 | Status: SHIPPED | OUTPATIENT
Start: 2024-06-10

## 2024-06-10 NOTE — PATIENT COMMUNICATION
[facility] pharmacy calling to confirm the DrBertin Is OK with patient picking up testosterone early. Informed [facility] pharmacist Dr. Baires is aware. No further action required.

## 2024-06-24 ENCOUNTER — OFFICE VISIT (OUTPATIENT)
Dept: UROLOGY | Facility: CLINIC | Age: 54
End: 2024-06-24
Payer: COMMERCIAL

## 2024-06-24 VITALS
HEIGHT: 75 IN | OXYGEN SATURATION: 96 % | HEART RATE: 54 BPM | DIASTOLIC BLOOD PRESSURE: 78 MMHG | BODY MASS INDEX: 34.89 KG/M2 | WEIGHT: 280.6 LBS | SYSTOLIC BLOOD PRESSURE: 118 MMHG

## 2024-06-24 DIAGNOSIS — C62.90 SEMINOMA (HCC): Primary | ICD-10-CM

## 2024-06-24 PROCEDURE — 99214 OFFICE O/P EST MOD 30 MIN: CPT | Performed by: UROLOGY

## 2024-06-24 NOTE — LETTER
June 24, 2024     Maurice Muñoz DO  [address]  [facility] [address]    Patient: Mario Ratliff   YOB: 1970   Date of Visit: 6/24/2024       Dear Dr. Muñoz:    Thank you for referring Mario Ratliff to me for evaluation. Below are my notes for this consultation.    If you have questions, please do not hesitate to call me. I look forward to following your patient along with you.         Sincerely,        Madhu Del Toro MD        CC: MD Madhu Richmond MD  6/24/2024  9:16 AM  Sign when Signing Visit  6/24/2024    Mario Ratliff  1970  [id number]    1. Seminoma (HCC)  Assessment & Plan:  Impression: History of stage IV seminoma, status post left orchiectomy, status post 4 cycles of BEP, CT scan with CHANTEL, routine prostate cancer screening, gynecomastia left greater than right    Plan: I provided the patient with reassurance that recent tumor markers and CT scan of the chest abdomen and pelvis showed no evidence of disease recurrence.  In addition he continues to show decrease in size of the retroperitoneal lymph nodes which are now all less than 1 cm.  He is following with endocrinology for his gynecomastia and is currently on testosterone supplementation.  Other options could include surgical removal as well as low-dose radiation.  I recommend that he return in 1 year with a repeat PSA and digital rectal examination.  He will continue to follow with medical oncology for surveillance CT scans and tumor markers.       History of Present Illness  53 y.o. male with a history of stage IV seminoma.  At the time of presentation he had a supraclavicular/neck lymph node which was biopsied by Dr. Kasper and confirmed to be seminoma.  He subsequently underwent a left orchiectomy.  Ultrasound showed a hypoechoic lesion in the left testicle but no madhu tumor.  He was noted to have a 7 cm retroperitoneal mass at the time of surgery.  He ultimately received 4 cycles of BEP.  He  was found to have a large cord lipoma at the time of orchiectomy.  He now follows with Dr. Vázquez.    Pathology from the orchiectomy in June 2023 was actually negative for malignancy.  He likely had a burnt out testicular tumor.  His most recent hCG and AFP are both normal.  These are from May 2024.  His LDH level is below the normal limit.    His last CT scan of the chest abdomen and pelvis performed with contrast was in May 2024 revealing scattered less prominent retroperitoneal lymph nodes all less than 1 cm.  There is left greater than right gynecomastia noted.  There is no evidence of metastatic disease in the chest abdomen or pelvis.    He does complain of gynecomastia left greater than right.  He states that it is tender.  He has been seen by radiation oncology who has recommended against low-dose radiation therapy.  He is following with endocrinology    His most recent PSA level available for my review is 0.44 from March 2024.          AUA Symptom Score  AUA SYMPTOM SCORE      Flowsheet Row Most Recent Value   AUA SYMPTOM SCORE    How often have you had a sensation of not emptying your bladder completely after you finished urinating? 0 (P)     How often have you had to urinate again less than two hours after you finished urinating? 0 (P)     How often have you found you stopped and started again several times when you urinate? 0 (P)     How often have you found it difficult to postpone urination? 0 (P)     How often have you had a weak urinary stream? 1 (P)     How often have you had to push or strain to begin urination? 0 (P)     How many times did you most typically get up to urinate from the time you went to bed at night until the time you got up in the morning? 1 (P)     Quality of Life: If you were to spend the rest of your life with your urinary condition just the way it is now, how would you feel about that? 1 (P)     AUA SYMPTOM SCORE 2 (P)              Review of Systems    Past Medical  History  Past Medical History:   Diagnosis Date   • Allergic 1/1/2000    seasonal allergies   • Cancer (HCC)     testicular cancer   • COVID 2021   • History of cancer chemotherapy    • History of transfusion    • Impacted cerumen    • Lymphadenopathy    • Obesity, unspecified    • Seasonal allergies    • Seminoma of testis (HCC)    • Testicular cancer (HCC) 01/17/2023   • Torn meniscus     right knee- surgical repair today 7/19/2021   • Wears glasses        Past Social History  Past Surgical History:   Procedure Laterality Date   • COLONOSCOPY     • FACIAL/NECK BIOPSY Left 01/11/2023    Procedure: EXCISION OF LEFT NECK LYMPH NODE WITH LYMPHOMA PROTOCOL;  Surgeon: Wilber Kasper MD;  [facility];  Service: Surgical Oncology   • FL GUIDED CENTRAL VENOUS ACCESS DEVICE INSERTION  01/23/2023   • HEMORROIDECTOMY     • IR PORT REMOVAL     • KNEE SURGERY Right 7/19/2021   • IN ARTHRS KNE SURG W/MENISCECTOMY MED/LAT W/SHVG Right 07/19/2021    Procedure: ARTHROSCOPY KNEE, partial lateral meniscectomy;  Surgeon: Guy Vides MD;  [facility];  Service: Orthopedics   • IN ARTHRS KNE SURG W/MENISCECTOMY MED/LAT W/SHVG Left 12/05/2023    Procedure: ARTHROSCOPY KNEE, partial lateral meniscectomy;  Surgeon: Guy Vides MD;  [facility];  Service: Orthopedics   • IN ORCHIECTOMY RADICAL TUMOR INGUINAL APPROACH Left 06/16/2023    Procedure: RADICAL ORCHIECTOMY INGUINAL; EXCISION CORD LIPOMA;  Surgeon: Ky Del Toro MD;  [facility];  Service: Urology   • SKIN BIOPSY     • TUNNELED VENOUS PORT PLACEMENT N/A 01/23/2023    Procedure: INSERTION VENOUS PORT (PORT-A-CATH);  Surgeon: Wilber Kasper MD;  [facility];  Service: Surgical Oncology       Past Family History  Family History   Problem Relation Age of Onset   • Cancer Mother         Breast Cancer   • Breast cancer Mother 50 - 59   • Breast cancer additional onset Mother    • Squamous cell carcinoma Father     • Basal cell carcinoma Father    • Cancer Father         Skin carcenoma   • Basal cell carcinoma Sister    • Breast cancer Sister 40 - 49   • Cancer Sister         Breast Cancer and Skin carcenoma   • Breast cancer additional onset Sister    • Basal cell carcinoma Brother    • Pancreatic cancer Maternal Aunt    • Pancreatic cancer Maternal Aunt    • Breast cancer Maternal Grandmother 32   • No Known Problems Maternal Grandfather    • No Known Problems Paternal Grandmother    • No Known Problems Paternal Grandfather    • No Known Problems Daughter        Past Social history  Social History     Socioeconomic History   • Marital status: /Civil Union     Spouse name: Not on file   • Number of children: Not on file   • Years of education: Not on file   • Highest education level: Not on file   Occupational History   • Not on file   Tobacco Use   • Smoking status: Never     Passive exposure: Past   • Smokeless tobacco: Never   • Tobacco comments:     During childhood, both parents smoked.   Vaping Use   • Vaping status: Never Used   Substance and Sexual Activity   • Alcohol use: Yes     Alcohol/week: 3.0 standard drinks of alcohol     Types: 1 Glasses of wine, 1 Cans of beer, 1 Shots of liquor per week     Comment: 1-2 weekly   • Drug use: Never   • Sexual activity: Yes     Partners: Female   Other Topics Concern   • Not on file   Social History Narrative   • Not on file     Social Determinants of Health     Financial Resource Strain: Not on file   Food Insecurity: No Food Insecurity (2/13/2023)    Hunger Vital Sign    • Worried About Running Out of Food in the Last Year: Never true    • Ran Out of Food in the Last Year: Never true   Transportation Needs: No Transportation Needs (2/13/2023)    PRAPARE - Transportation    • Lack of Transportation (Medical): No    • Lack of Transportation (Non-Medical): No   Physical Activity: Not on file   Stress: Not on file   Social Connections: Not on file   Intimate Partner  Violence: Not on file   Housing Stability: Low Risk  (2/13/2023)    Housing Stability Vital Sign    • Unable to Pay for Housing in the Last Year: No    • Number of Places Lived in the Last Year: 1    • Unstable Housing in the Last Year: No       Current Medications  Current Outpatient Medications   Medication Sig Dispense Refill   • hydrocortisone 2.5 % cream Apply as needed to face, no more than 3-4 days.   May apply to left lower leg as needed 28 g 3   • ketoconazole (NIZORAL) 2 % cream Apply topically 2 (two) times a day To face 30 g 3   • loratadine (CLARITIN) 10 mg tablet Take 10 mg by mouth if needed for allergies     • testosterone (ANDROGEL) 1.62 % TD gel pump 1 pump daily 30 actuation 3   • triamcinolone (KENALOG) 0.1 % cream Apply topically 2 (two) times a day For 4 weeks. 454 g 1   • acetaminophen (TYLENOL) 325 mg tablet Take 650 mg by mouth every 6 (six) hours as needed for mild pain (Patient not taking: Reported on 2/20/2024)     • albuterol (Ventolin HFA) 90 mcg/act inhaler Inhale 2 puffs every 6 (six) hours as needed for wheezing (Patient not taking: Reported on 5/15/2024) 18 g 5   • apixaban (Eliquis) 2.5 mg Take 1 tablet (2.5 mg total) by mouth 2 (two) times a day for 14 days (Patient not taking: Reported on 2/20/2024) 28 tablet 0   • benzonatate (TESSALON) 200 MG capsule Take 1 capsule (200 mg total) by mouth 3 (three) times a day as needed for cough (Patient not taking: Reported on 4/26/2024) 20 capsule 0   • Cholecalciferol (Vitamin D3) 1.25 MG (30120 UT) CAPS Take by mouth in the morning (Patient not taking: Reported on 2/20/2024)     • [facility] Aspirin Low Dose 81 MG EC tablet TAKE 1 TABLET (81 MG TOTAL) BY MOUTH 2 (TWO) TIMES A DAY FOR 14 DAYS (Patient not taking: Reported on 2/20/2024)     • multivitamin (THERAGRAN) TABS Take 1 tablet by mouth daily (Patient not taking: Reported on 2/22/2024)     • mupirocin (BACTROBAN) 2 % ointment Apply topically 2 (two) times a day To left lower leg for 7-10  "days (Patient not taking: Reported on 4/26/2024) 30 g 0   • naproxen (NAPROSYN) 500 mg tablet Take 1 tablet (500 mg total) by mouth 2 (two) times a day with meals (Patient not taking: Reported on 2/20/2024) 60 tablet 0   • NON FORMULARY Take 1 capsule by mouth 3 (three) times a day Nervive; s/p neuropathy from chemo (Patient not taking: Reported on 2/22/2024)     • ondansetron (ZOFRAN-ODT) 4 mg disintegrating tablet Take 1 tablet (4 mg total) by mouth every 8 (eight) hours as needed for nausea or vomiting (Patient not taking: Reported on 2/20/2024) 15 tablet 0     No current facility-administered medications for this visit.       Allergies  Allergies   Allergen Reactions   • Oyster Shell - Food Allergy Vomiting       Past Medical History, Social History, Family History, medications and allergies were reviewed.    Vitals  Vitals:    06/24/24 0822   BP: 118/78   BP Location: Right arm   Patient Position: Sitting   Cuff Size: Large   Pulse: (!) 54   SpO2: 96%   Weight: 127 kg (280 lb 9.6 oz)   Height: 6' 3\" (1.905 m)       Physical Exam  On examination he is in no acute distress.  His abdomen is soft nontender nondistended.  A left groin scar is noted.  The left testicle is absent.  The right testicle is normal.  There are no inguinal hernias.  Skin is warm.  Extremities without edema.  Neurologic is grossly intact and nonfocal.  Digital rectal examination is quite limited by body habitus.  Gait normal.  Affect normal.  Results  Lab Results   Component Value Date    PSA 0.44 03/20/2024    PSA 0.6 12/28/2022    PSA 0.6 05/07/2021     Lab Results   Component Value Date    CALCIUM 9.3 05/17/2024    K 3.9 05/17/2024    CO2 27 05/17/2024     05/17/2024    BUN 28 (H) 05/17/2024    CREATININE 1.15 05/17/2024     Lab Results   Component Value Date    WBC 5.13 05/17/2024    HGB 14.4 05/17/2024    HCT 42.0 05/17/2024    MCV 97 05/17/2024     05/17/2024       Office Urine Dip  No results found for this or any " previous visit (from the past 1 hour(s)).]

## 2024-06-24 NOTE — PROGRESS NOTES
6/24/2024    Mario Ratliff  1970  [id number]    1. Seminoma (HCC)  Assessment & Plan:  Impression: History of stage IV seminoma, status post left orchiectomy, status post 4 cycles of BEP, CT scan with CHANTEL, routine prostate cancer screening, gynecomastia left greater than right    Plan: I provided the patient with reassurance that recent tumor markers and CT scan of the chest abdomen and pelvis showed no evidence of disease recurrence.  In addition he continues to show decrease in size of the retroperitoneal lymph nodes which are now all less than 1 cm.  He is following with endocrinology for his gynecomastia and is currently on testosterone supplementation.  Other options could include surgical removal as well as low-dose radiation.  I recommend that he return in 1 year with a repeat PSA and digital rectal examination.  He will continue to follow with medical oncology for surveillance CT scans and tumor markers.       History of Present Illness  53 y.o. male with a history of stage IV seminoma.  At the time of presentation he had a supraclavicular/neck lymph node which was biopsied by Dr. Kasper and confirmed to be seminoma.  He subsequently underwent a left orchiectomy.  Ultrasound showed a hypoechoic lesion in the left testicle but no madhu tumor.  He was noted to have a 7 cm retroperitoneal mass at the time of surgery.  He ultimately received 4 cycles of BEP.  He was found to have a large cord lipoma at the time of orchiectomy.  He now follows with Dr. Vázquez.    Pathology from the orchiectomy in June 2023 was actually negative for malignancy.  He likely had a burnt out testicular tumor.  His most recent hCG and AFP are both normal.  These are from May 2024.  His LDH level is below the normal limit.    His last CT scan of the chest abdomen and pelvis performed with contrast was in May 2024 revealing scattered less prominent retroperitoneal lymph nodes all less than 1 cm.  There is left greater than right  gynecomastia noted.  There is no evidence of metastatic disease in the chest abdomen or pelvis.    He does complain of gynecomastia left greater than right.  He states that it is tender.  He has been seen by radiation oncology who has recommended against low-dose radiation therapy.  He is following with endocrinology    His most recent PSA level available for my review is 0.44 from March 2024.          AUA Symptom Score  AUA SYMPTOM SCORE      Flowsheet Row Most Recent Value   AUA SYMPTOM SCORE    How often have you had a sensation of not emptying your bladder completely after you finished urinating? 0 (P)     How often have you had to urinate again less than two hours after you finished urinating? 0 (P)     How often have you found you stopped and started again several times when you urinate? 0 (P)     How often have you found it difficult to postpone urination? 0 (P)     How often have you had a weak urinary stream? 1 (P)     How often have you had to push or strain to begin urination? 0 (P)     How many times did you most typically get up to urinate from the time you went to bed at night until the time you got up in the morning? 1 (P)     Quality of Life: If you were to spend the rest of your life with your urinary condition just the way it is now, how would you feel about that? 1 (P)     AUA SYMPTOM SCORE 2 (P)              Review of Systems    Past Medical History  Past Medical History:   Diagnosis Date   • Allergic 1/1/2000    seasonal allergies   • Cancer (HCC)     testicular cancer   • COVID 2021   • History of cancer chemotherapy    • History of transfusion    • Impacted cerumen    • Lymphadenopathy    • Obesity, unspecified    • Seasonal allergies    • Seminoma of testis (HCC)    • Testicular cancer (HCC) 01/17/2023   • Torn meniscus     right knee- surgical repair today 7/19/2021   • Wears glasses        Past Social History  Past Surgical History:   Procedure Laterality Date   • COLONOSCOPY     •  FACIAL/NECK BIOPSY Left 01/11/2023    Procedure: EXCISION OF LEFT NECK LYMPH NODE WITH LYMPHOMA PROTOCOL;  Surgeon: Wilber Kasper MD;  [facility];  Service: Surgical Oncology   • FL GUIDED CENTRAL VENOUS ACCESS DEVICE INSERTION  01/23/2023   • HEMORROIDECTOMY     • IR PORT REMOVAL     • KNEE SURGERY Right 7/19/2021   • AR ARTHRS KNE SURG W/MENISCECTOMY MED/LAT W/SHVG Right 07/19/2021    Procedure: ARTHROSCOPY KNEE, partial lateral meniscectomy;  Surgeon: Guy Vides MD;  [facility];  Service: Orthopedics   • AR ARTHRS KNE SURG W/MENISCECTOMY MED/LAT W/SHVG Left 12/05/2023    Procedure: ARTHROSCOPY KNEE, partial lateral meniscectomy;  Surgeon: Guy Vides MD;  [facility];  Service: Orthopedics   • AR ORCHIECTOMY RADICAL TUMOR INGUINAL APPROACH Left 06/16/2023    Procedure: RADICAL ORCHIECTOMY INGUINAL; EXCISION CORD LIPOMA;  Surgeon: Ky Del Toro MD;  [facility];  Service: Urology   • SKIN BIOPSY     • TUNNELED VENOUS PORT PLACEMENT N/A 01/23/2023    Procedure: INSERTION VENOUS PORT (PORT-A-CATH);  Surgeon: Wilber Kasper MD;  [facility];  Service: Surgical Oncology       Past Family History  Family History   Problem Relation Age of Onset   • Cancer Mother         Breast Cancer   • Breast cancer Mother 50 - 59   • Breast cancer additional onset Mother    • Squamous cell carcinoma Father    • Basal cell carcinoma Father    • Cancer Father         Skin carcenoma   • Basal cell carcinoma Sister    • Breast cancer Sister 40 - 49   • Cancer Sister         Breast Cancer and Skin carcenoma   • Breast cancer additional onset Sister    • Basal cell carcinoma Brother    • Pancreatic cancer Maternal Aunt    • Pancreatic cancer Maternal Aunt    • Breast cancer Maternal Grandmother 32   • No Known Problems Maternal Grandfather    • No Known Problems Paternal Grandmother    • No Known Problems Paternal Grandfather    • No Known Problems Daughter         Past Social history  Social History     Socioeconomic History   • Marital status: /Civil Union     Spouse name: Not on file   • Number of children: Not on file   • Years of education: Not on file   • Highest education level: Not on file   Occupational History   • Not on file   Tobacco Use   • Smoking status: Never     Passive exposure: Past   • Smokeless tobacco: Never   • Tobacco comments:     During childhood, both parents smoked.   Vaping Use   • Vaping status: Never Used   Substance and Sexual Activity   • Alcohol use: Yes     Alcohol/week: 3.0 standard drinks of alcohol     Types: 1 Glasses of wine, 1 Cans of beer, 1 Shots of liquor per week     Comment: 1-2 weekly   • Drug use: Never   • Sexual activity: Yes     Partners: Female   Other Topics Concern   • Not on file   Social History Narrative   • Not on file     Social Determinants of Health     Financial Resource Strain: Not on file   Food Insecurity: No Food Insecurity (2/13/2023)    Hunger Vital Sign    • Worried About Running Out of Food in the Last Year: Never true    • Ran Out of Food in the Last Year: Never true   Transportation Needs: No Transportation Needs (2/13/2023)    PRAPARE - Transportation    • Lack of Transportation (Medical): No    • Lack of Transportation (Non-Medical): No   Physical Activity: Not on file   Stress: Not on file   Social Connections: Not on file   Intimate Partner Violence: Not on file   Housing Stability: Low Risk  (2/13/2023)    Housing Stability Vital Sign    • Unable to Pay for Housing in the Last Year: No    • Number of Places Lived in the Last Year: 1    • Unstable Housing in the Last Year: No       Current Medications  Current Outpatient Medications   Medication Sig Dispense Refill   • hydrocortisone 2.5 % cream Apply as needed to face, no more than 3-4 days.   May apply to left lower leg as needed 28 g 3   • ketoconazole (NIZORAL) 2 % cream Apply topically 2 (two) times a day To face 30 g 3   • loratadine  (CLARITIN) 10 mg tablet Take 10 mg by mouth if needed for allergies     • testosterone (ANDROGEL) 1.62 % TD gel pump 1 pump daily 30 actuation 3   • triamcinolone (KENALOG) 0.1 % cream Apply topically 2 (two) times a day For 4 weeks. 454 g 1   • acetaminophen (TYLENOL) 325 mg tablet Take 650 mg by mouth every 6 (six) hours as needed for mild pain (Patient not taking: Reported on 2/20/2024)     • albuterol (Ventolin HFA) 90 mcg/act inhaler Inhale 2 puffs every 6 (six) hours as needed for wheezing (Patient not taking: Reported on 5/15/2024) 18 g 5   • apixaban (Eliquis) 2.5 mg Take 1 tablet (2.5 mg total) by mouth 2 (two) times a day for 14 days (Patient not taking: Reported on 2/20/2024) 28 tablet 0   • benzonatate (TESSALON) 200 MG capsule Take 1 capsule (200 mg total) by mouth 3 (three) times a day as needed for cough (Patient not taking: Reported on 4/26/2024) 20 capsule 0   • Cholecalciferol (Vitamin D3) 1.25 MG (07253 UT) CAPS Take by mouth in the morning (Patient not taking: Reported on 2/20/2024)     • [facility] Aspirin Low Dose 81 MG EC tablet TAKE 1 TABLET (81 MG TOTAL) BY MOUTH 2 (TWO) TIMES A DAY FOR 14 DAYS (Patient not taking: Reported on 2/20/2024)     • multivitamin (THERAGRAN) TABS Take 1 tablet by mouth daily (Patient not taking: Reported on 2/22/2024)     • mupirocin (BACTROBAN) 2 % ointment Apply topically 2 (two) times a day To left lower leg for 7-10 days (Patient not taking: Reported on 4/26/2024) 30 g 0   • naproxen (NAPROSYN) 500 mg tablet Take 1 tablet (500 mg total) by mouth 2 (two) times a day with meals (Patient not taking: Reported on 2/20/2024) 60 tablet 0   • NON FORMULARY Take 1 capsule by mouth 3 (three) times a day Nervive; s/p neuropathy from chemo (Patient not taking: Reported on 2/22/2024)     • ondansetron (ZOFRAN-ODT) 4 mg disintegrating tablet Take 1 tablet (4 mg total) by mouth every 8 (eight) hours as needed for nausea or vomiting (Patient not taking: Reported on 2/20/2024) 15  "tablet 0     No current facility-administered medications for this visit.       Allergies  Allergies   Allergen Reactions   • Oyster Shell - Food Allergy Vomiting       Past Medical History, Social History, Family History, medications and allergies were reviewed.    Vitals  Vitals:    06/24/24 0822   BP: 118/78   BP Location: Right arm   Patient Position: Sitting   Cuff Size: Large   Pulse: (!) 54   SpO2: 96%   Weight: 127 kg (280 lb 9.6 oz)   Height: 6' 3\" (1.905 m)       Physical Exam  On examination he is in no acute distress.  His abdomen is soft nontender nondistended.  A left groin scar is noted.  The left testicle is absent.  The right testicle is normal.  There are no inguinal hernias.  Skin is warm.  Extremities without edema.  Neurologic is grossly intact and nonfocal.  Digital rectal examination is quite limited by body habitus.  Gait normal.  Affect normal.  Results  Lab Results   Component Value Date    PSA 0.44 03/20/2024    PSA 0.6 12/28/2022    PSA 0.6 05/07/2021     Lab Results   Component Value Date    CALCIUM 9.3 05/17/2024    K 3.9 05/17/2024    CO2 27 05/17/2024     05/17/2024    BUN 28 (H) 05/17/2024    CREATININE 1.15 05/17/2024     Lab Results   Component Value Date    WBC 5.13 05/17/2024    HGB 14.4 05/17/2024    HCT 42.0 05/17/2024    MCV 97 05/17/2024     05/17/2024       Office Urine Dip  No results found for this or any previous visit (from the past 1 hour(s)).]  "

## 2024-07-13 ENCOUNTER — APPOINTMENT (OUTPATIENT)
Dept: LAB | Facility: HOSPITAL | Age: 54
End: 2024-07-13
Payer: COMMERCIAL

## 2024-07-13 DIAGNOSIS — E29.1 PRIMARY HYPOGONADISM IN MALE: ICD-10-CM

## 2024-07-13 LAB
ALBUMIN SERPL BCG-MCNC: 4.3 G/DL (ref 3.5–5)
ALP SERPL-CCNC: 81 U/L (ref 34–104)
ALT SERPL W P-5'-P-CCNC: 23 U/L (ref 7–52)
ANION GAP SERPL CALCULATED.3IONS-SCNC: 5 MMOL/L (ref 4–13)
AST SERPL W P-5'-P-CCNC: 23 U/L (ref 13–39)
BILIRUB SERPL-MCNC: 0.62 MG/DL (ref 0.2–1)
BUN SERPL-MCNC: 28 MG/DL (ref 5–25)
CALCIUM SERPL-MCNC: 9.2 MG/DL (ref 8.4–10.2)
CHLORIDE SERPL-SCNC: 101 MMOL/L (ref 96–108)
CO2 SERPL-SCNC: 28 MMOL/L (ref 21–32)
CREAT SERPL-MCNC: 1.15 MG/DL (ref 0.6–1.3)
ERYTHROCYTE [DISTWIDTH] IN BLOOD BY AUTOMATED COUNT: 12.4 % (ref 11.6–15.1)
GFR SERPL CREATININE-BSD FRML MDRD: 71 ML/MIN/1.73SQ M
GLUCOSE P FAST SERPL-MCNC: 93 MG/DL (ref 65–99)
HCT VFR BLD AUTO: 44.1 % (ref 36.5–49.3)
HGB BLD-MCNC: 15.1 G/DL (ref 12–17)
MCH RBC QN AUTO: 33.4 PG (ref 26.8–34.3)
MCHC RBC AUTO-ENTMCNC: 34.2 G/DL (ref 31.4–37.4)
MCV RBC AUTO: 98 FL (ref 82–98)
PLATELET # BLD AUTO: 177 THOUSANDS/UL (ref 149–390)
PMV BLD AUTO: 9.1 FL (ref 8.9–12.7)
POTASSIUM SERPL-SCNC: 4.1 MMOL/L (ref 3.5–5.3)
PROT SERPL-MCNC: 7.4 G/DL (ref 6.4–8.4)
PSA SERPL-MCNC: 0.72 NG/ML (ref 0–4)
RBC # BLD AUTO: 4.52 MILLION/UL (ref 3.88–5.62)
SODIUM SERPL-SCNC: 134 MMOL/L (ref 135–147)
WBC # BLD AUTO: 5.76 THOUSAND/UL (ref 4.31–10.16)

## 2024-07-13 PROCEDURE — 84402 ASSAY OF FREE TESTOSTERONE: CPT

## 2024-07-13 PROCEDURE — 36415 COLL VENOUS BLD VENIPUNCTURE: CPT

## 2024-07-13 PROCEDURE — 84403 ASSAY OF TOTAL TESTOSTERONE: CPT

## 2024-07-13 PROCEDURE — 80053 COMPREHEN METABOLIC PANEL: CPT

## 2024-07-13 PROCEDURE — 85027 COMPLETE CBC AUTOMATED: CPT

## 2024-07-13 PROCEDURE — G0103 PSA SCREENING: HCPCS

## 2024-07-15 LAB
TESTOST FREE SERPL-MCNC: 2.8 PG/ML (ref 7.2–24)
TESTOST SERPL-MCNC: 334 NG/DL (ref 264–916)

## 2024-08-27 NOTE — PROGRESS NOTES
Hematology/Oncology Outpatient Office Note    Date of Service: 2024    [facility] [place]  [address] [place] [address]  [phone]    Reason for Consultation:   Chief Complaint   Patient presents with    Follow-up       Cancer Stage at diagnosis: IIIC    Referral Physician: No ref. provider found    Primary Care Physician:  Maurice Muñoz DO     Nickname: Mario    Spouse: Candis Berry ECO    Today's ECO     Goals and Barriers:  Current Goal: Minimize effects of disease burden, extend life.   Barriers to accomplishing this: None    Patient's Capacity to Self Care:  Patient is able to self care    Code Status: not addressed today    Advanced directives: not addressed today    ASSESSMENT & PLAN      Diagnosis ICD-10-CM Associated Orders   1. Seminoma (HCC)  C62.90             This is a 53 y.o. 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    Baseline PFT: WNL    C3D15 bleomycin omitted due to severe pancytopenia requiring 2U PRBC.    2023  tumor board: get PET/CT, if any growth, would do dissection, Otherwise, start surveillance thereafter  2023:  tumor board recommendation to pursue surveillance    5/3/2023: , HCG <1 WNL, AFP slightly high for first time at 8.1    Variant 1  NTHL1  c.787C>T (p.R263C); heterozygous; uncertain significance   Variant 2  SDHC c.15G>T (p.L5F); heterozygous; uncertain sig    Discussion of decision making  Oncology history updated, accordingly, during this visit  Goals of care/patient communication  I discussed with the patient the clinical course leading up to their cancer diagnosis. I reviewed relevant office notes, imaging reports and pathology result as well.  I told the patient that this is a case of potentially curable  disease and what this means. We discussed that the goal of anti-cancer therapy is to provide best quality of life, extend overall survival, and progression free survival as shown in clinical trials. We also discussed that there might be a point when the cancer will no longer respond to this anti-neoplastic therapy.   I explained the risks/benefits of the proposed cancer therapy: Bleomycin 30 units IV, Etoposide 100mg/m2, cisplatin 20mg/m2, and after discussion including understanding risks of possible life-threatening complications and therapy-related malignancy development, informed consent for blood products and treatment has been signed and obtained.  TNM/Staging At Diagnosis  [id number] S2  Cancer Staging   IIIC, poor risk (non pulmonary visceral mets)  Disease Features/Tumor Markers/Genetics  Tumor Marker: 1/18/2023:  (3x ULN), AFP (5.2, WNL), HCG (13, slightly high)  7/28/2023: HCG <1, , AFP 4.79  10/5/2023: HCG <1, , AFP 4.51  11/30/2023 HCG< 1, AFP 4.47  1/24/2024: HCG <1, , AFP 4.64  9/4/2024 markers WNL  Notable Path Features:   1/11/2023: Lymph node, left neck, excision: Germ cell tumor, compatible with metastatic seminoma. Background nonnecrotizing granulomatous inflammation.  Treatment: s/p BEP  Other Supportive care:  Treatment Team Members  Surgeon: Dr. Del Toro  Rad Onc  Palliative  Labs: 12/28/2022: creat 0.96, T bili 0.63, WBC 6.49k, Hgb 15.9, plt 244k  1/2024: testosterone 209, estrogen 91 WNL  3/20/2024: HCG <1, LDH WNL, AFP 4.85, CXR WNL  Diagnostics   1/3/2023 CT soft tissue neck: Moderate left-sided adenopathy primarily within the left neck, posterior jugular chain levels 3 through 5 with a prominent node present in the upper mediastinum  1/3/2023 CT Chest w/c: Left supraclavicular, superior mediastinal, posterior mediastinal (15mm)/retrocrural (2.2 cm)and suspected partially imaged left abdominal adenopathy  1/4/2023: CT Abd/pelv w/c: Bulky retroperitoneal  lymphadenopathy consistent with lymphoma (left para-aortic region extending into the left upper quadrant measures 8.2 x 10.9 cm.  Aortocaval lymphadenopathy measures 3.7 x 5.2 cm.  More inferior left para-aortic lymphadenopathy at the bifurcation measures 5.0 x 5.9 cm.)  1/18/2023: Testicular U/S shows a left testicular lesion (1.5 x 1.7 x 0.9 cm) and the RP adenopathy is more to the Left of the aorta  4/19/2023 CT Chest w/c: No evidence of acute intrathoracic pathology  5/1/2023 CT CAP w/c: Interval marked improvement of retroperitoneal adenopathy.   Mild splenomegaly. Image 93, retrocaval, 9 mm previously 3 cm. Image 96, left para-aortic, 2 cm previously 7.5 cm  6/7/2023 PET/CT: essential CR,  L inguina LN SUV 5 (reduced)  7/28/2023: CXR: No acute cardiopulmonary disease  10/5/2023 CT Abd/pelv w/c: Retroperitoneal lymphadenopathy with index lymph nodes demonstrating continued interval improvement. Splenomegaly  index retroperitoneal/left periaortic lymph nodes largest measuring 14 mm in short axis, previously measured 25 mm in short axis. Retrocaval lymph node measuring 9 x 12 mm, previously measured 13 x 19 mm  10/6/2023: CXR: WNL  11/30/2023 CXR: No acute cardiopulmonary disease  12/13/2023 US L Breast: benign  There is gynecomastia seen in both breasts, minimal on the right and mild on the left. The gynecomastia correlates with the palpable mass and tenderness reported by the patient.  There is a questioned intramammary lymph node in the outer left breast.  Ultrasound breast left limited diagnostic: Targeted ultrasound of the retroareolar left breast was performed for further evaluation of the painful palpable area of concern.  Fibroglandular tissue is visualized in the retroareolar left breast, consistent with mild gynecomastia.  At 2:00 5 cm from the nipple within the left breast, there is a benign-appearing intramammary lymph node  1/24/2024 CXR: No acute cardiopulmonary disease. No significant  change  2/5/2024 CT CAP w/c: Stable retroperitoneal lymphadenopathy. No new suspicious findings  5/17/2024 CT CAP w/c: Progressive marked gynecomastia, left much greater than right.  Scattered retroperitoneal lymph nodes are slightly less prominent than on prior exam and all subcentimeter short axis.. Largest node previously measured 1.3 cm short axis and now measures 1 cm short axis  5/28/2024 TTE LVEF 65-69%  9/4/2024 CXR: WNL    Discussion of decision making    I personally reviewed the following lab results, the image studies, pathology, other specialty/physicians consult notes and recommendations, and outside medical records from [facility]. I had a lengthy discussion with the patient and shared the work-up findings. We discussed the diagnosis and management plan as below. I spent 32 minutes reviewing the records (labs, clinician notes, outside records, medical history, ordering medicine/tests/procedures, interpreting the imaging/labs previously done) and coordination of care as well as direct time with the patient today, of which greater than 50% of the time was spent in counseling and coordination of care with the patient/family.      Plan/Labs  F/u Urology for post-orchiectomy urologic surveillance  Cont surveillance with CT Abd/pelv w/c q 4 months (11/2024), CXR (9/2024)  CMP, AFP, LDH, HCG ordered q12 weeks standing until 5/2025   Rad Onc f/u to consider prn symptomatic RT to his mammary glands (not indicated at this time): seeing endocrine for this as well, will pursue resection if worsening    Surveillance for clinical stage III seminoma includes history and physical every 2 months for the first year following completion of chemotherapy which would be until April 2024 along with every 4-month CT scan of the abdomen, pelvis along with every 2-month chest x-ray. (until 6/2024)    Year 2 (starting 7/2024) surveillance consists of every 3-month history and physical and serology markers, every 6-month CT abdomen,  pelvis, and chest x-ray every 3 months.    Years 3-4 surveillance consists of history and physical and markers every 6 months, annual chest x-ray and CT scan  Year 5 surveillance consists of annual chest x-ray and history and physical and markers with as needed CT scan      Follow Up: q2 months with H&P, serology markers scheduled thru 12/9/2024    All questions were answered to the patient's satisfaction during this encounter. The patient knows the contact information for our office and knows to reach out for any relevant concerns related to this encounter. They are to call for any temperature 100.4 or higher, new symptoms including but not restricted to shaking chills, decreased appetite, nausea, vomiting, diarrhea, increased fatigue, shortness of breath or chest pain, confusion, and not feeling the strength to come to the clinic. For all other listed problems and medical diagnosis in their chart - they are managed by PCP and/or other specialists, which the patient acknowledges. Thank you very much for your consultation and making us a part of this patient's care. We are continuing to follow closely with you. Please do not hesitate to reach out to me with any additional questions or concerns.    Nancy Vázquez MD  Hematology & Medical Oncology Staff Physician             Disclaimer: This document was prepared using [employer] technology. If a word or phrase is confusing, or does not make sense, this is likely due to recognition error which was not discovered during this clinician's review. If you believe an error has occurred, please contact me through [facility] service line for rashida?cation.      ONCOLOGY HISTORY OF PRESENT ILLNESS        Oncology History   Seminoma (HCC)   1/11/2023 -  Cancer Staged    Staging form: Testis, AJCC 8th Edition  - Clinical stage from 1/11/2023: cT4, cN3, pM1a, S2 - Signed by Nancy Vázquez MD on 1/20/2023  Stage prefix: Initial diagnosis  Lactate dehydrogenase  (LDH) (U/L): 699  Human chorionic gonadotropin (hCG) (mIU/mL): 13  Alpha fetoprotein (AFP) (ng/mL): 5.2  Laterality: Left  Sites of metastasis: Distant lymph nodes, NOS, Retroperitoneal lymph node, NOS  Source of metastatic specimen: Supraclavicular Lymph Nodes  Staged by: Managing physician       1/19/2023 Initial Diagnosis    Seminoma (HCC)     1/30/2023 - 4/17/2023 Chemotherapy    pegfilgrastim (NEULASTA ONPRO), 6 mg, Subcutaneous, Once, 4 of 4 cycles  Administration: 6 mg (2/6/2023), 6 mg (2/27/2023), 6 mg (3/20/2023), 6 mg (3/6/2023), 6 mg (4/17/2023), 6 mg (4/10/2023)  bleomycin (BLENOXANE) IVPB, 30 Units, Intravenous, Once, 4 of 4 cycles  Administration: 30 Units (1/30/2023), 30 Units (2/6/2023), 30 Units (2/13/2023), 30 Units (2/20/2023), 30 Units (2/27/2023), 30 Units (3/6/2023), 30 Units (3/13/2023), 30 Units (3/20/2023), 30 Units (4/3/2023), 30 Units (4/10/2023), 30 Units (4/17/2023)  CISplatin (PLATINOL) infusion, 20 mg/m2 = 52.2 mg, Intravenous, Once, 4 of 4 cycles  Administration: 52.2 mg (1/30/2023), 52.2 mg (1/31/2023), 52.2 mg (2/1/2023), 52.2 mg (2/2/2023), 52.2 mg (2/3/2023), 52.2 mg (2/20/2023), 52.2 mg (2/21/2023), 52.2 mg (2/22/2023), 52.2 mg (2/23/2023), 52.2 mg (2/24/2023), 52.2 mg (3/13/2023), 52.2 mg (3/14/2023), 52.2 mg (3/15/2023), 52.2 mg (3/16/2023), 52.2 mg (3/17/2023), 52.2 mg (4/3/2023), 52.2 mg (4/4/2023), 52.2 mg (4/5/2023), 52.2 mg (4/6/2023), 52.2 mg (4/7/2023)  fosaprepitant (EMEND) IVPB, 150 mg, Intravenous, Once, 4 of 4 cycles  Administration: 150 mg (1/30/2023), 150 mg (2/20/2023), 150 mg (3/13/2023), 150 mg (4/3/2023), 150 mg (2/24/2023), 150 mg (4/10/2023)       Lots of IV Mag and several PRBC required during chemo  C3D15 deferred due to plt <10k, Hgb <8    SUBJECTIVE  (INTERVAL HISTORY)      Clotting History None   Bleeding History None   Cancer History Seminoma   Family Cancer History Mom/sister (breast), same sister (melanoma), father (basal cell skin cancer)   H/O  Blood/Plt Transfusion None   Tobacco/etoh/drug abuse No nicotine use, etoh abuse, rec drug use       Cancer Screening history C-scope (2019)   Occupation  at [employer]       Interval events: having ongoing increased tenderness of the L Breast (since Thanksgiving 2023) along with some pain, improvement on topical testosterone gel. Otherwise doing much better, doing projects, but mild neuropathy in the fingertips, toes that is persisting (odd feeling in the balls of his feet and tips of his toes). Not dropping items, gripping is better    Has noticed more of abnormal sense of smell of fresh cut grass and cologne.    I have reviewed the relevant past medical, surgical, social and family history. I have also reviewed allergies and medications for this patent.    Review of Systems    Baseline weight: 300 lbs    Has had intermittent lower back, thigh rash since summer of 2022.     Denies F/C, V, SOB, CP, LH, HA, itching, gen weakness, melena, hematuria, hematochezia, falls, diarrhea, or constipation       A 10-point review of system was performed, pertinent positive and negative were detailed as above. Otherwise, the 10-point review of system was negative.      Past Medical History:   Diagnosis Date    Allergic 1/1/2000    seasonal allergies    Cancer (HCC)     testicular cancer    COVID 2021    History of cancer chemotherapy     History of transfusion     Impacted cerumen     Lymphadenopathy     Obesity, unspecified     Seasonal allergies     Seminoma of testis (HCC)     Testicular cancer (HCC) 01/17/2023    Torn meniscus     right knee- surgical repair today 7/19/2021    Wears glasses        Past Surgical History:   Procedure Laterality Date    COLONOSCOPY      FACIAL/NECK BIOPSY Left 01/11/2023    Procedure: EXCISION OF LEFT NECK LYMPH NODE WITH LYMPHOMA PROTOCOL;  Surgeon: Wilber Kasper MD;  Location: [facility];  Service: Surgical Oncology    FL GUIDED CENTRAL VENOUS ACCESS DEVICE INSERTION  01/23/2023     HEMORROIDECTOMY      IR PORT REMOVAL      KNEE SURGERY Right 7/19/2021    MI ARTHRS KNE SURG W/MENISCECTOMY MED/LAT W/SHVG Right 07/19/2021    Procedure: ARTHROSCOPY KNEE, partial lateral meniscectomy;  Surgeon: Guy Vides MD;  Location[facility];  Service: Orthopedics    MI ARTHRS KNE SURG W/MENISCECTOMY MED/LAT W/SHVG Left 12/05/2023    Procedure: ARTHROSCOPY KNEE, partial lateral meniscectomy;  Surgeon: Guy Vides MD;  [facility];  Service: Orthopedics    MI ORCHIECTOMY RADICAL TUMOR INGUINAL APPROACH Left 06/16/2023    Procedure: RADICAL ORCHIECTOMY INGUINAL; EXCISION CORD LIPOMA;  Surgeon: Ky Del Toro MD;  [facility];  Service: Urology    SKIN BIOPSY      TUNNELED VENOUS PORT PLACEMENT N/A 01/23/2023    Procedure: INSERTION VENOUS PORT (PORT-A-CATH);  Surgeon: Wilber Kasper MD;  [facility];  Service: Surgical Oncology       Family History   Problem Relation Age of Onset    Cancer Mother         Breast Cancer    Breast cancer Mother 50 - 59    Breast cancer additional onset Mother     Squamous cell carcinoma Father     Basal cell carcinoma Father     Cancer Father         Skin carcenoma    Basal cell carcinoma Sister     Breast cancer Sister 40 - 49    Cancer Sister         Breast Cancer and Skin carcenoma    Breast cancer additional onset Sister     Basal cell carcinoma Brother     Pancreatic cancer Maternal Aunt     Pancreatic cancer Maternal Aunt     Breast cancer Maternal Grandmother 32    No Known Problems Maternal Grandfather     No Known Problems Paternal Grandmother     No Known Problems Paternal Grandfather     No Known Problems Daughter        Social History     Socioeconomic History    Marital status: /Civil Union     Spouse name: Not on file    Number of children: Not on file    Years of education: Not on file    Highest education level: Not on file   Occupational History    Not on file   Tobacco Use    Smoking status:  Never     Passive exposure: Past    Smokeless tobacco: Never    Tobacco comments:     During childhood, both parents smoked.   Vaping Use    Vaping status: Never Used   Substance and Sexual Activity    Alcohol use: Yes     Alcohol/week: 3.0 standard drinks of alcohol     Types: 1 Glasses of wine, 1 Cans of beer, 1 Shots of liquor per week     Comment: 1-2 weekly    Drug use: Never    Sexual activity: Yes     Partners: Female   Other Topics Concern    Not on file   Social History Narrative    Not on file     Social Determinants of Health     Financial Resource Strain: Not on file   Food Insecurity: No Food Insecurity (2/13/2023)    Hunger Vital Sign     Worried About Running Out of Food in the Last Year: Never true     Ran Out of Food in the Last Year: Never true   Transportation Needs: No Transportation Needs (2/13/2023)    PRAPARE - Transportation     Lack of Transportation (Medical): No     Lack of Transportation (Non-Medical): No   Physical Activity: Not on file   Stress: Not on file   Social Connections: Not on file   Intimate Partner Violence: Not on file   Housing Stability: Low Risk  (2/13/2023)    Housing Stability Vital Sign     Unable to Pay for Housing in the Last Year: No     Number of Places Lived in the Last Year: 1     Unstable Housing in the Last Year: No       Allergies   Allergen Reactions    Oyster Shell - Food Allergy Vomiting       Current Outpatient Medications   Medication Sig Dispense Refill    hydrocortisone 2.5 % cream Apply as needed to face, no more than 3-4 days.   May apply to left lower leg as needed 28 g 3    ketoconazole (NIZORAL) 2 % cream Apply topically 2 (two) times a day To face 30 g 3    loratadine (CLARITIN) 10 mg tablet Take 10 mg by mouth if needed for allergies      testosterone (ANDROGEL) 1.62 % TD gel pump 1 pump daily 30 actuation 3    triamcinolone (KENALOG) 0.1 % cream Apply topically 2 (two) times a day For 4 weeks. 454 g 1    acetaminophen (TYLENOL) 325 mg tablet  Take 650 mg by mouth every 6 (six) hours as needed for mild pain (Patient not taking: Reported on 2/20/2024)      albuterol (Ventolin HFA) 90 mcg/act inhaler Inhale 2 puffs every 6 (six) hours as needed for wheezing (Patient not taking: Reported on 5/15/2024) 18 g 5    apixaban (Eliquis) 2.5 mg Take 1 tablet (2.5 mg total) by mouth 2 (two) times a day for 14 days (Patient not taking: Reported on 2/20/2024) 28 tablet 0    benzonatate (TESSALON) 200 MG capsule Take 1 capsule (200 mg total) by mouth 3 (three) times a day as needed for cough (Patient not taking: Reported on 4/26/2024) 20 capsule 0    Cholecalciferol (Vitamin D3) 1.25 MG (36555 UT) CAPS Take by mouth in the morning (Patient not taking: Reported on 2/20/2024)      [employer] Aspirin Low Dose 81 MG EC tablet TAKE 1 TABLET (81 MG TOTAL) BY MOUTH 2 (TWO) TIMES A DAY FOR 14 DAYS (Patient not taking: Reported on 2/20/2024)      multivitamin (THERAGRAN) TABS Take 1 tablet by mouth daily (Patient not taking: Reported on 2/22/2024)      mupirocin (BACTROBAN) 2 % ointment Apply topically 2 (two) times a day To left lower leg for 7-10 days (Patient not taking: Reported on 4/26/2024) 30 g 0    naproxen (NAPROSYN) 500 mg tablet Take 1 tablet (500 mg total) by mouth 2 (two) times a day with meals (Patient not taking: Reported on 2/20/2024) 60 tablet 0    NON FORMULARY Take 1 capsule by mouth 3 (three) times a day Nervive; s/p neuropathy from chemo (Patient not taking: Reported on 2/22/2024)      ondansetron (ZOFRAN-ODT) 4 mg disintegrating tablet Take 1 tablet (4 mg total) by mouth every 8 (eight) hours as needed for nausea or vomiting (Patient not taking: Reported on 2/20/2024) 15 tablet 0     No current facility-administered medications for this visit.       (Not in a hospital admission)      Objective:     24 Hour Vitals Assessment:     Vitals:    09/06/24 0808   BP: 116/70   Pulse: 58   Resp: 18   Temp: 97.8 °F (36.6 °C)   SpO2: 98%       Weight at last visit: 281  lbs    Weight today: 268 lbs    PHYSICIAN EXAM    General: Appearance: alert, cooperative, no distress.  HEENT: Normocephalic, atraumatic. No scleral icterus. conjunctivae clear. EOMI.  Chest: No tenderness to palpation. No open wound noted. L gynecomastia noted  Lungs: Clear to auscultation bilaterally, Respirations unlabored.  Cardiac: Regular rate and rhythm, +S1and S2  Abdomen: Soft, non-tender, non-distended. Bowel sounds are normal.   : b/l testicular exam WNL  L breast exam: nodule or two  Extremities:  No edema, cyanosis, clubbing.  Skin: Skin color, turgor are normal. No rashes.  Lymphatics: no palpable axillary, or inguinal adenopathy  L supra-clavicular LN palpated with incision c/d/i    Port:  c/d/i    Neurologic: Awake, Alert, and oriented, no gross focal deficits noted b/l.       DATA REVIEW:    Pathology Result:    Final Diagnosis   Date Value Ref Range Status   05/15/2024   Final    A. Skin, mid back, excision:    EPIDERMAL INCLUSION CYST.          02/22/2024   Final    A. Skin, right upper eyelid:    SEBORRHEIC KERATOSIS, IRRITATED AND INFLAMED.          06/16/2023   Final    A.  Left testis (radical orchiectomy):     - Negative for carcinoma.      - Testicular parenchyma with extensive fibrosis. See comment.     Comment:  The history of metastatic seminoma involving a left neck lymph node ([id number]) status post chemotherapy is noted.      B.  Left spermatic cord lipoma (excision):     - Mature fibroadipose tissue, compatible with lipoma.     - One (1) lymph node, negative for carcinoma.    C.  Left spermatic cord (resection):     - Benign spermatic cord.     01/11/2023   Final    A.  Lymph node, left neck, excision:  -Germ cell tumor, compatible with metastatic seminoma (see note).  -Background nonnecrotizing granulomatous inflammation (see note).       Comment:     This is an appended report. These results have been appended to a previously preliminary verified report.   10/25/2019   Final    A.  "Cecum, cold biopsy:  - Portion of benign colonic mucosa with prominent reactive lymphoid aggregate.     B. Ascending colon, cold snare:  - Tubular adenoma.  - Negative for high grade dysplasia/ carcinoma.    C. Colon, splenic flexure, cold snare:  - Tubular adenoma.  - Negative for high grade dysplasia/ carcinoma.            Image Results:   Image result are reviewed and documented in Hematology/Oncology history    XR chest pa & lateral  Narrative: CHEST    INDICATION:   Malignant neoplasm of unspecified testis, unspecified whether descended or undescended.     COMPARISON:  None.    EXAM PERFORMED/VIEWS:  XR CHEST PA AND LATERAL    FINDINGS:    Cardiomediastinal silhouette appears unremarkable.    The lungs are clear.  No pneumothorax or pleural effusion.    Osseous structures appear within normal limits for patient age.  Impression: No acute cardiopulmonary disease.  No significant change from 3/20/2024    Electronically signed: 09/04/2024 10:02 AM Sharad Ochoa MD      LABS:  Lab data are reviewed and documented in HemOnc history.       Lab Results   Component Value Date    HGB 15.1 07/13/2024    HCT 44.1 07/13/2024    MCV 98 07/13/2024     07/13/2024    WBC 5.76 07/13/2024    NRBC 0 05/17/2024    BANDSPCT 1 04/26/2023    ATYLMPCT 2 (H) 04/21/2023     Lab Results   Component Value Date    K 3.7 09/04/2024     09/04/2024    CO2 25 09/04/2024    BUN 18 09/04/2024    CREATININE 0.95 09/04/2024    GLUF 95 09/04/2024    CALCIUM 8.9 09/04/2024    CORRECTEDCA 9.0 03/31/2023    AST 16 09/04/2024    ALT 14 09/04/2024    ALKPHOS 66 09/04/2024    EGFR 89 09/04/2024       No results found for: \"IRON\", \"TIBC\", \"FERRITIN\"    Lab Results   Component Value Date    NSNABIAI54 321 10/05/2023       No results for input(s): \"WBC\", \"CREAT\", \"PLT\" in the last 72 hours.    By:  Nancy Vázquez MD, 9/6/2024, 8:23 AM                                  "

## 2024-09-03 DIAGNOSIS — N62 GYNECOMASTIA: Primary | ICD-10-CM

## 2024-09-04 ENCOUNTER — HOSPITAL ENCOUNTER (OUTPATIENT)
Dept: RADIOLOGY | Facility: HOSPITAL | Age: 54
Discharge: HOME | End: 2024-09-04
Payer: COMMERCIAL

## 2024-09-04 ENCOUNTER — APPOINTMENT (OUTPATIENT)
Dept: LAB | Facility: HOSPITAL | Age: 54
End: 2024-09-04
Payer: COMMERCIAL

## 2024-09-04 DIAGNOSIS — C62.90 SEMINOMA (HCC): ICD-10-CM

## 2024-09-04 LAB
AFP-TM SERPL-MCNC: 4.08 NG/ML (ref 0–9)
ALBUMIN SERPL BCG-MCNC: 4.1 G/DL (ref 3.5–5)
ALP SERPL-CCNC: 66 U/L (ref 34–104)
ALT SERPL W P-5'-P-CCNC: 14 U/L (ref 7–52)
ANION GAP SERPL CALCULATED.3IONS-SCNC: 7 MMOL/L (ref 4–13)
AST SERPL W P-5'-P-CCNC: 16 U/L (ref 13–39)
BILIRUB SERPL-MCNC: 0.6 MG/DL (ref 0.2–1)
BUN SERPL-MCNC: 18 MG/DL (ref 5–25)
CALCIUM SERPL-MCNC: 8.9 MG/DL (ref 8.4–10.2)
CHLORIDE SERPL-SCNC: 104 MMOL/L (ref 96–108)
CO2 SERPL-SCNC: 25 MMOL/L (ref 21–32)
CREAT SERPL-MCNC: 0.95 MG/DL (ref 0.6–1.3)
GFR SERPL CREATININE-BSD FRML MDRD: 89 ML/MIN/1.73SQ M
GLUCOSE P FAST SERPL-MCNC: 95 MG/DL (ref 65–99)
HCG-TM SERPL-SCNC: 0.9 MLU/ML
LDH SERPL-CCNC: 138 U/L (ref 140–271)
POTASSIUM SERPL-SCNC: 3.7 MMOL/L (ref 3.5–5.3)
PROT SERPL-MCNC: 6.9 G/DL (ref 6.4–8.4)
SODIUM SERPL-SCNC: 136 MMOL/L (ref 135–147)

## 2024-09-04 PROCEDURE — 36415 COLL VENOUS BLD VENIPUNCTURE: CPT

## 2024-09-04 PROCEDURE — 82105 ALPHA-FETOPROTEIN SERUM: CPT

## 2024-09-04 PROCEDURE — 83615 LACTATE (LD) (LDH) ENZYME: CPT

## 2024-09-04 PROCEDURE — 80053 COMPREHEN METABOLIC PANEL: CPT

## 2024-09-04 PROCEDURE — 71046 X-RAY EXAM CHEST 2 VIEWS: CPT

## 2024-09-04 PROCEDURE — 84702 CHORIONIC GONADOTROPIN TEST: CPT

## 2024-09-06 ENCOUNTER — OFFICE VISIT (OUTPATIENT)
Dept: HEMATOLOGY ONCOLOGY | Facility: CLINIC | Age: 54
End: 2024-09-06
Payer: COMMERCIAL

## 2024-09-06 VITALS
TEMPERATURE: 97.8 F | SYSTOLIC BLOOD PRESSURE: 116 MMHG | DIASTOLIC BLOOD PRESSURE: 70 MMHG | BODY MASS INDEX: 33.32 KG/M2 | HEIGHT: 75 IN | WEIGHT: 268 LBS | HEART RATE: 58 BPM | RESPIRATION RATE: 18 BRPM | OXYGEN SATURATION: 98 %

## 2024-09-06 DIAGNOSIS — C62.90 SEMINOMA (HCC): Primary | ICD-10-CM

## 2024-09-06 PROCEDURE — 99214 OFFICE O/P EST MOD 30 MIN: CPT | Performed by: INTERNAL MEDICINE

## 2024-09-23 ENCOUNTER — TELEPHONE (OUTPATIENT)
Age: 54
End: 2024-09-23

## 2024-09-23 NOTE — TELEPHONE ENCOUNTER
Patient's wife called, patient is pursuing surgical intervention for gynecomastia. For insurance purposes, Dr. Reginald Deluna's office is requesting short concise letter stating urology agrees it is medically necessary to move forward with surgery. Hem Onc has also submitted a letter stating as such which can be found under media.     Fax# [phone]

## 2024-09-25 ENCOUNTER — OFFICE VISIT (OUTPATIENT)
Dept: FAMILY MEDICINE CLINIC | Facility: CLINIC | Age: 54
End: 2024-09-25
Payer: COMMERCIAL

## 2024-09-25 VITALS
SYSTOLIC BLOOD PRESSURE: 122 MMHG | HEIGHT: 75 IN | BODY MASS INDEX: 33.1 KG/M2 | TEMPERATURE: 97.2 F | OXYGEN SATURATION: 97 % | WEIGHT: 266.2 LBS | HEART RATE: 53 BPM | DIASTOLIC BLOOD PRESSURE: 80 MMHG

## 2024-09-25 DIAGNOSIS — D70.1 CHEMOTHERAPY-INDUCED NEUTROPENIA (HCC): ICD-10-CM

## 2024-09-25 DIAGNOSIS — T45.1X5A CHEMOTHERAPY-INDUCED NEUTROPENIA (HCC): ICD-10-CM

## 2024-09-25 DIAGNOSIS — Z12.11 COLON CANCER SCREENING: ICD-10-CM

## 2024-09-25 DIAGNOSIS — Z00.00 WELL ADULT EXAM: Primary | ICD-10-CM

## 2024-09-25 DIAGNOSIS — C62.90 SEMINOMA (HCC): ICD-10-CM

## 2024-09-25 PROCEDURE — 99396 PREV VISIT EST AGE 40-64: CPT | Performed by: FAMILY MEDICINE

## 2024-09-25 NOTE — LETTER
September 25, 2024     Patient: Mario Ratliff  YOB: 1970  Date of Visit: 9/25/2024      To Whom it May Concern:    Mario Ratliff is under my professional care. Mario was seen in my office on 9/25/2024. 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  Unfortunately as a result of treatment he has developed secondary gynecomastia which is painful for him and limits his activity and ability to exercise.    He is considering surgical treatment by plastic surgery for his gynecomastia and I believe this is medically necessary and we will likely allow for him to be more active in the coming years.  This will also decrease his risk for other comorbid conditions such as cardiovascular disease and diabetes.    Thank you for your consideration in this matter    If you have any questions or concerns, please don't hesitate to call.         Sincerely,          Maurice Muñoz,         CC: No Recipients

## 2024-09-25 NOTE — PROGRESS NOTES
"Assessment/Plan: Anticipatory guidance provided.  Continue regular exercise and good diet.  Recommend lipid panel as below.  Patient will schedule for colonoscopy as he is due.     1. Well adult exam  2. Chemotherapy-induced neutropenia (HCC)  -     Lipid Panel with Direct LDL reflex; Future; Expected date: 09/25/2024  3. Seminoma (HCC)  -     Lipid Panel with Direct LDL reflex; Future; Expected date: 09/25/2024        Subjective:      Patient ID: Mario Ratliff is a 53 y.o. male.    Patient is seen today for annual well check.  He is due for lipid panel.  He continues to follow with oncology for surveillance of history of seminoma.  No evidence of recurrence.  He does have some chemotherapy-induced neutropenia history.  He also has some mild neuralgia/neuropathy symptoms.  He is trying to remain active.  He is considering swimming.             The following portions of the patient's history were reviewed and updated as appropriate: allergies, current medications, past family history, past medical history, past social history, past surgical history, and problem list.    Review of Systems   Constitutional: Negative.    HENT: Negative.     Eyes: Negative.    Respiratory: Negative.     Cardiovascular: Negative.    Gastrointestinal: Negative.    Endocrine: Negative.    Genitourinary: Negative.    Musculoskeletal: Negative.    Skin: Negative.    Allergic/Immunologic: Negative.    Neurological: Negative.    Hematological: Negative.    Psychiatric/Behavioral: Negative.           Objective:      /80 (BP Location: Left arm, Patient Position: Sitting)   Pulse (!) 53   Temp (!) 97.2 °F (36.2 °C)   Ht 6' 3\" (1.905 m)   Wt 121 kg (266 lb 3.2 oz)   SpO2 97%   BMI 33.27 kg/m²          Physical Exam  Vitals reviewed.   Constitutional:       Appearance: He is well-developed.   HENT:      Head: Normocephalic and atraumatic.      Right Ear: External ear normal. Tympanic membrane is not erythematous or bulging.      Left Ear: " External ear normal. Tympanic membrane is not erythematous or bulging.      Nose: Nose normal.      Mouth/Throat:      Mouth: No oral lesions.      Pharynx: No oropharyngeal exudate.   Eyes:      General: No scleral icterus.        Right eye: No discharge.         Left eye: No discharge.      Conjunctiva/sclera: Conjunctivae normal.   Neck:      Thyroid: No thyromegaly.   Cardiovascular:      Rate and Rhythm: Normal rate and regular rhythm.      Heart sounds: Normal heart sounds. No murmur heard.     No friction rub. No gallop.   Pulmonary:      Effort: Pulmonary effort is normal. No respiratory distress.      Breath sounds: No wheezing or rales.   Chest:      Chest wall: No tenderness.   Abdominal:      General: Bowel sounds are normal. There is no distension.      Palpations: Abdomen is soft. There is no mass.      Tenderness: There is no abdominal tenderness. There is no guarding or rebound.   Musculoskeletal:         General: No tenderness or deformity. Normal range of motion.      Cervical back: Normal range of motion and neck supple.   Lymphadenopathy:      Cervical: No cervical adenopathy.   Skin:     General: Skin is warm and dry.      Coloration: Skin is not pale.      Findings: No erythema or rash.   Neurological:      Mental Status: He is alert and oriented to person, place, and time.      Cranial Nerves: No cranial nerve deficit.      Motor: No abnormal muscle tone.      Coordination: Coordination normal.      Deep Tendon Reflexes: Reflexes are normal and symmetric.   Psychiatric:         Behavior: Behavior normal.

## 2024-10-01 ENCOUNTER — IMMUNIZATIONS (OUTPATIENT)
Dept: FAMILY MEDICINE CLINIC | Facility: CLINIC | Age: 54
End: 2024-10-01
Payer: COMMERCIAL

## 2024-10-01 DIAGNOSIS — Z23 NEED FOR INFLUENZA VACCINATION: Primary | ICD-10-CM

## 2024-10-01 DIAGNOSIS — Z12.11 SCREENING FOR COLON CANCER: ICD-10-CM

## 2024-10-01 PROCEDURE — 90673 RIV3 VACCINE NO PRESERV IM: CPT

## 2024-10-01 PROCEDURE — G0008 ADMIN INFLUENZA VIRUS VAC: HCPCS

## 2024-10-19 ENCOUNTER — HOSPITAL ENCOUNTER (OUTPATIENT)
Dept: ULTRASOUND IMAGING | Facility: HOSPITAL | Age: 54
Discharge: HOME | End: 2024-10-19
Attending: INTERNAL MEDICINE
Payer: COMMERCIAL

## 2024-10-19 DIAGNOSIS — E29.1 PRIMARY HYPOGONADISM IN MALE: ICD-10-CM

## 2024-10-19 PROCEDURE — 76870 US EXAM SCROTUM: CPT

## 2024-10-25 ENCOUNTER — OFFICE VISIT (OUTPATIENT)
Dept: ENDOCRINOLOGY | Facility: CLINIC | Age: 54
End: 2024-10-25
Payer: COMMERCIAL

## 2024-10-25 VITALS
HEIGHT: 75 IN | BODY MASS INDEX: 33.62 KG/M2 | SYSTOLIC BLOOD PRESSURE: 112 MMHG | DIASTOLIC BLOOD PRESSURE: 70 MMHG | WEIGHT: 270.4 LBS

## 2024-10-25 DIAGNOSIS — N62 GYNECOMASTIA: Primary | ICD-10-CM

## 2024-10-25 DIAGNOSIS — E29.1 PRIMARY HYPOGONADISM IN MALE: ICD-10-CM

## 2024-10-25 PROCEDURE — 99214 OFFICE O/P EST MOD 30 MIN: CPT | Performed by: INTERNAL MEDICINE

## 2024-10-25 RX ORDER — TESTOSTERONE 1.62 MG/G
GEL TRANSDERMAL
Qty: 30 ACTUATION | Refills: 3 | Status: SHIPPED | OUTPATIENT
Start: 2024-10-25

## 2024-10-25 NOTE — LETTER
October 25, 2024     Ky Del Toro MD  [address]  [address]  [place] [address]    Patient: Mario Ratliff   YOB: 1970   Date of Visit: 10/25/2024       Dear Dr. Del Toro:    Thank you for referring Mario Ratliff to me for evaluation. Below are my notes for this consultation.    If you have questions, please do not hesitate to call me. I look forward to following your patient along with you.         Sincerely,        Dung Baires DO        CC: No Recipients    Dung Baires DO  10/25/2024  7:45 AM  Sign when Signing Visit  10/25/2024    Assessment & Plan     Diagnoses and all orders for this visit:    Gynecomastia  -     Testosterone, free, total  -     Comprehensive metabolic panel  -     CBC  -     PSA, Total Screen  -     TSH, 3rd generation  -     T4, free    Primary hypogonadism in male  -     Testosterone, free, total  -     Comprehensive metabolic panel  -     CBC  -     PSA, Total Screen  -     TSH, 3rd generation  -     T4, free        Assessment/Plan:  1.  Low testosterone: We elected to continue current regimen for now.  Update lab work and we will contact patient as results are available.    2.  Gynecomastia: Plan for low testosterone as above.  He for will follow with plastic surgery for surgery consideration.    3. Right testicular microlithiasis: Recommended patient contact urology.  Suspect this is benign finding but will pass along my note today to urology as well given his past hx.      CC: Follow-up    History of Present Illness    HPI: Mario Ratliff is a 54 y.o. year old male with history of seminoma and left orchiectomy presents for a follow-up.  Initially seen in the office in April for evaluation of gynecomastia.  Lab work evaluation showed lower testosterone with elevated gonadotropins.  Based on these labs it was decided to trial low-dose of testosterone replacement to see if this provides any symptom improvement including regarding gynecomastia.  After  some time patient wished to pursue plastic surgery referral which has been placed.  He presents today overall feeling well.  He has seen plastic surgery for discussion about surgical management of gynecomastia.  Tolerating testosterone well.  Notes good energy.    Review of Systems   Constitutional:  Negative for fatigue.   HENT:  Negative for trouble swallowing and voice change.    Eyes:  Negative for visual disturbance.   Respiratory:  Negative for shortness of breath.    Cardiovascular:  Negative for palpitations and leg swelling.   Gastrointestinal:  Negative for abdominal pain, nausea and vomiting.   Endocrine: Negative for polydipsia and polyuria.   Musculoskeletal:  Negative for arthralgias and myalgias.   Skin:  Negative for rash.   Neurological:  Negative for dizziness, tremors and weakness.   Hematological:  Negative for adenopathy.   Psychiatric/Behavioral:  Negative for agitation and confusion.        Historical Information  Past Medical History:   Diagnosis Date   • Allergic 1/1/2000    seasonal allergies   • Cancer (HCC)     testicular cancer   • COVID 2021   • History of cancer chemotherapy    • History of transfusion    • Impacted cerumen    • Lymphadenopathy    • Obesity, unspecified    • Seasonal allergies    • Seminoma of testis (HCC)    • Testicular cancer (HCC) 01/17/2023   • Torn meniscus     right knee- surgical repair today 7/19/2021   • Wears glasses      Past Surgical History:   Procedure Laterality Date   • COLONOSCOPY     • FACIAL/NECK BIOPSY Left 01/11/2023    Procedure: EXCISION OF LEFT NECK LYMPH NODE WITH LYMPHOMA PROTOCOL;  Surgeon: Wilber Kasper MD;  [facility];  Service: Surgical Oncology   • FL GUIDED CENTRAL VENOUS ACCESS DEVICE INSERTION  01/23/2023   • HEMORROIDECTOMY     • IR PORT REMOVAL     • KNEE SURGERY Right 7/19/2021   • WV ARTHRS KNE SURG W/MENISCECTOMY MED/LAT W/SHVG Right 07/19/2021    Procedure: ARTHROSCOPY KNEE, partial lateral meniscectomy;  Surgeon:  Guy Vides MD;  Location: [facility];  Service: Orthopedics   • KS ARTHRS KNE SURG W/MENISCECTOMY MED/LAT W/SHVG Left 12/05/2023    Procedure: ARTHROSCOPY KNEE, partial lateral meniscectomy;  Surgeon: Guy Vides MD;  [facility];  Service: Orthopedics   • KS ORCHIECTOMY RADICAL TUMOR INGUINAL APPROACH Left 06/16/2023    Procedure: RADICAL ORCHIECTOMY INGUINAL; EXCISION CORD LIPOMA;  Surgeon: Ky Del Toro MD;  Location: [facility];  Service: Urology   • SKIN BIOPSY     • TUNNELED VENOUS PORT PLACEMENT N/A 01/23/2023    Procedure: INSERTION VENOUS PORT (PORT-A-CATH);  Surgeon: Wilber Kasper MD;  Location: [facility];  Service: Surgical Oncology     Social History  Social History     Substance and Sexual Activity   Alcohol Use Yes   • Alcohol/week: 3.0 standard drinks of alcohol   • Types: 1 Glasses of wine, 1 Cans of beer, 1 Shots of liquor per week    Comment: 1-2 weekly     Social History     Substance and Sexual Activity   Drug Use Never     Social History     Tobacco Use   Smoking Status Never   • Passive exposure: Past   Smokeless Tobacco Never   Tobacco Comments    During childhood, both parents smoked.     Family History:   Family History   Problem Relation Age of Onset   • Cancer Mother         Breast Cancer   • Breast cancer Mother 50 - 59   • Breast cancer additional onset Mother    • Squamous cell carcinoma Father    • Basal cell carcinoma Father    • Cancer Father         Skin carcenoma   • Basal cell carcinoma Sister    • Breast cancer Sister 40 - 49   • Cancer Sister         Breast Cancer and Skin carcenoma   • Breast cancer additional onset Sister    • Basal cell carcinoma Brother    • Pancreatic cancer Maternal Aunt    • Pancreatic cancer Maternal Aunt    • Breast cancer Maternal Grandmother 32   • No Known Problems Maternal Grandfather    • No Known Problems Paternal Grandmother    • No Known Problems Paternal Grandfather    • No Known Problems Daughter         Meds/Allergies  Current Outpatient Medications   Medication Sig Dispense Refill   • hydrocortisone 2.5 % cream Apply as needed to face, no more than 3-4 days.   May apply to left lower leg as needed 28 g 3   • ketoconazole (NIZORAL) 2 % cream Apply topically 2 (two) times a day To face 30 g 3   • testosterone (ANDROGEL) 1.62 % TD gel pump 1 pump daily 30 actuation 3   • triamcinolone (KENALOG) 0.1 % cream Apply topically 2 (two) times a day For 4 weeks. 454 g 1   • acetaminophen (TYLENOL) 325 mg tablet Take 650 mg by mouth every 6 (six) hours as needed for mild pain (Patient not taking: Reported on 2/20/2024)     • albuterol (Ventolin HFA) 90 mcg/act inhaler Inhale 2 puffs every 6 (six) hours as needed for wheezing (Patient not taking: Reported on 5/15/2024) 18 g 5   • apixaban (Eliquis) 2.5 mg Take 1 tablet (2.5 mg total) by mouth 2 (two) times a day for 14 days (Patient not taking: Reported on 2/20/2024) 28 tablet 0   • benzonatate (TESSALON) 200 MG capsule Take 1 capsule (200 mg total) by mouth 3 (three) times a day as needed for cough (Patient not taking: Reported on 4/26/2024) 20 capsule 0   • Cholecalciferol (Vitamin D3) 1.25 MG (30861 UT) CAPS Take by mouth in the morning (Patient not taking: Reported on 2/20/2024)     • [employer] Aspirin Low Dose 81 MG EC tablet TAKE 1 TABLET (81 MG TOTAL) BY MOUTH 2 (TWO) TIMES A DAY FOR 14 DAYS (Patient not taking: Reported on 2/20/2024)     • loratadine (CLARITIN) 10 mg tablet Take 10 mg by mouth if needed for allergies     • multivitamin (THERAGRAN) TABS Take 1 tablet by mouth daily (Patient not taking: Reported on 2/22/2024)     • mupirocin (BACTROBAN) 2 % ointment Apply topically 2 (two) times a day To left lower leg for 7-10 days (Patient not taking: Reported on 4/26/2024) 30 g 0   • naproxen (NAPROSYN) 500 mg tablet Take 1 tablet (500 mg total) by mouth 2 (two) times a day with meals (Patient not taking: Reported on 2/20/2024) 60 tablet 0   • NON FORMULARY Take 1  "capsule by mouth 3 (three) times a day Nervive; s/p neuropathy from chemo (Patient not taking: Reported on 2/22/2024)     • ondansetron (ZOFRAN-ODT) 4 mg disintegrating tablet Take 1 tablet (4 mg total) by mouth every 8 (eight) hours as needed for nausea or vomiting (Patient not taking: Reported on 2/20/2024) 15 tablet 0     No current facility-administered medications for this visit.     Allergies   Allergen Reactions   • Oyster Shell - Food Allergy Vomiting       Objective  Vitals: Blood pressure 112/70, height 6' 3\" (1.905 m), weight 123 kg (270 lb 6.4 oz).  Invasive Devices       Central Venous Catheter Line  Duration             Port A Cath 01/23/23 Left Internal jugular 641 days                    Physical Exam  Vitals reviewed.   Constitutional:       General: He is not in acute distress.     Appearance: He is well-developed. He is not diaphoretic.   HENT:      Head: Normocephalic and atraumatic.   Eyes:      Conjunctiva/sclera: Conjunctivae normal.      Pupils: Pupils are equal, round, and reactive to light.   Neck:      Thyroid: No thyromegaly.   Cardiovascular:      Rate and Rhythm: Normal rate and regular rhythm.   Pulmonary:      Effort: Pulmonary effort is normal. No respiratory distress.      Breath sounds: Normal breath sounds.   Abdominal:      General: Bowel sounds are normal.      Palpations: Abdomen is soft.   Musculoskeletal:         General: Normal range of motion.      Cervical back: Normal range of motion and neck supple.   Skin:     General: Skin is warm and dry.      Findings: No rash.   Neurological:      Mental Status: He is alert and oriented to person, place, and time.      Motor: No abnormal muscle tone.   Psychiatric:         Behavior: Behavior normal.         The history was obtained from the review of the chart and from the patient.    Lab Results:      Component      Latest Ref Rng 7/13/2024   Sodium      135 - 147 mmol/L 134 (L)    Potassium      3.5 - 5.3 mmol/L 4.1    Chloride    "   96 - 108 mmol/L 101    Carbon Dioxide      21 - 32 mmol/L 28    ANION GAP      4 - 13 mmol/L 5    BUN      5 - 25 mg/dL 28 (H)    Creatinine      0.60 - 1.30 mg/dL 1.15    GLUCOSE, FASTING      65 - 99 mg/dL 93    Calcium      8.4 - 10.2 mg/dL 9.2    AST      13 - 39 U/L 23    ALT      7 - 52 U/L 23    ALK PHOS      34 - 104 U/L 81    Total Protein      6.4 - 8.4 g/dL 7.4    Albumin      3.5 - 5.0 g/dL 4.3    Total Bilirubin      0.20 - 1.00 mg/dL 0.62    GFR, Calculated      ml/min/1.73sq m 71    WBC      4.31 - 10.16 Thousand/uL 5.76    RBC      3.88 - 5.62 Million/uL 4.52    Hemoglobin      12.0 - 17.0 g/dL 15.1    Hematocrit      36.5 - 49.3 % 44.1    MCV      82 - 98 fL 98    MCH      26.8 - 34.3 pg 33.4    MCHC      31.4 - 37.4 g/dL 34.2    RDW      11.6 - 15.1 % 12.4    Platelet Count      149 - 390 Thousands/uL 177    MPV      8.9 - 12.7 fL 9.1    TESTOSTERONE FREE      7.2 - 24.0 pg/mL 2.8 (L)    Testosterone, Total, LC/MS      264 - 916 ng/dL 334    PSA      0.000 - 4.000 ng/mL 0.722       Legend:  (L) Low  (H) High    10/19/24:  SCROTAL ULTRASOUND     INDICATION: History of testicular cancer status post left orchiectomy.     COMPARISON: Scrotal ultrasound 1/18/2023     TECHNIQUE: Ultrasound the scrotal contents was performed with a high frequency linear transducer utilizing volumetric sweep imaging as well as standard still image techniques. Imaging performed in longitudinal and transverse orientation. Color and   spectral Doppler evaluation also performed bilaterally.     FINDINGS:     TESTES:     RIGHT testis = 4.7 x 1.9 x 2.9 cm. Volume 13.7 mL  Normal contour with homogeneous smooth echotexture.  No intratesticular mass lesion. Diffuse punctate echogenic foci, consistent with microlithiasis.     LEFT testis is surgically absent. No findings of disease recurrence in the surgical bed.     Doppler flow within both testes is present and symmetric.     EPIDIDYMIDES:  Normal size.  Doppler ultrasound  "demonstrates normal blood flow.  No epididymal lesions.     HYDROCELE: No significant fluid present.     VARICOCELE: Right-sided varicocele measuring up to 4 mm.     SCROTUM: Scrotal thickness and appearance within normal limits. No evidence for extratesticular mass or hernia demonstrated.     IMPRESSION:     1. Status post left orchiectomy. No findings of disease recurrence in the surgical bed.     2. Right testicular microlithiasis is present without intratesticular mass or other worrisome findings.  In the absence of any other risk factors for testicular cancer (e.g., personal history of testicular cancer, father or brother with testicular   cancer, history of cryptorchidism for maldescent, testicular atrophy, or other risk factors), no further imaging or biochemical follow-up is necessary; all that is recommended is routine monthly testicular self-examination.  However if the patient has   risk factors for testicular cancer, evaluation and determination of an optimal follow-up strategy is deferred to urologist. (Reference: AJR 2016: [id number].)     3. Right-sided varicocele measuring up to 4 mm.        Resident: VIVIANA RIVERA I, the attending radiologist, have reviewed the images and agree with the final report above.     Workstation performed: [id number]       Future Appointments   Date Time Provider Department Center   11/20/2024  7:00 AM AL CT 1 AL [facility]   12/9/2024  8:00 AM Nancy Vázquez MD [facility]       Portions of the record may have been created with voice recognition software. Occasional wrong word or \"sound a like\" substitutions may have occurred due to the inherent limitations of voice recognition software. Read the chart carefully and recognize, using context, where substitutions have occurred.    "

## 2024-10-25 NOTE — PROGRESS NOTES
10/25/2024    Assessment & Plan      Diagnoses and all orders for this visit:    Gynecomastia  -     Testosterone, free, total  -     Comprehensive metabolic panel  -     CBC  -     PSA, Total Screen  -     TSH, 3rd generation  -     T4, free    Primary hypogonadism in male  -     Testosterone, free, total  -     Comprehensive metabolic panel  -     CBC  -     PSA, Total Screen  -     TSH, 3rd generation  -     T4, free        Assessment/Plan:  1.  Low testosterone: We elected to continue current regimen for now.  Update lab work and we will contact patient as results are available.    2.  Gynecomastia: Plan for low testosterone as above.  He for will follow with plastic surgery for surgery consideration.    3. Right testicular microlithiasis: Recommended patient contact urology.  Suspect this is benign finding but will pass along my note today to urology as well given his past hx.      CC: Follow-up    History of Present Illness     HPI: Mario Ratliff is a 54 y.o. year old male with history of seminoma and left orchiectomy presents for a follow-up.  Initially seen in the office in April for evaluation of gynecomastia.  Lab work evaluation showed lower testosterone with elevated gonadotropins.  Based on these labs it was decided to trial low-dose of testosterone replacement to see if this provides any symptom improvement including regarding gynecomastia.  After some time patient wished to pursue plastic surgery referral which has been placed.  He presents today overall feeling well.  He has seen plastic surgery for discussion about surgical management of gynecomastia.  Tolerating testosterone well.  Notes good energy.    Review of Systems   Constitutional:  Negative for fatigue.   HENT:  Negative for trouble swallowing and voice change.    Eyes:  Negative for visual disturbance.   Respiratory:  Negative for shortness of breath.    Cardiovascular:  Negative for palpitations and leg swelling.   Gastrointestinal:   Negative for abdominal pain, nausea and vomiting.   Endocrine: Negative for polydipsia and polyuria.   Musculoskeletal:  Negative for arthralgias and myalgias.   Skin:  Negative for rash.   Neurological:  Negative for dizziness, tremors and weakness.   Hematological:  Negative for adenopathy.   Psychiatric/Behavioral:  Negative for agitation and confusion.        Historical Information   Past Medical History:   Diagnosis Date    Allergic 1/1/2000    seasonal allergies    Cancer (HCC)     testicular cancer    COVID 2021    History of cancer chemotherapy     History of transfusion     Impacted cerumen     Lymphadenopathy     Obesity, unspecified     Seasonal allergies     Seminoma of testis (HCC)     Testicular cancer (HCC) 01/17/2023    Torn meniscus     right knee- surgical repair today 7/19/2021    Wears glasses      Past Surgical History:   Procedure Laterality Date    COLONOSCOPY      FACIAL/NECK BIOPSY Left 01/11/2023    Procedure: EXCISION OF LEFT NECK LYMPH NODE WITH LYMPHOMA PROTOCOL;  Surgeon: Wilber Kasper MD;  Location: [facility];  Service: Surgical Oncology    FL GUIDED CENTRAL VENOUS ACCESS DEVICE INSERTION  01/23/2023    HEMORROIDECTOMY      IR PORT REMOVAL      KNEE SURGERY Right 7/19/2021    MO ARTHRS KNE SURG W/MENISCECTOMY MED/LAT W/SHVG Right 07/19/2021    Procedure: ARTHROSCOPY KNEE, partial lateral meniscectomy;  Surgeon: Guy Vides MD;  Location: [facility];  Service: Orthopedics    MO ARTHRS KNE SURG W/MENISCECTOMY MED/LAT W/SHVG Left 12/05/2023    Procedure: ARTHROSCOPY KNEE, partial lateral meniscectomy;  Surgeon: Guy Vides MD;  Location: [facility];  Service: Orthopedics    MO ORCHIECTOMY RADICAL TUMOR INGUINAL APPROACH Left 06/16/2023    Procedure: RADICAL ORCHIECTOMY INGUINAL; EXCISION CORD LIPOMA;  Surgeon: Ky Del Toro MD;  [facility];  Service: Urology    SKIN BIOPSY      TUNNELED VENOUS PORT PLACEMENT N/A 01/23/2023    Procedure:  INSERTION VENOUS PORT (PORT-A-CATH);  Surgeon: Wilber Kasper MD;  Location: [facility];  Service: Surgical Oncology     Social History   Social History     Substance and Sexual Activity   Alcohol Use Yes    Alcohol/week: 3.0 standard drinks of alcohol    Types: 1 Glasses of wine, 1 Cans of beer, 1 Shots of liquor per week    Comment: 1-2 weekly     Social History     Substance and Sexual Activity   Drug Use Never     Social History     Tobacco Use   Smoking Status Never    Passive exposure: Past   Smokeless Tobacco Never   Tobacco Comments    During childhood, both parents smoked.     Family History:   Family History   Problem Relation Age of Onset    Cancer Mother         Breast Cancer    Breast cancer Mother 50 - 59    Breast cancer additional onset Mother     Squamous cell carcinoma Father     Basal cell carcinoma Father     Cancer Father         Skin carcenoma    Basal cell carcinoma Sister     Breast cancer Sister 40 - 49    Cancer Sister         Breast Cancer and Skin carcenoma    Breast cancer additional onset Sister     Basal cell carcinoma Brother     Pancreatic cancer Maternal Aunt     Pancreatic cancer Maternal Aunt     Breast cancer Maternal Grandmother 32    No Known Problems Maternal Grandfather     No Known Problems Paternal Grandmother     No Known Problems Paternal Grandfather     No Known Problems Daughter        Meds/Allergies   Current Outpatient Medications   Medication Sig Dispense Refill    hydrocortisone 2.5 % cream Apply as needed to face, no more than 3-4 days.   May apply to left lower leg as needed 28 g 3    ketoconazole (NIZORAL) 2 % cream Apply topically 2 (two) times a day To face 30 g 3    testosterone (ANDROGEL) 1.62 % TD gel pump 1 pump daily 30 actuation 3    triamcinolone (KENALOG) 0.1 % cream Apply topically 2 (two) times a day For 4 weeks. 454 g 1    acetaminophen (TYLENOL) 325 mg tablet Take 650 mg by mouth every 6 (six) hours as needed for mild pain (Patient not taking:  "Reported on 2/20/2024)      albuterol (Ventolin HFA) 90 mcg/act inhaler Inhale 2 puffs every 6 (six) hours as needed for wheezing (Patient not taking: Reported on 5/15/2024) 18 g 5    apixaban (Eliquis) 2.5 mg Take 1 tablet (2.5 mg total) by mouth 2 (two) times a day for 14 days (Patient not taking: Reported on 2/20/2024) 28 tablet 0    benzonatate (TESSALON) 200 MG capsule Take 1 capsule (200 mg total) by mouth 3 (three) times a day as needed for cough (Patient not taking: Reported on 4/26/2024) 20 capsule 0    Cholecalciferol (Vitamin D3) 1.25 MG (48556 UT) CAPS Take by mouth in the morning (Patient not taking: Reported on 2/20/2024)      [facility] Aspirin Low Dose 81 MG EC tablet TAKE 1 TABLET (81 MG TOTAL) BY MOUTH 2 (TWO) TIMES A DAY FOR 14 DAYS (Patient not taking: Reported on 2/20/2024)      loratadine (CLARITIN) 10 mg tablet Take 10 mg by mouth if needed for allergies      multivitamin (THERAGRAN) TABS Take 1 tablet by mouth daily (Patient not taking: Reported on 2/22/2024)      mupirocin (BACTROBAN) 2 % ointment Apply topically 2 (two) times a day To left lower leg for 7-10 days (Patient not taking: Reported on 4/26/2024) 30 g 0    naproxen (NAPROSYN) 500 mg tablet Take 1 tablet (500 mg total) by mouth 2 (two) times a day with meals (Patient not taking: Reported on 2/20/2024) 60 tablet 0    NON FORMULARY Take 1 capsule by mouth 3 (three) times a day Nervive; s/p neuropathy from chemo (Patient not taking: Reported on 2/22/2024)      ondansetron (ZOFRAN-ODT) 4 mg disintegrating tablet Take 1 tablet (4 mg total) by mouth every 8 (eight) hours as needed for nausea or vomiting (Patient not taking: Reported on 2/20/2024) 15 tablet 0     No current facility-administered medications for this visit.     Allergies   Allergen Reactions    Oyster Shell - Food Allergy Vomiting       Objective   Vitals: Blood pressure 112/70, height 6' 3\" (1.905 m), weight 123 kg (270 lb 6.4 oz).  Invasive Devices       Central Venous Catheter " Line  Duration             Port A Cath 01/23/23 Left Internal jugular 641 days                    Physical Exam  Vitals reviewed.   Constitutional:       General: He is not in acute distress.     Appearance: He is well-developed. He is not diaphoretic.   HENT:      Head: Normocephalic and atraumatic.   Eyes:      Conjunctiva/sclera: Conjunctivae normal.      Pupils: Pupils are equal, round, and reactive to light.   Neck:      Thyroid: No thyromegaly.   Cardiovascular:      Rate and Rhythm: Normal rate and regular rhythm.   Pulmonary:      Effort: Pulmonary effort is normal. No respiratory distress.      Breath sounds: Normal breath sounds.   Abdominal:      General: Bowel sounds are normal.      Palpations: Abdomen is soft.   Musculoskeletal:         General: Normal range of motion.      Cervical back: Normal range of motion and neck supple.   Skin:     General: Skin is warm and dry.      Findings: No rash.   Neurological:      Mental Status: He is alert and oriented to person, place, and time.      Motor: No abnormal muscle tone.   Psychiatric:         Behavior: Behavior normal.         The history was obtained from the review of the chart and from the patient.    Lab Results:      Component      Latest Ref Rng 7/13/2024   Sodium      135 - 147 mmol/L 134 (L)    Potassium      3.5 - 5.3 mmol/L 4.1    Chloride      96 - 108 mmol/L 101    Carbon Dioxide      21 - 32 mmol/L 28    ANION GAP      4 - 13 mmol/L 5    BUN      5 - 25 mg/dL 28 (H)    Creatinine      0.60 - 1.30 mg/dL 1.15    GLUCOSE, FASTING      65 - 99 mg/dL 93    Calcium      8.4 - 10.2 mg/dL 9.2    AST      13 - 39 U/L 23    ALT      7 - 52 U/L 23    ALK PHOS      34 - 104 U/L 81    Total Protein      6.4 - 8.4 g/dL 7.4    Albumin      3.5 - 5.0 g/dL 4.3    Total Bilirubin      0.20 - 1.00 mg/dL 0.62    GFR, Calculated      ml/min/1.73sq m 71    WBC      4.31 - 10.16 Thousand/uL 5.76    RBC      3.88 - 5.62 Million/uL 4.52    Hemoglobin      12.0 - 17.0  g/dL 15.1    Hematocrit      36.5 - 49.3 % 44.1    MCV      82 - 98 fL 98    MCH      26.8 - 34.3 pg 33.4    MCHC      31.4 - 37.4 g/dL 34.2    RDW      11.6 - 15.1 % 12.4    Platelet Count      149 - 390 Thousands/uL 177    MPV      8.9 - 12.7 fL 9.1    TESTOSTERONE FREE      7.2 - 24.0 pg/mL 2.8 (L)    Testosterone, Total, LC/MS      264 - 916 ng/dL 334    PSA      0.000 - 4.000 ng/mL 0.722       Legend:  (L) Low  (H) High    10/19/24:  [facility] ULTRASOUND     INDICATION: History of testicular cancer status post left orchiectomy.     COMPARISON: Scrotal ultrasound 1/18/2023     TECHNIQUE: Ultrasound the scrotal contents was performed with a high frequency linear transducer utilizing volumetric sweep imaging as well as standard still image techniques. Imaging performed in longitudinal and transverse orientation. Color and   spectral Doppler evaluation also performed bilaterally.     FINDINGS:     TESTES:     RIGHT testis = 4.7 x 1.9 x 2.9 cm. Volume 13.7 mL  Normal contour with homogeneous smooth echotexture.  No intratesticular mass lesion. Diffuse punctate echogenic foci, consistent with microlithiasis.     LEFT testis is surgically absent. No findings of disease recurrence in the surgical bed.     Doppler flow within both testes is present and symmetric.     EPIDIDYMIDES:  Normal size.  Doppler ultrasound demonstrates normal blood flow.  No epididymal lesions.     HYDROCELE: No significant fluid present.     VARICOCELE: Right-sided varicocele measuring up to 4 mm.     SCROTUM: Scrotal thickness and appearance within normal limits. No evidence for extratesticular mass or hernia demonstrated.     IMPRESSION:     1. Status post left orchiectomy. No findings of disease recurrence in the surgical bed.     2. Right testicular microlithiasis is present without intratesticular mass or other worrisome findings.  In the absence of any other risk factors for testicular cancer (e.g., personal history of testicular cancer,  "father or brother with testicular   cancer, history of cryptorchidism for maldescent, testicular atrophy, or other risk factors), no further imaging or biochemical follow-up is necessary; all that is recommended is routine monthly testicular self-examination.  However if the patient has   risk factors for testicular cancer, evaluation and determination of an optimal follow-up strategy is deferred to urologist. (Reference: AJR 2016: 206:[id number].)     3. Right-sided varicocele measuring up to 4 mm.        Resident: VIVIANA RIVERA I, the attending radiologist, have reviewed the images and agree with the final report above.     Workstation performed: [id number]       Future Appointments   Date Time Provider Department Center   11/20/2024  7:00 AM AL CT 1 AL CT [facility]   12/9/2024  8:00 AM Nancy Vázquez MD [facility]       Portions of the record may have been created with voice recognition software. Occasional wrong word or \"sound a like\" substitutions may have occurred due to the inherent limitations of voice recognition software. Read the chart carefully and recognize, using context, where substitutions have occurred.    "

## 2024-10-29 ENCOUNTER — PATIENT MESSAGE (OUTPATIENT)
Dept: UROLOGY | Facility: CLINIC | Age: 54
End: 2024-10-29

## 2024-10-29 NOTE — PATIENT COMMUNICATION
10/19/24 [facility] ULTRASOUND     IMPRESSION:     1. Status post left orchiectomy. No findings of disease recurrence in the surgical bed.     2. Right testicular microlithiasis is present without intratesticular mass or other worrisome findings.  In the absence of any other risk factors for testicular cancer (e.g., personal history of testicular cancer, father or brother with testicular   cancer, history of cryptorchidism for maldescent, testicular atrophy, or other risk factors), no further imaging or biochemical follow-up is necessary; all that is recommended is routine monthly testicular self-examination.  However if the patient has   risk factors for testicular cancer, evaluation and determination of an optimal follow-up strategy is deferred to urologist. (Reference: AJR 2016: [id number].)     3. Right-sided varicocele measuring up to 4 mm.      Please review and Advise

## 2024-11-20 ENCOUNTER — HOSPITAL ENCOUNTER (OUTPATIENT)
Dept: CT IMAGING | Facility: HOSPITAL | Age: 54
Discharge: HOME | End: 2024-11-20
Attending: INTERNAL MEDICINE
Payer: COMMERCIAL

## 2024-11-20 DIAGNOSIS — C62.90 SEMINOMA (HCC): ICD-10-CM

## 2024-11-20 PROCEDURE — 74177 CT ABD & PELVIS W/CONTRAST: CPT

## 2024-11-20 RX ADMIN — IOHEXOL 100 ML: 350 INJECTION, SOLUTION INTRAVENOUS at 06:55

## 2024-11-21 ENCOUNTER — ANESTHESIA EVENT (OUTPATIENT)
Dept: PERIOP | Facility: HOSPITAL | Age: 54
End: 2024-11-21
Payer: COMMERCIAL

## 2024-11-21 NOTE — PRE-PROCEDURE INSTRUCTIONS
Pre-Surgery Instructions:   Medication Instructions    ketoconazole (NIZORAL) 2 % cream Hold day of surgery.    loratadine (CLARITIN) 10 mg tablet Take day of surgery.    testosterone (ANDROGEL) 1.62 % TD gel pump Hold day of surgery.    Medication instructions for day surgery reviewed. Please use only a sip of water to take your instructed medications. Avoid all over the counter vitamins, supplements and NSAIDS for one week prior to surgery per anesthesia guidelines. Tylenol is ok to take as needed.     You will receive a call one business day prior to surgery with an arrival time and hospital directions. If your surgery is scheduled on a Monday, the hospital will be calling you on the Friday prior to your surgery. If you have not heard from anyone by 8pm, please call the hospital supervisor through the hospital  at [phone]. ([place] [phone] or [place] [phone]).    Do not eat or drink anything after midnight the night before your surgery, including candy, mints, lifesavers, or chewing gum. Do not drink alcohol 24hrs before your surgery. Try not to smoke at least 24hrs before your surgery.       Follow the pre surgery showering instructions as listed in the “My Surgical Experience Booklet” or otherwise provided by your surgeon's office. Do not use a blade to shave the surgical area 1 week before surgery. It is okay to use a clean electric clippers up to 24 hours before surgery. Do not apply any lotions, creams, including makeup, cologne, deodorant, or perfumes after showering on the day of your surgery. Do not use dry shampoo, hair spray, hair gel, or any type of hair products.     No contact lenses, eye make-up, or artificial eyelashes. Remove nail polish, including gel polish, and any artificial, gel, or acrylic nails if possible. Remove all jewelry including rings and body piercing jewelry.     Wear causal clothing that is easy to take on and off. Consider your type of surgery.    Keep  any valuables, jewelry, piercings at home. Please bring any specially ordered equipment (sling, braces) if indicated.    Arrange for a responsible person to drive you to and from the hospital on the day of your surgery. Please confirm the visitor policy for the day of your procedure when you receive your phone call with an arrival time.     Call the surgeon's office with any new illnesses, exposures, or additional questions prior to surgery.    Please reference your “My Surgical Experience Booklet” for additional information to prepare for your upcoming surgery.

## 2024-11-22 ENCOUNTER — APPOINTMENT (OUTPATIENT)
Dept: LAB | Facility: HOSPITAL | Age: 54
End: 2024-11-22
Payer: COMMERCIAL

## 2024-11-22 DIAGNOSIS — Z01.812 ENCOUNTER FOR PREPROCEDURAL LABORATORY EXAMINATION: ICD-10-CM

## 2024-11-22 DIAGNOSIS — Z01.818 PRE-OP TESTING: ICD-10-CM

## 2024-11-22 DIAGNOSIS — N62 HYPERTROPHY OF BREAST: ICD-10-CM

## 2024-11-22 LAB
ANION GAP SERPL CALCULATED.3IONS-SCNC: 6 MMOL/L (ref 4–13)
ATRIAL RATE: 56 BPM
BUN SERPL-MCNC: 22 MG/DL (ref 5–25)
CALCIUM SERPL-MCNC: 9.2 MG/DL (ref 8.4–10.2)
CHLORIDE SERPL-SCNC: 103 MMOL/L (ref 96–108)
CO2 SERPL-SCNC: 29 MMOL/L (ref 21–32)
CREAT SERPL-MCNC: 1.08 MG/DL (ref 0.6–1.3)
ERYTHROCYTE [DISTWIDTH] IN BLOOD BY AUTOMATED COUNT: 12.1 % (ref 11.6–15.1)
GFR SERPL CREATININE-BSD FRML MDRD: 76 ML/MIN/1.73SQ M
GLUCOSE P FAST SERPL-MCNC: 96 MG/DL (ref 65–99)
HCT VFR BLD AUTO: 42.5 % (ref 36.5–49.3)
HGB BLD-MCNC: 14.9 G/DL (ref 12–17)
MCH RBC QN AUTO: 32.6 PG (ref 26.8–34.3)
MCHC RBC AUTO-ENTMCNC: 35.1 G/DL (ref 31.4–37.4)
MCV RBC AUTO: 93 FL (ref 82–98)
P AXIS: 26 DEGREES
PLATELET # BLD AUTO: 192 THOUSANDS/UL (ref 149–390)
PMV BLD AUTO: 8.9 FL (ref 8.9–12.7)
POTASSIUM SERPL-SCNC: 3.6 MMOL/L (ref 3.5–5.3)
PR INTERVAL: 168 MS
QRS AXIS: -6 DEGREES
QRSD INTERVAL: 114 MS
QT INTERVAL: 464 MS
QTC INTERVAL: 419 MS
RBC # BLD AUTO: 4.57 MILLION/UL (ref 3.88–5.62)
SODIUM SERPL-SCNC: 138 MMOL/L (ref 135–147)
T WAVE AXIS: 11 DEGREES
VENTRICULAR RATE: 49 BPM
WBC # BLD AUTO: 5.98 THOUSAND/UL (ref 4.31–10.16)

## 2024-11-22 PROCEDURE — 93010 ELECTROCARDIOGRAM REPORT: CPT | Performed by: INTERNAL MEDICINE

## 2024-11-22 PROCEDURE — 36415 COLL VENOUS BLD VENIPUNCTURE: CPT

## 2024-11-22 PROCEDURE — 85027 COMPLETE CBC AUTOMATED: CPT

## 2024-11-22 PROCEDURE — 80048 BASIC METABOLIC PNL TOTAL CA: CPT

## 2024-11-27 PROBLEM — N62 GYNECOMASTIA: Status: ACTIVE | Noted: 2024-11-27

## 2024-11-29 NOTE — PROGRESS NOTES
Hematology/Oncology Outpatient Office Note    Date of Service: 2024    [facility]  [address] [place] [id number]  [phone]    Reason for Consultation:   Chief Complaint   Patient presents with    Follow-up       Cancer Stage at diagnosis: IIIC    Referral Physician: No ref. provider found    Primary Care Physician:  Maurice Muñoz DO     Nickname: Mario    Spouse: Candis Berry ECO    Today's ECO     Goals and Barriers:  Current Goal: Minimize effects of disease burden, extend life.   Barriers to accomplishing this: None    Patient's Capacity to Self Care:  Patient is able to self care    Code Status: not addressed today    Advanced directives: not addressed today    ASSESSMENT & PLAN      Diagnosis ICD-10-CM Associated Orders   1. Seminoma (HCC)  C62.90             This is a 54 y.o. 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    Baseline PFT: WNL    C3D15 bleomycin omitted due to severe pancytopenia requiring 2U PRBC.    2023  tumor board: get PET/CT, if any growth, would do dissection, Otherwise, start surveillance thereafter  2023:  tumor board recommendation to pursue surveillance    5/3/2023: , HCG <1 WNL, AFP slightly high for first time at 8.1    Variant 1  NTHL1  c.787C>T (p.R263C); heterozygous; uncertain significance   Variant 2  SDHC c.15G>T (p.L5F); heterozygous; uncertain sig    Discussion of decision making  Oncology history updated, accordingly, during this visit  Goals of care/patient communication  I discussed with the patient the clinical course leading up to their cancer diagnosis. I reviewed relevant office notes, imaging reports and pathology result as well.  I told the patient that this is a case of potentially curable  disease and what this means. We discussed that the goal of anti-cancer therapy is to provide best quality of life, extend overall survival, and progression free survival as shown in clinical trials. We also discussed that there might be a point when the cancer will no longer respond to this anti-neoplastic therapy.   I explained the risks/benefits of the proposed cancer therapy: Bleomycin 30 units IV, Etoposide 100mg/m2, cisplatin 20mg/m2, and after discussion including understanding risks of possible life-threatening complications and therapy-related malignancy development, informed consent for blood products and treatment has been signed and obtained.  TNM/Staging At Diagnosis  [id number] S2  Cancer Staging   IIIC, poor risk (non pulmonary visceral mets)  Disease Features/Tumor Markers/Genetics  Tumor Marker: 1/18/2023:  (3x ULN), AFP (5.2, WNL), HCG (13, slightly high)  7/28/2023: HCG <1, , AFP 4.79  10/5/2023: HCG <1, , AFP 4.51  11/30/2023 HCG< 1, AFP 4.47  1/24/2024: HCG <1, , AFP 4.64  9/4/2024 markers WNL  Notable Path Features:   1/11/2023: Lymph node, left neck, excision: Germ cell tumor, compatible with metastatic seminoma. Background nonnecrotizing granulomatous inflammation.  Treatment: s/p BEP  Other Supportive care:  Treatment Team Members  Surgeon: Dr. Del Toro  Rad Onc  Palliative  Labs: 12/28/2022: creat 0.96, T bili 0.63, WBC 6.49k, Hgb 15.9, plt 244k  1/2024: testosterone 209, estrogen 91 WNL  3/20/2024: HCG <1, LDH WNL, AFP 4.85, CXR WNL  12/8/2024: LDH, AFP, HCG WNL  Diagnostics   1/3/2023 CT soft tissue neck: Moderate left-sided adenopathy primarily within the left neck, posterior jugular chain levels 3 through 5 with a prominent node present in the upper mediastinum  1/3/2023 CT Chest w/c: Left supraclavicular, superior mediastinal, posterior mediastinal (15mm)/retrocrural (2.2 cm)and suspected partially imaged left abdominal adenopathy  1/4/2023: CT Abd/pelv w/c:  Bulky retroperitoneal lymphadenopathy consistent with lymphoma (left para-aortic region extending into the left upper quadrant measures 8.2 x 10.9 cm.  Aortocaval lymphadenopathy measures 3.7 x 5.2 cm.  More inferior left para-aortic lymphadenopathy at the bifurcation measures 5.0 x 5.9 cm.)  1/18/2023: Testicular U/S shows a left testicular lesion (1.5 x 1.7 x 0.9 cm) and the RP adenopathy is more to the Left of the aorta  4/19/2023 CT Chest w/c: No evidence of acute intrathoracic pathology  5/1/2023 CT CAP w/c: Interval marked improvement of retroperitoneal adenopathy.   Mild splenomegaly. Image 93, retrocaval, 9 mm previously 3 cm. Image 96, left para-aortic, 2 cm previously 7.5 cm  6/7/2023 PET/CT: essential CR,  L inguina LN SUV 5 (reduced)  7/28/2023: CXR: No acute cardiopulmonary disease  10/5/2023 CT Abd/pelv w/c: Retroperitoneal lymphadenopathy with index lymph nodes demonstrating continued interval improvement. Splenomegaly  index retroperitoneal/left periaortic lymph nodes largest measuring 14 mm in short axis, previously measured 25 mm in short axis. Retrocaval lymph node measuring 9 x 12 mm, previously measured 13 x 19 mm  10/6/2023: CXR: WNL  11/30/2023 CXR: No acute cardiopulmonary disease  12/13/2023 US L Breast: benign  There is gynecomastia seen in both breasts, minimal on the right and mild on the left. The gynecomastia correlates with the palpable mass and tenderness reported by the patient.  There is a questioned intramammary lymph node in the outer left breast.  Ultrasound breast left limited diagnostic: Targeted ultrasound of the retroareolar left breast was performed for further evaluation of the painful palpable area of concern.  Fibroglandular tissue is visualized in the retroareolar left breast, consistent with mild gynecomastia.  At 2:00 5 cm from the nipple within the left breast, there is a benign-appearing intramammary lymph node  1/24/2024 CXR: No acute cardiopulmonary disease. No  significant change  2/5/2024 CT CAP w/c: Stable retroperitoneal lymphadenopathy. No new suspicious findings  5/17/2024 CT CAP w/c: Progressive marked gynecomastia, left much greater than right.  Scattered retroperitoneal lymph nodes are slightly less prominent than on prior exam and all subcentimeter short axis.. Largest node previously measured 1.3 cm short axis and now measures 1 cm short axis  5/28/2024 TTE LVEF 65-69%  9/4/2024 CXR: WNL  11/20/2024 CT Abd/pelv w/c:  No interval change since previous study. Stable subcentimeter retroperitoneal and pelvic lymph nodes. No new lymphadenopathy. Left orchiectomy  Stable subcentimeter retroperitoneal lymph nodes measuring up to 7 mm in short axis image 78 series 2. Stable right external iliac lymph node measures 9 mm in short axis   12/8/2024 CXR: unofficial read by me no pathology noted    Discussion of decision making    I personally reviewed the following lab results, the image studies, pathology, other specialty/physicians consult notes and recommendations, and outside medical records from [facility]. I had a lengthy discussion with the patient and shared the work-up findings. We discussed the diagnosis and management plan as below. I spent 31 minutes reviewing the records (labs, clinician notes, outside records, medical history, ordering medicine/tests/procedures, interpreting the imaging/labs previously done) and coordination of care as well as direct time with the patient today, of which greater than 50% of the time was spent in counseling and coordination of care with the patient/family.      Plan/Labs  F/u Urology for post-orchiectomy urologic surveillance  Cont surveillance with CT Abd/pelv w/c (due 6/2025), CXR (due 3/2025 and is ordered)  CMP, AFP, LDH, HCG ordered q12 weeks standing until 3/2025   S/P mammary gland resection 12/9/24; following with endocrine for this as well    Surveillance for clinical stage III seminoma includes history and physical every 2  months for the first year following completion of chemotherapy which would be until April 2024 along with every 4-month CT scan of the abdomen, pelvis along with every 2-month chest x-ray. (until 6/2024)    Year 2 (starting 7/2024) surveillance consists of every 3-month history and physical and serology markers, every 6-month CT abdomen, pelvis, and chest x-ray every 3 months.    Years 3-4 surveillance consists of history and physical and markers every 6 months, annual chest x-ray and CT scan  Year 5 surveillance consists of annual chest x-ray and history and physical and markers with as needed CT scan      Follow Up: q3 months with H&P, serology markers     All questions were answered to the patient's satisfaction during this encounter. The patient knows the contact information for our office and knows to reach out for any relevant concerns related to this encounter. They are to call for any temperature 100.4 or higher, new symptoms including but not restricted to shaking chills, decreased appetite, nausea, vomiting, diarrhea, increased fatigue, shortness of breath or chest pain, confusion, and not feeling the strength to come to the clinic. For all other listed problems and medical diagnosis in their chart - they are managed by PCP and/or other specialists, which the patient acknowledges. Thank you very much for your consultation and making us a part of this patient's care. We are continuing to follow closely with you. Please do not hesitate to reach out to me with any additional questions or concerns.    Nancy Vázquez MD  Hematology & Medical Oncology Staff Physician             Disclaimer: This document was prepared using [employer] technology. If a word or phrase is confusing, or does not make sense, this is likely due to recognition error which was not discovered during this clinician's review. If you believe an error has occurred, please contact me through [facility] service line for  rashida?cation.      ONCOLOGY HISTORY OF PRESENT ILLNESS        Oncology History   Seminoma (HCC)   1/11/2023 -  Cancer Staged    Staging form: Testis, AJCC 8th Edition  - Clinical stage from 1/11/2023: cT4, cN3, pM1a, S2 - Signed by Nancy Vázquez MD on 1/20/2023  Stage prefix: Initial diagnosis  Lactate dehydrogenase (LDH) (U/L): 699  Human chorionic gonadotropin (hCG) (mIU/mL): 13  Alpha fetoprotein (AFP) (ng/mL): 5.2  Laterality: Left  Sites of metastasis: Distant lymph nodes, NOS, Retroperitoneal lymph node, NOS  Source of metastatic specimen: Supraclavicular Lymph Nodes  Staged by: Managing physician       1/19/2023 Initial Diagnosis    Seminoma (HCC)     1/30/2023 - 4/17/2023 Chemotherapy    pegfilgrastim (NEULASTA ONPRO), 6 mg, Subcutaneous, Once, 4 of 4 cycles  Administration: 6 mg (2/6/2023), 6 mg (2/27/2023), 6 mg (3/20/2023), 6 mg (3/6/2023), 6 mg (4/17/2023), 6 mg (4/10/2023)  bleomycin (BLENOXANE) IVPB, 30 Units, Intravenous, Once, 4 of 4 cycles  Administration: 30 Units (1/30/2023), 30 Units (2/6/2023), 30 Units (2/13/2023), 30 Units (2/20/2023), 30 Units (2/27/2023), 30 Units (3/6/2023), 30 Units (3/13/2023), 30 Units (3/20/2023), 30 Units (4/3/2023), 30 Units (4/10/2023), 30 Units (4/17/2023)  CISplatin (PLATINOL) infusion, 20 mg/m2 = 52.2 mg, Intravenous, Once, 4 of 4 cycles  Administration: 52.2 mg (1/30/2023), 52.2 mg (1/31/2023), 52.2 mg (2/1/2023), 52.2 mg (2/2/2023), 52.2 mg (2/3/2023), 52.2 mg (2/20/2023), 52.2 mg (2/21/2023), 52.2 mg (2/22/2023), 52.2 mg (2/23/2023), 52.2 mg (2/24/2023), 52.2 mg (3/13/2023), 52.2 mg (3/14/2023), 52.2 mg (3/15/2023), 52.2 mg (3/16/2023), 52.2 mg (3/17/2023), 52.2 mg (4/3/2023), 52.2 mg (4/4/2023), 52.2 mg (4/5/2023), 52.2 mg (4/6/2023), 52.2 mg (4/7/2023)  fosaprepitant (EMEND) IVPB, 150 mg, Intravenous, Once, 4 of 4 cycles  Administration: 150 mg (1/30/2023), 150 mg (2/20/2023), 150 mg (3/13/2023), 150 mg (4/3/2023), 150 mg (2/24/2023), 150 mg  (4/10/2023)       Lots of IV Mag and several PRBC required during chemo  C3D15 deferred due to plt <10k, Hgb <8    SUBJECTIVE  (INTERVAL HISTORY)      Clotting History None   Bleeding History None   Cancer History Seminoma   Family Cancer History Mom/sister (breast), same sister (melanoma), father (basal cell skin cancer)   H/O Blood/Plt Transfusion None   Tobacco/etoh/drug abuse No nicotine use, etoh abuse, rec drug use       Cancer Screening history C-scope (2019)   Occupation  at [employer]       Interval events: Due to significant tenderness from gynecomastia patient recent had surgery no complications currently has drains that will be removed Wednesday 12/11. Mild neuropathy in the fingertips, toes that is persisting (odd feeling in the balls of his feet and tips of his toes). Not dropping items, gripping is better    Overall doing well with no SOB, or new lumps on his person that he has noticed.    I have reviewed the relevant past medical, surgical, social and family history. I have also reviewed allergies and medications for this patent.    Review of Systems    Baseline weight: 300 lbs    Has had intermittent lower back, thigh rash since summer of 2022.     Denies F/C, V, SOB, CP, LH, HA, itching, gen weakness, melena, hematuria, hematochezia, falls, diarrhea, or constipation       A 10-point review of system was performed, pertinent positive and negative were detailed as above. Otherwise, the 10-point review of system was negative.      Past Medical History:   Diagnosis Date    Allergic 1/1/2000    seasonal allergies    Cancer (HCC)     testicular cancer    COVID 2021    Gynecomastia, male     History of cancer chemotherapy     History of transfusion     Impacted cerumen     Lymphadenopathy     Obesity, unspecified     Seasonal allergies     Seminoma of testis (HCC)     Testicular cancer (HCC) 01/17/2023    Torn meniscus     right knee- surgical repair today 7/19/2021    Wears glasses         Past Surgical History:   Procedure Laterality Date    COLONOSCOPY      FACIAL/NECK BIOPSY Left 01/11/2023    Procedure: EXCISION OF LEFT NECK LYMPH NODE WITH LYMPHOMA PROTOCOL;  Surgeon: Wilber Kasper MD;  Location: [facility];  Service: Surgical Oncology    FL GUIDED CENTRAL VENOUS ACCESS DEVICE INSERTION  01/23/2023    HEMORROIDECTOMY      IR PORT REMOVAL      KNEE SURGERY Right 7/19/2021    MI ARTHRS KNE SURG W/MENISCECTOMY MED/LAT W/SHVG Right 07/19/2021    Procedure: ARTHROSCOPY KNEE, partial lateral meniscectomy;  Surgeon: Guy Vides MD;  Location: [facility];  Service: Orthopedics    MI ARTHRS KNE SURG W/MENISCECTOMY MED/LAT W/SHVG Left 12/05/2023    Procedure: ARTHROSCOPY KNEE, partial lateral meniscectomy;  Surgeon: Guy Vides MD;  [facility];  Service: Orthopedics    MI MASTECTOMY GYNECOMASTIA Bilateral 12/4/2024    Procedure: GYNECOMASECTOMY;  Surgeon: Reginald Deluna MD;  Location: [facility];  Service: Plastics    MI ORCHIECTOMY RADICAL TUMOR INGUINAL APPROACH Left 06/16/2023    Procedure: RADICAL ORCHIECTOMY INGUINAL; EXCISION CORD LIPOMA;  Surgeon: Ky Del Toro MD;  [facility];  Service: Urology    SKIN BIOPSY      TUNNELED VENOUS PORT PLACEMENT N/A 01/23/2023    Procedure: INSERTION VENOUS PORT (PORT-A-CATH);  Surgeon: Wilber Kasper MD;  Location: [facility];  Service: Surgical Oncology       Family History   Problem Relation Age of Onset    Cancer Mother         Breast Cancer    Breast cancer Mother 50 - 59    Breast cancer additional onset Mother     Squamous cell carcinoma Father     Basal cell carcinoma Father     Cancer Father         Skin carcenoma    Basal cell carcinoma Sister     Breast cancer Sister 40 - 49    Cancer Sister         Breast Cancer and Skin carcenoma    Breast cancer additional onset Sister     Basal cell carcinoma Brother     Pancreatic cancer Maternal Aunt     Pancreatic cancer Maternal Aunt     Breast cancer  Maternal Grandmother 32    No Known Problems Maternal Grandfather     No Known Problems Paternal Grandmother     No Known Problems Paternal Grandfather     No Known Problems Daughter        Social History     Socioeconomic History    Marital status: /Civil Union     Spouse name: Not on file    Number of children: Not on file    Years of education: Not on file    Highest education level: Not on file   Occupational History    Not on file   Tobacco Use    Smoking status: Never     Passive exposure: Past    Smokeless tobacco: Never    Tobacco comments:     During childhood, both parents smoked.   Vaping Use    Vaping status: Never Used   Substance and Sexual Activity    Alcohol use: Yes     Alcohol/week: 3.0 standard drinks of alcohol     Types: 1 Glasses of wine, 1 Cans of beer, 1 Shots of liquor per week     Comment: 1-2 weekly    Drug use: Never    Sexual activity: Yes     Partners: Female   Other Topics Concern    Not on file   Social History Narrative    Not on file     Social Drivers of Health     Financial Resource Strain: Not on file   Food Insecurity: No Food Insecurity (2/13/2023)    Hunger Vital Sign     Worried About Running Out of Food in the Last Year: Never true     Ran Out of Food in the Last Year: Never true   Transportation Needs: No Transportation Needs (2/13/2023)    PRAPARE - Transportation     Lack of Transportation (Medical): No     Lack of Transportation (Non-Medical): No   Physical Activity: Not on file   Stress: Not on file   Social Connections: Not on file   Intimate Partner Violence: Not on file   Housing Stability: Low Risk  (2/13/2023)    Housing Stability Vital Sign     Unable to Pay for Housing in the Last Year: No     Number of Places Lived in the Last Year: 1     Unstable Housing in the Last Year: No       Allergies   Allergen Reactions    Oyster Extract GI Intolerance and Vomiting     Reaction eating oysters       Current Outpatient Medications   Medication Sig Dispense Refill     cephalexin (KEFLEX) 500 mg capsule TAKE 1 CAPSULE BY MOUTH FOUR TIMES A DAY START DAY OF SURGERY      ketoconazole (NIZORAL) 2 % cream Apply topically 2 (two) times a day To face 30 g 3    loratadine (CLARITIN) 10 mg tablet Take 10 mg by mouth if needed for allergies      testosterone (ANDROGEL) 1.62 % TD gel pump 1 pump daily 30 actuation 3     No current facility-administered medications for this visit.       (Not in a hospital admission)      Objective:     24 Hour Vitals Assessment:     Vitals:    12/09/24 0807   BP: 124/80   Pulse: 61   Resp: 18   Temp: 97.9 °F (36.6 °C)   SpO2: 97%         Weight at last visit: 281 lbs    Weight today: 268 lbs    PHYSICIAN EXAM    General: Appearance: alert, cooperative, no distress.  HEENT: Normocephalic, atraumatic. No scleral icterus. conjunctivae clear. EOMI.  Chest: No tenderness to palpation. No open wound noted. L gynecomastia noted  Lungs: Clear to auscultation bilaterally, Respirations unlabored.  Cardiac: Regular rate and rhythm, +S1and S2  Abdomen: Soft, non-tender, non-distended. Bowel sounds are normal.   : b/l testicular exam WNL  L breast exam: nodule or two  Extremities:  No edema, cyanosis, clubbing.  Skin: Skin color, turgor are normal. No rashes.  Lymphatics: no palpable axillary, or inguinal adenopathy  L supra-clavicular LN palpated with incision c/d/i    Port:  c/d/I    Neurologic: Awake, Alert, and oriented, no gross focal deficits noted b/l.       DATA REVIEW:    Pathology Result:    Final Diagnosis   Date Value Ref Range Status   05/15/2024   Final    A. Skin, mid back, excision:    EPIDERMAL INCLUSION CYST.          02/22/2024   Final    A. Skin, right upper eyelid:    SEBORRHEIC KERATOSIS, IRRITATED AND INFLAMED.          06/16/2023   Final    A.  Left testis (radical orchiectomy):     - Negative for carcinoma.      - Testicular parenchyma with extensive fibrosis. See comment.     Comment:  The history of metastatic seminoma involving a left  neck lymph node ([id number]) status post chemotherapy is noted.      B.  Left spermatic cord lipoma (excision):     - Mature fibroadipose tissue, compatible with lipoma.     - One (1) lymph node, negative for carcinoma.    C.  Left spermatic cord (resection):     - Benign spermatic cord.     01/11/2023   Final    A.  Lymph node, left neck, excision:  -Germ cell tumor, compatible with metastatic seminoma (see note).  -Background nonnecrotizing granulomatous inflammation (see note).       Comment:     This is an appended report. These results have been appended to a previously preliminary verified report.   10/25/2019   Final    A. Cecum, cold biopsy:  - Portion of benign colonic mucosa with prominent reactive lymphoid aggregate.     B. Ascending colon, cold snare:  - Tubular adenoma.  - Negative for high grade dysplasia/ carcinoma.    C. Colon, splenic flexure, cold snare:  - Tubular adenoma.  - Negative for high grade dysplasia/ carcinoma.            Image Results:   Image result are reviewed and documented in Hematology/Oncology history    CT abdomen pelvis w contrast  Narrative: CT ABDOMEN AND PELVIS WITH IV CONTRAST    INDICATION:   Malignant neoplasm of unspecified testis, unspecified whether descended or undescended.      COMPARISON: CT chest abdomen and pelvis dated 5/17/2024    TECHNIQUE:  CT examination of the abdomen and pelvis was performed. Multiplanar 2D reformatted images were created from the source data.    This examination, like all CT scans performed in the [facility], was performed utilizing techniques to minimize radiation dose exposure, including the use of iterative reconstruction and automated exposure control. Radiation dose length   product (DLP) for this visit:    IV Contrast:  iohexol (OMNIPAQUE) 350 MG/ML injection (MULTI-DOSE) 100 mL -   Enteric Contrast:  Enteric contrast was not administered.    FINDINGS:    ABDOMEN    LOWER CHEST: No clinically significant abnormality in  the visualized lower chest.    LIVER/BILIARY TREE: Unremarkable.    GALLBLADDER: No calcified gallstones. No pericholecystic inflammatory change.    SPLEEN: Stable splenomegaly measuring 16 cm.    PANCREAS: Unremarkable.     ADRENAL GLANDS: Unremarkable.    KIDNEYS/URETERS: Unremarkable. No hydronephrosis.    STOMACH AND BOWEL: Minimal sigmoid diverticulosis. Normal caliber bowel loops..    APPENDIX: No findings to suggest appendicitis.    ABDOMINOPELVIC CAVITY: No ascites. No pneumoperitoneum. Stable subcentimeter retroperitoneal lymph nodes measuring up to 7 mm in short axis image 78 series 2. Stable right external iliac lymph node measures 9 mm in short axis image 162 series 2. No new   lymphadenopathy.    VESSELS: Unremarkable for patient's age.    PELVIS    REPRODUCTIVE ORGANS: Normal size prostate. Left orchiectomy.    URINARY BLADDER: Unremarkable.    ABDOMINAL WALL/INGUINAL REGIONS: Fat-containing right inguinal hernia. Tiny fat-containing umbilical hernia.    BONES: No acute fracture or suspicious osseous lesion.  Impression: 1. No interval change since previous study. Stable subcentimeter retroperitoneal and pelvic lymph nodes. No new lymphadenopathy. Left orchiectomy.    2. Stable fat-containing right inguinal and umbilical hernias.    3. Stable splenomegaly measuring 16 cm    Electronically signed: 11/21/2024 04:45 AM Bruce Day MD      LABS:  Lab data are reviewed and documented in [facility] history.       Lab Results   Component Value Date    HGB 14.9 11/22/2024    HCT 42.5 11/22/2024    MCV 93 11/22/2024     11/22/2024    WBC 5.98 11/22/2024    NRBC 0 05/17/2024    BANDSPCT 1 04/26/2023    ATYLMPCT 2 (H) 04/21/2023     Lab Results   Component Value Date    K 3.8 12/08/2024     12/08/2024    CO2 28 12/08/2024    BUN 25 12/08/2024    CREATININE 1.08 12/08/2024    GLUF 86 12/08/2024    CALCIUM 9.2 12/08/2024    CORRECTEDCA 9.0 03/31/2023    AST 24 12/08/2024    ALT 32 12/08/2024    ALKPHOS  "66 12/08/2024    EGFR 76 12/08/2024       No results found for: \"IRON\", \"TIBC\", \"FERRITIN\"    Lab Results   Component Value Date    [id number] 321 10/05/2023       No results for input(s): \"WBC\", \"CREAT\", \"PLT\" in the last 72 hours.    By:  Nancy Vázquez MD, 12/9/2024, 8:28 AM                                  "

## 2024-12-04 ENCOUNTER — HOSPITAL ENCOUNTER (OUTPATIENT)
Facility: HOSPITAL | Age: 54
Setting detail: OUTPATIENT SURGERY
Discharge: HOME | End: 2024-12-04
Attending: SURGERY | Admitting: SURGERY
Payer: COMMERCIAL

## 2024-12-04 ENCOUNTER — ANESTHESIA (OUTPATIENT)
Dept: PERIOP | Facility: HOSPITAL | Age: 54
End: 2024-12-04
Payer: COMMERCIAL

## 2024-12-04 VITALS
OXYGEN SATURATION: 96 % | BODY MASS INDEX: 32.95 KG/M2 | DIASTOLIC BLOOD PRESSURE: 79 MMHG | RESPIRATION RATE: 18 BRPM | HEIGHT: 75 IN | WEIGHT: 265 LBS | TEMPERATURE: 96.3 F | HEART RATE: 54 BPM | SYSTOLIC BLOOD PRESSURE: 118 MMHG

## 2024-12-04 RX ORDER — HYDROCODONE BITARTRATE AND ACETAMINOPHEN 5; 325 MG/1; MG/1
2 TABLET ORAL EVERY 4 HOURS PRN
Refills: 0 | Status: DISCONTINUED | OUTPATIENT
Start: 2024-12-04 | End: 2024-12-04 | Stop reason: HOSPADM

## 2024-12-04 RX ORDER — LIDOCAINE HYDROCHLORIDE 10 MG/ML
INJECTION, SOLUTION EPIDURAL; INFILTRATION; INTRACAUDAL; PERINEURAL AS NEEDED
Status: DISCONTINUED | OUTPATIENT
Start: 2024-12-04 | End: 2024-12-04

## 2024-12-04 RX ORDER — CEFAZOLIN SODIUM 2 G/50ML
2000 SOLUTION INTRAVENOUS ONCE
Status: DISCONTINUED | OUTPATIENT
Start: 2024-12-04 | End: 2024-12-04 | Stop reason: HOSPADM

## 2024-12-04 RX ORDER — ONDANSETRON 2 MG/ML
4 INJECTION INTRAMUSCULAR; INTRAVENOUS ONCE AS NEEDED
Status: DISCONTINUED | OUTPATIENT
Start: 2024-12-04 | End: 2024-12-04 | Stop reason: HOSPADM

## 2024-12-04 RX ORDER — FENTANYL CITRATE/PF 50 MCG/ML
25 SYRINGE (ML) INJECTION
Status: DISCONTINUED | OUTPATIENT
Start: 2024-12-04 | End: 2024-12-04 | Stop reason: HOSPADM

## 2024-12-04 RX ORDER — MINERAL OIL
OIL (ML) MISCELLANEOUS AS NEEDED
Status: DISCONTINUED | OUTPATIENT
Start: 2024-12-04 | End: 2024-12-04 | Stop reason: HOSPADM

## 2024-12-04 RX ORDER — PROPOFOL 10 MG/ML
INJECTION, EMULSION INTRAVENOUS CONTINUOUS PRN
Status: DISCONTINUED | OUTPATIENT
Start: 2024-12-04 | End: 2024-12-04

## 2024-12-04 RX ORDER — SODIUM CHLORIDE, SODIUM LACTATE, POTASSIUM CHLORIDE, CALCIUM CHLORIDE 600; 310; 30; 20 MG/100ML; MG/100ML; MG/100ML; MG/100ML
INJECTION, SOLUTION INTRAVENOUS CONTINUOUS PRN
Status: DISCONTINUED | OUTPATIENT
Start: 2024-12-04 | End: 2024-12-04

## 2024-12-04 RX ORDER — KETOROLAC TROMETHAMINE 30 MG/ML
INJECTION, SOLUTION INTRAMUSCULAR; INTRAVENOUS AS NEEDED
Status: DISCONTINUED | OUTPATIENT
Start: 2024-12-04 | End: 2024-12-04

## 2024-12-04 RX ORDER — KETOROLAC TROMETHAMINE 30 MG/ML
30 INJECTION, SOLUTION INTRAMUSCULAR; INTRAVENOUS ONCE
Status: DISCONTINUED | OUTPATIENT
Start: 2024-12-04 | End: 2024-12-04 | Stop reason: HOSPADM

## 2024-12-04 RX ORDER — FENTANYL CITRATE 50 UG/ML
INJECTION, SOLUTION INTRAMUSCULAR; INTRAVENOUS AS NEEDED
Status: DISCONTINUED | OUTPATIENT
Start: 2024-12-04 | End: 2024-12-04

## 2024-12-04 RX ORDER — CEFAZOLIN SODIUM 2 G/50ML
SOLUTION INTRAVENOUS AS NEEDED
Status: DISCONTINUED | OUTPATIENT
Start: 2024-12-04 | End: 2024-12-04

## 2024-12-04 RX ORDER — MIDAZOLAM HYDROCHLORIDE 2 MG/2ML
INJECTION, SOLUTION INTRAMUSCULAR; INTRAVENOUS AS NEEDED
Status: DISCONTINUED | OUTPATIENT
Start: 2024-12-04 | End: 2024-12-04

## 2024-12-04 RX ORDER — HYDROMORPHONE HCL/PF 1 MG/ML
0.5 SYRINGE (ML) INJECTION
Status: DISCONTINUED | OUTPATIENT
Start: 2024-12-04 | End: 2024-12-04 | Stop reason: HOSPADM

## 2024-12-04 RX ORDER — ONDANSETRON 2 MG/ML
INJECTION INTRAMUSCULAR; INTRAVENOUS AS NEEDED
Status: DISCONTINUED | OUTPATIENT
Start: 2024-12-04 | End: 2024-12-04

## 2024-12-04 RX ORDER — MAGNESIUM HYDROXIDE 1200 MG/15ML
LIQUID ORAL AS NEEDED
Status: DISCONTINUED | OUTPATIENT
Start: 2024-12-04 | End: 2024-12-04 | Stop reason: HOSPADM

## 2024-12-04 RX ORDER — BUPIVACAINE HYDROCHLORIDE AND EPINEPHRINE 2.5; 5 MG/ML; UG/ML
INJECTION, SOLUTION EPIDURAL; INFILTRATION; INTRACAUDAL; PERINEURAL AS NEEDED
Status: DISCONTINUED | OUTPATIENT
Start: 2024-12-04 | End: 2024-12-04 | Stop reason: HOSPADM

## 2024-12-04 RX ORDER — PROMETHAZINE HYDROCHLORIDE 25 MG/ML
25 INJECTION, SOLUTION INTRAMUSCULAR; INTRAVENOUS ONCE AS NEEDED
Status: DISCONTINUED | OUTPATIENT
Start: 2024-12-04 | End: 2024-12-04 | Stop reason: HOSPADM

## 2024-12-04 RX ORDER — PROPOFOL 10 MG/ML
INJECTION, EMULSION INTRAVENOUS AS NEEDED
Status: DISCONTINUED | OUTPATIENT
Start: 2024-12-04 | End: 2024-12-04

## 2024-12-04 RX ORDER — DEXAMETHASONE SODIUM PHOSPHATE 10 MG/ML
INJECTION, SOLUTION INTRAMUSCULAR; INTRAVENOUS AS NEEDED
Status: DISCONTINUED | OUTPATIENT
Start: 2024-12-04 | End: 2024-12-04

## 2024-12-04 RX ORDER — HYDROMORPHONE HCL/PF 1 MG/ML
SYRINGE (ML) INJECTION AS NEEDED
Status: DISCONTINUED | OUTPATIENT
Start: 2024-12-04 | End: 2024-12-04

## 2024-12-04 RX ORDER — HYDROCODONE BITARTRATE AND ACETAMINOPHEN 5; 325 MG/1; MG/1
1 TABLET ORAL EVERY 4 HOURS PRN
Refills: 0 | Status: DISCONTINUED | OUTPATIENT
Start: 2024-12-04 | End: 2024-12-04 | Stop reason: HOSPADM

## 2024-12-04 RX ADMIN — DEXAMETHASONE SODIUM PHOSPHATE 10 MG: 10 INJECTION, SOLUTION INTRAMUSCULAR; INTRAVENOUS at 09:00

## 2024-12-04 RX ADMIN — ONDANSETRON 4 MG: 2 INJECTION INTRAMUSCULAR; INTRAVENOUS at 10:33

## 2024-12-04 RX ADMIN — CEFAZOLIN SODIUM 2000 MG: 2 SOLUTION INTRAVENOUS at 09:12

## 2024-12-04 RX ADMIN — FENTANYL CITRATE 50 MCG: 50 INJECTION, SOLUTION INTRAMUSCULAR; INTRAVENOUS at 09:23

## 2024-12-04 RX ADMIN — PROPOFOL 50 MCG/KG/MIN: 10 INJECTION, EMULSION INTRAVENOUS at 09:04

## 2024-12-04 RX ADMIN — FENTANYL CITRATE 50 MCG: 50 INJECTION, SOLUTION INTRAMUSCULAR; INTRAVENOUS at 09:11

## 2024-12-04 RX ADMIN — KETOROLAC TROMETHAMINE 30 MG: 30 INJECTION, SOLUTION INTRAMUSCULAR; INTRAVENOUS at 10:33

## 2024-12-04 RX ADMIN — HYDROMORPHONE HYDROCHLORIDE 0.5 MG: 1 INJECTION, SOLUTION INTRAMUSCULAR; INTRAVENOUS; SUBCUTANEOUS at 09:47

## 2024-12-04 RX ADMIN — FENTANYL CITRATE 25 MCG: 50 INJECTION, SOLUTION INTRAMUSCULAR; INTRAVENOUS at 08:47

## 2024-12-04 RX ADMIN — FENTANYL CITRATE 25 MCG: 50 INJECTION, SOLUTION INTRAMUSCULAR; INTRAVENOUS at 09:35

## 2024-12-04 RX ADMIN — FENTANYL CITRATE 25 MCG: 50 INJECTION, SOLUTION INTRAMUSCULAR; INTRAVENOUS at 09:00

## 2024-12-04 RX ADMIN — SODIUM CHLORIDE, SODIUM LACTATE, POTASSIUM CHLORIDE, AND CALCIUM CHLORIDE: .6; .31; .03; .02 INJECTION, SOLUTION INTRAVENOUS at 08:57

## 2024-12-04 RX ADMIN — SODIUM CHLORIDE, SODIUM LACTATE, POTASSIUM CHLORIDE, AND CALCIUM CHLORIDE: .6; .31; .03; .02 INJECTION, SOLUTION INTRAVENOUS at 09:44

## 2024-12-04 RX ADMIN — PROPOFOL 200 MG: 10 INJECTION, EMULSION INTRAVENOUS at 09:00

## 2024-12-04 RX ADMIN — FENTANYL CITRATE 25 MCG: 50 INJECTION, SOLUTION INTRAMUSCULAR; INTRAVENOUS at 09:30

## 2024-12-04 RX ADMIN — LIDOCAINE HYDROCHLORIDE 50 MG: 10 SOLUTION INTRAVENOUS at 09:00

## 2024-12-04 RX ADMIN — MIDAZOLAM 2 MG: 1 INJECTION INTRAMUSCULAR; INTRAVENOUS at 08:56

## 2024-12-04 NOTE — DISCHARGE INSTR - AVS FIRST PAGE
[facility]     [facility], [address] [place] [address], [address], [place] [address]   P [phone]/F [phone]/ [email or web address]       Home Insturctions for the gynecomastia Patient     Please call the office today to schedule a post operative appointment, and tell the office staff  that you doctor needs see you in our office in *** days.    You may remove all dressing in the morning and shower.    Do not bend, lift or strain for 2 weeks following surgery.    Do not drive a car until instructed to do so.    You should avoid lifting anything over 10 lbs for 1-2 weeks    You may shower unless otherwise instructed.    When you arrive home you should remain relaxed. Short walks are permitted after the first week. You should not do any strenuous activities such as cleaning, exercising or shopping until instructed.    If you have small children someone should help you with .    You should make arrangements to be off from work at least two weeks following surgery.    You will be given a prescription for a pain medicine. You can use extra strength Tylenol as a substitute.    We have spent considerable time and effort to make your surgical experience as efficient and pleasant as possible. We would appreciate your suggestions regarding any area of your care, which you think, could be improved to make your experience more pleasant.    You may have drainage around your drain tube.    You may be without your bra for 4-5 hours while you wash and dry the bra.        If you have any questions, please call the office between 9:00 A.M. and 5:00 P.M.     If a problem should arise after hours, the doctor can be reached through the answering service at [phone]

## 2024-12-04 NOTE — ANESTHESIA POSTPROCEDURE EVALUATION
Post-Op Assessment Note    CV Status:  Stable  Pain Score: 1    Pain management: adequate       Mental Status:  Alert and awake   Hydration Status:  Euvolemic   PONV Controlled:  Controlled   Airway Patency:  Patent  Two or more mitigation strategies used for obstructive sleep apnea   Post Op Vitals Reviewed: Yes    No anethesia notable event occurred.    Staff: Anesthesiologist           Last Filed PACU Vitals:  Vitals Value Taken Time   Temp 96.3 °F (35.7 °C) 12/04/24 1143   Pulse 54 12/04/24 1143   /79 12/04/24 1143   Resp 18 12/04/24 1143   SpO2 96 % 12/04/24 1143       Modified Venkatesh:  Activity: 2 (12/4/2024 11:30 AM)  Respiration: 2 (12/4/2024 11:30 AM)  Circulation: 2 (12/4/2024 11:30 AM)  Consciousness: 2 (12/4/2024 11:30 AM)  Oxygen Saturation: 2 (12/4/2024 11:30 AM)  Modified Venkatesh Score: 10 (12/4/2024 11:30 AM)

## 2024-12-04 NOTE — ANESTHESIA POSTPROCEDURE EVALUATION
Post-Op Assessment Note    CV Status:  Stable  Pain Score: 0    Pain management: adequate       Mental Status:  Sleepy and arousable   Hydration Status:  Stable   PONV Controlled:  None   Airway Patency:  Patent     Post Op Vitals Reviewed: Yes    No anethesia notable event occurred.    Staff: CRNA           Last Filed PACU Vitals:  Vitals Value Taken Time   Temp 97.4 °F (36.3 °C) 12/04/24 1048   Pulse 67 12/04/24 1052   /77 12/04/24 1049   Resp 18 12/04/24 1052   SpO2 96 % 12/04/24 1052   Vitals shown include unfiled device data.    Modified Venkatesh:  No data recorded

## 2024-12-04 NOTE — OP NOTE
OPERATIVE REPORT  PATIENT NAME: Mario Ratliff    :  1970  MRN: [id number]  Pt Location:  OR ROOM 11    SURGERY DATE: 2024    Surgeons and Role:     * Reginald Deluna MD - Primary     * Nkiia Aviles - Assisting    Preop Diagnosis:  Hypertrophy of breast [N62]    Post-Op Diagnosis Codes:     * Hypertrophy of breast [N62]    Procedure(s):  Bilateral - GYNECOMASECTOMY    Specimen(s):  * No specimens in log *    Estimated Blood Loss:   Minimal    Drains:  Open Drain Right Breast (Active)   Number of days: 0       Open Drain Left Breast (Active)   Number of days: 0       Anesthesia Type:   General    Operative Indications:  Hypertrophy of breast [N62]       Operative Findings:  B/l breast tissue      Complications:   None    Procedure and Technique:  The patient was identified as being the correct patient. In the    operating he was placed in the supine position on the bed, intubated    by anesthesia, prepped and draped in sterile surgical fashion. I first    went ahead tumesed the breasts with 1 liters of our tumescent    solution. We went ahead and suctioned the breast area for    approximately 1 liters of fat. After this was done, the patient still    had some breast tissue underneath the nipple areolar region. We then    made an incision 1.5 cm on the lateral aspect. The tissue in this    area was then slowly removed in a piecemeal fashion. The tissue was sent to pathology.    Once we had good adequate removal of the breast tissue region on both left and right-hand sides we went ahead and obtained meticulous hemostasis and    closed our stab incisions with deep 3-0 PDS, 4-0 PDS , 4-0 monocryl and Dermabond dressing.    The patient was then at this point placed into a chest binder,    awakened from surgery and taken to the recovery room in stable    condition.      I was present for the entire procedure.    Patient Disposition:  PACU              SIGNATURE: Reginald Deluna MD  DATE: 2024  TIME:  10:49 AM

## 2024-12-04 NOTE — ANESTHESIA PREPROCEDURE EVALUATION
Procedure:  GYNECOMASECTOMY (Bilateral: Breast)    Relevant Problems   ANESTHESIA (within normal limits)      CARDIO   (-) Angina at rest (HCC)   (-) Angina of effort (HCC)   (-) GRANADO (dyspnea on exertion)      ENDO (within normal limits)      GI/HEPATIC (within normal limits)      /RENAL (within normal limits)      HEMATOLOGY   (+) Immunocompromised (HCC)   (+) Normocytic anemia      MUSCULOSKELETAL (within normal limits)      NEURO/PSYCH   (+) Headache      PULMONARY (within normal limits)   (-) Smoking   (-) URI (upper respiratory infection)      Obstetrics/Gynecology   (+) Gynecomastia      Oncology   (+) Chemotherapy-induced neutropenia (HCC)   (+) Seminoma (HCC)      5/2024   Left Ventricle: Left ventricular cavity size is normal. Wall thickness is normal. The left ventricular ejection fraction is 65-69 %. Systolic function is normal. GLS is normal at 16%. Wall motion is normal. Diastolic function is normal.    There are no significant valvular abnormalities.    Pericardium: There is no pericardial effusion.  Physical Exam    Airway    Mallampati score: IV  TM Distance: >3 FB  Neck ROM: full     Dental        Cardiovascular  Rhythm: regular, Rate: normal    Pulmonary   Breath sounds clear to auscultation    Other Findings        Anesthesia Plan  ASA Score- 2     Anesthesia Type- general with ASA Monitors.         Additional Monitors:     Airway Plan: ETT and LMA.           Plan Factors-Exercise tolerance (METS): >4 METS.    Chart reviewed. EKG reviewed.   Patient summary reviewed.    Patient is not a current smoker.      Obstructive sleep apnea risk education given perioperatively.        Induction- intravenous.    Postoperative Plan- Plan for postoperative opioid use.     Perioperative Resuscitation Plan - Level 1 - Full Code.       Informed Consent- Anesthetic plan and risks discussed with patient.  I personally reviewed this patient with the CRNA. Discussed and agreed on the Anesthesia Plan with the  CRNA..

## 2024-12-04 NOTE — NURSING NOTE
Pt able to tolerate p.o. intake. Pt reports minimal pain. AVS reviewed and pt verbalized understanding. IV removed. Pt in no apparent distress.

## 2024-12-04 NOTE — DISCHARGE SUMMARY
Discharge Summary - Plastic Surgery   Name: Mario Ratliff 54 y.o. male I MRN: [id number]  Unit/Bed#: OR POOL I Date of Admission: 12/4/2024   Date of Service: 12/4/2024 I Hospital Day: 0      PLASTIC, RECONSTRUCTIVE, & HAND SURGERY   Discharge Summary  Date of Admission:   12/4/2024  Date of Discharge:   12/04/24  Attending:  Reginald Deluna MD  Principal/Final Diagnosis:   Hypertrophy of breast [N62]  Principal Procedure:   GYNECOMASECTOMY (Bilateral: Breast)  Discharge Medications:  See after visit summary for reconciled discharge medications provided to patient and family.  Reason for Admission:  Mario Ratliff was electively admitted to undergo the above named procedure on 12/4/2024 as an outpatient.  Hospital Course:  Patient underwent the above named procedure on the day of admission without complications. They were discharged home the same day.  Disposition:  To home in care of self and family.  Condition:  Good  Follow up:  Patient with follow up in the office with Dr. Reginald Deluna MD in 1 week(s) or as scheduled per his office  Reginald Deluna MD  12/4/2024 8:31 AM

## 2024-12-05 DIAGNOSIS — R05.8 DRY COUGH: ICD-10-CM

## 2024-12-05 DIAGNOSIS — R59.1 LYMPHADENOPATHY: ICD-10-CM

## 2024-12-05 DIAGNOSIS — C62.90 SEMINOMA (HCC): Primary | ICD-10-CM

## 2024-12-05 DIAGNOSIS — N62 GYNECOMASTIA: ICD-10-CM

## 2024-12-05 DIAGNOSIS — D84.9 IMMUNOCOMPROMISED (HCC): ICD-10-CM

## 2024-12-08 ENCOUNTER — APPOINTMENT (OUTPATIENT)
Dept: LAB | Facility: HOSPITAL | Age: 54
End: 2024-12-08
Payer: COMMERCIAL

## 2024-12-08 ENCOUNTER — HOSPITAL ENCOUNTER (OUTPATIENT)
Dept: RADIOLOGY | Facility: HOSPITAL | Age: 54
Discharge: HOME | End: 2024-12-08
Payer: COMMERCIAL

## 2024-12-08 DIAGNOSIS — N62 GYNECOMASTIA: ICD-10-CM

## 2024-12-08 DIAGNOSIS — C62.90 SEMINOMA (HCC): ICD-10-CM

## 2024-12-08 DIAGNOSIS — R59.1 LYMPHADENOPATHY: ICD-10-CM

## 2024-12-08 DIAGNOSIS — D84.9 IMMUNOCOMPROMISED (HCC): ICD-10-CM

## 2024-12-08 DIAGNOSIS — T45.1X5A CHEMOTHERAPY-INDUCED NEUTROPENIA (HCC): ICD-10-CM

## 2024-12-08 DIAGNOSIS — D70.1 CHEMOTHERAPY-INDUCED NEUTROPENIA (HCC): ICD-10-CM

## 2024-12-08 DIAGNOSIS — R05.8 DRY COUGH: ICD-10-CM

## 2024-12-08 LAB
AFP-TM SERPL-MCNC: 5.02 NG/ML (ref 0–9)
ALBUMIN SERPL BCG-MCNC: 4.2 G/DL (ref 3.5–5)
ALP SERPL-CCNC: 66 U/L (ref 34–104)
ALT SERPL W P-5'-P-CCNC: 32 U/L (ref 7–52)
ANION GAP SERPL CALCULATED.3IONS-SCNC: 6 MMOL/L (ref 4–13)
AST SERPL W P-5'-P-CCNC: 24 U/L (ref 13–39)
BILIRUB SERPL-MCNC: 0.74 MG/DL (ref 0.2–1)
BUN SERPL-MCNC: 25 MG/DL (ref 5–25)
CALCIUM SERPL-MCNC: 9.2 MG/DL (ref 8.4–10.2)
CHLORIDE SERPL-SCNC: 102 MMOL/L (ref 96–108)
CHOLEST SERPL-MCNC: 193 MG/DL (ref ?–200)
CO2 SERPL-SCNC: 28 MMOL/L (ref 21–32)
CREAT SERPL-MCNC: 1.08 MG/DL (ref 0.6–1.3)
GFR SERPL CREATININE-BSD FRML MDRD: 76 ML/MIN/1.73SQ M
GLUCOSE P FAST SERPL-MCNC: 86 MG/DL (ref 65–99)
HCG-TM SERPL-SCNC: 1.3 MLU/ML
HDLC SERPL-MCNC: 49 MG/DL
LDH SERPL-CCNC: 146 U/L (ref 140–271)
LDLC SERPL CALC-MCNC: 128 MG/DL (ref 0–100)
POTASSIUM SERPL-SCNC: 3.8 MMOL/L (ref 3.5–5.3)
PROT SERPL-MCNC: 7.3 G/DL (ref 6.4–8.4)
SODIUM SERPL-SCNC: 136 MMOL/L (ref 135–147)
TRIGL SERPL-MCNC: 82 MG/DL (ref ?–150)

## 2024-12-08 PROCEDURE — 82105 ALPHA-FETOPROTEIN SERUM: CPT

## 2024-12-08 PROCEDURE — 84702 CHORIONIC GONADOTROPIN TEST: CPT

## 2024-12-08 PROCEDURE — 71046 X-RAY EXAM CHEST 2 VIEWS: CPT

## 2024-12-08 PROCEDURE — 83615 LACTATE (LD) (LDH) ENZYME: CPT

## 2024-12-08 PROCEDURE — 36415 COLL VENOUS BLD VENIPUNCTURE: CPT

## 2024-12-08 PROCEDURE — 80053 COMPREHEN METABOLIC PANEL: CPT

## 2024-12-08 PROCEDURE — 80061 LIPID PANEL: CPT

## 2024-12-09 ENCOUNTER — OFFICE VISIT (OUTPATIENT)
Dept: HEMATOLOGY ONCOLOGY | Facility: CLINIC | Age: 54
End: 2024-12-09
Payer: COMMERCIAL

## 2024-12-09 VITALS
RESPIRATION RATE: 18 BRPM | HEART RATE: 61 BPM | TEMPERATURE: 97.9 F | DIASTOLIC BLOOD PRESSURE: 80 MMHG | OXYGEN SATURATION: 97 % | WEIGHT: 272 LBS | BODY MASS INDEX: 33.82 KG/M2 | SYSTOLIC BLOOD PRESSURE: 124 MMHG | HEIGHT: 75 IN

## 2024-12-09 DIAGNOSIS — C62.90 SEMINOMA (HCC): Primary | ICD-10-CM

## 2024-12-09 PROCEDURE — 99214 OFFICE O/P EST MOD 30 MIN: CPT | Performed by: INTERNAL MEDICINE

## 2024-12-09 RX ORDER — CEPHALEXIN 500 MG/1
CAPSULE ORAL
COMMUNITY
Start: 2024-11-25

## 2024-12-10 ENCOUNTER — RESULTS FOLLOW-UP (OUTPATIENT)
Dept: FAMILY MEDICINE CLINIC | Facility: CLINIC | Age: 54
End: 2024-12-10

## 2024-12-12 ENCOUNTER — OFFICE VISIT (OUTPATIENT)
Dept: UROLOGY | Facility: AMBULATORY SURGERY CENTER | Age: 54
End: 2024-12-12
Payer: COMMERCIAL

## 2024-12-12 ENCOUNTER — TELEPHONE (OUTPATIENT)
Dept: GASTROENTEROLOGY | Facility: CLINIC | Age: 54
End: 2024-12-12

## 2024-12-12 VITALS
BODY MASS INDEX: 33.32 KG/M2 | HEIGHT: 75 IN | WEIGHT: 268 LBS | DIASTOLIC BLOOD PRESSURE: 80 MMHG | SYSTOLIC BLOOD PRESSURE: 126 MMHG | OXYGEN SATURATION: 98 % | HEART RATE: 54 BPM

## 2024-12-12 DIAGNOSIS — C62.12 MALIGNANT NEOPLASM OF DESCENDED LEFT TESTIS (HCC): Primary | ICD-10-CM

## 2024-12-12 PROCEDURE — 99214 OFFICE O/P EST MOD 30 MIN: CPT | Performed by: UROLOGY

## 2024-12-12 NOTE — LETTER
2024     Maurice Muñoz DO  [address]  [facility] [address]    Patient: Mario Ratliff   YOB: 1970   Date of Visit: 2024       Dear Dr. Muñoz:    Thank you for referring Mario Ratliff to me for evaluation. Below are my notes for this consultation.    If you have questions, please do not hesitate to call me. I look forward to following your patient along with you.         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  He appears to be in remission at this time.  Now that he is 1.5 years out from his initial diagnosis I recommend repeating his tumor markers, chest x-ray, CAT scan in 6 months.  I have instructed him on self testicular examination of his solitary right testicle.  I recommend that he return in 1 year with me for repeat physical examination and scrotal ultrasound secondary to microlithiasis.  Lastly, he will continue to follow with endocrinology for his hypogonadism for which she is using AndroGel at this time.  Based on his PSA less than  1 without family history of prostate cancer, PSA testing can be performed on an every other year basis.    History of Present Illness  Mario Ratliff is a 54 y.o. 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  He was diagnosed in the summer 2023.  He then subsequently underwent left orchiectomy.  He had a 7 cm retroperitoneal mass at the time of his orchiectomy.  He ultimately received 4 cycles of BEP.  His final pathology of the left testicle from June 2023 was negative for malignancy.  It is likely that he had a burnt out testicular tumor.  His most recent tumor markers are all within the normal range.  He follows with Dr. Vázquez from medical oncology.  His most recent imaging study including a chest x-ray as well as a CT scan of the abdomen and pelvis reveals subcentimeter retroperitoneal and right external iliac lymph nodes.  He is currently on surveillance.  Recently he had bilateral breast reduction for gynecomastia.  Recent PSA from July 2024 is noted to be 0.77.    AUA SYMPTOM SCORE      Flowsheet Row Most Recent Value   AUA SYMPTOM SCORE    How often have you had a sensation of not emptying your bladder completely after you finished urinating? 0 (P)     How often have you had to urinate again less than two hours after you finished urinating? 0 (P)     How often have you found you stopped and started again several times when you urinate? 0 (P)     How often have you found it difficult to postpone urination? 0 (P)     How often have you had a weak urinary stream? 0 (P)     How often have you had to push or strain to begin urination? 0 (P)     How many times did you most typically get up to urinate from the time you went to bed at night until the time you got up in the morning? 1 (P)     Quality of Life: If you were to spend the rest of your life with your urinary condition just the way it is now, how would you feel  "about that? 2 (P)     AUA SYMPTOM SCORE 1 (P)            Review of Systems       Objective  /80 (BP Location: Left arm, Patient Position: Sitting, Cuff Size: Standard)   Pulse (!) 54   Ht 6' 3\" (1.905 m)   Wt 122 kg (268 lb)   SpO2 98%   BMI 33.50 kg/m²     Physical Exam on examination he is in no acute distress.  His abdomen is soft nontender nondistended.  Left inguinal scar appreciated.  Solitary right testis is normal.  Skin is warm.  Extremities without edema.  Neurologic is grossly intact and nonfocal.  Abdomen is soft nontender nondistended    Results  Lab Results   Component Value Date    PSA 0.722 07/13/2024    PSA 0.44 03/20/2024    PSA 0.6 12/28/2022     Lab Results   Component Value Date    CALCIUM 9.2 12/08/2024    K 3.8 12/08/2024    CO2 28 12/08/2024     12/08/2024    BUN 25 12/08/2024    CREATININE 1.08 12/08/2024     Lab Results   Component Value Date    WBC 5.98 11/22/2024    HGB 14.9 11/22/2024    HCT 42.5 11/22/2024    MCV 93 11/22/2024     11/22/2024       Office Urine Dip  No results found for this or any previous visit (from the past hour).]       "

## 2024-12-12 NOTE — TELEPHONE ENCOUNTER
Scheduled 01.31.25  Physician performing colonoscopy:DR ANDERSEN  Location of [facility]oscopy:[facility]  Bowel prep reviewed with patient:DUL/CARMEL  Instructions reviewed with patient by:SENT VIA [employer]  Clearances: N/A    12/12/24  Screened by: Jose Weller MA    Referring Provider DR LARSON    Pre- Screening:     There is no height or weight on file to calculate BMI.  Has patient been referred for a routine screening Colonoscopy? yes  Is the patient between 45-75 years old? yes      Previous Colonoscopy yes   If yes:    Date: 2019    Facility: [facility]    Reason:       SCHEDULING STAFF: If the patient is between 45yrs-49yrs, please advise patient to confirm benefits/coverage with their insurance company for a routine screening colonoscopy, some insurance carriers will only cover at 50yrs or older. If the patient is over 75years old, please schedule an office visit.     Does the patient want to see a Gastroenterologist prior to their procedure OR are they having any GI symptoms? no    Has the patient been hospitalized or had abdominal surgery in the past 6 months? no    Does the patient use supplemental oxygen? no    Does the patient take Coumadin, Lovenox, Plavix, Elliquis, Xarelto, or other blood thinning medication? no    Has the patient had a stroke, cardiac event, or stent placed in the past year? no    SCHEDULING STAFF: If patient answers NO to above questions, then schedule procedure. If patient answers YES to above questions, then schedule office appointment.     If patient is between 45yrs - 49yrs, please advise patient that we will have to confirm benefits & coverage with their insurance company for a routine screening colonoscopy.

## 2024-12-12 NOTE — PROGRESS NOTES
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  He appears to be in remission at this time.  Now that he is 1.5 years out from his initial diagnosis I recommend repeating his tumor markers, chest x-ray, CAT scan in 6 months.  I have instructed him on self testicular examination of his solitary right testicle.  I recommend that he return in 1 year with me for repeat physical examination and scrotal ultrasound secondary to microlithiasis.  Lastly, he will continue to follow with endocrinology for his hypogonadism for which she is using AndroGel at this time.  Based on his PSA less than 1 without family history of prostate cancer, PSA testing can be performed on an every other year basis.    History of Present Illness   Mario Ratliff is a 54 y.o. 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  He was diagnosed in the summer 2023.  He then subsequently underwent left orchiectomy.  He had a 7 cm retroperitoneal mass at the time of his orchiectomy.  He ultimately received 4 cycles of BEP.  His final pathology of the left testicle from 2023 was  "negative for malignancy.  It is likely that he had a burnt out testicular tumor.  His most recent tumor markers are all within the normal range.  He follows with Dr. Vázquez from medical oncology.  His most recent imaging study including a chest x-ray as well as a CT scan of the abdomen and pelvis reveals subcentimeter retroperitoneal and right external iliac lymph nodes.  He is currently on surveillance.  Recently he had bilateral breast reduction for gynecomastia.  Recent PSA from July 2024 is noted to be 0.77.    AUA SYMPTOM SCORE      Flowsheet Row Most Recent Value   AUA SYMPTOM SCORE    How often have you had a sensation of not emptying your bladder completely after you finished urinating? 0 (P)     How often have you had to urinate again less than two hours after you finished urinating? 0 (P)     How often have you found you stopped and started again several times when you urinate? 0 (P)     How often have you found it difficult to postpone urination? 0 (P)     How often have you had a weak urinary stream? 0 (P)     How often have you had to push or strain to begin urination? 0 (P)     How many times did you most typically get up to urinate from the time you went to bed at night until the time you got up in the morning? 1 (P)     Quality of Life: If you were to spend the rest of your life with your urinary condition just the way it is now, how would you feel about that? 2 (P)     AUA SYMPTOM SCORE 1 (P)            Review of Systems       Objective   /80 (BP Location: Left arm, Patient Position: Sitting, Cuff Size: Standard)   Pulse (!) 54   Ht 6' 3\" (1.905 m)   Wt 122 kg (268 lb)   SpO2 98%   BMI 33.50 kg/m²     Physical Exam on examination he is in no acute distress.  His abdomen is soft nontender nondistended.  Left inguinal scar appreciated.  Solitary right testis is normal.  Skin is warm.  Extremities without edema.  Neurologic is grossly intact and nonfocal.  Abdomen is soft nontender " nondistended    Results  Lab Results   Component Value Date    PSA 0.722 07/13/2024    PSA 0.44 03/20/2024    PSA 0.6 12/28/2022     Lab Results   Component Value Date    CALCIUM 9.2 12/08/2024    K 3.8 12/08/2024    CO2 28 12/08/2024     12/08/2024    BUN 25 12/08/2024    CREATININE 1.08 12/08/2024     Lab Results   Component Value Date    WBC 5.98 11/22/2024    HGB 14.9 11/22/2024    HCT 42.5 11/22/2024    MCV 93 11/22/2024     11/22/2024       Office Urine Dip  No results found for this or any previous visit (from the past hour).]

## 2025-01-30 ENCOUNTER — ANESTHESIA EVENT (OUTPATIENT)
Dept: ANESTHESIOLOGY | Facility: HOSPITAL | Age: 55
End: 2025-01-30

## 2025-01-30 ENCOUNTER — ANESTHESIA (OUTPATIENT)
Dept: ANESTHESIOLOGY | Facility: HOSPITAL | Age: 55
End: 2025-01-30

## 2025-01-30 RX ORDER — SODIUM CHLORIDE, SODIUM LACTATE, POTASSIUM CHLORIDE, CALCIUM CHLORIDE 600; 310; 30; 20 MG/100ML; MG/100ML; MG/100ML; MG/100ML
125 INJECTION, SOLUTION INTRAVENOUS CONTINUOUS
Status: CANCELLED | OUTPATIENT
Start: 2025-01-30

## 2025-01-30 NOTE — ANESTHESIA PREPROCEDURE EVALUATION
Procedure:  PRE-OP ONLY    Relevant Problems   HEMATOLOGY   (+) Immunocompromised (HCC)   (+) Normocytic anemia      NEURO/PSYCH   (+) Headache      Oncology   (+) Chemotherapy-induced neutropenia (HCC)      FEN/Gastrointestinal   (+) Intractable nausea and vomiting      Hx testicular CA         Anesthesia Plan  ASA Score- 2     Anesthesia Type- IV sedation with anesthesia with ASA Monitors.         Additional Monitors:     Airway Plan:            Plan Factors-    Chart reviewed.    Patient summary reviewed.                  Induction- intravenous.    Postoperative Plan-         Informed Consent-       NPO Status:  No vitals data found for the desired time range.

## 2025-01-31 ENCOUNTER — ANESTHESIA (OUTPATIENT)
Dept: GASTROENTEROLOGY | Facility: HOSPITAL | Age: 55
End: 2025-01-31
Payer: COMMERCIAL

## 2025-01-31 ENCOUNTER — ANESTHESIA EVENT (OUTPATIENT)
Dept: GASTROENTEROLOGY | Facility: HOSPITAL | Age: 55
End: 2025-01-31
Payer: COMMERCIAL

## 2025-01-31 ENCOUNTER — HOSPITAL ENCOUNTER (OUTPATIENT)
Dept: GASTROENTEROLOGY | Facility: HOSPITAL | Age: 55
Setting detail: OUTPATIENT SURGERY
End: 2025-01-31
Attending: COLON & RECTAL SURGERY
Payer: COMMERCIAL

## 2025-01-31 VITALS
DIASTOLIC BLOOD PRESSURE: 75 MMHG | SYSTOLIC BLOOD PRESSURE: 118 MMHG | WEIGHT: 263 LBS | OXYGEN SATURATION: 100 % | BODY MASS INDEX: 32.7 KG/M2 | HEART RATE: 59 BPM | TEMPERATURE: 97.9 F | HEIGHT: 75 IN | RESPIRATION RATE: 20 BRPM

## 2025-01-31 DIAGNOSIS — Z86.0100 HX OF COLONIC POLYP: ICD-10-CM

## 2025-01-31 PROCEDURE — 45380 COLONOSCOPY AND BIOPSY: CPT | Performed by: COLON & RECTAL SURGERY

## 2025-01-31 PROCEDURE — 88305 TISSUE EXAM BY PATHOLOGIST: CPT | Performed by: SPECIALIST

## 2025-01-31 RX ORDER — PROPOFOL 10 MG/ML
INJECTION, EMULSION INTRAVENOUS AS NEEDED
Status: DISCONTINUED | OUTPATIENT
Start: 2025-01-31 | End: 2025-01-31

## 2025-01-31 RX ORDER — LIDOCAINE HYDROCHLORIDE 10 MG/ML
INJECTION, SOLUTION EPIDURAL; INFILTRATION; INTRACAUDAL; PERINEURAL AS NEEDED
Status: DISCONTINUED | OUTPATIENT
Start: 2025-01-31 | End: 2025-01-31

## 2025-01-31 RX ORDER — SODIUM CHLORIDE 9 MG/ML
INJECTION, SOLUTION INTRAVENOUS CONTINUOUS PRN
Status: DISCONTINUED | OUTPATIENT
Start: 2025-01-31 | End: 2025-01-31

## 2025-01-31 RX ADMIN — PROPOFOL 150 MG: 10 INJECTION, EMULSION INTRAVENOUS at 07:43

## 2025-01-31 RX ADMIN — PROPOFOL 50 MG: 10 INJECTION, EMULSION INTRAVENOUS at 08:09

## 2025-01-31 RX ADMIN — PROPOFOL 50 MG: 10 INJECTION, EMULSION INTRAVENOUS at 08:01

## 2025-01-31 RX ADMIN — PROPOFOL 50 MG: 10 INJECTION, EMULSION INTRAVENOUS at 07:55

## 2025-01-31 RX ADMIN — PROPOFOL 50 MG: 10 INJECTION, EMULSION INTRAVENOUS at 07:46

## 2025-01-31 RX ADMIN — PROPOFOL 50 MG: 10 INJECTION, EMULSION INTRAVENOUS at 07:50

## 2025-01-31 RX ADMIN — SODIUM CHLORIDE: 0.9 INJECTION, SOLUTION INTRAVENOUS at 07:41

## 2025-01-31 RX ADMIN — LIDOCAINE HYDROCHLORIDE 20 MG: 10 INJECTION, SOLUTION EPIDURAL; INFILTRATION; INTRACAUDAL; PERINEURAL at 07:43

## 2025-01-31 NOTE — ANESTHESIA PREPROCEDURE EVALUATION
Procedure:  COLONOSCOPY    Relevant Problems   HEMATOLOGY   (+) Immunocompromised (HCC)   (+) Normocytic anemia      NEURO/PSYCH   (+) Headache      Hx testicular CA    Physical Exam    Airway    Mallampati score: III  TM Distance: >3 FB  Neck ROM: full     Dental   Comment: Temporary tooth to be removed as per patient     Cardiovascular      Pulmonary      Other Findings        Anesthesia Plan  ASA Score- 2     Anesthesia Type- IV sedation with anesthesia with ASA Monitors.         Additional Monitors:     Airway Plan:            Plan Factors-    Chart reviewed.    Patient summary reviewed.    Patient is not a current smoker.              Induction- intravenous.    Postoperative Plan-         Informed Consent- Anesthetic plan and risks discussed with patient.  I personally reviewed this patient with the CRNA. Discussed and agreed on the Anesthesia Plan with the CRNA..      NPO Status:  Vitals Value Taken Time   Date of last liquid 01/31/25 01/31/25 0649   Time of last liquid 0100 01/31/25 0649   Date of last solid 01/29/25 01/31/25 0649   Time of last solid

## 2025-01-31 NOTE — ANESTHESIA POSTPROCEDURE EVALUATION
Post-Op Assessment Note    CV Status:  Stable  Pain Score: 0    Pain management: adequate       Mental Status:  Awake and alert   Hydration Status:  Stable   PONV Controlled:  None   Airway Patency:  Patent     Post Op Vitals Reviewed: Yes    No anethesia notable event occurred.    Staff: CRNA           Last Filed PACU Vitals:  Vitals Value Taken Time   Temp 97.9 °F (36.6 °C) 01/31/25 0815   Pulse 59 01/31/25 0815   /68 01/31/25 0815   Resp 20 01/31/25 0815   SpO2 99 % 01/31/25 0815       Modified Venkatesh:     Vitals Value Taken Time   Activity 2 01/31/25 0816   Respiration 2 01/31/25 0816   Circulation 2 01/31/25 0816   Consciousness 1 01/31/25 0816   Oxygen Saturation 2 01/31/25 0816     Modified Venkatesh Score: 9

## 2025-01-31 NOTE — H&P
H&P - Colorectal   Name: Mario Ratliff 54 y.o. male I MRN: [id number]  Unit/Bed#:  I Date of Admission: 1/31/2025   Date of Service: 1/31/2025 I Hospital Day: 0     Assessment & Plan  Hx of colonic polyp  2019 last colonoscopy, history of polyps, due for recall.  Screening colonoscopy,discussed in a face-to-face, personal, informed consent process, the benefits, alternatives, risks including not limited to bleeding, missed lesion, perforation requiring emergent surgery discussed/understood.      History of Present Illness   Mario Ratliff is a 54 y.o. male who presents for colonoscopy.    Review of Systems  I have reviewed the patient's PMH, PSH, Social History, Family History, Meds, and Allergies  Historical Information   Past Medical History:   Diagnosis Date    Allergic 1/1/2000    seasonal allergies    Cancer (HCC)     testicular cancer    COVID 2021    Gynecomastia, male     History of cancer chemotherapy     History of transfusion     Impacted cerumen     Lymphadenopathy     Obesity, unspecified     Seasonal allergies     Seminoma of testis (HCC)     Testicular cancer (HCC) 01/17/2023    Torn meniscus     right knee- surgical repair today 7/19/2021    Wears glasses      Past Surgical History:   Procedure Laterality Date    COLONOSCOPY      FACIAL/NECK BIOPSY Left 01/11/2023    Procedure: EXCISION OF LEFT NECK LYMPH NODE WITH LYMPHOMA PROTOCOL;  Surgeon: Wilber Kasper MD;  [facility];  Service: Surgical Oncology    FL GUIDED CENTRAL VENOUS ACCESS DEVICE INSERTION  01/23/2023    HEMORROIDECTOMY      IR PORT REMOVAL      KNEE SURGERY Right 7/19/2021    HI ARTHRS KNE SURG W/MENISCECTOMY MED/LAT W/SHVG Right 07/19/2021    Procedure: ARTHROSCOPY KNEE, partial lateral meniscectomy;  Surgeon: Guy Vides MD;  [facility];  Service: Orthopedics    HI ARTHRS KNE SURG W/MENISCECTOMY MED/LAT W/SHVG Left 12/05/2023    Procedure: ARTHROSCOPY KNEE, partial lateral meniscectomy;  Surgeon: Guy Cristina  MD Peewee;  Location[facility];  Service: Orthopedics    MN MASTECTOMY FOR GYNECOMASTIA Bilateral 12/4/2024    Procedure: GYNECOMASECTOMY;  Surgeon: Reginald Deluna MD;  Location: [facility];  Service: Plastics    MN ORCHIECTOMY RADICAL TUMOR INGUINAL APPROACH Left 06/16/2023    Procedure: RADICAL ORCHIECTOMY INGUINAL; EXCISION CORD LIPOMA;  Surgeon: Ky Del Toro MD;  [facility];  Service: Urology    SKIN BIOPSY      TUNNELED VENOUS PORT PLACEMENT N/A 01/23/2023    Procedure: INSERTION VENOUS PORT (PORT-A-CATH);  Surgeon: Wilber Kasper MD;  Location[facility];  Service: Surgical Oncology     Social History     Tobacco Use    Smoking status: Never     Passive exposure: Past    Smokeless tobacco: Never    Tobacco comments:     During childhood, both parents smoked.   Vaping Use    Vaping status: Never Used   Substance and Sexual Activity    Alcohol use: Yes     Alcohol/week: 3.0 standard drinks of alcohol     Types: 1 Glasses of wine, 1 Cans of beer, 1 Shots of liquor per week     Comment: 1-2 weekly    Drug use: Never    Sexual activity: Yes     Partners: Female     E-Cigarette/Vaping    E-Cigarette Use Never User      E-Cigarette/Vaping Substances    Nicotine No     THC No     CBD No     Flavoring No     Other No     Unknown No      Family History   Problem Relation Age of Onset    Cancer Mother         Breast Cancer    Breast cancer Mother 50 - 59    Breast cancer additional onset Mother     Squamous cell carcinoma Father     Basal cell carcinoma Father     Cancer Father         Skin carcenoma    Basal cell carcinoma Sister     Breast cancer Sister 40 - 49    Cancer Sister         Breast Cancer and Skin carcenoma    Breast cancer additional onset Sister     Basal cell carcinoma Brother     Pancreatic cancer Maternal Aunt     Pancreatic cancer Maternal Aunt     Breast cancer Maternal Grandmother 32    No Known Problems Maternal Grandfather     No Known Problems Paternal Grandmother     No  "Known Problems Paternal Grandfather     No Known Problems Daughter        Objective :  Temp:  [96.7 °F (35.9 °C)] 96.7 °F (35.9 °C)  HR:  [64] 64  BP: (133)/(81) 133/81  Resp:  [16] 16  SpO2:  [95 %] 95 %  O2 Device: None (Room air)      Physical Exam  HENT:      Head: Normocephalic.   Eyes:      Pupils: Pupils are equal, round, and reactive to light.   Cardiovascular:      Rate and Rhythm: Normal rate.   Pulmonary:      Effort: Pulmonary effort is normal.   Abdominal:      General: There is no distension.      Palpations: Abdomen is soft.   Musculoskeletal:      Right lower leg: No edema.      Left lower leg: No edema.   Neurological:      Mental Status: He is alert.         Lab Results: I have reviewed the following results:  No results for input(s): \"WBC\", \"HGB\", \"HCT\", \"PLT\", \"BANDSPCT\", \"SODIUM\", \"K\", \"CL\", \"CO2\", \"BUN\", \"CREATININE\", \"GLUC\", \"CAIONIZED\", \"MG\", \"PHOS\", \"AST\", \"ALT\", \"ALB\", \"TBILI\", \"DBILI\", \"ALKPHOS\", \"PTT\", \"INR\", \"HSTNI0\", \"HSTNI2\", \"BNP\", \"LACTICACID\" in the last 72 hours.    Imaging Results Review: No pertinent imaging studies reviewed.  Other Study Results Review: No additional pertinent studies reviewed.          "

## 2025-02-03 ENCOUNTER — RESULTS FOLLOW-UP (OUTPATIENT)
Dept: OTHER | Facility: HOSPITAL | Age: 55
End: 2025-02-03

## 2025-02-03 PROCEDURE — 88305 TISSUE EXAM BY PATHOLOGIST: CPT | Performed by: SPECIALIST

## 2025-02-14 NOTE — TELEPHONE ENCOUNTER
HEART CARE NOTE          Assessment/Recommendations   1. HFpEF/RV dysfunction c/b severe ADHF  Assessment / Plan  Transition to oral diuretic regimen; continue to monitor UOP and renal function closely  Patient is at increased risk for adverse cardiac events 2/2 non-compliance, morbid obesity, renal dysfunction     2. CKD  Assessment / Plan  [facility] with [facility]  Diuresis as above; renal function wnl; continue to monitor UOP and renal function closely     3. HTN  Assessment / Plan   Adequately controlled - no additional recommendations at this time     4. NARCISA  Assessment / Plan  CPAP at bedtime     5. PE c/b Core pulmonale + pulmonary HTN  Assessment / Plan  Hx of PE; not currently on AC - defer management to primary  C/b Core pulmonale; pulm Htn likely multifactorial given hx of PE and current WHO Group 2 2/2 volume overload - repeat echo oncer near euvolemia     Plan of care discussed on February 14, 2025 with patient at bedside, and primary team overseeing patient's care      History of Present Illness/Subjective    Ms. Bess Enamorado is a 48 year old female with a PMHx significant for (per Epic notation) multiple sclerosis, hypertension, CHF presented to the ED by ambulance for evaluation post fall     Today, Mrs. Enamorado denies acute cardiac events or complaints; Management plan as detailed above     ECG: Personally reviewed. normal sinus rhythm, nonspecific ST and T waves changes, incomplete RBBB, right axis deviation.     ECHO (personnaly Reviewed on 2/13/24):   1. Technically difficult study.  2. The left ventricle is normal in size.Image quality does not provide for  detailed assessment of LV systolic function, but is felt to be normal with a  visually estimated ejection fraction of roughly 65%.  3. There is abnormal septal motion c/w right ventricular overload.  4. Probable mild-moderate tricuspid insufficiency (the color Doppler tricuspid  insufficiency jet is poorly visualized).  5. There is  Received VM: Hi, this is Praveena Barber calling  I'm calling in reference to my  Laura Wilson  He's a patient of doctor Ricky Rodriguez  If someone could please give me a call back at [phone]  I just received a call from the [facility] and I would like a call back please  Thank you  Bye  Returned call to patient    Left voice message with direct call back number "severe right ventricular enlargement with severely reduced right  ventricular systolic performance.  6. The left atrium appears enlarged though difficult to quantify due to  inadequate visualization. There is severe right atrial enlargement.  7. Right ventricular systolic pressure relative to right atrial pressure is  moderate-severely increased. The pulmonary artery pressure is estimated to be  70-75 mmHg plus right atrial pressure. In addition, the IVC appears dilated  with decreased phasic variation in caval diameter consistent with elevated  right atrial pressure.     When compared to the prior real-time echocardiogram dated 19 July 2024, the  pulmonary artery pressure estimate measures slightly less on the current  study. The findings are otherwise felt to be fairly similar on both  examinations (although the right ventricular size was previously described as  only mildly enlarged, it is this reader's impression that right ventricular  size was severely increased on the prior study with concomitant severe  depression and right ventricular systolic performance).    Telemetry: personally reviewed February 14, 2025; notable for sinus rhythm     Lab results: personally reviewed February 14, 2025; notable for renal function wnl    Medical history and pertinent documents reviewed in Care Everywhere please where applicable see details above        Physical Examination Review of Systems   BP 99/56 (BP Location: Left arm)   Pulse 84   Temp 97.6  F (36.4  C) (Axillary)   Resp 20   Ht 1.626 m (5' 4\")   Wt (!) 149.7 kg (330 lb)   SpO2 95%   BMI 56.64 kg/m    Body mass index is 56.64 kg/m .  Wt Readings from Last 3 Encounters:   02/11/25 (!) 149.7 kg (330 lb)   01/13/25 140.6 kg (310 lb)   09/11/24 135.2 kg (298 lb)     General Appearance:   no distress, normal body habitus   ENT/Mouth: membranes moist, no oral lesions or bleeding gums.      EYES:  no scleral icterus, normal conjunctivae   Neck: no carotid bruits or " thyromegaly   Chest/Lungs:   lungs are clear to auscultation, no rales or wheezing, equal chest wall expansion    Cardiovascular:   Regular. Normal first and second heart sounds with no murmurs, rubs, or gallops; the carotid, radial and posterior tibial pulses are intact, + JVD     Abdomen:  no organomegaly, masses, bruits, or tenderness; bowel sounds are present   Extremities: no cyanosis or clubbing   Skin: no xanthelasma, warm.    Neurologic: NAD     Psychiatric: alert and oriented x3, calm     A complete 10 systems ROS was reviewed  And is negative except what is listed in the HPI.          Medical History  Surgical History Family History Social History   Past Medical History:   Diagnosis Date    Acute on chronic diastolic congestive heart failure (H) 02/09/2022    Arthritis 2019    Hips    ASCUS favor benign 08/2015    Neg HPV    Depressive disorder 2010    H/O colposcopy with cervical biopsy 09/14/2015    Bx & ECC - negative    Hypertension 2019    LSIL on Pap smear 07/2014    Neg high risk HPV    Multiple sclerosis (H) 08/29/2005 9/18/200pt dx with MS 2004--dx by neurolgist and is followed by Dr. Harris    Myocardial infarction (H)     Obese     Pneumonia due to infectious organism, unspecified laterality, unspecified part of lung 02/08/2022    Sepsis (Temp 101, , WBC 13.0) 10/23/2018    Shingles 10/2016    SIRS (systemic inflammatory response syndrome) (H) 03/04/2021    Sleep apnea     UNSPEC CONSTIPATION 09/18/2006 9/18/2006   Has tried otc suppository and otc lax.     Past Surgical History:   Procedure Laterality Date    CHOLECYSTECTOMY, LAPOROSCOPIC      Cholecystectomy, Laparoscopic    COLONOSCOPY  2007?    Bathroom issue..results normal    COMBINED CYSTOSCOPY, RETROGRADES, EXCHANGE STENT URETER(S) Right 2/21/2022    Procedure: CYSTOSCOPY, WITH right RETROGRADE PYELOGRAM AND right URETERAL STENT PLACEMENT;  Surgeon: Doyle Palomares MD;  [place] [facility] [place] REDUCTION INTERNAL  FIXATION TOE(S)  4/13/2012    Procedure:OPEN REDUCTION INTERNAL FIXATION TOE(S); Open reduction internal fixation right proximal fifth metatarsal fracture-   Anes-choice block; Surgeon:LEY, JEFFREY DUANE; Location:[facility]    PICC SINGLE LUMEN PLACEMENT  3/20/2024    PICC TRIPLE LUMEN PLACEMENT  2/21/2022         no family history of premature coronary artery disease Social History     Socioeconomic History    Marital status: Single     Spouse name: Not on file    Number of children: Not on file    Years of education: Not on file    Highest education level: Not on file   Occupational History     Employer: [employer]   Tobacco Use    Smoking status: Never    Smokeless tobacco: Never   Vaping Use    Vaping status: Never Used   Substance and Sexual Activity    Alcohol use: Yes     Comment: social    Drug use: No    Sexual activity: Not Currently     Partners: Male     Birth control/protection: Pill   Other Topics Concern    Parent/sibling w/ CABG, MI or angioplasty before 65F 55M? No   Social History Narrative    , 3rd-5th grade     Social Drivers of Health     Financial Resource Strain: Low Risk  (2/12/2025)    Financial Resource Strain     Within the past 12 months, have you or your family members you live with been unable to get utilities (heat, electricity) when it was really needed?: No   Food Insecurity: Low Risk  (2/12/2025)    Food Insecurity     Within the past 12 months, did you worry that your food would run out before you got money to buy more?: No     Within the past 12 months, did the food you bought just not last and you didn t have money to get more?: No   Transportation Needs: Low Risk  (2/12/2025)    Transportation Needs     Within the past 12 months, has lack of transportation kept you from medical appointments, getting your medicines, non-medical meetings or appointments, work, or from getting things that you need?: No   Physical Activity: Inactive (3/21/2024)    Exercise  Vital Sign     Days of Exercise per Week: 0 days     Minutes of Exercise per Session: 0 min   Stress: No Stress Concern Present (12/4/2020)    [employer] - Occupational Stress Questionnaire     Feeling of Stress : Only a little   Social Connections: Unknown (3/1/2022)    Social Connection and Isolation Panel [NHANES]     Frequency of Communication with Friends and Family: Twice a week     Frequency of Social Gatherings with Friends and Family: Twice a week     Attends Nondenominational Services: Not on file     Active Member of Clubs or Organizations: Not on file     Attends Club or Organization Meetings: Not on file     Marital Status: Not on file   Interpersonal Safety: Low Risk  (2/12/2025)    Interpersonal Safety     Do you feel physically and emotionally safe where you currently live?: Yes     Within the past 12 months, have you been hit, slapped, kicked or otherwise physically hurt by someone?: No     Within the past 12 months, have you been humiliated or emotionally abused in other ways by your partner or ex-partner?: No   Housing Stability: Low Risk  (2/12/2025)    Housing Stability     Do you have housing? : Yes     Are you worried about losing your housing?: No           Lab Results    Chemistry/lipid CBC Cardiac Enzymes/BNP/TSH/INR   Lab Results   Component Value Date    CHOL 81 02/12/2025    HDL 11 (L) 02/12/2025    TRIG 134 02/12/2025    BUN 15.1 02/14/2025     02/14/2025    CO2 31 (H) 02/14/2025    Lab Results   Component Value Date    WBC 6.0 02/14/2025    HGB 13.3 02/14/2025    HCT 42.6 02/14/2025    MCV 88 02/14/2025     02/14/2025    Lab Results   Component Value Date    TROPONINI 0.03 04/06/2022     (H) 04/06/2022    TSH 3.52 02/12/2025    INR 1.35 (H) 04/22/2024     Lab Results   Component Value Date    TROPONINI 0.03 04/06/2022          Weight:    Wt Readings from Last 3 Encounters:   02/11/25 (!) 149.7 kg (330 lb)   01/13/25 140.6 kg (310 lb)   09/11/24  135.2 kg (298 lb)       Allergies  No Known Allergies      Surgical History  Past Surgical History:   Procedure Laterality Date    CHOLECYSTECTOMY, LAPOROSCOPIC      Cholecystectomy, Laparoscopic    COLONOSCOPY  2007?    Bathroom issue..results normal    COMBINED CYSTOSCOPY, RETROGRADES, EXCHANGE STENT URETER(S) Right 2/21/2022    Procedure: CYSTOSCOPY, WITH right RETROGRADE PYELOGRAM AND right URETERAL STENT PLACEMENT;  Surgeon: Doyle Palomares MD;  Location: [facility] [place]    OPEN REDUCTION INTERNAL FIXATION TOE(S)  4/13/2012    Procedure:OPEN REDUCTION INTERNAL FIXATION TOE(S); Open reduction internal fixation right proximal fifth metatarsal fracture-   Anes-choice block; Surgeon:LEY, JEFFREY DUANE; Location:[facility]    PICC SINGLE LUMEN PLACEMENT  3/20/2024    PICC TRIPLE LUMEN PLACEMENT  2/21/2022            Social History  Tobacco:   History   Smoking Status    Never   Smokeless Tobacco    Never    Alcohol:   Social History    Substance and Sexual Activity      Alcohol use: Yes        Comment: social   Illicit Drugs:   History   Drug Use No       Family History  Family History   Problem Relation Age of Onset    Neurologic Disorder Father         MS    Heart Disease Father         irregular heart rate    Diabetes Father     Melanoma Father     Sleep Apnea Father     Other Cancer Father     Cancer Maternal Grandfather         lung    Other Cancer Maternal Grandfather     Cancer Paternal Grandmother         stomach    Other Cancer Paternal Grandmother     Cancer Mother     Other Cancer Mother     Thyroid Disease Mother     Gynecology Maternal Aunt         PCOS    Hypertension Maternal Grandmother     Anxiety Disorder Maternal Grandmother     Thyroid Disease Maternal Grandmother     Anxiety Disorder Sister           Raúl Sanchez MD on 2/14/2025      cc: Mikala Luna

## 2025-02-28 NOTE — ASSESSMENT & PLAN NOTE
This is a 54 y.o. c PMHx notable for seasonal allergies, 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 on surveillance  Orders:    CT abdomen pelvis w wo contrast; Future    XR chest pa and lateral; Future

## 2025-02-28 NOTE — PROGRESS NOTES
Name: Mario Ratliff      : 1970      MRN: [id number]  Encounter Provider: Nancy Vázquez MD  Encounter Date: 3/10/2025   Encounter department: [facility] H[facility]AT[facility] [place]  :  Assessment & Plan  Seminoma (HCC)    This is a 54 y.o. 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    History of Present Illness   No chief complaint on file.        Objective   There were no vitals taken for this visit.    Cancer Stage at diagnosis: IIIC    Referral Physician: No ref. provider found    Primary Care Physician:  Maurice Muñoz DO     Nickname: Mario    Spouse: Candis Berry ECO    Today's ECO     Goals and Barriers:  Current Goal: Minimize effects of disease burden, extend life.   Barriers to accomplishing this: None    Patient's Capacity to Self Care:  Patient is able to self care    Code Status: not addressed today    Advanced directives: not addressed today      This is a 54 y.o. 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    Baseline PFT: WNL    C3D15 bleomycin omitted due to severe pancytopenia requiring 2U PRBC.    2023  tumor board: get PET/CT, if any growth, would do dissection, Otherwise, start surveillance thereafter  2023:  tumor board recommendation to pursue surveillance    5/3/2023: , HCG <1 WNL, AFP slightly high for first time at 8.1    Variant 1  NTHL1  c.787C>T (p.R263C); heterozygous; uncertain significance   Variant 2  SDHC c.15G>T (p.L5F); heterozygous; uncertain sig    Discussion of decision making  Oncology history updated, accordingly, during this visit  Goals of care/patient communication  I  discussed with the patient the clinical course leading up to their cancer diagnosis. I reviewed relevant office notes, imaging reports and pathology result as well.  I told the patient that this is a case of potentially curable disease and what this means. We discussed that the goal of anti-cancer therapy is to provide best quality of life, extend overall survival, and progression free survival as shown in clinical trials. We also discussed that there might be a point when the cancer will no longer respond to this anti-neoplastic therapy.   I explained the risks/benefits of the proposed cancer therapy: Bleomycin 30 units IV, Etoposide 100mg/m2, cisplatin 20mg/m2, and after discussion including understanding risks of possible life-threatening complications and therapy-related malignancy development, informed consent for blood products and treatment has been signed and obtained.  TNM/Staging At Diagnosis  [id number] S2  Cancer Staging   IIIC, poor risk (non pulmonary visceral mets)  Disease Features/Tumor Markers/Genetics  Tumor Marker: 1/18/2023:  (3x ULN), AFP (5.2, WNL), HCG (13, slightly high)  7/28/2023: HCG <1, , AFP 4.79  10/5/2023: HCG <1, , AFP 4.51  11/30/2023 HCG< 1, AFP 4.47  1/24/2024: HCG <1, , AFP 4.64  9/4/2024 markers WNL  Notable Path Features:   1/11/2023: Lymph node, left neck, excision: Germ cell tumor, compatible with metastatic seminoma. Background nonnecrotizing granulomatous inflammation.  Treatment: s/p BEP  Other Supportive care:  Treatment Team Members  Surgeon: Dr. Del Toro  Rad Onc  Palliative  Labs: 12/28/2022: creat 0.96, T bili 0.63, WBC 6.49k, Hgb 15.9, plt 244k  1/2024: testosterone 209, estrogen 91 WNL  3/20/2024: HCG <1, LDH WNL, AFP 4.85, CXR WNL  12/8/2024: LDH, AFP, HCG WNL  Diagnostics   1/3/2023 CT soft tissue neck: Moderate left-sided adenopathy primarily within the left neck, posterior jugular chain levels 3 through 5 with a prominent node present  in the upper mediastinum  1/3/2023 CT Chest w/c: Left supraclavicular, superior mediastinal, posterior mediastinal (15mm)/retrocrural (2.2 cm)and suspected partially imaged left abdominal adenopathy  1/4/2023: CT Abd/pelv w/c: Bulky retroperitoneal lymphadenopathy consistent with lymphoma (left para-aortic region extending into the left upper quadrant measures 8.2 x 10.9 cm.  Aortocaval lymphadenopathy measures 3.7 x 5.2 cm.  More inferior left para-aortic lymphadenopathy at the bifurcation measures 5.0 x 5.9 cm.)  1/18/2023: Testicular U/S shows a left testicular lesion (1.5 x 1.7 x 0.9 cm) and the RP adenopathy is more to the Left of the aorta  4/19/2023 CT Chest w/c: No evidence of acute intrathoracic pathology  5/1/2023 CT CAP w/c: Interval marked improvement of retroperitoneal adenopathy.   Mild splenomegaly. Image 93, retrocaval, 9 mm previously 3 cm. Image 96, left para-aortic, 2 cm previously 7.5 cm  6/7/2023 PET/CT: essential CR,  L inguina LN SUV 5 (reduced)  7/28/2023: CXR: No acute cardiopulmonary disease  10/5/2023 CT Abd/pelv w/c: Retroperitoneal lymphadenopathy with index lymph nodes demonstrating continued interval improvement. Splenomegaly  index retroperitoneal/left periaortic lymph nodes largest measuring 14 mm in short axis, previously measured 25 mm in short axis. Retrocaval lymph node measuring 9 x 12 mm, previously measured 13 x 19 mm  10/6/2023: CXR: WNL  11/30/2023 CXR: No acute cardiopulmonary disease  12/13/2023 US L Breast: benign  There is gynecomastia seen in both breasts, minimal on the right and mild on the left. The gynecomastia correlates with the palpable mass and tenderness reported by the patient.  There is a questioned intramammary lymph node in the outer left breast.  Ultrasound breast left limited diagnostic: Targeted ultrasound of the retroareolar left breast was performed for further evaluation of the painful palpable area of concern.  Fibroglandular tissue is visualized in  the retroareolar left breast, consistent with mild gynecomastia.  At 2:00 5 cm from the nipple within the left breast, there is a benign-appearing intramammary lymph node  1/24/2024 CXR: No acute cardiopulmonary disease. No significant change  2/5/2024 CT CAP w/c: Stable retroperitoneal lymphadenopathy. No new suspicious findings  5/17/2024 CT CAP w/c: Progressive marked gynecomastia, left much greater than right.  Scattered retroperitoneal lymph nodes are slightly less prominent than on prior exam and all subcentimeter short axis.. Largest node previously measured 1.3 cm short axis and now measures 1 cm short axis  5/28/2024 TTE LVEF 65-69%  9/4/2024 CXR: WNL  11/20/2024 CT Abd/pelv w/c:  No interval change since previous study. Stable subcentimeter retroperitoneal and pelvic lymph nodes. No new lymphadenopathy. Left orchiectomy  Stable subcentimeter retroperitoneal lymph nodes measuring up to 7 mm in short axis image 78 series 2. Stable right external iliac lymph node measures 9 mm in short axis   12/8/2024 CXR: unofficial read by me no pathology noted    Discussion of decision making    I personally reviewed the following lab results, the image studies, pathology, other specialty/physicians consult notes and recommendations, and outside medical records from [facility]. I had a lengthy discussion with the patient and shared the work-up findings. We discussed the diagnosis and management plan as below. I spent 32 minutes reviewing the records (labs, clinician notes, outside records, medical history, ordering medicine/tests/procedures, interpreting the imaging/labs previously done) and coordination of care as well as direct time with the patient today, of which greater than 50% of the time was spent in counseling and coordination of care with the patient/family.      Plan/Labs  F/u Urology for post-orchiectomy urologic surveillance  Cont surveillance with CT Abd/pelv w/c (ordered 6/2025), CXR (due then and is  ordered)  CMP, AFP, LDH, HCG ordered q12 weeks standing until 8/2025   S/P mammary gland resection 12/9/2024; following with endocrine for this as well    Surveillance for clinical stage III seminoma includes history and physical every 2 months for the first year following completion of chemotherapy which would be until April 2024 along with every 4-month CT scan of the abdomen, pelvis along with every 2-month chest x-ray. (until 6/2024)    Year 2 (started 7/2024) surveillance consists of every 3-month history and physical and serology markers, every 6-month CT abdomen, pelvis, and chest x-ray every 3 months.    Years 3-4 surveillance consists of history and physical and markers every 6 months, annual chest x-ray and CT scan  Year 5 surveillance consists of annual chest x-ray and history and physical and markers with as needed CT scan      Follow Up: q3 months with H&P, serology markers  (do early July)*    All questions were answered to the patient's satisfaction during this encounter. The patient knows the contact information for our office and knows to reach out for any relevant concerns related to this encounter. They are to call for any temperature 100.4 or higher, new symptoms including but not restricted to shaking chills, decreased appetite, nausea, vomiting, diarrhea, increased fatigue, shortness of breath or chest pain, confusion, and not feeling the strength to come to the clinic. For all other listed problems and medical diagnosis in their chart - they are managed by PCP and/or other specialists, which the patient acknowledges. Thank you very much for your consultation and making us a part of this patient's care. We are continuing to follow closely with you. Please do not hesitate to reach out to me with any additional questions or concerns.    Nancy Vázquez MD  Hematology & Medical Oncology Staff Physician             Disclaimer: This document was prepared using [employer] technology.  If a word or phrase is confusing, or does not make sense, this is likely due to recognition error which was not discovered during this clinician's review. If you believe an error has occurred, please contact me through [facility] for rashida?cation.      ONCOLOGY HISTORY OF PRESENT ILLNESS        Oncology History   Seminoma (HCC)   1/11/2023 -  Cancer Staged    Staging form: Testis, AJCC 8th Edition  - Clinical stage from 1/11/2023: cT4, cN3, pM1a, S2 - Signed by Nancy Vázquez MD on 1/20/2023  Stage prefix: Initial diagnosis  Lactate dehydrogenase (LDH) (U/L): 699  Human chorionic gonadotropin (hCG) (mIU/mL): 13  Alpha fetoprotein (AFP) (ng/mL): 5.2  Laterality: Left  Sites of metastasis: Distant lymph nodes, NOS, Retroperitoneal lymph node, NOS  Source of metastatic specimen: Supraclavicular Lymph Nodes  Staged by: Managing physician       1/19/2023 Initial Diagnosis    Seminoma (HCC)     1/30/2023 - 4/17/2023 Chemotherapy    pegfilgrastim (NEULASTA ONPRO), 6 mg, Subcutaneous, Once, 4 of 4 cycles  Administration: 6 mg (2/6/2023), 6 mg (2/27/2023), 6 mg (3/20/2023), 6 mg (3/6/2023), 6 mg (4/17/2023), 6 mg (4/10/2023)  bleomycin (BLENOXANE) IVPB, 30 Units, Intravenous, Once, 4 of 4 cycles  Administration: 30 Units (1/30/2023), 30 Units (2/6/2023), 30 Units (2/13/2023), 30 Units (2/20/2023), 30 Units (2/27/2023), 30 Units (3/6/2023), 30 Units (3/13/2023), 30 Units (3/20/2023), 30 Units (4/3/2023), 30 Units (4/10/2023), 30 Units (4/17/2023)  CISplatin (PLATINOL) infusion, 20 mg/m2 = 52.2 mg, Intravenous, Once, 4 of 4 cycles  Administration: 52.2 mg (1/30/2023), 52.2 mg (1/31/2023), 52.2 mg (2/1/2023), 52.2 mg (2/2/2023), 52.2 mg (2/3/2023), 52.2 mg (2/20/2023), 52.2 mg (2/21/2023), 52.2 mg (2/22/2023), 52.2 mg (2/23/2023), 52.2 mg (2/24/2023), 52.2 mg (3/13/2023), 52.2 mg (3/14/2023), 52.2 mg (3/15/2023), 52.2 mg (3/16/2023), 52.2 mg (3/17/2023), 52.2 mg (4/3/2023), 52.2 mg (4/4/2023), 52.2 mg (4/5/2023),  52.2 mg (4/6/2023), 52.2 mg (4/7/2023)  fosaprepitant (EMEND) IVPB, 150 mg, Intravenous, Once, 4 of 4 cycles  Administration: 150 mg (1/30/2023), 150 mg (2/20/2023), 150 mg (3/13/2023), 150 mg (4/3/2023), 150 mg (2/24/2023), 150 mg (4/10/2023)       Lots of IV Mag and several PRBC required during chemo  C3D15 deferred due to plt <10k, Hgb <8    SUBJECTIVE  (INTERVAL HISTORY)      Clotting History None   Bleeding History None   Cancer History Seminoma   Family Cancer History Mom/sister (breast), same sister (melanoma), father (basal cell skin cancer)   H/O Blood/Plt Transfusion None   Tobacco/etoh/drug abuse No nicotine use, etoh abuse, rec drug use       Cancer Screening history C-scope (2019)   Occupation  at [employer]       Interval events: Mild neuropathy in the fingertips, toes that is persisting (odd feeling in the balls of his feet and tips of his toes). Not dropping items, gripping is better    Overall doing well with no SOB, or new lumps on his person that he has noticed. Chest discomfort gone due to mastectomy.    I have reviewed the relevant past medical, surgical, social and family history. I have also reviewed allergies and medications for this patent.    Review of Systems    Baseline weight: 300 lbs    Has had intermittent lower back, thigh rash since summer of 2022.     Denies F/C, V, SOB, CP, LH, HA, itching, gen weakness, melena, hematuria, hematochezia, falls, diarrhea, or constipation       A 10-point review of system was performed, pertinent positive and negative were detailed as above. Otherwise, the 10-point review of system was negative.      Past Medical History:   Diagnosis Date    Allergic 1/1/2000    seasonal allergies    Cancer (HCC)     testicular cancer    COVID 2021    Gynecomastia, male     History of cancer chemotherapy     History of transfusion     Impacted cerumen     Lymphadenopathy     Obesity, unspecified     Seasonal allergies     Seminoma of testis (HCC)      Testicular cancer (HCC) 01/17/2023    Torn meniscus     right knee- surgical repair today 7/19/2021    Wears glasses        Past Surgical History:   Procedure Laterality Date    COLONOSCOPY      FACIAL/NECK BIOPSY Left 01/11/2023    Procedure: EXCISION OF LEFT NECK LYMPH NODE WITH LYMPHOMA PROTOCOL;  Surgeon: Wilber Kasper MD;  [facility];  Service: Surgical Oncology    FL GUIDED CENTRAL VENOUS ACCESS DEVICE INSERTION  01/23/2023    HEMORROIDECTOMY      IR PORT REMOVAL      KNEE SURGERY Right 7/19/2021    CT ARTHRS KNE SURG W/MENISCECTOMY MED/LAT W/SHVG Right 07/19/2021    Procedure: ARTHROSCOPY KNEE, partial lateral meniscectomy;  Surgeon: Guy Vides MD;  Location: [facility];  Service: Orthopedics    CT ARTHRS KNE SURG W/MENISCECTOMY MED/LAT W/SHVG Left 12/05/2023    Procedure: ARTHROSCOPY KNEE, partial lateral meniscectomy;  Surgeon: Guy Vides MD;  [facility];  Service: Orthopedics    CT MASTECTOMY FOR GYNECOMASTIA Bilateral 12/4/2024    Procedure: GYNECOMASECTOMY;  Surgeon: Reginald Deluna MD;  Location:  MAIN OR;  Service: Plastics    CT ORCHIECTOMY RADICAL TUMOR INGUINAL APPROACH Left 06/16/2023    Procedure: RADICAL ORCHIECTOMY INGUINAL; EXCISION CORD LIPOMA;  Surgeon: Ky Del Toro MD;  [facility];  Service: Urology    SKIN BIOPSY      TUNNELED VENOUS PORT PLACEMENT N/A 01/23/2023    Procedure: INSERTION VENOUS PORT (PORT-A-CATH);  Surgeon: Wilber Kasper MD;  Location: [facility];  Service: Surgical Oncology       Family History   Problem Relation Age of Onset    Cancer Mother         Breast Cancer    Breast cancer Mother 50 - 59    Breast cancer additional onset Mother     Squamous cell carcinoma Father     Basal cell carcinoma Father     Cancer Father         Skin carcenoma    Basal cell carcinoma Sister     Breast cancer Sister 40 - 49    Cancer Sister         Breast Cancer and Skin carcenoma    Breast cancer additional onset Sister      Basal cell carcinoma Brother     Pancreatic cancer Maternal Aunt     Pancreatic cancer Maternal Aunt     Breast cancer Maternal Grandmother 32    No Known Problems Maternal Grandfather     No Known Problems Paternal Grandmother     No Known Problems Paternal Grandfather     No Known Problems Daughter        Social History     Socioeconomic History    Marital status: /Civil Union     Spouse name: Not on file    Number of children: Not on file    Years of education: Not on file    Highest education level: Not on file   Occupational History    Not on file   Tobacco Use    Smoking status: Never     Passive exposure: Past    Smokeless tobacco: Never    Tobacco comments:     During childhood, both parents smoked.   Vaping Use    Vaping status: Never Used   Substance and Sexual Activity    Alcohol use: Yes     Alcohol/week: 3.0 standard drinks of alcohol     Types: 1 Glasses of wine, 1 Cans of beer, 1 Shots of liquor per week     Comment: 1-2 weekly    Drug use: Never    Sexual activity: Yes     Partners: Female   Other Topics Concern    Not on file   Social History Narrative    Not on file     Social Drivers of Health     Financial Resource Strain: Not on file   Food Insecurity: No Food Insecurity (2/13/2023)    Hunger Vital Sign     Worried About Running Out of Food in the Last Year: Never true     Ran Out of Food in the Last Year: Never true   Transportation Needs: No Transportation Needs (2/13/2023)    PRAPARE - Transportation     Lack of Transportation (Medical): No     Lack of Transportation (Non-Medical): No   Physical Activity: Not on file   Stress: Not on file   Social Connections: Not on file   Intimate Partner Violence: Not on file   Housing Stability: Low Risk  (2/13/2023)    Housing Stability Vital Sign     Unable to Pay for Housing in the Last Year: No     Number of Places Lived in the Last Year: 1     Unstable Housing in the Last Year: No       Allergies   Allergen Reactions    Oyster Extract GI  Intolerance and Vomiting     Reaction eating oysters       Current Outpatient Medications   Medication Sig Dispense Refill    cephalexin (KEFLEX) 500 mg capsule TAKE 1 CAPSULE BY MOUTH FOUR TIMES A DAY START DAY OF SURGERY (Patient not taking: Reported on 1/31/2025)      ketoconazole (NIZORAL) 2 % cream Apply topically 2 (two) times a day To face 30 g 3    loratadine (CLARITIN) 10 mg tablet Take 10 mg by mouth if needed for allergies      testosterone (ANDROGEL) 1.62 % TD gel pump 1 pump daily 30 actuation 3     No current facility-administered medications for this visit.       (Not in a hospital admission)      Objective:     24 Hour Vitals Assessment:     There were no vitals filed for this visit.        Weight at last visit: 268 lbs    Weight today: 276 lbs    PHYSICIAN EXAM    General: Appearance: alert, cooperative, no distress.  HEENT: Normocephalic, atraumatic. No scleral icterus. conjunctivae clear. EOMI.  Chest: No tenderness to palpation. No open wound noted. L gynecomastia noted  Lungs: Clear to auscultation bilaterally, Respirations unlabored.  Cardiac: Regular rate and rhythm, +S1and S2  Abdomen: Soft, non-tender, non-distended. Bowel sounds are normal.   : b/l testicular exam WNL (last exam)  L breast exam: nodule or two  Extremities:  No edema, cyanosis, clubbing.  Skin: Skin color, turgor are normal. No rashes.  Lymphatics: no palpable axillary, or inguinal adenopathy      Neurologic: Awake, Alert, and oriented, no gross focal deficits noted b/l.       DATA REVIEW:    Pathology Result:    Final Diagnosis   Date Value Ref Range Status   01/31/2025   Final    A. Polyp, Colorectal, trans verse x 2 . cold bx:    - Tubular adenomas.     - Negative for high grade dysplasia.      05/15/2024   Final    A. Skin, mid back, excision:    EPIDERMAL INCLUSION CYST.          02/22/2024   Final    A. Skin, right upper eyelid:    SEBORRHEIC KERATOSIS, IRRITATED AND INFLAMED.          06/16/2023   Final    A.  Left  testis (radical orchiectomy):     - Negative for carcinoma.      - Testicular parenchyma with extensive fibrosis. See comment.     Comment:  The history of metastatic seminoma involving a left neck lymph node ([id number]) status post chemotherapy is noted.      B.  Left spermatic cord lipoma (excision):     - Mature fibroadipose tissue, compatible with lipoma.     - One (1) lymph node, negative for carcinoma.    C.  Left spermatic cord (resection):     - Benign spermatic cord.     01/11/2023   Final    A.  Lymph node, left neck, excision:  -Germ cell tumor, compatible with metastatic seminoma (see note).  -Background nonnecrotizing granulomatous inflammation (see note).       Comment:     This is an appended report. These results have been appended to a previously preliminary verified report.   10/25/2019   Final    A. Cecum, cold biopsy:  - Portion of benign colonic mucosa with prominent reactive lymphoid aggregate.     B. Ascending colon, cold snare:  - Tubular adenoma.  - Negative for high grade dysplasia/ carcinoma.    C. Colon, splenic flexure, cold snare:  - Tubular adenoma.  - Negative for high grade dysplasia/ carcinoma.            Image Results:   Image result are reviewed and documented in Hematology/Oncology history    Colonoscopy  Narrative: Table formatting from the original result was not included.  Formerly [facility]  [address] [facility] [address]  [phone]    DATE OF SERVICE:  1/31/25    PHYSICIAN(S):  Attending:   Guy Early MD     INDICATION:  Hx of colonic polyp    POST-OP DIAGNOSIS:  See the impression below.    HISTORY:  Prior colonoscopy: 6 years ago.    BOWEL PREPARATION:  Miralax/Dulcolax    PREPROCEDURE:  Informed consent was obtained for the procedure, including sedation. Risks   including but not limited to bleeding, infection, perforation, adverse   drug reaction and aspiration were explained in detail. Also explained   about less than 100% sensitivity  with the exam and other alternatives. The   patient was placed in the left lateral decubitus position.    Procedure: Colonoscopy     DETAILS OF PROCEDURE:   Patient was taken to the procedure room where a time out was performed to   confirm correct patient and correct procedure. The patient underwent   monitored anesthesia care, which was administered by an anesthesia   professional. The patient's blood pressure, ECG, ETCO2, heart rate, level   of consciousness, oxygen and respirations were monitored throughout the   procedure. A digital rectal exam was performed. A perianal exam was   performed. The scope was introduced through the anus and advanced to the   cecum. Retroflexion was performed in the rectum. The quality of bowel   preparation was evaluated using the Paisley Bowel Preparation Scale with   scores of: right colon = 2, transverse colon = 2, left colon = 2. The   total BBPS score was 6. Bowel prep was adequate. The patient experienced   no blood loss. The procedure was not difficult. The patient tolerated the   procedure well. There were no apparent adverse events.     ANESTHESIA INFORMATION:  ASA: II  Anesthesia Type: IV Sedation with Anesthesia    MEDICATIONS:  No administrations occurring from 0742 to 0814 on 01/31/25     FINDINGS:  Two sessile and benign-appearing polyps measuring smaller than 5 mm in the   transverse colon; performed cold forceps biopsy with complete en bloc   removal  Few scattered diverticula in the descending colon and sigmoid colon    EVENTS:  Procedure Events   Event Event Time   ENDO CECUM REACHED 1/31/2025  7:48 AM   ENDO SCOPE OUT TIME 1/31/2025  8:12 AM     SPECIMENS:  ID Type Source Tests Collected by Time Destination   1 : trans verse x 2 . cold bx Tissue Polyp, Colorectal TISSUE EXAM Guy Early MD 1/31/2025 0753      EQUIPMENT:  Colonoscope -[id number]   Impression: 2 subcentimeter polyps in the transverse colon were removed with cold   forceps biopsy  Scattered  "diverticulosis in the descending colon and sigmoid colon    RECOMMENDATION:  Repeat colonoscopy in 5 years, due: 1/30/2030  Personal history of colon polyps                 Guy Early MD       LABS:  Lab data are reviewed and documented in [facility] history.       Lab Results   Component Value Date    HGB 14.9 11/22/2024    HCT 42.5 11/22/2024    MCV 93 11/22/2024     11/22/2024    WBC 5.98 11/22/2024    NRBC 0 05/17/2024    BANDSPCT 1 04/26/2023    ATYLMPCT 2 (H) 04/21/2023     Lab Results   Component Value Date    K 3.8 12/08/2024     12/08/2024    CO2 28 12/08/2024    BUN 25 12/08/2024    CREATININE 1.08 12/08/2024    GLUF 86 12/08/2024    CALCIUM 9.2 12/08/2024    CORRECTEDCA 9.0 03/31/2023    AST 24 12/08/2024    ALT 32 12/08/2024    ALKPHOS 66 12/08/2024    EGFR 76 12/08/2024       No results found for: \"IRON\", \"TIBC\", \"FERRITIN\"    Lab Results   Component Value Date    MGIRIHGB63 321 10/05/2023       No results for input(s): \"WBC\", \"CREAT\", \"PLT\" in the last 72 hours.    By:  Nancy Vázquez MD, 2/28/2025, 11:43 AM                                  "

## 2025-03-02 ENCOUNTER — APPOINTMENT (OUTPATIENT)
Dept: LAB | Facility: HOSPITAL | Age: 55
End: 2025-03-02
Payer: COMMERCIAL

## 2025-03-02 ENCOUNTER — HOSPITAL ENCOUNTER (OUTPATIENT)
Dept: RADIOLOGY | Facility: HOSPITAL | Age: 55
Discharge: HOME | End: 2025-03-02
Payer: COMMERCIAL

## 2025-03-02 DIAGNOSIS — C62.90 SEMINOMA (HCC): ICD-10-CM

## 2025-03-02 LAB
AFP-TM SERPL-MCNC: 4.21 NG/ML (ref 0–9)
ALBUMIN SERPL BCG-MCNC: 4.3 G/DL (ref 3.5–5)
ALP SERPL-CCNC: 57 U/L (ref 34–104)
ALT SERPL W P-5'-P-CCNC: 20 U/L (ref 7–52)
ANION GAP SERPL CALCULATED.3IONS-SCNC: 5 MMOL/L (ref 4–13)
AST SERPL W P-5'-P-CCNC: 18 U/L (ref 13–39)
BILIRUB SERPL-MCNC: 1.17 MG/DL (ref 0.2–1)
BUN SERPL-MCNC: 19 MG/DL (ref 5–25)
CALCIUM SERPL-MCNC: 9.2 MG/DL (ref 8.4–10.2)
CHLORIDE SERPL-SCNC: 104 MMOL/L (ref 96–108)
CO2 SERPL-SCNC: 29 MMOL/L (ref 21–32)
CREAT SERPL-MCNC: 1.16 MG/DL (ref 0.6–1.3)
GFR SERPL CREATININE-BSD FRML MDRD: 70 ML/MIN/1.73SQ M
GLUCOSE P FAST SERPL-MCNC: 96 MG/DL (ref 65–99)
HCG-TM SERPL-SCNC: 0.9 MLU/ML
LDH SERPL-CCNC: 152 U/L (ref 140–271)
POTASSIUM SERPL-SCNC: 4 MMOL/L (ref 3.5–5.3)
PROT SERPL-MCNC: 7.2 G/DL (ref 6.4–8.4)
SODIUM SERPL-SCNC: 138 MMOL/L (ref 135–147)

## 2025-03-02 PROCEDURE — 84702 CHORIONIC GONADOTROPIN TEST: CPT

## 2025-03-02 PROCEDURE — 83615 LACTATE (LD) (LDH) ENZYME: CPT

## 2025-03-02 PROCEDURE — 71046 X-RAY EXAM CHEST 2 VIEWS: CPT

## 2025-03-02 PROCEDURE — 80053 COMPREHEN METABOLIC PANEL: CPT

## 2025-03-02 PROCEDURE — 36415 COLL VENOUS BLD VENIPUNCTURE: CPT

## 2025-03-02 PROCEDURE — 82105 ALPHA-FETOPROTEIN SERUM: CPT

## 2025-03-10 ENCOUNTER — OFFICE VISIT (OUTPATIENT)
Dept: HEMATOLOGY ONCOLOGY | Facility: CLINIC | Age: 55
End: 2025-03-10
Payer: COMMERCIAL

## 2025-03-10 VITALS
HEART RATE: 59 BPM | DIASTOLIC BLOOD PRESSURE: 82 MMHG | OXYGEN SATURATION: 98 % | BODY MASS INDEX: 34.32 KG/M2 | TEMPERATURE: 97.2 F | HEIGHT: 75 IN | WEIGHT: 276 LBS | SYSTOLIC BLOOD PRESSURE: 138 MMHG | RESPIRATION RATE: 16 BRPM

## 2025-03-10 DIAGNOSIS — C62.90 SEMINOMA (HCC): Primary | ICD-10-CM

## 2025-03-10 PROCEDURE — 99214 OFFICE O/P EST MOD 30 MIN: CPT | Performed by: INTERNAL MEDICINE

## 2025-05-07 ENCOUNTER — OFFICE VISIT (OUTPATIENT)
Dept: ENDOCRINOLOGY | Facility: CLINIC | Age: 55
End: 2025-05-07
Payer: COMMERCIAL

## 2025-05-07 VITALS
WEIGHT: 275.6 LBS | HEIGHT: 75 IN | SYSTOLIC BLOOD PRESSURE: 128 MMHG | BODY MASS INDEX: 34.27 KG/M2 | DIASTOLIC BLOOD PRESSURE: 78 MMHG

## 2025-05-07 DIAGNOSIS — E29.1 PRIMARY HYPOGONADISM IN MALE: ICD-10-CM

## 2025-05-07 DIAGNOSIS — N62 GYNECOMASTIA: Primary | ICD-10-CM

## 2025-05-07 DIAGNOSIS — E29.1 HYPOGONADISM, MALE: ICD-10-CM

## 2025-05-07 DIAGNOSIS — E53.8 B12 DEFICIENCY: ICD-10-CM

## 2025-05-07 PROCEDURE — 99214 OFFICE O/P EST MOD 30 MIN: CPT | Performed by: INTERNAL MEDICINE

## 2025-05-07 RX ORDER — TESTOSTERONE 1.62 MG/G
GEL TRANSDERMAL
Qty: 30 ACTUATION | Refills: 3 | Status: SHIPPED | OUTPATIENT
Start: 2025-05-07

## 2025-05-07 NOTE — PROGRESS NOTES
Name: Mario Ratliff      : 1970      MRN: [id number]  Encounter Provider: Dung Baires DO  Encounter Date: 2025   Encounter department: [facility] [place]    No chief complaint on file.  :  Assessment & Plan  Gynecomastia  Status post surgery in 2024.  Continue testosterone as below.  Orders:    Testosterone, free, total    CBC    TSH, 3rd generation    T4, free    Comprehensive metabolic panel    Hypogonadism, male  Continue current testosterone and update labs.  If these are stable we will arrange follow-up in 1 year.  Orders:    Testosterone, free, total    CBC    TSH, 3rd generation    T4, free    Comprehensive metabolic panel    B12 deficiency    Orders:    Vitamin B12    Primary hypogonadism in male    Orders:    testosterone (ANDROGEL) 1.62 % TD gel pump; 1 pump daily      Assessment & Plan        History of Present Illness   History of Present Illness    Mario Ratliff is a 54 y.o. male who presents today overall feeling well.  Tolerating testosterone well.  No obvious side effects.  He is status post surgery for gynecomastia in December.    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  I initially saw him in the office in 2024 for evaluation of gynecomastia.  Lab work evaluation overall was unrevealing with the exception of lower testosterone levels and elevated gonadotropins.  Based on these labs, it was decided to trial low-dose testosterone replacement to supplement his testosterone level and see if there is any improvement in gynecomastia symptoms.  After some time, patient wished to pursue plastic surgery and referral was placed and surgery was performed in 2024.        Review of Systems   All other systems reviewed and are negative.   as per HPI       Medical History Reviewed by provider this encounter:     .    Objective   /78 (BP Location: Right arm, Patient Position: Sitting,  "Cuff Size: Adult)   Ht 6' 3\" (1.905 m)   Wt 125 kg (275 lb 9.6 oz)   BMI 34.45 kg/m²      Body mass index is 34.45 kg/m².  Wt Readings from Last 3 Encounters:   05/07/25 125 kg (275 lb 9.6 oz)   03/10/25 125 kg (276 lb)   01/31/25 119 kg (263 lb)     Physical Exam  Vitals reviewed.   Constitutional:       General: He is not in acute distress.     Appearance: He is well-developed. He is not diaphoretic.   HENT:      Head: Normocephalic and atraumatic.   Eyes:      Conjunctiva/sclera: Conjunctivae normal.      Pupils: Pupils are equal, round, and reactive to light.   Neck:      Thyroid: No thyromegaly.   Cardiovascular:      Rate and Rhythm: Normal rate and regular rhythm.   Pulmonary:      Effort: Pulmonary effort is normal. No respiratory distress.      Breath sounds: Normal breath sounds.   Abdominal:      General: Bowel sounds are normal.      Palpations: Abdomen is soft.   Musculoskeletal:         General: Normal range of motion.      Cervical back: Normal range of motion and neck supple.   Skin:     General: Skin is warm and dry.      Findings: No rash.   Neurological:      Mental Status: He is alert and oriented to person, place, and time.      Motor: No abnormal muscle tone.   Psychiatric:         Behavior: Behavior normal.       Physical Exam      Results    Labs:   No results found for: \"HGBA1C\"  Lab Results   Component Value Date    CREATININE 1.16 03/02/2025    CREATININE 1.08 12/08/2024    CREATININE 1.08 11/22/2024    BUN 19 03/02/2025    K 4.0 03/02/2025     03/02/2025    CO2 29 03/02/2025     eGFR   Date Value Ref Range Status   03/02/2025 70 ml/min/1.73sq m Final     Lab Results   Component Value Date    HDL 49 12/08/2024    TRIG 82 12/08/2024     Lab Results   Component Value Date    ALT 20 03/02/2025    AST 18 03/02/2025    ALKPHOS 57 03/02/2025     Lab Results   Component Value Date    PUM3SRKFKLSU 4.639 (H) 05/17/2024     Lab Results   Component Value Date    FREET4 0.69 05/17/2024 "       There are no Patient Instructions on file for this visit.    Discussed with the patient and all questioned fully answered. He will call me if any problems arise.

## 2025-05-07 NOTE — ASSESSMENT & PLAN NOTE
Status post surgery in December 2024.  Continue testosterone as below.  Orders:    Testosterone, free, total    CBC    TSH, 3rd generation    T4, free    Comprehensive metabolic panel

## 2025-05-19 ENCOUNTER — OFFICE VISIT (OUTPATIENT)
Dept: FAMILY MEDICINE CLINIC | Facility: CLINIC | Age: 55
End: 2025-05-19
Payer: COMMERCIAL

## 2025-05-19 VITALS
HEIGHT: 75 IN | BODY MASS INDEX: 34.29 KG/M2 | DIASTOLIC BLOOD PRESSURE: 80 MMHG | OXYGEN SATURATION: 98 % | SYSTOLIC BLOOD PRESSURE: 124 MMHG | HEART RATE: 50 BPM | WEIGHT: 275.8 LBS | TEMPERATURE: 96.1 F

## 2025-05-19 DIAGNOSIS — M79.675 PAIN OF TOE OF LEFT FOOT: Primary | ICD-10-CM

## 2025-05-19 DIAGNOSIS — Z12.5 SCREENING FOR PROSTATE CANCER: ICD-10-CM

## 2025-05-19 PROBLEM — D84.9 IMMUNOCOMPROMISED (HCC): Status: RESOLVED | Noted: 2024-03-25 | Resolved: 2025-05-19

## 2025-05-19 PROCEDURE — 99213 OFFICE O/P EST LOW 20 MIN: CPT | Performed by: FAMILY MEDICINE

## 2025-05-19 NOTE — PROGRESS NOTES
"Name: Mario Ratliff      : 1970      MRN: [id number]  Encounter Provider: Maurice Muñoz DO  Encounter Date: 2025   Encounter department: [facility] PRACT[facility]  :  Assessment & Plan  Pain of toe of left foot  Recommend uric acid level and basic metabolic panel to rule out gout.  Consider follow-up with podiatrist if no improvement or worsening symptoms.  Orders:    Basic metabolic panel; Future    Uric acid; Future    Screening for prostate cancer    Orders:    PSA, Total Screen; Future           History of Present Illness   Patient with pain to the left toe for 1 to 2 weeks.  Pain is mild.  Denies any fever or inflammation.  No recent trauma.  Patient is otherwise generally feeling well.  No other joint pain.  No prior history of gout.      Review of Systems   Constitutional: Negative.    HENT: Negative.     Eyes: Negative.    Respiratory: Negative.     Cardiovascular: Negative.    Gastrointestinal: Negative.    Endocrine: Negative.    Genitourinary: Negative.    Musculoskeletal:  Positive for arthralgias.   Skin: Negative.    Allergic/Immunologic: Negative.    Neurological: Negative.    Hematological: Negative.    Psychiatric/Behavioral: Negative.         Objective   /80 (BP Location: Left arm, Patient Position: Sitting, Cuff Size: Large)   Pulse (!) 50   Temp (!) 96.1 °F (35.6 °C) (Tympanic)   Ht 6' 3\" (1.905 m)   Wt 125 kg (275 lb 12.8 oz)   SpO2 98%   BMI 34.47 kg/m²      Physical Exam  Vitals reviewed.   Constitutional:       Appearance: He is well-developed.   HENT:      Head: Normocephalic and atraumatic.      Right Ear: External ear normal.      Left Ear: External ear normal.      Nose: Nose normal.     Eyes:      General: No scleral icterus.        Right eye: No discharge.         Left eye: No discharge.      Conjunctiva/sclera: Conjunctivae normal.      Pupils: Pupils are equal, round, and reactive to light.     Neck:      Thyroid: No thyromegaly.      Vascular: No " JVD.      Trachea: No tracheal deviation.     Cardiovascular:      Rate and Rhythm: Normal rate and regular rhythm.      Heart sounds: Normal heart sounds. No murmur heard.     No friction rub. No gallop.   Pulmonary:      Effort: Pulmonary effort is normal. No respiratory distress.      Breath sounds: Normal breath sounds. No stridor. No wheezing or rales.   Chest:      Chest wall: No tenderness.   Abdominal:      General: There is no distension.      Palpations: There is no mass.      Tenderness: There is no abdominal tenderness. There is no guarding or rebound.      Hernia: No hernia is present.     Musculoskeletal:         General: Tenderness (No tenderness to distal toe.  Mild tenderness to left first MTP joint) present. No deformity. Normal range of motion.      Cervical back: Normal range of motion and neck supple.   Lymphadenopathy:      Cervical: No cervical adenopathy.     Skin:     General: Skin is warm and dry.      Capillary Refill: Capillary refill takes less than 2 seconds.      Coloration: Skin is not pale.      Findings: No erythema or rash.     Neurological:      Mental Status: He is alert and oriented to person, place, and time.      Cranial Nerves: No cranial nerve deficit.      Sensory: No sensory deficit.      Motor: No abnormal muscle tone.      Coordination: Coordination normal.      Deep Tendon Reflexes: Reflexes normal.     Psychiatric:         Behavior: Behavior normal.

## 2025-05-23 ENCOUNTER — RESULTS FOLLOW-UP (OUTPATIENT)
Dept: FAMILY MEDICINE CLINIC | Facility: CLINIC | Age: 55
End: 2025-05-23

## 2025-05-23 ENCOUNTER — APPOINTMENT (OUTPATIENT)
Dept: LAB | Facility: HOSPITAL | Age: 55
End: 2025-05-23
Payer: COMMERCIAL

## 2025-05-23 DIAGNOSIS — M79.675 PAIN OF TOE OF LEFT FOOT: ICD-10-CM

## 2025-05-23 LAB
ALBUMIN SERPL BCG-MCNC: 4.3 G/DL (ref 3.5–5)
ALP SERPL-CCNC: 54 U/L (ref 34–104)
ALT SERPL W P-5'-P-CCNC: 17 U/L (ref 7–52)
ANION GAP SERPL CALCULATED.3IONS-SCNC: 6 MMOL/L (ref 4–13)
AST SERPL W P-5'-P-CCNC: 17 U/L (ref 13–39)
BILIRUB SERPL-MCNC: 1.19 MG/DL (ref 0.2–1)
BUN SERPL-MCNC: 24 MG/DL (ref 5–25)
CALCIUM SERPL-MCNC: 9.5 MG/DL (ref 8.4–10.2)
CHLORIDE SERPL-SCNC: 102 MMOL/L (ref 96–108)
CO2 SERPL-SCNC: 29 MMOL/L (ref 21–32)
CREAT SERPL-MCNC: 1.16 MG/DL (ref 0.6–1.3)
ERYTHROCYTE [DISTWIDTH] IN BLOOD BY AUTOMATED COUNT: 12.3 % (ref 11.6–15.1)
GFR SERPL CREATININE-BSD FRML MDRD: 70 ML/MIN/1.73SQ M
GLUCOSE P FAST SERPL-MCNC: 97 MG/DL (ref 65–99)
HCT VFR BLD AUTO: 44.3 % (ref 36.5–49.3)
HGB BLD-MCNC: 15.5 G/DL (ref 12–17)
MCH RBC QN AUTO: 33.4 PG (ref 26.8–34.3)
MCHC RBC AUTO-ENTMCNC: 35 G/DL (ref 31.4–37.4)
MCV RBC AUTO: 96 FL (ref 82–98)
PLATELET # BLD AUTO: 191 THOUSANDS/UL (ref 149–390)
PMV BLD AUTO: 9.1 FL (ref 8.9–12.7)
POTASSIUM SERPL-SCNC: 4 MMOL/L (ref 3.5–5.3)
PROT SERPL-MCNC: 7.2 G/DL (ref 6.4–8.4)
PSA SERPL-MCNC: 0.56 NG/ML (ref 0–4)
RBC # BLD AUTO: 4.64 MILLION/UL (ref 3.88–5.62)
SODIUM SERPL-SCNC: 137 MMOL/L (ref 135–147)
T4 FREE SERPL-MCNC: 0.92 NG/DL (ref 0.61–1.12)
TSH SERPL DL<=0.05 MIU/L-ACNC: 4.35 UIU/ML (ref 0.45–4.5)
URATE SERPL-MCNC: 7.3 MG/DL (ref 3.5–8.5)
VIT B12 SERPL-MCNC: 348 PG/ML (ref 180–914)
WBC # BLD AUTO: 5.55 THOUSAND/UL (ref 4.31–10.16)

## 2025-05-23 PROCEDURE — 84550 ASSAY OF BLOOD/URIC ACID: CPT

## 2025-05-25 LAB
TESTOST FREE SERPL-MCNC: 5.7 PG/ML (ref 7.2–24)
TESTOST SERPL-MCNC: 470 NG/DL (ref 264–916)

## 2025-05-27 ENCOUNTER — RESULTS FOLLOW-UP (OUTPATIENT)
Dept: ENDOCRINOLOGY | Facility: CLINIC | Age: 55
End: 2025-05-27

## 2025-05-27 DIAGNOSIS — E29.1 PRIMARY HYPOGONADISM IN MALE: ICD-10-CM

## 2025-05-27 DIAGNOSIS — E29.1 HYPOGONADISM, MALE: Primary | ICD-10-CM

## 2025-06-18 ENCOUNTER — TELEPHONE (OUTPATIENT)
Dept: UROLOGY | Facility: CLINIC | Age: 55
End: 2025-06-18

## 2025-06-28 ENCOUNTER — HOSPITAL ENCOUNTER (OUTPATIENT)
Dept: CT IMAGING | Facility: HOSPITAL | Age: 55
Discharge: HOME | End: 2025-06-28
Attending: INTERNAL MEDICINE
Payer: COMMERCIAL

## 2025-06-28 DIAGNOSIS — C62.90 SEMINOMA (HCC): ICD-10-CM

## 2025-06-28 PROCEDURE — 74177 CT ABD & PELVIS W/CONTRAST: CPT

## 2025-06-28 RX ADMIN — IOHEXOL 100 ML: 350 INJECTION, SOLUTION INTRAVENOUS at 08:10

## 2025-06-30 DIAGNOSIS — C62.90 SEMINOMA (HCC): Primary | ICD-10-CM

## 2025-07-02 NOTE — ASSESSMENT & PLAN NOTE
This is a 54 y.o. 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

## 2025-07-03 ENCOUNTER — HOSPITAL ENCOUNTER (OUTPATIENT)
Dept: RADIOLOGY | Facility: HOSPITAL | Age: 55
Discharge: HOME | End: 2025-07-03
Payer: COMMERCIAL

## 2025-07-03 ENCOUNTER — APPOINTMENT (OUTPATIENT)
Dept: LAB | Facility: HOSPITAL | Age: 55
End: 2025-07-03
Payer: COMMERCIAL

## 2025-07-03 DIAGNOSIS — C62.90 SEMINOMA (HCC): ICD-10-CM

## 2025-07-03 LAB
AFP-TM SERPL-MCNC: 4.75 NG/ML (ref 0–9)
ALBUMIN SERPL BCG-MCNC: 4.3 G/DL (ref 3.5–5)
ALP SERPL-CCNC: 54 U/L (ref 34–104)
ALT SERPL W P-5'-P-CCNC: 20 U/L (ref 7–52)
ANION GAP SERPL CALCULATED.3IONS-SCNC: 5 MMOL/L (ref 4–13)
AST SERPL W P-5'-P-CCNC: 18 U/L (ref 13–39)
BILIRUB SERPL-MCNC: 0.85 MG/DL (ref 0.2–1)
BUN SERPL-MCNC: 26 MG/DL (ref 5–25)
CALCIUM SERPL-MCNC: 9.3 MG/DL (ref 8.4–10.2)
CHLORIDE SERPL-SCNC: 104 MMOL/L (ref 96–108)
CO2 SERPL-SCNC: 27 MMOL/L (ref 21–32)
CREAT SERPL-MCNC: 1.1 MG/DL (ref 0.6–1.3)
ERYTHROCYTE [DISTWIDTH] IN BLOOD BY AUTOMATED COUNT: 12 % (ref 11.6–15.1)
GFR SERPL CREATININE-BSD FRML MDRD: 74 ML/MIN/1.73SQ M
GLUCOSE P FAST SERPL-MCNC: 94 MG/DL (ref 65–99)
HCG-TM SERPL-SCNC: 0.7 MLU/ML
HCT VFR BLD AUTO: 43.4 % (ref 36.5–49.3)
HGB BLD-MCNC: 15.4 G/DL (ref 12–17)
LDH SERPL-CCNC: 133 U/L (ref 140–271)
MCH RBC QN AUTO: 33 PG (ref 26.8–34.3)
MCHC RBC AUTO-ENTMCNC: 35.5 G/DL (ref 31.4–37.4)
MCV RBC AUTO: 93 FL (ref 82–98)
PLATELET # BLD AUTO: 196 THOUSANDS/UL (ref 149–390)
PMV BLD AUTO: 8.8 FL (ref 8.9–12.7)
POTASSIUM SERPL-SCNC: 4.2 MMOL/L (ref 3.5–5.3)
PROT SERPL-MCNC: 7 G/DL (ref 6.4–8.4)
PSA SERPL-MCNC: 0.55 NG/ML (ref 0–4)
RBC # BLD AUTO: 4.66 MILLION/UL (ref 3.88–5.62)
SODIUM SERPL-SCNC: 136 MMOL/L (ref 135–147)
WBC # BLD AUTO: 5.09 THOUSAND/UL (ref 4.31–10.16)

## 2025-07-03 PROCEDURE — 85027 COMPLETE CBC AUTOMATED: CPT | Performed by: INTERNAL MEDICINE

## 2025-07-03 PROCEDURE — 84403 ASSAY OF TOTAL TESTOSTERONE: CPT | Performed by: INTERNAL MEDICINE

## 2025-07-03 PROCEDURE — 84402 ASSAY OF FREE TESTOSTERONE: CPT | Performed by: INTERNAL MEDICINE

## 2025-07-03 PROCEDURE — 80053 COMPREHEN METABOLIC PANEL: CPT | Performed by: INTERNAL MEDICINE

## 2025-07-03 PROCEDURE — 83615 LACTATE (LD) (LDH) ENZYME: CPT

## 2025-07-03 PROCEDURE — 36415 COLL VENOUS BLD VENIPUNCTURE: CPT | Performed by: INTERNAL MEDICINE

## 2025-07-03 PROCEDURE — G0103 PSA SCREENING: HCPCS | Performed by: INTERNAL MEDICINE

## 2025-07-03 PROCEDURE — 71046 X-RAY EXAM CHEST 2 VIEWS: CPT

## 2025-07-03 PROCEDURE — 84702 CHORIONIC GONADOTROPIN TEST: CPT

## 2025-07-03 PROCEDURE — 82105 ALPHA-FETOPROTEIN SERUM: CPT

## 2025-07-05 LAB
TESTOST FREE SERPL-MCNC: 2.4 PG/ML (ref 7.2–24)
TESTOST SERPL-MCNC: 258 NG/DL (ref 264–916)

## 2025-07-07 DIAGNOSIS — E29.1 PRIMARY HYPOGONADISM IN MALE: ICD-10-CM

## 2025-07-07 RX ORDER — TESTOSTERONE 1.62 MG/G
GEL TRANSDERMAL
Qty: 60 ACTUATION | Refills: 3 | Status: SHIPPED | OUTPATIENT
Start: 2025-07-07

## 2025-07-10 ENCOUNTER — OFFICE VISIT (OUTPATIENT)
Dept: HEMATOLOGY ONCOLOGY | Facility: CLINIC | Age: 55
End: 2025-07-10
Payer: COMMERCIAL

## 2025-07-10 VITALS
HEIGHT: 75 IN | BODY MASS INDEX: 34.32 KG/M2 | DIASTOLIC BLOOD PRESSURE: 84 MMHG | RESPIRATION RATE: 16 BRPM | SYSTOLIC BLOOD PRESSURE: 132 MMHG | HEART RATE: 59 BPM | WEIGHT: 276 LBS | OXYGEN SATURATION: 97 % | TEMPERATURE: 97.3 F

## 2025-07-10 DIAGNOSIS — C62.90 SEMINOMA (HCC): Primary | ICD-10-CM

## 2025-07-10 PROCEDURE — 99214 OFFICE O/P EST MOD 30 MIN: CPT | Performed by: INTERNAL MEDICINE

## (undated) DEVICE — 3000CC GUARDIAN II: Brand: GUARDIAN

## (undated) DEVICE — ELECTRODE NEEDLE MOD E-Z CLEAN 2.75IN 7CM -0013M

## (undated) DEVICE — PENROSE DRAIN, 18 X 3 8: Brand: CARDINAL HEALTH

## (undated) DEVICE — GLOVE SRG BIOGEL 8

## (undated) DEVICE — SUT PROLENE 2-0 SH 36 IN 8523H

## (undated) DEVICE — 3M™ STERI-STRIP™ REINFORCED ADHESIVE SKIN CLOSURES, R1547, 1/2 IN X 4 IN (12 MM X 100 MM), 6 STRIPS/ENVELOPE: Brand: 3M™ STERI-STRIP™

## (undated) DEVICE — PADDING CAST 6IN COTTON STRL

## (undated) DEVICE — SUT SILK 2-0 SH 30 IN K833H

## (undated) DEVICE — VIAL DECANTER

## (undated) DEVICE — SUT MONOCRYL 4-0 PS-2 18 IN Y496G

## (undated) DEVICE — GAUZE SPONGES,16 PLY: Brand: CURITY

## (undated) DEVICE — INTENDED FOR TISSUE SEPARATION, AND OTHER PROCEDURES THAT REQUIRE A SHARP SURGICAL BLADE TO PUNCTURE OR CUT.: Brand: BARD-PARKER ® CARBON RIB-BACK BLADES

## (undated) DEVICE — ABDOMINAL PAD: Brand: DERMACEA

## (undated) DEVICE — TUBING SUCTION 5MM X 12 FT

## (undated) DEVICE — TUBING INFILTRATION SNGL SPIKE F/LIPOTOWER

## (undated) DEVICE — 3M™ STERI-DRAPE™ U-DRAPE 1015: Brand: STERI-DRAPE™

## (undated) DEVICE — NEPTUNE E-SEP SMOKE EVACUATION PENCIL, COATED, 70MM BLADE, PUSH BUTTON SWITCH: Brand: NEPTUNE E-SEP

## (undated) DEVICE — GLOVE INDICATOR PI UNDERGLOVE SZ 8.5 BLUE

## (undated) DEVICE — ACE WRAP 6 IN UNSTERILE

## (undated) DEVICE — COBAN 6 IN STERILE

## (undated) DEVICE — PLUMEPEN PRO 10FT

## (undated) DEVICE — BETHLEHEM UNIVERSAL BREAST PK: Brand: CARDINAL HEALTH

## (undated) DEVICE — BETHLEHEM UNIVERSAL MINOR GEN: Brand: CARDINAL HEALTH

## (undated) DEVICE — BETHLEHEM UNIVERSAL  ARTHRO PK: Brand: CARDINAL HEALTH

## (undated) DEVICE — BULB SYRINGE,IRRIGATION WITH PROTECTIVE CAP: Brand: DOVER

## (undated) DEVICE — SCD SEQUENTIAL COMPRESSION COMFORT SLEEVE MEDIUM KNEE LENGTH: Brand: KENDALL SCD

## (undated) DEVICE — UNDYED MONOFILAMENT (POLYDIOXANONE), ABSORBABLE SURGICAL SUTURE: Brand: PDS

## (undated) DEVICE — 3M™ IOBAN™ 2 ANTIMICROBIAL INCISE DRAPE 6650EZ: Brand: IOBAN™ 2

## (undated) DEVICE — MEDI-VAC YANK SUCT HNDL W/TPRD BULBOUS TIP: Brand: CARDINAL HEALTH

## (undated) DEVICE — NEEDLE 18 G X 1 1/2

## (undated) DEVICE — GLOVE INDICATOR PI UNDERGLOVE SZ 8 BLUE

## (undated) DEVICE — SUT CHROMIC 3-0 SH 27 IN G122H

## (undated) DEVICE — SUT VICRYL 3-0 SH 27 IN J416H

## (undated) DEVICE — GLOVE INDICATOR PI UNDERGLOVE SZ 7 BLUE

## (undated) DEVICE — GLOVE SRG BIOGEL ECLIPSE 8

## (undated) DEVICE — SPONGE STICK WITH PVP-I: Brand: KENDALL

## (undated) DEVICE — CHLORAPREP HI-LITE 10.5ML ORANGE

## (undated) DEVICE — TUBING LIPOSUCTION ASPIRATION 12FT STERILE

## (undated) DEVICE — IMPERVIOUS STOCKINETTE: Brand: DEROYAL

## (undated) DEVICE — EXOFIN PRECISION PEN HIGH VISCOSITY TOPICAL SKIN ADHESIVE: Brand: EXOFIN PRECISION PEN, 1G

## (undated) DEVICE — DRAPE EQUIPMENT RF WAND

## (undated) DEVICE — ASTOUND STANDARD SURGICAL GOWN, XL: Brand: CONVERTORS

## (undated) DEVICE — INTENDED FOR TISSUE SEPARATION, AND OTHER PROCEDURES THAT REQUIRE A SHARP SURGICAL BLADE TO PUNCTURE OR CUT.: Brand: BARD-PARKER SAFETY BLADES SIZE 11, STERILE

## (undated) DEVICE — SPONGE LAP 18 X 18 IN STRL RFD

## (undated) DEVICE — PUDDLE VAC

## (undated) DEVICE — INTENDED FOR TISSUE SEPARATION, AND OTHER PROCEDURES THAT REQUIRE A SHARP SURGICAL BLADE TO PUNCTURE OR CUT.: Brand: BARD-PARKER SAFETY BLADES SIZE 15, STERILE

## (undated) DEVICE — SUT MONOCRYL 4-0 P-3 18 IN Y494G

## (undated) DEVICE — BAG DECANTER

## (undated) DEVICE — VEIN PICK

## (undated) DEVICE — SMOKE EVAC FLUID SUCTION HEPA FILTER

## (undated) DEVICE — NEEDLE 25G X 1 1/2

## (undated) DEVICE — SUT MONOCRYL 4-0 PS-2 27 IN Y426H

## (undated) DEVICE — SUT MONOCRYL 2-0 SH 27 IN Y417H

## (undated) DEVICE — PAD GROUNDING DUAL ADULT

## (undated) DEVICE — 4-PORT MANIFOLD: Brand: NEPTUNE 2

## (undated) DEVICE — SYRINGE 1ML TB 26G X 3/8 SAFETY

## (undated) DEVICE — CHLORAPREP HI-LITE 26ML ORANGE

## (undated) DEVICE — SYRINGE 20ML LL

## (undated) DEVICE — TUBING EXTENSION 6 FT CONTIN WAVE

## (undated) DEVICE — DRAPE SURGIKIT SADDLE BAG

## (undated) DEVICE — TIBURON SPLIT SHEET: Brand: CONVERTORS

## (undated) DEVICE — INTENDED FOR TISSUE SEPARATION, AND OTHER PROCEDURES THAT REQUIRE A SHARP SURGICAL BLADE TO PUNCTURE OR CUT.: Brand: BARD-PARKER ® SAFETYLOCK CARBON RIB-BACK BLADES

## (undated) DEVICE — PAD GROUNDING ADULT

## (undated) DEVICE — BLADE SHAVER DISSECTOR 4MM 13CM COOLCUT

## (undated) DEVICE — DISPOSABLE EQUIPMENT COVER: Brand: SMALL TOWEL DRAPE

## (undated) DEVICE — GLOVE SRG BIOGEL 6.5

## (undated) DEVICE — GAUZE SPONGES,USP TYPE VII GAUZE, 12 PLY: Brand: CURITY

## (undated) DEVICE — SKIN MARKER DUAL TIP WITH RULER CAP, FLEXIBLE RULER AND LABELS: Brand: DEVON

## (undated) DEVICE — SYRINGE 30ML LL

## (undated) DEVICE — CYSTO TUBING TUR Y IRRIGATION

## (undated) DEVICE — SUT SILK 2-0 18 IN A185H

## (undated) DEVICE — STANDARD SURGICAL GOWN, L: Brand: CONVERTORS

## (undated) DEVICE — DISPOSABLE OR TOWEL: Brand: CARDINAL HEALTH

## (undated) DEVICE — 3M™ TEGADERM™ TRANSPARENT FILM DRESSING FRAME STYLE, 1626W, 4 IN X 4-3/4 IN (10 CM X 12 CM), 50/CT 4CT/CASE: Brand: 3M™ TEGADERM™

## (undated) DEVICE — STERILE POLYISOPRENE POWDER-FREE SURGICAL GLOVES: Brand: PROTEXIS

## (undated) DEVICE — SUT SILK 0 30 IN A306H

## (undated) DEVICE — CANNISTER WASTE IMPLOSION PROOF

## (undated) DEVICE — SUT VICRYL 2-0 SH 27 IN UNDYED J417H

## (undated) DEVICE — ADHESIVE SKIN HIGH VISCOSITY EXOFIN 1ML

## (undated) DEVICE — STRL BETHLACCESS CATHETER PACK: Brand: CARDINAL HEALTH

## (undated) DEVICE — CHEST/BREAST DRAPE: Brand: CONVERTORS

## (undated) DEVICE — SUT SILK 0 SH 30 IN K834H

## (undated) DEVICE — GLOVE SRG BIOGEL 7.5

## (undated) DEVICE — ROSEBUD DISSECTORS: Brand: DEROYAL

## (undated) DEVICE — DECANTER: Brand: UNBRANDED

## (undated) DEVICE — GLOVE SRG BIOGEL ECLIPSE 7.5